# Patient Record
Sex: MALE | Race: WHITE | NOT HISPANIC OR LATINO | Employment: OTHER | URBAN - METROPOLITAN AREA
[De-identification: names, ages, dates, MRNs, and addresses within clinical notes are randomized per-mention and may not be internally consistent; named-entity substitution may affect disease eponyms.]

---

## 2017-01-06 ENCOUNTER — APPOINTMENT (OUTPATIENT)
Dept: LAB | Facility: CLINIC | Age: 82
End: 2017-01-06
Payer: MEDICARE

## 2017-01-06 ENCOUNTER — TRANSCRIBE ORDERS (OUTPATIENT)
Dept: LAB | Facility: CLINIC | Age: 82
End: 2017-01-06

## 2017-01-06 DIAGNOSIS — E03.9 HYPOTHYROIDISM, UNSPECIFIED TYPE: ICD-10-CM

## 2017-01-06 DIAGNOSIS — E78.00 PURE HYPERCHOLESTEROLEMIA: Primary | ICD-10-CM

## 2017-01-06 DIAGNOSIS — E78.00 PURE HYPERCHOLESTEROLEMIA: ICD-10-CM

## 2017-01-06 LAB
ALBUMIN SERPL BCP-MCNC: 3.8 G/DL (ref 3.5–5)
ALP SERPL-CCNC: 89 U/L (ref 46–116)
ALT SERPL W P-5'-P-CCNC: 26 U/L (ref 12–78)
ANION GAP SERPL CALCULATED.3IONS-SCNC: 5 MMOL/L (ref 4–13)
AST SERPL W P-5'-P-CCNC: 15 U/L (ref 5–45)
BASOPHILS # BLD AUTO: 0.05 THOUSANDS/ΜL (ref 0–0.1)
BASOPHILS NFR BLD AUTO: 1 % (ref 0–1)
BILIRUB SERPL-MCNC: 0.56 MG/DL (ref 0.2–1)
BUN SERPL-MCNC: 21 MG/DL (ref 5–25)
CALCIUM SERPL-MCNC: 8.9 MG/DL (ref 8.3–10.1)
CHLORIDE SERPL-SCNC: 105 MMOL/L (ref 100–108)
CHOLEST SERPL-MCNC: 184 MG/DL (ref 50–200)
CO2 SERPL-SCNC: 30 MMOL/L (ref 21–32)
CREAT SERPL-MCNC: 1.34 MG/DL (ref 0.6–1.3)
EOSINOPHIL # BLD AUTO: 0.42 THOUSAND/ΜL (ref 0–0.61)
EOSINOPHIL NFR BLD AUTO: 6 % (ref 0–6)
ERYTHROCYTE [DISTWIDTH] IN BLOOD BY AUTOMATED COUNT: 12.4 % (ref 11.6–15.1)
GFR SERPL CREATININE-BSD FRML MDRD: 50.9 ML/MIN/1.73SQ M
GLUCOSE SERPL-MCNC: 117 MG/DL (ref 65–140)
HCT VFR BLD AUTO: 45.8 % (ref 36.5–49.3)
HDLC SERPL-MCNC: 46 MG/DL (ref 40–60)
HGB BLD-MCNC: 15.9 G/DL (ref 12–17)
LDLC SERPL CALC-MCNC: 107 MG/DL (ref 0–100)
LYMPHOCYTES # BLD AUTO: 1.04 THOUSANDS/ΜL (ref 0.6–4.47)
LYMPHOCYTES NFR BLD AUTO: 15 % (ref 14–44)
MCH RBC QN AUTO: 32.6 PG (ref 26.8–34.3)
MCHC RBC AUTO-ENTMCNC: 34.7 G/DL (ref 31.4–37.4)
MCV RBC AUTO: 94 FL (ref 82–98)
MONOCYTES # BLD AUTO: 0.78 THOUSAND/ΜL (ref 0.17–1.22)
MONOCYTES NFR BLD AUTO: 11 % (ref 4–12)
NEUTROPHILS # BLD AUTO: 4.53 THOUSANDS/ΜL (ref 1.85–7.62)
NEUTS SEG NFR BLD AUTO: 67 % (ref 43–75)
NRBC BLD AUTO-RTO: 0 /100 WBCS
NT-PROBNP SERPL-MCNC: 4073 PG/ML
PLATELET # BLD AUTO: 155 THOUSANDS/UL (ref 149–390)
PMV BLD AUTO: 10.4 FL (ref 8.9–12.7)
POTASSIUM SERPL-SCNC: 3.9 MMOL/L (ref 3.5–5.3)
PROT SERPL-MCNC: 7.8 G/DL (ref 6.4–8.2)
RBC # BLD AUTO: 4.88 MILLION/UL (ref 3.88–5.62)
SODIUM SERPL-SCNC: 140 MMOL/L (ref 136–145)
TRIGL SERPL-MCNC: 156 MG/DL
TSH SERPL DL<=0.05 MIU/L-ACNC: 4.17 UIU/ML (ref 0.36–3.74)
WBC # BLD AUTO: 6.84 THOUSAND/UL (ref 4.31–10.16)

## 2017-01-06 PROCEDURE — 85025 COMPLETE CBC W/AUTO DIFF WBC: CPT

## 2017-01-06 PROCEDURE — 80061 LIPID PANEL: CPT

## 2017-01-06 PROCEDURE — 80053 COMPREHEN METABOLIC PANEL: CPT

## 2017-01-06 PROCEDURE — 84443 ASSAY THYROID STIM HORMONE: CPT

## 2017-01-06 PROCEDURE — 36415 COLL VENOUS BLD VENIPUNCTURE: CPT

## 2017-01-06 PROCEDURE — 83880 ASSAY OF NATRIURETIC PEPTIDE: CPT

## 2017-01-20 ENCOUNTER — TRANSCRIBE ORDERS (OUTPATIENT)
Dept: LAB | Facility: CLINIC | Age: 82
End: 2017-01-20

## 2017-01-20 ENCOUNTER — APPOINTMENT (OUTPATIENT)
Dept: LAB | Facility: CLINIC | Age: 82
End: 2017-01-20
Payer: MEDICARE

## 2017-01-20 DIAGNOSIS — N18.30 CHRONIC KIDNEY DISEASE, STAGE III (MODERATE) (HCC): ICD-10-CM

## 2017-01-20 DIAGNOSIS — Z13.1 DM (DIABETES MELLITUS SCREEN): ICD-10-CM

## 2017-01-20 DIAGNOSIS — N18.30 CHRONIC KIDNEY DISEASE, STAGE III (MODERATE) (HCC): Primary | ICD-10-CM

## 2017-01-20 LAB
ANION GAP SERPL CALCULATED.3IONS-SCNC: 5 MMOL/L (ref 4–13)
BUN SERPL-MCNC: 20 MG/DL (ref 5–25)
CALCIUM SERPL-MCNC: 9 MG/DL (ref 8.3–10.1)
CHLORIDE SERPL-SCNC: 104 MMOL/L (ref 100–108)
CO2 SERPL-SCNC: 29 MMOL/L (ref 21–32)
CREAT SERPL-MCNC: 1.44 MG/DL (ref 0.6–1.3)
EST. AVERAGE GLUCOSE BLD GHB EST-MCNC: 146 MG/DL
GFR SERPL CREATININE-BSD FRML MDRD: 46.9 ML/MIN/1.73SQ M
GLUCOSE SERPL-MCNC: 139 MG/DL (ref 65–140)
HBA1C MFR BLD: 6.7 % (ref 4.2–6.3)
POTASSIUM SERPL-SCNC: 3.8 MMOL/L (ref 3.5–5.3)
SODIUM SERPL-SCNC: 138 MMOL/L (ref 136–145)

## 2017-01-20 PROCEDURE — 36415 COLL VENOUS BLD VENIPUNCTURE: CPT

## 2017-01-20 PROCEDURE — 83036 HEMOGLOBIN GLYCOSYLATED A1C: CPT

## 2017-01-20 PROCEDURE — 80048 BASIC METABOLIC PNL TOTAL CA: CPT

## 2017-04-03 ENCOUNTER — APPOINTMENT (OUTPATIENT)
Dept: LAB | Facility: CLINIC | Age: 82
End: 2017-04-03
Payer: MEDICARE

## 2017-04-03 ENCOUNTER — TRANSCRIBE ORDERS (OUTPATIENT)
Dept: LAB | Facility: CLINIC | Age: 82
End: 2017-04-03

## 2017-04-03 DIAGNOSIS — I10 ESSENTIAL HYPERTENSION, MALIGNANT: ICD-10-CM

## 2017-04-03 DIAGNOSIS — Z86.39 PERSONAL HISTORY OF NUTRITIONAL DEFICIENCY: ICD-10-CM

## 2017-04-03 DIAGNOSIS — Z79.899 ENCOUNTER FOR LONG-TERM (CURRENT) USE OF OTHER MEDICATIONS: ICD-10-CM

## 2017-04-03 DIAGNOSIS — I10 ESSENTIAL HYPERTENSION, MALIGNANT: Primary | ICD-10-CM

## 2017-04-03 DIAGNOSIS — Z87.19 PERSONAL HISTORY OF DIGESTIVE DISEASE: ICD-10-CM

## 2017-04-03 LAB
ANION GAP SERPL CALCULATED.3IONS-SCNC: 5 MMOL/L (ref 4–13)
BUN SERPL-MCNC: 23 MG/DL (ref 5–25)
CALCIUM SERPL-MCNC: 9 MG/DL (ref 8.3–10.1)
CHLORIDE SERPL-SCNC: 104 MMOL/L (ref 100–108)
CO2 SERPL-SCNC: 30 MMOL/L (ref 21–32)
CREAT SERPL-MCNC: 1.62 MG/DL (ref 0.6–1.3)
GFR SERPL CREATININE-BSD FRML MDRD: 40.9 ML/MIN/1.73SQ M
GLUCOSE P FAST SERPL-MCNC: 162 MG/DL (ref 65–99)
POTASSIUM SERPL-SCNC: 4.3 MMOL/L (ref 3.5–5.3)
SODIUM SERPL-SCNC: 139 MMOL/L (ref 136–145)
TSH SERPL DL<=0.05 MIU/L-ACNC: 1.54 UIU/ML (ref 0.36–3.74)

## 2017-04-03 PROCEDURE — 36415 COLL VENOUS BLD VENIPUNCTURE: CPT

## 2017-04-03 PROCEDURE — 80048 BASIC METABOLIC PNL TOTAL CA: CPT

## 2017-04-03 PROCEDURE — 84443 ASSAY THYROID STIM HORMONE: CPT

## 2017-06-06 ENCOUNTER — TRANSCRIBE ORDERS (OUTPATIENT)
Dept: URGENT CARE | Facility: CLINIC | Age: 82
End: 2017-06-06

## 2017-06-06 ENCOUNTER — APPOINTMENT (OUTPATIENT)
Dept: LAB | Facility: CLINIC | Age: 82
End: 2017-06-06
Payer: MEDICARE

## 2017-06-06 DIAGNOSIS — Z79.899 ENCOUNTER FOR LONG-TERM (CURRENT) USE OF OTHER MEDICATIONS: ICD-10-CM

## 2017-06-06 DIAGNOSIS — I10 ESSENTIAL HYPERTENSION, MALIGNANT: ICD-10-CM

## 2017-06-06 DIAGNOSIS — R60.9 EDEMA, UNSPECIFIED TYPE: ICD-10-CM

## 2017-06-06 DIAGNOSIS — I10 ESSENTIAL HYPERTENSION, MALIGNANT: Primary | ICD-10-CM

## 2017-06-06 DIAGNOSIS — R07.9 CHEST PAIN, UNSPECIFIED: ICD-10-CM

## 2017-06-06 LAB
ALBUMIN SERPL BCP-MCNC: 3.8 G/DL (ref 3.5–5)
ALP SERPL-CCNC: 90 U/L (ref 46–116)
ALT SERPL W P-5'-P-CCNC: 21 U/L (ref 12–78)
ANION GAP SERPL CALCULATED.3IONS-SCNC: 8 MMOL/L (ref 4–13)
AST SERPL W P-5'-P-CCNC: 15 U/L (ref 5–45)
BASOPHILS # BLD AUTO: 0.08 THOUSANDS/ΜL (ref 0–0.1)
BASOPHILS NFR BLD AUTO: 1 % (ref 0–1)
BILIRUB SERPL-MCNC: 0.65 MG/DL (ref 0.2–1)
BUN SERPL-MCNC: 27 MG/DL (ref 5–25)
CALCIUM SERPL-MCNC: 8.8 MG/DL (ref 8.3–10.1)
CHLORIDE SERPL-SCNC: 101 MMOL/L (ref 100–108)
CO2 SERPL-SCNC: 29 MMOL/L (ref 21–32)
CREAT SERPL-MCNC: 1.67 MG/DL (ref 0.6–1.3)
EOSINOPHIL # BLD AUTO: 0.55 THOUSAND/ΜL (ref 0–0.61)
EOSINOPHIL NFR BLD AUTO: 9 % (ref 0–6)
ERYTHROCYTE [DISTWIDTH] IN BLOOD BY AUTOMATED COUNT: 13 % (ref 11.6–15.1)
GFR SERPL CREATININE-BSD FRML MDRD: 39.4 ML/MIN/1.73SQ M
GLUCOSE SERPL-MCNC: 218 MG/DL (ref 65–140)
HCT VFR BLD AUTO: 43.1 % (ref 36.5–49.3)
HGB BLD-MCNC: 14.9 G/DL (ref 12–17)
LYMPHOCYTES # BLD AUTO: 1.08 THOUSANDS/ΜL (ref 0.6–4.47)
LYMPHOCYTES NFR BLD AUTO: 17 % (ref 14–44)
MCH RBC QN AUTO: 33 PG (ref 26.8–34.3)
MCHC RBC AUTO-ENTMCNC: 34.6 G/DL (ref 31.4–37.4)
MCV RBC AUTO: 95 FL (ref 82–98)
MONOCYTES # BLD AUTO: 0.62 THOUSAND/ΜL (ref 0.17–1.22)
MONOCYTES NFR BLD AUTO: 10 % (ref 4–12)
NEUTROPHILS # BLD AUTO: 3.93 THOUSANDS/ΜL (ref 1.85–7.62)
NEUTS SEG NFR BLD AUTO: 63 % (ref 43–75)
NRBC BLD AUTO-RTO: 0 /100 WBCS
NT-PROBNP SERPL-MCNC: 1747 PG/ML
PLATELET # BLD AUTO: 133 THOUSANDS/UL (ref 149–390)
PMV BLD AUTO: 10.2 FL (ref 8.9–12.7)
POTASSIUM SERPL-SCNC: 3.6 MMOL/L (ref 3.5–5.3)
PROT SERPL-MCNC: 7.8 G/DL (ref 6.4–8.2)
RBC # BLD AUTO: 4.52 MILLION/UL (ref 3.88–5.62)
SODIUM SERPL-SCNC: 138 MMOL/L (ref 136–145)
WBC # BLD AUTO: 6.28 THOUSAND/UL (ref 4.31–10.16)

## 2017-06-06 PROCEDURE — 80053 COMPREHEN METABOLIC PANEL: CPT

## 2017-06-06 PROCEDURE — 36415 COLL VENOUS BLD VENIPUNCTURE: CPT

## 2017-06-06 PROCEDURE — 85025 COMPLETE CBC W/AUTO DIFF WBC: CPT

## 2017-06-06 PROCEDURE — 83880 ASSAY OF NATRIURETIC PEPTIDE: CPT

## 2017-07-17 ENCOUNTER — TRANSCRIBE ORDERS (OUTPATIENT)
Dept: LAB | Facility: CLINIC | Age: 82
End: 2017-07-17

## 2017-07-17 ENCOUNTER — APPOINTMENT (OUTPATIENT)
Dept: LAB | Facility: CLINIC | Age: 82
End: 2017-07-17
Payer: MEDICARE

## 2017-07-17 DIAGNOSIS — E03.9 UNSPECIFIED HYPOTHYROIDISM: ICD-10-CM

## 2017-07-17 DIAGNOSIS — E11.9 TYPE 2 DIABETES MELLITUS WITHOUT COMPLICATION, WITHOUT LONG-TERM CURRENT USE OF INSULIN (HCC): Primary | ICD-10-CM

## 2017-07-17 LAB
EST. AVERAGE GLUCOSE BLD GHB EST-MCNC: 151 MG/DL
HBA1C MFR BLD: 6.9 % (ref 4.2–6.3)
T4 FREE SERPL-MCNC: 1.37 NG/DL (ref 0.76–1.46)
TSH SERPL DL<=0.05 MIU/L-ACNC: 4.04 UIU/ML (ref 0.36–3.74)

## 2017-07-17 PROCEDURE — 84439 ASSAY OF FREE THYROXINE: CPT

## 2017-07-17 PROCEDURE — 36415 COLL VENOUS BLD VENIPUNCTURE: CPT

## 2017-07-17 PROCEDURE — 84443 ASSAY THYROID STIM HORMONE: CPT

## 2017-07-17 PROCEDURE — 83036 HEMOGLOBIN GLYCOSYLATED A1C: CPT

## 2017-10-19 ENCOUNTER — TRANSCRIBE ORDERS (OUTPATIENT)
Dept: LAB | Facility: CLINIC | Age: 82
End: 2017-10-19

## 2017-10-19 ENCOUNTER — APPOINTMENT (OUTPATIENT)
Dept: LAB | Facility: CLINIC | Age: 82
End: 2017-10-19
Payer: MEDICARE

## 2017-10-19 DIAGNOSIS — E03.9 HYPOTHYROIDISM, UNSPECIFIED TYPE: ICD-10-CM

## 2017-10-19 DIAGNOSIS — E11.9 TYPE 2 DIABETES MELLITUS WITHOUT COMPLICATION, UNSPECIFIED LONG TERM INSULIN USE STATUS: Primary | ICD-10-CM

## 2017-10-19 LAB
CREAT SERPL-MCNC: 1.48 MG/DL (ref 0.6–1.3)
CREAT UR-MCNC: 30.7 MG/DL
EST. AVERAGE GLUCOSE BLD GHB EST-MCNC: 140 MG/DL
GFR SERPL CREATININE-BSD FRML MDRD: 43 ML/MIN/1.73SQ M
HBA1C MFR BLD: 6.5 % (ref 4.2–6.3)
MICROALBUMIN UR-MCNC: 80.3 MG/L (ref 0–20)
MICROALBUMIN/CREAT 24H UR: 262 MG/G CREATININE (ref 0–30)
TSH SERPL DL<=0.05 MIU/L-ACNC: 3.5 UIU/ML (ref 0.36–3.74)

## 2017-10-19 PROCEDURE — 82565 ASSAY OF CREATININE: CPT

## 2017-10-19 PROCEDURE — 83036 HEMOGLOBIN GLYCOSYLATED A1C: CPT

## 2017-10-19 PROCEDURE — 82570 ASSAY OF URINE CREATININE: CPT

## 2017-10-19 PROCEDURE — 84443 ASSAY THYROID STIM HORMONE: CPT

## 2017-10-19 PROCEDURE — 82043 UR ALBUMIN QUANTITATIVE: CPT

## 2017-10-19 PROCEDURE — 36415 COLL VENOUS BLD VENIPUNCTURE: CPT

## 2017-11-09 ENCOUNTER — TRANSCRIBE ORDERS (OUTPATIENT)
Dept: URGENT CARE | Facility: CLINIC | Age: 82
End: 2017-11-09

## 2017-11-09 ENCOUNTER — APPOINTMENT (OUTPATIENT)
Dept: LAB | Facility: CLINIC | Age: 82
End: 2017-11-09
Payer: MEDICARE

## 2017-11-09 DIAGNOSIS — N18.9 CHRONIC RENAL IMPAIRMENT, UNSPECIFIED CKD STAGE: ICD-10-CM

## 2017-11-09 DIAGNOSIS — I51.9 MODERATE LEFT VENTRICULAR SYSTOLIC DYSFUNCTION: Primary | ICD-10-CM

## 2017-11-09 DIAGNOSIS — I10 HYPERTENSION, UNSPECIFIED TYPE: ICD-10-CM

## 2017-11-09 LAB
ANION GAP SERPL CALCULATED.3IONS-SCNC: 7 MMOL/L (ref 4–13)
BUN SERPL-MCNC: 20 MG/DL (ref 5–25)
CALCIUM SERPL-MCNC: 8.9 MG/DL (ref 8.3–10.1)
CHLORIDE SERPL-SCNC: 103 MMOL/L (ref 100–108)
CO2 SERPL-SCNC: 28 MMOL/L (ref 21–32)
CREAT SERPL-MCNC: 1.42 MG/DL (ref 0.6–1.3)
GFR SERPL CREATININE-BSD FRML MDRD: 45 ML/MIN/1.73SQ M
GLUCOSE P FAST SERPL-MCNC: 82 MG/DL (ref 65–99)
NT-PROBNP SERPL-MCNC: 1493 PG/ML
POTASSIUM SERPL-SCNC: 4 MMOL/L (ref 3.5–5.3)
SODIUM SERPL-SCNC: 138 MMOL/L (ref 136–145)

## 2017-11-09 PROCEDURE — 36415 COLL VENOUS BLD VENIPUNCTURE: CPT

## 2017-11-09 PROCEDURE — 80048 BASIC METABOLIC PNL TOTAL CA: CPT

## 2017-11-09 PROCEDURE — 83880 ASSAY OF NATRIURETIC PEPTIDE: CPT

## 2017-12-19 ENCOUNTER — TRANSCRIBE ORDERS (OUTPATIENT)
Dept: URGENT CARE | Facility: CLINIC | Age: 82
End: 2017-12-19

## 2017-12-19 ENCOUNTER — APPOINTMENT (OUTPATIENT)
Dept: LAB | Facility: CLINIC | Age: 82
End: 2017-12-19
Payer: MEDICARE

## 2017-12-19 DIAGNOSIS — I42.9 CARDIOMYOPATHY, UNSPECIFIED TYPE (HCC): ICD-10-CM

## 2017-12-19 DIAGNOSIS — E11.22 CONTROLLED TYPE 2 DIABETES MELLITUS WITH CHRONIC KIDNEY DISEASE, UNSPECIFIED CKD STAGE, UNSPECIFIED LONG TERM INSULIN USE STATUS: ICD-10-CM

## 2017-12-19 DIAGNOSIS — I42.9 CARDIOMYOPATHY, UNSPECIFIED TYPE (HCC): Primary | ICD-10-CM

## 2017-12-19 LAB
ANION GAP SERPL CALCULATED.3IONS-SCNC: 5 MMOL/L (ref 4–13)
BUN SERPL-MCNC: 23 MG/DL (ref 5–25)
CALCIUM SERPL-MCNC: 8.6 MG/DL (ref 8.3–10.1)
CHLORIDE SERPL-SCNC: 104 MMOL/L (ref 100–108)
CO2 SERPL-SCNC: 29 MMOL/L (ref 21–32)
CREAT SERPL-MCNC: 1.77 MG/DL (ref 0.6–1.3)
GFR SERPL CREATININE-BSD FRML MDRD: 35 ML/MIN/1.73SQ M
GLUCOSE SERPL-MCNC: 196 MG/DL (ref 65–140)
NT-PROBNP SERPL-MCNC: 1542 PG/ML
POTASSIUM SERPL-SCNC: 4 MMOL/L (ref 3.5–5.3)
SODIUM SERPL-SCNC: 138 MMOL/L (ref 136–145)

## 2017-12-19 PROCEDURE — 80048 BASIC METABOLIC PNL TOTAL CA: CPT

## 2017-12-19 PROCEDURE — 36415 COLL VENOUS BLD VENIPUNCTURE: CPT

## 2017-12-19 PROCEDURE — 83880 ASSAY OF NATRIURETIC PEPTIDE: CPT

## 2018-01-05 ENCOUNTER — GENERIC CONVERSION - ENCOUNTER (OUTPATIENT)
Dept: OTHER | Facility: OTHER | Age: 83
End: 2018-01-05

## 2018-01-05 ENCOUNTER — OFFICE VISIT (OUTPATIENT)
Dept: URGENT CARE | Facility: CLINIC | Age: 83
End: 2018-01-05
Payer: MEDICARE

## 2018-01-05 ENCOUNTER — TRANSCRIBE ORDERS (OUTPATIENT)
Dept: URGENT CARE | Facility: CLINIC | Age: 83
End: 2018-01-05

## 2018-01-05 ENCOUNTER — APPOINTMENT (OUTPATIENT)
Dept: URGENT CARE | Facility: CLINIC | Age: 83
End: 2018-01-05
Payer: MEDICARE

## 2018-01-05 DIAGNOSIS — R42 DIZZY: Primary | ICD-10-CM

## 2018-01-05 DIAGNOSIS — R42 DIZZY: ICD-10-CM

## 2018-01-05 LAB — GLUCOSE SERPL-MCNC: 216 MG/DL (ref 65–140)

## 2018-01-05 PROCEDURE — 99213 OFFICE O/P EST LOW 20 MIN: CPT

## 2018-01-05 PROCEDURE — G0463 HOSPITAL OUTPT CLINIC VISIT: HCPCS

## 2018-01-05 PROCEDURE — 93005 ELECTROCARDIOGRAM TRACING: CPT

## 2018-01-05 PROCEDURE — 82948 REAGENT STRIP/BLOOD GLUCOSE: CPT

## 2018-01-08 LAB
ATRIAL RATE: 53 BPM
P AXIS: 46 DEGREES
PR INTERVAL: 264 MS
QRS AXIS: -46 DEGREES
QRSD INTERVAL: 100 MS
QT INTERVAL: 468 MS
QTC INTERVAL: 439 MS
T WAVE AXIS: 113 DEGREES
VENTRICULAR RATE: 53 BPM

## 2018-01-09 NOTE — PROGRESS NOTES
Assessment   1  Dizziness (780 4) (R42)    Plan   Diabetes    · Blood Glucose- POC; Status:Active - Perform Order,Retrospective By Protocol    Authorization; Requested [id number]; Fasting (Y/N) : No - N    Discussion/Summary   Discussion Summary:    Patient referred to the emergency room for further evaluation and treatment  Will be going to [facility] called and notified of arrival     Medication Side Effects Reviewed: Possible side effects of new medications were reviewed with the patient/guardian today  Understands and agrees with treatment plan: The treatment plan was reviewed with the patient/guardian  The patient/guardian understands and agrees with the treatment plan      Chief Complaint   1  Dizziness  Chief Complaint Free Text Note Form: C/O dizziness, lightheadedness and in balance in gait since early AM  Denies SOB, chest pain, headache  History of Present Illness   HPI: 80year old male here with dizziness, lightheadedness and unsteadiness that started this am  Patient has a history of CAD, diabetes  Denies any shortness of breath, chest pain, vision changes, headache, leg swelling  Hospital Based Practices Required Assessment:      Pain Assessment      the patient states they do not have pain  (on a scale of 0 to 10, the patient rates the pain at 0 )      Abuse And Domestic Violence Screen   Domestic violence screen not done today  Reason DV Screen not done: wife present     Dizziness: [facility] presents with complaints of dizziness  Associated symptoms include weakness, but-- no syncope,-- no difficulty ambulating,-- no nausea,-- no vomiting,-- no diarrhea,-- no headache,-- no symptoms of an upper respiratory infection,-- no ear pain,-- no ear fullness,-- no tinnitus,-- no hearing loss,-- no neck pain,-- no neck stiffness,-- no visual changes,-- no dysarthria,-- no dysphagia,-- no facial weakness-- and-- no falling episodes        Review of Systems Focused-Male:      Constitutional: feeling poorly, but-- as noted in HPI       ENT: no complaints of earache, no loss of hearing, no nosebleeds or nasal discharge, no sore throat or hoarseness  Cardiovascular: no complaints of slow or fast heart rate, no chest pain, no palpitations, no leg claudication or lower extremity edema  Respiratory: no complaints of shortness of breath, no wheezing or cough, no dyspnea on exertion, no orthopnea or PND  Gastrointestinal: no complaints of abdominal pain, no constipation, no nausea or vomiting, no diarrhea or bloody stools  Neurological: dizziness, but-- as noted in HPI,-- no headache,-- no numbness,-- no tingling,-- no confusion-- and-- no fainting  ROS Reviewed:    ROS reviewed  Active Problems   1  CAD (coronary artery disease) (414 00) (I25 10)   2  Diabetes (250 00) (E11 9)   3  H/O heart artery stent (V45 82) (Z95 5)   4  Hypothyroidism (244 9) (E03 9)    Past Medical History   1  History of Bug bite (919 4,E906 4) (W57 XXXA)   2  History of insect bite (V15 59) (Z87 828)   3  No pertinent past medical history  Active Problems And Past Medical History Reviewed: The active problems and past medical history were reviewed and updated today  Family History   Family History Reviewed: The family history was reviewed and updated today  Social History    · Never a smoker  Social History Reviewed: The social history was reviewed and updated today  The social history was reviewed and is unchanged  Surgical History   1  History of Back Surgery   2  History of Cath Placement Of Stent 2   3  History of Coronary Artery Triple Arterial Bypass Graft   4  History of Hip Surgery   5  History of Knee Replacement   6  History of Shoulder Surgery  Surgical History Reviewed: The surgical history was reviewed and updated today  Current Meds    1  Acetylcysteine 20 % Inhalation Solution;      Therapy: 80YEW9883 to (Last Rx:96Iec4033) Requested for: 90Dpo1854 Ordered   2  Amiodarone HCl - 200 MG Oral Tablet; TAKE 1 TABLET DAILY; Therapy: 15UIS4187 to (Last HH:60VFA7827)  Requested for: 71QDT9580 Ordered   3  AmLODIPine Besylate 5 MG Oral Tablet; TAKE 1 TABLET DAILY; Therapy: 59ABD7957 to (Last Rx:99Wxt7254)  Requested for: 89ULP8895 Ordered   4  Aspirin 81 MG Oral Tablet Chewable; Therapy: (Recorded:05Jan2018) to Recorded   5  Atorvastatin Calcium 10 MG Oral Tablet; Therapy: (353 081 865) to Recorded   6  BD Pen Needle Mini U/F 31G X 5 MM Miscellaneous; Therapy: 59NVW2003 to (Last Rx:24Tyv7788)  Requested for: 69Ecs6623 Ordered   7  BD Pen Needle Skylar U/F 32G X 4 MM Miscellaneous; Therapy: 17Ztq0956 to (Last Rx:99Sca6341)  Requested for: 12Epg6800 Ordered   8  Blephamide 10-0 2 % Ophthalmic Suspension; Therapy: 36Ghm5260 to (Last Rx:68Kei3444)  Requested for: 07Pnp7929 Ordered   9  Carvedilol 6 25 MG Oral Tablet; TAKE 1 TABLET TWICE DAILY,  WITH MORNING AND     EVENING MEAL; Therapy: 58SKZ4266 to (Last Rx:51Yqq9731)  Requested for: 25RUE0413 Ordered   10  Erythromycin 5 MG/GM Ophthalmic Ointment; Therapy: 92KON2776 to (Last Rx:08Jan2011)  Requested for: 53OHO7343 Ordered   11  Fish Oil CAPS; Therapy: (353 081 865) to Recorded   12  Furosemide 20 MG Oral Tablet; Therapy: (353 081 865) to Recorded   13  Isosorbide Mononitrate 30 MG TB24;      Therapy: (WPSGOFXW:03LAI4208) to Recorded   14  Nitroglycerin 0 4 MG Sublingual Tablet Sublingual;      Therapy: (Recorded:05Jan2018) to Recorded   15  NovoLOG FlexPen 100 UNIT/ML SOLN;      Therapy: (Recorded:05Jan2018) to Recorded   16  Plavix 75 MG Oral Tablet; TAKE 1 TABLET DAILY; Therapy: 02BSH5037 to (Last W[place]:18SPA6032)  Requested for: 45NDL4464 Ordered   17  Potassium Chloride 10 MEQ TBCR; Therapy: (485 987 290) to Recorded   18  Prandin 2 MG Oral Tablet; Therapy: (112 364 398) to Recorded   19   PrednisoLONE Acetate 1 % Ophthalmic Suspension; Therapy: 98WYJ7617 to (Last Rx:17Mar2011)  Requested for: 09OZC7394 Ordered   20  Tradjenta 5 MG Oral Tablet; Therapy: (50-92-49-29) to Recorded   21  Vigamox 0 5 % Ophthalmic Solution; Therapy: 24XON4143 to (Last Rx:14Mar2011)  Requested for: 72XBN9131 Ordered   22  Vitamin D (Ergocalciferol) 28097 UNIT Oral Capsule; Therapy: 27ETC9838 to (Last Rx:06Jun2011)  Requested for: 58AAO2624 Ordered  Medication List Reviewed: The medication list was reviewed and updated today  Allergies   1  No Known Drug Allergies    Vitals   Signs   Recorded: 12UCO9737 12:12PM   Heart Rate: 60  Respiration: 20  Systolic: 300  Diastolic: 68  O2 Saturation: 98    Physical Exam        Constitutional      General appearance: No acute distress, well appearing and well nourished  Eyes      Conjunctiva and lids: No swelling, erythema, or discharge  Ears, Nose, Mouth, and Throat      External inspection of ears and nose: Normal        Otoscopic examination: Tympanic membrance translucent with normal light reflex  Canals patent without erythema  Nasal mucosa, septum, and turbinates: Normal without edema or erythema  Oropharynx: Normal with no erythema, edema, exudate or lesions  Pulmonary      Respiratory effort: No increased work of breathing or signs of respiratory distress  Auscultation of lungs: Clear to auscultation  Cardiovascular      Auscultation of heart: Normal rate and rhythm, normal S1 and S2, without murmurs  Abdomen      Abdomen: Non-tender, no masses  Lymphatic      Palpation of lymph nodes in neck: No lymphadenopathy         Musculoskeletal      Gait and station: Normal        Psychiatric      Orientation to person, place and time: Normal        Mood and affect: Normal        Signatures    Electronically signed by : KARIME Rivas; Jan 5 2018  1:07PM EST                       (Author)     Electronically signed by : RACHAEL Vazquez ; Jan 8 2018  3:55PM EST                       (Co-author)

## 2018-01-23 VITALS
DIASTOLIC BLOOD PRESSURE: 68 MMHG | OXYGEN SATURATION: 98 % | RESPIRATION RATE: 20 BRPM | SYSTOLIC BLOOD PRESSURE: 148 MMHG | HEART RATE: 60 BPM

## 2018-07-23 ENCOUNTER — APPOINTMENT (OUTPATIENT)
Dept: LAB | Facility: CLINIC | Age: 83
End: 2018-07-23
Payer: MEDICARE

## 2018-07-23 ENCOUNTER — TRANSCRIBE ORDERS (OUTPATIENT)
Dept: LAB | Facility: CLINIC | Age: 83
End: 2018-07-23

## 2018-07-23 DIAGNOSIS — IMO0001 UNCONTROLLED DIABETES MELLITUS TYPE 2 WITHOUT COMPLICATIONS, UNSPECIFIED WHETHER LONG TERM INSULIN USE: Primary | ICD-10-CM

## 2018-07-23 DIAGNOSIS — E03.9 HYPOTHYROIDISM, UNSPECIFIED TYPE: Primary | ICD-10-CM

## 2018-07-23 DIAGNOSIS — E03.9 HYPOTHYROIDISM, UNSPECIFIED TYPE: ICD-10-CM

## 2018-07-23 LAB
EST. AVERAGE GLUCOSE BLD GHB EST-MCNC: 154 MG/DL
HBA1C MFR BLD: 7 % (ref 4.2–6.3)
TSH SERPL DL<=0.05 MIU/L-ACNC: 3.34 UIU/ML (ref 0.36–3.74)

## 2018-07-23 PROCEDURE — 36415 COLL VENOUS BLD VENIPUNCTURE: CPT

## 2018-07-23 PROCEDURE — 84443 ASSAY THYROID STIM HORMONE: CPT

## 2018-07-23 PROCEDURE — 83036 HEMOGLOBIN GLYCOSYLATED A1C: CPT

## 2018-11-29 ENCOUNTER — TRANSCRIBE ORDERS (OUTPATIENT)
Dept: LAB | Facility: CLINIC | Age: 83
End: 2018-11-29

## 2018-11-29 ENCOUNTER — APPOINTMENT (OUTPATIENT)
Dept: LAB | Facility: CLINIC | Age: 83
End: 2018-11-29
Payer: MEDICARE

## 2018-11-29 DIAGNOSIS — I48.0 PAROXYSMAL ATRIAL FIBRILLATION (HCC): ICD-10-CM

## 2018-11-29 DIAGNOSIS — I51.9 MYXEDEMA HEART DISEASE: ICD-10-CM

## 2018-11-29 DIAGNOSIS — E03.9 MYXEDEMA HEART DISEASE: ICD-10-CM

## 2018-11-29 DIAGNOSIS — Z79.4 ENCOUNTER FOR LONG-TERM (CURRENT) USE OF INSULIN (HCC): ICD-10-CM

## 2018-11-29 DIAGNOSIS — E11.9 TYPE 2 DIABETES MELLITUS WITHOUT COMPLICATION, WITH LONG-TERM CURRENT USE OF INSULIN (HCC): ICD-10-CM

## 2018-11-29 DIAGNOSIS — Z79.4 TYPE 2 DIABETES MELLITUS WITHOUT COMPLICATION, WITH LONG-TERM CURRENT USE OF INSULIN (HCC): ICD-10-CM

## 2018-11-29 DIAGNOSIS — E78.2 MIXED HYPERLIPIDEMIA: Primary | ICD-10-CM

## 2018-11-29 LAB
ALBUMIN SERPL BCP-MCNC: 3.8 G/DL (ref 3.5–5)
ALP SERPL-CCNC: 81 U/L (ref 46–116)
ALT SERPL W P-5'-P-CCNC: 22 U/L (ref 12–78)
ANION GAP SERPL CALCULATED.3IONS-SCNC: 4 MMOL/L (ref 4–13)
AST SERPL W P-5'-P-CCNC: 14 U/L (ref 5–45)
BASOPHILS # BLD AUTO: 0.07 THOUSANDS/ΜL (ref 0–0.1)
BASOPHILS NFR BLD AUTO: 1 % (ref 0–1)
BILIRUB SERPL-MCNC: 0.61 MG/DL (ref 0.2–1)
BUN SERPL-MCNC: 20 MG/DL (ref 5–25)
CALCIUM SERPL-MCNC: 9.4 MG/DL (ref 8.3–10.1)
CHLORIDE SERPL-SCNC: 104 MMOL/L (ref 100–108)
CHOLEST SERPL-MCNC: 172 MG/DL (ref 50–200)
CO2 SERPL-SCNC: 28 MMOL/L (ref 21–32)
CREAT SERPL-MCNC: 1.65 MG/DL (ref 0.6–1.3)
CREAT UR-MCNC: 82.8 MG/DL
EOSINOPHIL # BLD AUTO: 0.33 THOUSAND/ΜL (ref 0–0.61)
EOSINOPHIL NFR BLD AUTO: 6 % (ref 0–6)
ERYTHROCYTE [DISTWIDTH] IN BLOOD BY AUTOMATED COUNT: 12.2 % (ref 11.6–15.1)
GFR SERPL CREATININE-BSD FRML MDRD: 37 ML/MIN/1.73SQ M
GLUCOSE P FAST SERPL-MCNC: 122 MG/DL (ref 65–99)
HCT VFR BLD AUTO: 44.6 % (ref 36.5–49.3)
HDLC SERPL-MCNC: 40 MG/DL (ref 40–60)
HGB BLD-MCNC: 15.2 G/DL (ref 12–17)
IMM GRANULOCYTES # BLD AUTO: 0.03 THOUSAND/UL (ref 0–0.2)
IMM GRANULOCYTES NFR BLD AUTO: 1 % (ref 0–2)
LDLC SERPL CALC-MCNC: 73 MG/DL (ref 0–100)
LYMPHOCYTES # BLD AUTO: 1.07 THOUSANDS/ΜL (ref 0.6–4.47)
LYMPHOCYTES NFR BLD AUTO: 18 % (ref 14–44)
MCH RBC QN AUTO: 33.2 PG (ref 26.8–34.3)
MCHC RBC AUTO-ENTMCNC: 34.1 G/DL (ref 31.4–37.4)
MCV RBC AUTO: 97 FL (ref 82–98)
MICROALBUMIN UR-MCNC: 104 MG/L (ref 0–20)
MICROALBUMIN/CREAT 24H UR: 126 MG/G CREATININE (ref 0–30)
MONOCYTES # BLD AUTO: 0.6 THOUSAND/ΜL (ref 0.17–1.22)
MONOCYTES NFR BLD AUTO: 10 % (ref 4–12)
NEUTROPHILS # BLD AUTO: 3.89 THOUSANDS/ΜL (ref 1.85–7.62)
NEUTS SEG NFR BLD AUTO: 64 % (ref 43–75)
NONHDLC SERPL-MCNC: 132 MG/DL
NRBC BLD AUTO-RTO: 0 /100 WBCS
PLATELET # BLD AUTO: 151 THOUSANDS/UL (ref 149–390)
PMV BLD AUTO: 10.4 FL (ref 8.9–12.7)
POTASSIUM SERPL-SCNC: 4.1 MMOL/L (ref 3.5–5.3)
PROT SERPL-MCNC: 7.6 G/DL (ref 6.4–8.2)
RBC # BLD AUTO: 4.58 MILLION/UL (ref 3.88–5.62)
SODIUM SERPL-SCNC: 136 MMOL/L (ref 136–145)
TRIGL SERPL-MCNC: 295 MG/DL
TSH SERPL DL<=0.05 MIU/L-ACNC: 3.38 UIU/ML (ref 0.36–3.74)
WBC # BLD AUTO: 5.99 THOUSAND/UL (ref 4.31–10.16)

## 2018-11-29 PROCEDURE — 80061 LIPID PANEL: CPT

## 2018-11-29 PROCEDURE — 84443 ASSAY THYROID STIM HORMONE: CPT

## 2018-11-29 PROCEDURE — 36415 COLL VENOUS BLD VENIPUNCTURE: CPT

## 2018-11-29 PROCEDURE — 82570 ASSAY OF URINE CREATININE: CPT

## 2018-11-29 PROCEDURE — 80053 COMPREHEN METABOLIC PANEL: CPT

## 2018-11-29 PROCEDURE — 83036 HEMOGLOBIN GLYCOSYLATED A1C: CPT

## 2018-11-29 PROCEDURE — 85025 COMPLETE CBC W/AUTO DIFF WBC: CPT

## 2018-11-29 PROCEDURE — 82043 UR ALBUMIN QUANTITATIVE: CPT

## 2018-11-30 LAB
EST. AVERAGE GLUCOSE BLD GHB EST-MCNC: 177 MG/DL
HBA1C MFR BLD: 7.8 % (ref 4.2–6.3)

## 2018-12-05 ENCOUNTER — OFFICE VISIT (OUTPATIENT)
Dept: CARDIOLOGY CLINIC | Facility: CLINIC | Age: 83
End: 2018-12-05
Payer: MEDICARE

## 2018-12-05 VITALS
DIASTOLIC BLOOD PRESSURE: 80 MMHG | WEIGHT: 178 LBS | HEIGHT: 68 IN | BODY MASS INDEX: 26.98 KG/M2 | SYSTOLIC BLOOD PRESSURE: 148 MMHG

## 2018-12-05 DIAGNOSIS — R00.1 BRADYCARDIA: ICD-10-CM

## 2018-12-05 DIAGNOSIS — I10 ESSENTIAL HYPERTENSION: ICD-10-CM

## 2018-12-05 DIAGNOSIS — I48.0 PAROXYSMAL ATRIAL FIBRILLATION (HCC): ICD-10-CM

## 2018-12-05 DIAGNOSIS — I25.5 ISCHEMIC CARDIOMYOPATHY: Primary | ICD-10-CM

## 2018-12-05 DIAGNOSIS — Z95.1 HX OF CABG: ICD-10-CM

## 2018-12-05 DIAGNOSIS — E78.5 DYSLIPIDEMIA: ICD-10-CM

## 2018-12-05 PROBLEM — E11.8 TYPE 2 DIABETES MELLITUS WITH COMPLICATION, WITH LONG-TERM CURRENT USE OF INSULIN (HCC): Status: ACTIVE | Noted: 2018-12-05

## 2018-12-05 PROBLEM — Z79.4 TYPE 2 DIABETES MELLITUS WITH COMPLICATION, WITH LONG-TERM CURRENT USE OF INSULIN (HCC): Status: ACTIVE | Noted: 2018-12-05

## 2018-12-05 PROCEDURE — 93000 ELECTROCARDIOGRAM COMPLETE: CPT | Performed by: INTERNAL MEDICINE

## 2018-12-05 PROCEDURE — 99214 OFFICE O/P EST MOD 30 MIN: CPT | Performed by: INTERNAL MEDICINE

## 2018-12-05 RX ORDER — REPAGLINIDE 2 MG/1
2 TABLET ORAL
COMMUNITY
End: 2019-07-31

## 2018-12-05 RX ORDER — NITROGLYCERIN 0.4 MG/1
0.4 TABLET SUBLINGUAL
COMMUNITY
End: 2020-08-21 | Stop reason: SDUPTHER

## 2018-12-05 RX ORDER — CHLORAL HYDRATE 500 MG
1000 CAPSULE ORAL DAILY
COMMUNITY
End: 2020-11-18 | Stop reason: CLARIF

## 2018-12-05 RX ORDER — CARVEDILOL 3.12 MG/1
3.12 TABLET ORAL 2 TIMES DAILY WITH MEALS
COMMUNITY
End: 2019-01-22 | Stop reason: SDUPTHER

## 2018-12-05 RX ORDER — AMIODARONE HYDROCHLORIDE 200 MG/1
100 TABLET ORAL DAILY
COMMUNITY
End: 2019-01-22 | Stop reason: SDUPTHER

## 2018-12-05 RX ORDER — AMLODIPINE BESYLATE 2.5 MG/1
5 TABLET ORAL 2 TIMES DAILY
COMMUNITY
End: 2019-01-22 | Stop reason: SDUPTHER

## 2018-12-05 RX ORDER — ISOSORBIDE MONONITRATE 30 MG/1
30 TABLET, EXTENDED RELEASE ORAL 4 TIMES DAILY
COMMUNITY
End: 2019-01-22 | Stop reason: SDUPTHER

## 2018-12-05 RX ORDER — LEVOTHYROXINE SODIUM 0.07 MG/1
75 TABLET ORAL DAILY
COMMUNITY
End: 2019-01-22 | Stop reason: SDUPTHER

## 2018-12-05 RX ORDER — FUROSEMIDE 20 MG/1
20 TABLET ORAL
COMMUNITY
End: 2019-01-22 | Stop reason: SDUPTHER

## 2018-12-05 RX ORDER — POTASSIUM CHLORIDE 750 MG/1
10 CAPSULE, EXTENDED RELEASE ORAL 3 TIMES DAILY
COMMUNITY
End: 2019-01-22 | Stop reason: SDUPTHER

## 2018-12-05 RX ORDER — ATORVASTATIN CALCIUM 10 MG/1
10 TABLET, FILM COATED ORAL DAILY
COMMUNITY
End: 2019-01-22 | Stop reason: SDUPTHER

## 2018-12-05 NOTE — PATIENT INSTRUCTIONS
Coronary Artery Disease   AMBULATORY CARE:   Coronary artery disease (CAD)  is narrowing of the arteries to your heart caused by a buildup of plaque  Plaque is made up of cholesterol and other substances  The narrowing in your arteries decreases the amount of blood that can flow to your heart  This causes your heart to get less oxygen  You may not have any symptoms of CAD  It is important for you to manage CAD even if you feel well  CAD can lead to a heart attack if it is not managed  Common symptoms include the following:   · Chest pain (angina), causing burning, squeezing, or crushing tightness in your chest    · Pain that spreads to your neck, jaw, or shoulder blade    · Nausea, vomiting, sweating, fainting, and hands and feet that are cold to the touch  Call 911 for any of the following:   · You have any of the following signs of a heart attack:      ¨ Squeezing, pressure, or pain in your chest that lasts longer than 5 minutes or returns    ¨ Discomfort or pain in your back, neck, jaw, stomach, or arm     ¨ Trouble breathing    ¨ Nausea or vomiting    ¨ Lightheadedness or a sudden cold sweat, especially with chest pain or trouble breathing    Contact your healthcare provider if:   · You have chest pain that is more frequent, or you have chest pain at rest     · You have questions or concerns about your condition or care  Medicines used to treat CAD:   · Blood pressure medicines  are given to lower your blood pressure  ACE inhibitors help keep your blood vessels relaxed and open to help keep blood flowing into your heart  Beta-blockers keep your heart pumping strongly and regularly so it does not work too hard to get oxygen  · Cholesterol medicines  help lower blood cholesterol levels  · Nitrates , such as nitroglycerin, relax the arteries of your heart so it gets more oxygen  They help to relieve your chest pain  · Antiplatelets , such as aspirin, help prevent blood clots   Take your antiplatelet medicine exactly as directed  These medicines make it more likely for you to bleed or bruise  If you are told to take aspirin, do not take acetaminophen or ibuprofen instead  · Blood thinners  keep clots from forming in your blood  Clots may cause heart attacks, strokes, or death  This medicine makes it more likely for you to bleed or bruise  · Do not take certain medicines without asking your healthcare provider first   These include NSAIDs, herbal or vitamin supplements, or hormones (estrogen or progestin)  Procedures used to treat CAD:   · Angioplasty  may be done to open an artery blocked by plaque  A tube with a balloon on the end is threaded into the blocked artery  Once the tube is in the artery, the balloon is inflated  As the balloon inflates, it presses the plaque against the artery wall to open the artery  A stent may be placed in your artery to keep it open  · Coronary artery bypass graft surgery (CABG)  is open heart surgery  Healthcare providers take arteries or veins from other areas in your body and use them to bypass or go around the blocked arteries of your heart  Cardiac rehabilitation:  Your healthcare provider may recommend that you attend cardiac rehabilitation (rehab)  This is a program run by specialists who will help you safely strengthen your heart and reduce the risk for more heart disease  The plan includes exercise, relaxation, stress management, and heart-healthy nutrition  Healthcare providers will also check to make sure any medicines you are taking are working  Manage CAD to prevent a heart attack:   · Do not smoke  Nicotine and other chemicals in cigarettes and cigars can cause heart and lung damage  Ask your healthcare provider for information if you currently smoke and need help to quit  E-cigarettes or smokeless tobacco still contain nicotine  Talk to your healthcare provider before you use these products  · Exercise regularly    Exercise at least 30 minutes each day, on most days of the week  Exercise helps to lower high cholesterol and high blood pressure  It can also help you maintain a healthy weight  Ask your healthcare provider about the kind of exercise you should do and how to get started  · Maintain a healthy weight  If you are overweight, talk to your healthcare provider about how to lose weight  A weight loss of 10% can improve your heart health  · Eat heart-healthy foods  Include fresh fruits and vegetables in your meal plan  Choose low-fat foods, such as skim or 1% fat milk, low-fat cheese and yogurt, fish, chicken (without skin), and lean meats  Eat two 4-ounce servings of fish high in omega-3 fats each week, such as salmon, fresh tuna, and herring  Do not eat foods that are high in sodium, such as canned foods, potato chips, salty snacks, and cold cuts  Put less table salt on your food  · Limit or do not drink alcohol  A drink of alcohol is 12 ounces of beer, 5 ounces of wine, or 1½ ounces of liquor  · Manage other health conditions  Follow your healthcare provider's advice on how to manage other conditions that can affect your heart health  These include diabetes, high blood pressure, and high cholesterol  You may need to take medicines for these conditions and make other lifestyle changes  · Ask if you should have a flu vaccine  The flu can be dangerous for a person who has CAD  The flu vaccine is available every year in the fall  Follow up with your healthcare provider as directed: You may need to return for other tests  You may also be referred to a cardiac surgeon  Write down your questions so you remember to ask them during your visits  © 2017 [employer]  Information is for End User's use only and may not be sold, redistributed or otherwise used for commercial purposes  All illustrations and images included in CareNotes® are the copyrighted property of [employer]  or [employer]    The above information is an  only  It is not intended as medical advice for individual conditions or treatments  Talk to your doctor, nurse or pharmacist before following any medical regimen to see if it is safe and effective for you

## 2018-12-05 NOTE — PROGRESS NOTES
Subjective:        Patient ID: Hollie Sewell is a 80 y o  male  Chief Complaint:  Alyse Montes is here to establish cardiac care, his cardiologist having retired  I reviewed records, I some in the hospital a couple years ago for bradycardia, reduced his amiodarone and carvedilol he says he has felt well since  Denies any hospitalizations since  Prior to that he had a catheterization at [facility] which revealed 2 of his vein grafts occluded, patent LIMA to LAD, ejection fraction 40%  Echocardiogram I did subsequent to that revealing ejection fraction of 50%  Has had a chest pain, has nitroglycerin but has not needed to use any, denies any concerning shortness of breath edema orthopnea PND palpitations presyncope or syncope  He does not recall ever having a breathing test   Recent transaminases and TSH normal   EKG shows normal sinus rhythm, left anterior fascicular block  The following portions of the patient's history were reviewed and updated as appropriate: allergies, current medications, past family history, past medical history, past social history, past surgical history and problem list   Review of Systems   Constitution: Negative for chills, diaphoresis, malaise/fatigue and weight gain  HENT: Positive for hearing loss  Negative for nosebleeds and stridor  Eyes: Negative for double vision, vision loss in left eye, vision loss in right eye and visual disturbance  Cardiovascular: Negative for chest pain, claudication, cyanosis, dyspnea on exertion, irregular heartbeat, leg swelling, near-syncope, orthopnea, palpitations, paroxysmal nocturnal dyspnea and syncope  Respiratory: Negative for cough, shortness of breath, snoring and wheezing  Endocrine: Negative for polydipsia, polyphagia and polyuria  Hematologic/Lymphatic: Negative for bleeding problem  Does not bruise/bleed easily  Skin: Negative for flushing and rash  Musculoskeletal: Positive for arthritis and stiffness  Negative for falls and myalgias  Gastrointestinal: Negative for abdominal pain, heartburn, hematemesis, hematochezia, melena and nausea  Genitourinary: Negative for hematuria  Neurological: Negative for brief paralysis, dizziness, focal weakness, headaches, light-headedness, loss of balance and vertigo  Psychiatric/Behavioral: Negative for altered mental status and substance abuse  Allergic/Immunologic: Negative for hives  Objective:      /80   Ht 5' 8" (1 727 m)   Wt 80 7 kg (178 lb)   BMI 27 06 kg/m²   Physical Exam   Constitutional: He is oriented to person, place, and time  He appears well-developed and well-nourished  No distress  HENT:   Head: Normocephalic and atraumatic  Eyes: Pupils are equal, round, and reactive to light  EOM are normal  No scleral icterus  Neck: Normal range of motion  Neck supple  No JVD present  No thyromegaly present  Cardiovascular: Normal rate, regular rhythm and normal heart sounds  Exam reveals no gallop and no friction rub  No murmur heard  Pulmonary/Chest: Effort normal and breath sounds normal  No stridor  No respiratory distress  He has no wheezes  He has no rales  Abdominal: Soft  Bowel sounds are normal  He exhibits no distension and no mass  There is no tenderness  Musculoskeletal: Normal range of motion  He exhibits no edema or deformity  Neurological: He is alert and oriented to person, place, and time  Coordination normal    Skin: Skin is warm and dry  No erythema  No pallor  Psychiatric: He has a normal mood and affect   His behavior is normal        Lab Review:   Transcribe Orders on 11/29/2018   Component Date Value    WBC 11/29/2018 5 99     RBC 11/29/2018 4 58     Hemoglobin 11/29/2018 15 2     Hematocrit 11/29/2018 44 6     MCV 11/29/2018 97     MCH 11/29/2018 33 2     MCHC 11/29/2018 34 1     RDW 11/29/2018 12 2     MPV 11/29/2018 10 4     Platelets 97/07/1203 151     nRBC 11/29/2018 0     Neutrophils Relative 11/29/2018 64     Immat GRANS % 11/29/2018 1     Lymphocytes Relative 11/29/2018 18     Monocytes Relative 11/29/2018 10     Eosinophils Relative 11/29/2018 6     Basophils Relative 11/29/2018 1     Neutrophils Absolute 11/29/2018 3 89     Immature Grans Absolute 11/29/2018 0 03     Lymphocytes Absolute 11/29/2018 1 07     Monocytes Absolute 11/29/2018 0 60     Eosinophils Absolute 11/29/2018 0 33     Basophils Absolute 11/29/2018 0 07     Sodium 11/29/2018 136     Potassium 11/29/2018 4 1     Chloride 11/29/2018 104     CO2 11/29/2018 28     ANION GAP 11/29/2018 4     BUN 11/29/2018 20     Creatinine 11/29/2018 1 65*    Glucose, Fasting 11/29/2018 122*    Calcium 11/29/2018 9 4     AST 11/29/2018 14     ALT 11/29/2018 22     Alkaline Phosphatase 11/29/2018 81     Total Protein 11/29/2018 7 6     Albumin 11/29/2018 3 8     Total Bilirubin 11/29/2018 0 61     eGFR 11/29/2018 37     Cholesterol 11/29/2018 172     Triglycerides 11/29/2018 295*    HDL, Direct 11/29/2018 40     LDL Calculated 11/29/2018 73     Non-HDL-Chol (CHOL-HDL) 11/29/2018 132     Hemoglobin A1C 11/29/2018 7 8*    EAG 11/29/2018 177     Creatinine, Ur 11/29/2018 82 8     Microalbum  ,U,Random 11/29/2018 104 0*    Microalb Creat Ratio 11/29/2018 126*    TSH 3RD GENERATON 11/29/2018 3 380    Appointment on 07/23/2018   Component Date Value    TSH 3RD GENERATON 07/23/2018 3 340    Transcribe Orders on 07/23/2018   Component Date Value    Hemoglobin A1C 07/23/2018 7 0*    EAG 07/23/2018 154      No results found  Assessment:       1  Ischemic cardiomyopathy  POCT ECG   2  Hx of CABG     3  Paroxysmal atrial fibrillation (HCC)     4  Bradycardia     5  Dyslipidemia     6  Essential hypertension          Plan:       Marjorie Cohn is without any signs or symptoms reminiscent of unstable angina, decompensated heart failure nor electrical instability  Medications ideal, reviewed in detail, advised no changes  Doubt much benefit in surveillance PFT testing at this point, he has been on amiodarone for a very longtime, minimal dose  I discussed my reasoning here with him today  Annual TSH and transaminases advised  Lipids reasonably well controlled, improving his blood sugar would certainly improve his triglycerides  I do not think any surveillance cardiac testing is warranted  I will see him back every 6 months with you, sooner as needed  Glad to see he is stable

## 2019-01-22 DIAGNOSIS — E03.9 HYPOTHYROIDISM, UNSPECIFIED TYPE: Primary | ICD-10-CM

## 2019-01-22 DIAGNOSIS — I48.91 ATRIAL FIBRILLATION, UNSPECIFIED TYPE (HCC): ICD-10-CM

## 2019-01-22 DIAGNOSIS — I10 ESSENTIAL HYPERTENSION: ICD-10-CM

## 2019-01-22 DIAGNOSIS — E78.5 HYPERLIPIDEMIA, UNSPECIFIED HYPERLIPIDEMIA TYPE: ICD-10-CM

## 2019-01-22 DIAGNOSIS — I25.10 CORONARY ARTERY DISEASE WITHOUT ANGINA PECTORIS, UNSPECIFIED VESSEL OR LESION TYPE, UNSPECIFIED WHETHER NATIVE OR TRANSPLANTED HEART: ICD-10-CM

## 2019-01-22 DIAGNOSIS — E87.6 HYPOKALEMIA: ICD-10-CM

## 2019-01-23 RX ORDER — CARVEDILOL 3.12 MG/1
3.12 TABLET ORAL 2 TIMES DAILY WITH MEALS
Qty: 180 TABLET | Refills: 3 | Status: SHIPPED | OUTPATIENT
Start: 2019-01-23 | End: 2020-02-04 | Stop reason: SDUPTHER

## 2019-01-23 RX ORDER — POTASSIUM CHLORIDE 750 MG/1
10 CAPSULE, EXTENDED RELEASE ORAL 3 TIMES DAILY
Qty: 270 CAPSULE | Refills: 3 | Status: SHIPPED | OUTPATIENT
Start: 2019-01-23 | End: 2019-08-20

## 2019-01-23 RX ORDER — ATORVASTATIN CALCIUM 10 MG/1
10 TABLET, FILM COATED ORAL DAILY
Qty: 90 TABLET | Refills: 3 | Status: SHIPPED | OUTPATIENT
Start: 2019-01-23 | End: 2020-02-04 | Stop reason: SDUPTHER

## 2019-01-23 RX ORDER — FUROSEMIDE 20 MG/1
20 TABLET ORAL
Qty: 90 TABLET | Refills: 3 | Status: SHIPPED | OUTPATIENT
Start: 2019-01-23 | End: 2019-08-20

## 2019-01-23 RX ORDER — AMLODIPINE BESYLATE 2.5 MG/1
2.5 TABLET ORAL 2 TIMES DAILY
Qty: 180 TABLET | Refills: 3 | Status: SHIPPED | OUTPATIENT
Start: 2019-01-23 | End: 2019-08-20

## 2019-01-23 RX ORDER — LEVOTHYROXINE SODIUM 0.07 MG/1
75 TABLET ORAL DAILY
Qty: 90 TABLET | Refills: 3 | Status: SHIPPED | OUTPATIENT
Start: 2019-01-23 | End: 2021-12-14 | Stop reason: SDUPTHER

## 2019-01-23 RX ORDER — ISOSORBIDE MONONITRATE 30 MG/1
30 TABLET, EXTENDED RELEASE ORAL 4 TIMES DAILY
Qty: 360 TABLET | Refills: 3 | Status: SHIPPED | OUTPATIENT
Start: 2019-01-23 | End: 2019-08-20

## 2019-01-23 RX ORDER — AMIODARONE HYDROCHLORIDE 200 MG/1
100 TABLET ORAL DAILY
Qty: 45 TABLET | Refills: 3 | Status: SHIPPED | OUTPATIENT
Start: 2019-01-23 | End: 2020-02-04 | Stop reason: SDUPTHER

## 2019-03-18 ENCOUNTER — APPOINTMENT (OUTPATIENT)
Dept: LAB | Facility: CLINIC | Age: 84
End: 2019-03-18
Payer: MEDICARE

## 2019-03-18 ENCOUNTER — TRANSCRIBE ORDERS (OUTPATIENT)
Dept: LAB | Facility: CLINIC | Age: 84
End: 2019-03-18

## 2019-03-18 DIAGNOSIS — E11.649 UNCONTROLLED TYPE 2 DIABETES MELLITUS WITH HYPOGLYCEMIA, UNSPECIFIED HYPOGLYCEMIA COMA STATUS (HCC): ICD-10-CM

## 2019-03-18 DIAGNOSIS — E03.9 HYPOTHYROIDISM, UNSPECIFIED TYPE: Primary | ICD-10-CM

## 2019-03-18 DIAGNOSIS — E11.22 TYPE 2 DIABETES MELLITUS WITH DIABETIC CHRONIC KIDNEY DISEASE, UNSPECIFIED CKD STAGE, UNSPECIFIED WHETHER LONG TERM INSULIN USE (HCC): ICD-10-CM

## 2019-03-18 LAB
EST. AVERAGE GLUCOSE BLD GHB EST-MCNC: 157 MG/DL
HBA1C MFR BLD: 7.1 % (ref 4.2–6.3)
TSH SERPL DL<=0.05 MIU/L-ACNC: 2.65 UIU/ML (ref 0.36–3.74)

## 2019-03-18 PROCEDURE — 36415 COLL VENOUS BLD VENIPUNCTURE: CPT

## 2019-03-18 PROCEDURE — 83036 HEMOGLOBIN GLYCOSYLATED A1C: CPT

## 2019-03-18 PROCEDURE — 84443 ASSAY THYROID STIM HORMONE: CPT

## 2019-04-16 ENCOUNTER — APPOINTMENT (OUTPATIENT)
Dept: LAB | Facility: CLINIC | Age: 84
End: 2019-04-16
Payer: MEDICARE

## 2019-04-16 ENCOUNTER — TRANSCRIBE ORDERS (OUTPATIENT)
Dept: LAB | Facility: CLINIC | Age: 84
End: 2019-04-16

## 2019-04-16 DIAGNOSIS — E11.9 TYPE 2 DIABETES MELLITUS WITHOUT COMPLICATION, WITH LONG-TERM CURRENT USE OF INSULIN (HCC): Primary | ICD-10-CM

## 2019-04-16 DIAGNOSIS — I10 HYPERTENSION, UNSPECIFIED TYPE: ICD-10-CM

## 2019-04-16 DIAGNOSIS — E03.9 ACQUIRED UNDERACTIVE THYROID: ICD-10-CM

## 2019-04-16 DIAGNOSIS — Z95.5 PRESENCE OF STENT IN CORONARY ARTERY: ICD-10-CM

## 2019-04-16 DIAGNOSIS — Z95.1 STATUS POST AORTO-CORONARY ARTERY BYPASS GRAFT: ICD-10-CM

## 2019-04-16 DIAGNOSIS — I25.110 CORONARY ARTERY DISEASE INVOLVING NATIVE CORONARY ARTERY WITH UNSTABLE ANGINA PECTORIS, UNSPECIFIED WHETHER NATIVE OR TRANSPLANTED HEART (HCC): ICD-10-CM

## 2019-04-16 DIAGNOSIS — Z79.4 TYPE 2 DIABETES MELLITUS WITHOUT COMPLICATION, WITH LONG-TERM CURRENT USE OF INSULIN (HCC): Primary | ICD-10-CM

## 2019-04-16 LAB
ALBUMIN SERPL BCP-MCNC: 4.2 G/DL (ref 3.5–5)
ALP SERPL-CCNC: 90 U/L (ref 46–116)
ALT SERPL W P-5'-P-CCNC: 22 U/L (ref 12–78)
ANION GAP SERPL CALCULATED.3IONS-SCNC: 2 MMOL/L (ref 4–13)
AST SERPL W P-5'-P-CCNC: 17 U/L (ref 5–45)
BASOPHILS # BLD AUTO: 0.09 THOUSANDS/ΜL (ref 0–0.1)
BASOPHILS NFR BLD AUTO: 2 % (ref 0–1)
BILIRUB SERPL-MCNC: 0.87 MG/DL (ref 0.2–1)
BUN SERPL-MCNC: 21 MG/DL (ref 5–25)
CALCIUM SERPL-MCNC: 8.5 MG/DL (ref 8.3–10.1)
CHLORIDE SERPL-SCNC: 104 MMOL/L (ref 100–108)
CHOLEST SERPL-MCNC: 176 MG/DL (ref 50–200)
CO2 SERPL-SCNC: 31 MMOL/L (ref 21–32)
CREAT SERPL-MCNC: 1.62 MG/DL (ref 0.6–1.3)
CREAT UR-MCNC: 28.4 MG/DL
EOSINOPHIL # BLD AUTO: 0.42 THOUSAND/ΜL (ref 0–0.61)
EOSINOPHIL NFR BLD AUTO: 7 % (ref 0–6)
ERYTHROCYTE [DISTWIDTH] IN BLOOD BY AUTOMATED COUNT: 12.5 % (ref 11.6–15.1)
EST. AVERAGE GLUCOSE BLD GHB EST-MCNC: 148 MG/DL
GFR SERPL CREATININE-BSD FRML MDRD: 38 ML/MIN/1.73SQ M
GLUCOSE P FAST SERPL-MCNC: 129 MG/DL (ref 65–99)
HBA1C MFR BLD: 6.8 % (ref 4.2–6.3)
HCT VFR BLD AUTO: 47.8 % (ref 36.5–49.3)
HDLC SERPL-MCNC: 43 MG/DL (ref 40–60)
HGB BLD-MCNC: 15.8 G/DL (ref 12–17)
IMM GRANULOCYTES # BLD AUTO: 0.04 THOUSAND/UL (ref 0–0.2)
IMM GRANULOCYTES NFR BLD AUTO: 1 % (ref 0–2)
LDLC SERPL CALC-MCNC: 94 MG/DL (ref 0–100)
LYMPHOCYTES # BLD AUTO: 1.14 THOUSANDS/ΜL (ref 0.6–4.47)
LYMPHOCYTES NFR BLD AUTO: 19 % (ref 14–44)
MCH RBC QN AUTO: 32 PG (ref 26.8–34.3)
MCHC RBC AUTO-ENTMCNC: 33.1 G/DL (ref 31.4–37.4)
MCV RBC AUTO: 97 FL (ref 82–98)
MICROALBUMIN UR-MCNC: 99.4 MG/L (ref 0–20)
MICROALBUMIN/CREAT 24H UR: 350 MG/G CREATININE (ref 0–30)
MONOCYTES # BLD AUTO: 0.62 THOUSAND/ΜL (ref 0.17–1.22)
MONOCYTES NFR BLD AUTO: 11 % (ref 4–12)
NEUTROPHILS # BLD AUTO: 3.62 THOUSANDS/ΜL (ref 1.85–7.62)
NEUTS SEG NFR BLD AUTO: 60 % (ref 43–75)
NONHDLC SERPL-MCNC: 133 MG/DL
NRBC BLD AUTO-RTO: 0 /100 WBCS
PLATELET # BLD AUTO: 165 THOUSANDS/UL (ref 149–390)
PMV BLD AUTO: 10.8 FL (ref 8.9–12.7)
POTASSIUM SERPL-SCNC: 4.1 MMOL/L (ref 3.5–5.3)
PROT SERPL-MCNC: 8.2 G/DL (ref 6.4–8.2)
RBC # BLD AUTO: 4.94 MILLION/UL (ref 3.88–5.62)
SODIUM SERPL-SCNC: 137 MMOL/L (ref 136–145)
TRIGL SERPL-MCNC: 195 MG/DL
WBC # BLD AUTO: 5.93 THOUSAND/UL (ref 4.31–10.16)

## 2019-04-16 PROCEDURE — 80061 LIPID PANEL: CPT

## 2019-04-16 PROCEDURE — 82043 UR ALBUMIN QUANTITATIVE: CPT

## 2019-04-16 PROCEDURE — 36415 COLL VENOUS BLD VENIPUNCTURE: CPT

## 2019-04-16 PROCEDURE — 82570 ASSAY OF URINE CREATININE: CPT

## 2019-04-16 PROCEDURE — 80053 COMPREHEN METABOLIC PANEL: CPT

## 2019-04-16 PROCEDURE — 83036 HEMOGLOBIN GLYCOSYLATED A1C: CPT

## 2019-04-16 PROCEDURE — 85025 COMPLETE CBC W/AUTO DIFF WBC: CPT

## 2019-05-31 ENCOUNTER — OFFICE VISIT (OUTPATIENT)
Dept: CARDIOLOGY CLINIC | Facility: CLINIC | Age: 84
End: 2019-05-31
Payer: MEDICARE

## 2019-05-31 VITALS
SYSTOLIC BLOOD PRESSURE: 128 MMHG | WEIGHT: 182 LBS | HEIGHT: 68 IN | BODY MASS INDEX: 27.58 KG/M2 | HEART RATE: 60 BPM | DIASTOLIC BLOOD PRESSURE: 70 MMHG

## 2019-05-31 DIAGNOSIS — I25.118 CORONARY ARTERY DISEASE OF NATIVE ARTERY OF NATIVE HEART WITH STABLE ANGINA PECTORIS (HCC): ICD-10-CM

## 2019-05-31 DIAGNOSIS — I20.9 ANGINA PECTORIS (HCC): Primary | ICD-10-CM

## 2019-05-31 DIAGNOSIS — I10 ESSENTIAL HYPERTENSION: ICD-10-CM

## 2019-05-31 DIAGNOSIS — E78.5 DYSLIPIDEMIA: ICD-10-CM

## 2019-05-31 DIAGNOSIS — I48.0 PAROXYSMAL ATRIAL FIBRILLATION (HCC): ICD-10-CM

## 2019-05-31 PROCEDURE — 93000 ELECTROCARDIOGRAM COMPLETE: CPT | Performed by: INTERNAL MEDICINE

## 2019-05-31 PROCEDURE — 99214 OFFICE O/P EST MOD 30 MIN: CPT | Performed by: INTERNAL MEDICINE

## 2019-06-20 ENCOUNTER — APPOINTMENT (OUTPATIENT)
Dept: LAB | Facility: CLINIC | Age: 84
End: 2019-06-20
Payer: MEDICARE

## 2019-06-20 ENCOUNTER — TRANSCRIBE ORDERS (OUTPATIENT)
Dept: LAB | Facility: CLINIC | Age: 84
End: 2019-06-20

## 2019-06-20 DIAGNOSIS — E11.29 TYPE 2 DIABETES MELLITUS WITH MICROALBUMINURIA, WITH LONG-TERM CURRENT USE OF INSULIN (HCC): Primary | ICD-10-CM

## 2019-06-20 DIAGNOSIS — R80.9 TYPE 2 DIABETES MELLITUS WITH MICROALBUMINURIA, WITH LONG-TERM CURRENT USE OF INSULIN (HCC): Primary | ICD-10-CM

## 2019-06-20 DIAGNOSIS — Z79.4 TYPE 2 DIABETES MELLITUS WITH MICROALBUMINURIA, WITH LONG-TERM CURRENT USE OF INSULIN (HCC): Primary | ICD-10-CM

## 2019-06-20 DIAGNOSIS — E11.65 UNCONTROLLED TYPE 2 DIABETES MELLITUS WITH HYPERGLYCEMIA (HCC): ICD-10-CM

## 2019-06-20 LAB
EST. AVERAGE GLUCOSE BLD GHB EST-MCNC: 148 MG/DL
HBA1C MFR BLD: 6.8 % (ref 4.2–6.3)

## 2019-06-20 PROCEDURE — 83036 HEMOGLOBIN GLYCOSYLATED A1C: CPT

## 2019-06-20 PROCEDURE — 36415 COLL VENOUS BLD VENIPUNCTURE: CPT

## 2019-07-31 ENCOUNTER — OFFICE VISIT (OUTPATIENT)
Dept: INTERNAL MEDICINE CLINIC | Facility: CLINIC | Age: 84
End: 2019-07-31
Payer: MEDICARE

## 2019-07-31 VITALS
OXYGEN SATURATION: 97 % | HEART RATE: 64 BPM | SYSTOLIC BLOOD PRESSURE: 120 MMHG | HEIGHT: 68 IN | DIASTOLIC BLOOD PRESSURE: 60 MMHG | WEIGHT: 184 LBS | RESPIRATION RATE: 16 BRPM | BODY MASS INDEX: 27.89 KG/M2 | TEMPERATURE: 97.4 F

## 2019-07-31 DIAGNOSIS — Z79.4 TYPE 2 DIABETES MELLITUS WITH COMPLICATION, WITH LONG-TERM CURRENT USE OF INSULIN (HCC): Primary | ICD-10-CM

## 2019-07-31 DIAGNOSIS — E78.2 MIXED HYPERLIPIDEMIA: ICD-10-CM

## 2019-07-31 DIAGNOSIS — E11.8 TYPE 2 DIABETES MELLITUS WITH COMPLICATION, WITH LONG-TERM CURRENT USE OF INSULIN (HCC): Primary | ICD-10-CM

## 2019-07-31 DIAGNOSIS — I48.0 PAROXYSMAL ATRIAL FIBRILLATION (HCC): ICD-10-CM

## 2019-07-31 DIAGNOSIS — I10 ESSENTIAL HYPERTENSION: ICD-10-CM

## 2019-07-31 DIAGNOSIS — N18.30 CHRONIC KIDNEY DISEASE, STAGE III (MODERATE) (HCC): ICD-10-CM

## 2019-07-31 PROBLEM — M48.062 SPINAL STENOSIS OF LUMBAR REGION WITH NEUROGENIC CLAUDICATION: Status: ACTIVE | Noted: 2017-05-04

## 2019-07-31 PROBLEM — S46.119A RUPTURE OF TENDON OF BICEPS, LONG HEAD: Status: ACTIVE | Noted: 2017-09-22

## 2019-07-31 PROCEDURE — 99204 OFFICE O/P NEW MOD 45 MIN: CPT | Performed by: INTERNAL MEDICINE

## 2019-07-31 RX ORDER — LANCETS
EACH MISCELLANEOUS
Refills: 3 | COMMUNITY
Start: 2019-06-30 | End: 2021-11-23 | Stop reason: SDUPTHER

## 2019-07-31 RX ORDER — BLOOD SUGAR DIAGNOSTIC, DRUM
STRIP MISCELLANEOUS
Refills: 3 | COMMUNITY
Start: 2019-06-20 | End: 2021-11-23 | Stop reason: SDUPTHER

## 2019-07-31 RX ORDER — PEN NEEDLE, DIABETIC 32GX 5/32"
NEEDLE, DISPOSABLE MISCELLANEOUS
Refills: 3 | COMMUNITY
Start: 2019-07-03

## 2019-07-31 NOTE — PROGRESS NOTES
Assessment/Plan:       Diagnoses and all orders for this visit:    Type 2 diabetes mellitus with complication, with long-term current use of insulin ([facility] [address] )  -     Ambulatory referral to Ophthalmology; Future    Mixed hyperlipidemia  -     Lipid Panel with Direct LDL reflex; Future    Essential hypertension  -     Comprehensive metabolic panel; Future  -     CBC and differential; Future    Paroxysmal atrial fibrillation (HCC)    Chronic kidney disease, stage III (moderate) (HCC)    Other orders  -     aspirin 81 MG tablet; Take 81 mg by mouth daily  -     BD PEN NEEDLE CARMINA U/F 32G X 4 MM MISC; USE 4 PEN NEEDLE DAILY AS DIRECTED  -     ACCU-CHEK SOFTCLIX LANCETS lancets; USE AND DISCARD 1 LANCET 3 TIMES DAILY  DX: E11 29  -     ACCU-CHEK COMPACT PLUS test strip; USE 3 TIMES DAILY  DX: E11 29        No problem-specific Assessment & Plan notes found for this encounter  The patient was seen and examined and noted to have no issues at this time    Subjective:      Patient ID: Ermias Butler is a 80 y o  male  The patient has a long history of CAD  He has had triple bypass and stent placement x 3  He is taking Coreg and Atorvastatin  He notes that he is taking the Imdur for episodic exertional CP/stable angina  He has a history of A-fib and he is on Amiodarone  However, he is not on Coumadin or NOAC at this time  He is on ASA 81 mg QDay  We discussed this and I am uncertain if falls are an issues and this was stopped because of safety issues  However, the patient is not interested in changing the medications at this time  The patient's DM is very well controlled an Lantus/Novolog and the patient's last hgbA1c was 6 8%  The patient's levothyroxine is also controlled by Endo  We will follow up on labs and see how he is doing with his TSH    Diabetes   He presents for his follow-up diabetic visit  He has type 2 diabetes mellitus  His disease course has been stable   Pertinent negatives for hypoglycemia include no confusion, dizziness, headaches, hunger, mood changes, nervousness/anxiousness, pallor, seizures, sleepiness, speech difficulty, sweats or tremors  Pertinent negatives for diabetes include no blurred vision, no chest pain, no fatigue, no foot paresthesias, no foot ulcerations, no polydipsia, no polyphagia, no polyuria, no visual change, no weakness and no weight loss  There are no hypoglycemic complications  Symptoms are stable  There are no diabetic complications  There are no known risk factors for coronary artery disease  When asked about current treatments, none were reported  He is compliant with treatment all of the time  His weight is stable  He is following a diabetic diet  He has not had a previous visit with a dietitian  He participates in exercise daily  There is no change in his home blood glucose trend  An ACE inhibitor/angiotensin II receptor blocker is not being taken  He does not see a podiatrist Eye exam is not current  The following portions of the patient's history were reviewed and updated as appropriate:   He has no past medical history on file  ,  does not have any pertinent problems on file  ,   has a past surgical history that includes Cardiac catheterization and Coronary artery bypass graft  ,  family history includes No Known Problems in his brother, father, maternal aunt, maternal grandfather, maternal grandmother, maternal uncle, mother, paternal aunt, paternal grandfather, paternal grandmother, paternal uncle, and sister  ,   reports that he has never smoked  He has quit using smokeless tobacco  His alcohol and drug histories are not on file  ,  has No Known Allergies     Current Outpatient Medications   Medication Sig Dispense Refill    ACCU-CHEK COMPACT PLUS test strip USE 3 TIMES DAILY  DX: E11 29  3    ACCU-CHEK SOFTCLIX LANCETS lancets USE AND DISCARD 1 LANCET 3 TIMES DAILY   DX: E11 29  3    amiodarone 200 mg tablet Take 0 5 tablets (100 mg total) by mouth daily 45 tablet 3    amLODIPine (NORVASC) 2 5 mg tablet Take 1 tablet (2 5 mg total) by mouth 2 (two) times a day 180 tablet 3    aspirin 81 MG tablet Take 81 mg by mouth daily      atorvastatin (LIPITOR) 10 mg tablet Take 1 tablet (10 mg total) by mouth daily 90 tablet 3    BD PEN NEEDLE CARMINA U/F 32G X 4 MM MISC USE 4 PEN NEEDLE DAILY AS DIRECTED  3    carvedilol (COREG) 3 125 mg tablet Take 1 tablet (3 125 mg total) by mouth 2 (two) times a day with meals 180 tablet 3    furosemide (LASIX) 20 mg tablet Take 1 tablet (20 mg total) by mouth daily after breakfast 90 tablet 3    insulin aspart (NovoLOG) 100 units/mL injection Inject under the skin 3 (three) times a day before meals      Insulin Glargine (LANTUS SOLOSTAR SC) Inject 5 Units under the skin daily       isosorbide mononitrate (IMDUR) 30 mg 24 hr tablet Take 1 tablet (30 mg total) by mouth 4 (four) times a day (Patient taking differently: 30 mg Take 2 tablets by oral route 2 times a day ) 360 tablet 3    levothyroxine 75 mcg tablet Take 1 tablet (75 mcg total) by mouth daily 90 tablet 3    nitroglycerin (NITROSTAT) 0 4 mg SL tablet Place 0 4 mg under the tongue every 5 (five) minutes as needed for chest pain      Omega-3 Fatty Acids (FISH OIL) 1,000 mg Take 1,000 mg by mouth daily      potassium chloride (MICRO-K) 10 MEQ CR capsule Take 1 capsule (10 mEq total) by mouth 3 (three) times a day (Patient taking differently: 10 mEq Take 2 tablets in the morning and 1 tablet in the evening ) 270 capsule 3     No current facility-administered medications for this visit  Review of Systems   Constitutional: Negative for chills, fatigue, fever and weight loss  HENT: Negative for mouth sores, postnasal drip, rhinorrhea, sinus pressure and sinus pain  Eyes: Negative for blurred vision  Respiratory: Negative  Cardiovascular: Negative for chest pain, palpitations and leg swelling     Gastrointestinal: Negative for constipation, diarrhea, nausea and vomiting  Endocrine: Negative for polydipsia, polyphagia and polyuria  Genitourinary: Negative  Musculoskeletal: Negative for back pain and myalgias  Skin: Negative for pallor  Neurological: Negative for dizziness, tremors, seizures, speech difficulty, weakness and headaches  Psychiatric/Behavioral: Negative for confusion  The patient is not nervous/anxious  Objective:  Vitals:    07/31/19 1404   BP: 120/60   BP Location: Left arm   Patient Position: Sitting   Cuff Size: Large   Pulse: 64   Resp: 16   Temp: (!) 97 4 °F (36 3 °C)   SpO2: 97%   Weight: 83 5 kg (184 lb)   Height: 5' 8" (1 727 m)     Body mass index is 27 98 kg/m²  Physical Exam   Constitutional: He is oriented to person, place, and time  He appears well-developed and well-nourished  HENT:   Head: Normocephalic and atraumatic  Eyes: Pupils are equal, round, and reactive to light  EOM are normal    Neck: Normal range of motion  Neck supple  Cardiovascular: Normal rate, regular rhythm, normal heart sounds and intact distal pulses  Exam reveals no friction rub  Pulses are no weak pulses  No murmur heard  Pulses:       Dorsalis pedis pulses are 2+ on the right side, and 2+ on the left side  Posterior tibial pulses are 2+ on the right side, and 2+ on the left side  Pulmonary/Chest: Effort normal and breath sounds normal  No stridor  No respiratory distress  He has no wheezes  Abdominal: Soft  Bowel sounds are normal  He exhibits no mass  There is no tenderness  There is no rebound  Musculoskeletal: Normal range of motion  He exhibits no edema or tenderness  Feet:   Right Foot:   Skin Integrity: Negative for ulcer, skin breakdown, erythema, warmth, callus or dry skin  Left Foot:   Skin Integrity: Negative for ulcer, skin breakdown, erythema, warmth, callus or dry skin  Neurological: He is alert and oriented to person, place, and time  Skin: Skin is warm and dry     Psychiatric: He has a normal mood and affect  Nursing note and vitals reviewed  Patient's shoes and socks removed  Right Foot/Ankle   Right Foot Inspection  Skin Exam: skin normal and skin intact no dry skin, no warmth, no callus, no erythema, no maceration, no abnormal color, no pre-ulcer, no ulcer and no callus                          Toe Exam: ROM and strength within normal limits  Sensory       Monofilament testing: intact  Vascular  Capillary refills: < 3 seconds  The right DP pulse is 2+  The right PT pulse is 2+  Left Foot/Ankle  Left Foot Inspection  Skin Exam: skin normal and skin intactno dry skin, no warmth, no erythema, no maceration, normal color, no pre-ulcer, no ulcer and no callus                         Toe Exam: ROM and strength within normal limits                   Sensory       Monofilament: intact  Vascular  Capillary refills: < 3 seconds  The left DP pulse is 2+  The left PT pulse is 2+  Assign Risk Category:  No deformity present; No loss of protective sensation;  No weak pulses       Risk: 0

## 2019-08-08 ENCOUNTER — TELEPHONE (OUTPATIENT)
Dept: CARDIOLOGY CLINIC | Facility: CLINIC | Age: 84
End: 2019-08-08

## 2019-08-20 ENCOUNTER — OFFICE VISIT (OUTPATIENT)
Dept: CARDIOLOGY CLINIC | Facility: CLINIC | Age: 84
End: 2019-08-20
Payer: MEDICARE

## 2019-08-20 VITALS
HEART RATE: 60 BPM | WEIGHT: 186 LBS | HEIGHT: 68 IN | BODY MASS INDEX: 28.19 KG/M2 | DIASTOLIC BLOOD PRESSURE: 60 MMHG | SYSTOLIC BLOOD PRESSURE: 112 MMHG

## 2019-08-20 DIAGNOSIS — R53.83 OTHER FATIGUE: Primary | ICD-10-CM

## 2019-08-20 DIAGNOSIS — I25.118 CORONARY ARTERY DISEASE OF NATIVE ARTERY OF NATIVE HEART WITH STABLE ANGINA PECTORIS (HCC): ICD-10-CM

## 2019-08-20 DIAGNOSIS — E78.2 MIXED HYPERLIPIDEMIA: ICD-10-CM

## 2019-08-20 DIAGNOSIS — E87.6 HYPOKALEMIA: ICD-10-CM

## 2019-08-20 DIAGNOSIS — I48.0 PAROXYSMAL ATRIAL FIBRILLATION (HCC): ICD-10-CM

## 2019-08-20 DIAGNOSIS — R42 LIGHTHEADEDNESS: ICD-10-CM

## 2019-08-20 DIAGNOSIS — I10 ESSENTIAL HYPERTENSION: ICD-10-CM

## 2019-08-20 DIAGNOSIS — I25.10 CORONARY ARTERY DISEASE WITHOUT ANGINA PECTORIS, UNSPECIFIED VESSEL OR LESION TYPE, UNSPECIFIED WHETHER NATIVE OR TRANSPLANTED HEART: ICD-10-CM

## 2019-08-20 PROCEDURE — 99214 OFFICE O/P EST MOD 30 MIN: CPT | Performed by: INTERNAL MEDICINE

## 2019-08-20 PROCEDURE — 93000 ELECTROCARDIOGRAM COMPLETE: CPT | Performed by: INTERNAL MEDICINE

## 2019-08-20 RX ORDER — ISOSORBIDE MONONITRATE 60 MG/1
60 TABLET, EXTENDED RELEASE ORAL DAILY
Start: 2019-08-20 | End: 2020-02-04 | Stop reason: SDUPTHER

## 2019-08-20 RX ORDER — POTASSIUM CHLORIDE 750 MG/1
10 CAPSULE, EXTENDED RELEASE ORAL 3 TIMES DAILY PRN
Qty: 270 CAPSULE | Refills: 3 | Status: SHIPPED | OUTPATIENT
Start: 2019-08-20 | End: 2020-02-04 | Stop reason: SDUPTHER

## 2019-08-20 RX ORDER — AMLODIPINE BESYLATE 2.5 MG/1
2.5 TABLET ORAL DAILY
Qty: 180 TABLET | Refills: 3
Start: 2019-08-20 | End: 2020-02-04 | Stop reason: SDUPTHER

## 2019-08-20 RX ORDER — FUROSEMIDE 20 MG/1
20 TABLET ORAL DAILY PRN
Qty: 90 TABLET | Refills: 3
Start: 2019-08-20 | End: 2020-02-04 | Stop reason: SDUPTHER

## 2019-08-20 NOTE — ASSESSMENT & PLAN NOTE
No real change in pattern over the last few years  Particularly as he has lightheadedness I am going to back off on some of his doses that may be contributing to relative low blood pressure

## 2019-08-20 NOTE — PROGRESS NOTES
Patient ID: Mely Álvarez is a 80 y o  male  Plan:      Paroxysmal atrial fibrillation (HCC)  None of late  Continues  On lower dose amiodarone  Coronary artery disease of native artery of native heart with stable angina pectoris (HCC)    No real change in pattern over the last few years  Particularly as he has lightheadedness I am going to back off on some of his doses that may be contributing to relative low blood pressure  Mixed hyperlipidemia    Tolerating statin therapy  Lightheadedness    Sounds as though his blood pressure is periodic Allyson low  He actually feels better since he cut back on amiodarone   In the last few daysbut I would rather change some of his other meds  Essential hypertension   Too well controlled  Follow up Plan: Will change furosemide and potassium to only take for weight over 185  Will cut down amlodipine to once daily and also cut down on Imdur or to once daily  Return visit in 1 month as previously scheduled with my partner  HPI:     Patient comes in today because for the past week or so he has felt lightheadedness with arising  No change in frequency of angina which truly only occurs with effort  He cut back on his own on the amiodarone dose and seems to feel a little bit better since then  Nonetheless, blood pressure  today in the office is on the lower side particularly for his age  Results for orders placed or performed in visit on 08/20/19   POCT ECG    Impression      Sinus rhythm  PVCs  First-degree AV block  Nonspecific ST segment changes  Most recent or relevant cardiac/vascular testing:      Coronary angiography January 2 017: Vein graft to posterior descending artery and obtuse marginal occluded  The mammary graft LAD okay  Unable to visualize left main or right coronary artery because of severe tortuosity        Past Surgical History:   Procedure Laterality Date    CARDIAC CATHETERIZATION      CORONARY ARTERY BYPASS GRAFT       CMP:   Lab Results   Component Value Date     02/23/2015    K 4 1 04/16/2019    K 4 3 02/23/2015     04/16/2019     02/23/2015    CO2 31 04/16/2019    CO2 26 7 02/23/2015    BUN 21 04/16/2019    BUN 28 (H) 02/23/2015    CREATININE 1 62 (H) 04/16/2019    CREATININE 1 42 (H) 11/18/2015    GLUCOSE 155 (H) 02/23/2015    EGFR 38 04/16/2019       Lipid Profile:   Lab Results   Component Value Date    CHOL 159 06/08/2015    TRIG 195 (H) 04/16/2019    TRIG 114 06/08/2015    HDL 43 04/16/2019    HDL 54 06/08/2015         Review of Systems   10  point ROS  was otherwise non pertinent or negative except as per HPI or as below  Gait:   Slow but normal         Objective:     /60   Pulse 60   Ht 5' 8" (1 727 m)   Wt 84 4 kg (186 lb)   BMI 28 28 kg/m²     PHYSICAL EXAM:    General:  Normal appearance in no distress  Eyes:  Anicteric  Oral mucosa:  Moist   Neck:  No JVD  Carotid upstrokes are brisk without bruits  No masses  Chest:  Clear to auscultation and percussion  Well-healed midline sternotomy scar  Cardiac:  Normal PMI  Normal S1 and S2  No murmur gallop or rub  Abdomen:  Soft and nontender  No palpable organomegaly or aortic enlargement  Extremities:  No peripheral edema  Musculoskeletal:  Symmetric  Vascular:  Femoral pulses are brisk without bruits  Popliteal pulses are intact bilaterally  Pedal pulses are intact  Neuro:  Grossly symmetric  Psych:  Alert and oriented x3  Current Outpatient Medications:     ACCU-CHEK COMPACT PLUS test strip, USE 3 TIMES DAILY  DX: E11 29, Disp: , Rfl: 3    ACCU-CHEK SOFTCLIX LANCETS lancets, USE AND DISCARD 1 LANCET 3 TIMES DAILY   DX: E11 29, Disp: , Rfl: 3    amiodarone 200 mg tablet, Take 0 5 tablets (100 mg total) by mouth daily, Disp: 45 tablet, Rfl: 3    amLODIPine (NORVASC) 2 5 mg tablet, Take 1 tablet (2 5 mg total) by mouth daily, Disp: 180 tablet, Rfl: 3    aspirin 81 MG tablet, Take 81 mg by mouth daily, Disp: , Rfl:     atorvastatin (LIPITOR) 10 mg tablet, Take 1 tablet (10 mg total) by mouth daily, Disp: 90 tablet, Rfl: 3    BD PEN NEEDLE CARMINA U/F 32G X 4 MM MISC, USE 4 PEN NEEDLE DAILY AS DIRECTED, Disp: , Rfl: 3    carvedilol (COREG) 3 125 mg tablet, Take 1 tablet (3 125 mg total) by mouth 2 (two) times a day with meals, Disp: 180 tablet, Rfl: 3    furosemide (LASIX) 20 mg tablet, Take 1 tablet (20 mg total) by mouth daily as needed (am weight more than 185), Disp: 90 tablet, Rfl: 3    insulin aspart (NovoLOG) 100 units/mL injection, Inject under the skin 3 (three) times a day before meals, Disp: , Rfl:     Insulin Glargine (LANTUS SOLOSTAR SC), Inject 5 Units under the skin daily , Disp: , Rfl:     isosorbide mononitrate (IMDUR) 60 mg 24 hr tablet, Take 1 tablet (60 mg total) by mouth daily, Disp: , Rfl:     levothyroxine 75 mcg tablet, Take 1 tablet (75 mcg total) by mouth daily, Disp: 90 tablet, Rfl: 3    nitroglycerin (NITROSTAT) 0 4 mg SL tablet, Place 0 4 mg under the tongue every 5 (five) minutes as needed for chest pain, Disp: , Rfl:     Omega-3 Fatty Acids (FISH OIL) 1,000 mg, Take 1,000 mg by mouth daily, Disp: , Rfl:     potassium chloride (MICRO-K) 10 MEQ CR capsule, Take 1 capsule (10 mEq total) by mouth 3 (three) times a day as needed (am weight  more than 185 ), Disp: 270 capsule, Rfl: 3  No Known Allergies  Past Medical History:   Diagnosis Date    Chronic kidney disease (CKD), stage III (moderate)     Coronary artery disease     CABG x3 in 1995    Hyperlipidemia     Hypertension     Hypothyroidism     Ischemic cardiomyopathy     Paroxysmal atrial fibrillation     Type II diabetes mellitus            Social History     Tobacco Use   Smoking Status Never Smoker   Smokeless Tobacco Former User

## 2019-08-20 NOTE — PATIENT INSTRUCTIONS
Only take furosemide and potassium if the weight in the morning is more than 185  Cut down on the amlodipine to once daily instead of twice daily  Changed isosorbide to 60 mg in the morning only

## 2019-09-20 ENCOUNTER — OFFICE VISIT (OUTPATIENT)
Dept: CARDIOLOGY CLINIC | Facility: CLINIC | Age: 84
End: 2019-09-20
Payer: MEDICARE

## 2019-09-20 VITALS
SYSTOLIC BLOOD PRESSURE: 118 MMHG | WEIGHT: 186 LBS | BODY MASS INDEX: 28.19 KG/M2 | HEIGHT: 68 IN | HEART RATE: 64 BPM | DIASTOLIC BLOOD PRESSURE: 74 MMHG

## 2019-09-20 DIAGNOSIS — I25.5 ISCHEMIC CARDIOMYOPATHY: ICD-10-CM

## 2019-09-20 DIAGNOSIS — I48.0 PAROXYSMAL ATRIAL FIBRILLATION (HCC): ICD-10-CM

## 2019-09-20 DIAGNOSIS — I25.118 CORONARY ARTERY DISEASE OF NATIVE ARTERY OF NATIVE HEART WITH STABLE ANGINA PECTORIS (HCC): ICD-10-CM

## 2019-09-20 DIAGNOSIS — R42 LIGHTHEADEDNESS: Primary | ICD-10-CM

## 2019-09-20 PROCEDURE — 99214 OFFICE O/P EST MOD 30 MIN: CPT | Performed by: INTERNAL MEDICINE

## 2019-09-20 NOTE — PATIENT INSTRUCTIONS
Coronary Artery Disease   AMBULATORY CARE:   Coronary artery disease (CAD)  is narrowing of the arteries to your heart caused by a buildup of plaque  Plaque is made up of cholesterol and other substances  The narrowing in your arteries decreases the amount of blood that can flow to your heart  This causes your heart to get less oxygen  You may not have any symptoms of CAD  It is important for you to manage CAD even if you feel well  CAD can lead to a heart attack if it is not managed  Common symptoms include the following:   · Chest pain (angina), causing burning, squeezing, or crushing tightness in your chest    · Pain that spreads to your neck, jaw, or shoulder blade    · Nausea, vomiting, sweating, fainting, and hands and feet that are cold to the touch  Call 911 for any of the following:   · You have any of the following signs of a heart attack:      ¨ Squeezing, pressure, or pain in your chest that lasts longer than 5 minutes or returns    ¨ Discomfort or pain in your back, neck, jaw, stomach, or arm     ¨ Trouble breathing    ¨ Nausea or vomiting    ¨ Lightheadedness or a sudden cold sweat, especially with chest pain or trouble breathing    Contact your healthcare provider if:   · You have chest pain that is more frequent, or you have chest pain at rest     · You have questions or concerns about your condition or care  Medicines used to treat CAD:   · Blood pressure medicines  are given to lower your blood pressure  ACE inhibitors help keep your blood vessels relaxed and open to help keep blood flowing into your heart  Beta-blockers keep your heart pumping strongly and regularly so it does not work too hard to get oxygen  · Cholesterol medicines  help lower blood cholesterol levels  · Nitrates , such as nitroglycerin, relax the arteries of your heart so it gets more oxygen  They help to relieve your chest pain  · Antiplatelets , such as aspirin, help prevent blood clots   Take your antiplatelet medicine exactly as directed  These medicines make it more likely for you to bleed or bruise  If you are told to take aspirin, do not take acetaminophen or ibuprofen instead  · Blood thinners  keep clots from forming in your blood  Clots may cause heart attacks, strokes, or death  This medicine makes it more likely for you to bleed or bruise  · Do not take certain medicines without asking your healthcare provider first   These include NSAIDs, herbal or vitamin supplements, or hormones (estrogen or progestin)  Procedures used to treat CAD:   · Angioplasty  may be done to open an artery blocked by plaque  A tube with a balloon on the end is threaded into the blocked artery  Once the tube is in the artery, the balloon is inflated  As the balloon inflates, it presses the plaque against the artery wall to open the artery  A stent may be placed in your artery to keep it open  · Coronary artery bypass graft surgery (CABG)  is open heart surgery  Healthcare providers take arteries or veins from other areas in your body and use them to bypass or go around the blocked arteries of your heart  Cardiac rehabilitation:  Your healthcare provider may recommend that you attend cardiac rehabilitation (rehab)  This is a program run by specialists who will help you safely strengthen your heart and reduce the risk for more heart disease  The plan includes exercise, relaxation, stress management, and heart-healthy nutrition  Healthcare providers will also check to make sure any medicines you are taking are working  Manage CAD to prevent a heart attack:   · Do not smoke  Nicotine and other chemicals in cigarettes and cigars can cause heart and lung damage  Ask your healthcare provider for information if you currently smoke and need help to quit  E-cigarettes or smokeless tobacco still contain nicotine  Talk to your healthcare provider before you use these products  · Exercise regularly    Exercise at least 30 minutes each day, on most days of the week  Exercise helps to lower high cholesterol and high blood pressure  It can also help you maintain a healthy weight  Ask your healthcare provider about the kind of exercise you should do and how to get started  · Maintain a healthy weight  If you are overweight, talk to your healthcare provider about how to lose weight  A weight loss of 10% can improve your heart health  · Eat heart-healthy foods  Include fresh fruits and vegetables in your meal plan  Choose low-fat foods, such as skim or 1% fat milk, low-fat cheese and yogurt, fish, chicken (without skin), and lean meats  Eat two 4-ounce servings of fish high in omega-3 fats each week, such as salmon, fresh tuna, and herring  Do not eat foods that are high in sodium, such as canned foods, potato chips, salty snacks, and cold cuts  Put less table salt on your food  · Limit or do not drink alcohol  A drink of alcohol is 12 ounces of beer, 5 ounces of wine, or 1½ ounces of liquor  · Manage other health conditions  Follow your healthcare provider's advice on how to manage other conditions that can affect your heart health  These include diabetes, high blood pressure, and high cholesterol  You may need to take medicines for these conditions and make other lifestyle changes  · Ask if you should have a flu vaccine  The flu can be dangerous for a person who has CAD  The flu vaccine is available every year in the fall  Follow up with your healthcare provider as directed: You may need to return for other tests  You may also be referred to a cardiac surgeon  Write down your questions so you remember to ask them during your visits  © 2017 [employer]  Information is for End User's use only and may not be sold, redistributed or otherwise used for commercial purposes  All illustrations and images included in CareNotes® are the copyrighted property of [employer]  or [employer]    The above information is an  only  It is not intended as medical advice for individual conditions or treatments  Talk to your doctor, nurse or pharmacist before following any medical regimen to see if it is safe and effective for you

## 2019-09-20 NOTE — PROGRESS NOTES
Subjective:        Patient ID: Marya Coels is a 80 y o  male  Chief Complaint:  Vick Edgar had some orthostasis  Saw my partner last month  Lasix and potassium a stopped  Amlodipine and Imdur or were both cut to only once a day  Wife says his blood pressure started arise any developed some fluid on his legs so they went back to taking everything as he was previously and miraculously his lightheadedness completely went away he said  No further orthostatic symptoms  No crescendo anginal symptoms  The following portions of the patient's history were reviewed and updated as appropriate: allergies, current medications, past family history, past medical history, past social history, past surgical history and problem list   Review of Systems   Constitution: Negative for chills, diaphoresis, malaise/fatigue and weight gain  HENT: Negative for nosebleeds and stridor  Eyes: Negative for double vision, vision loss in left eye, vision loss in right eye and visual disturbance  Cardiovascular: Negative for chest pain, claudication, cyanosis, dyspnea on exertion, irregular heartbeat, leg swelling, near-syncope, orthopnea, palpitations, paroxysmal nocturnal dyspnea and syncope  Respiratory: Negative for cough, shortness of breath, snoring and wheezing  Endocrine: Negative for polydipsia, polyphagia and polyuria  Hematologic/Lymphatic: Negative for bleeding problem  Does not bruise/bleed easily  Skin: Negative for flushing and rash  Musculoskeletal: Negative for falls and myalgias  Gastrointestinal: Negative for abdominal pain, heartburn, hematemesis, hematochezia, melena and nausea  Genitourinary: Negative for hematuria  Neurological: Negative for brief paralysis, dizziness, focal weakness, headaches, light-headedness, loss of balance and vertigo  Psychiatric/Behavioral: Negative for altered mental status and substance abuse  Allergic/Immunologic: Negative for hives  Objective:      /74   Pulse 64   Ht 5' 8" (1 727 m)   Wt 84 4 kg (186 lb)   BMI 28 28 kg/m²   Physical Exam   Constitutional: He is oriented to person, place, and time  He appears well-developed and well-nourished  No distress  HENT:   Head: Normocephalic and atraumatic  Eyes: Pupils are equal, round, and reactive to light  EOM are normal  No scleral icterus  Neck: Normal range of motion  Neck supple  No JVD present  No thyromegaly present  Cardiovascular: Normal rate, regular rhythm and normal heart sounds  Exam reveals no gallop and no friction rub  No murmur heard  Pulmonary/Chest: Effort normal and breath sounds normal  No stridor  No respiratory distress  He has no wheezes  He has no rales  Abdominal: Soft  Bowel sounds are normal  He exhibits no distension and no mass  There is no tenderness  Musculoskeletal: Normal range of motion  He exhibits no edema or deformity  Neurological: He is alert and oriented to person, place, and time  Coordination normal    Skin: Skin is warm and dry  No erythema  No pallor  Psychiatric: He has a normal mood and affect  His behavior is normal        Lab Review:   No visits with results within 2 Month(s) from this visit  Latest known visit with results is:   Transcribe Orders on 06/20/2019   Component Date Value    Hemoglobin A1C 06/20/2019 6 8*    EAG 06/20/2019 148      No results found  Assessment:       1  Lightheadedness     2  Paroxysmal atrial fibrillation (HCC)     3  Ischemic cardiomyopathy     4  Coronary artery disease of native artery of native heart with stable angina pectoris ([address] )          Plan:         Samina lightheadedness has resolved  No acute changes recommended today  He examines in sinus rhythm  His anginal pattern is stable  Volume status euvolemic /no evidence for decompensated CHF        I asked him to give me a call should lightheadedness recur, perhaps I would simply try to decrease 1 medication at a time rather than four, this would likely put his mind and his wife's mind at ease  They recollect that at 1 point he asked his prior heart dr  if he stopped his medicines what would happen and he was told "he wouldnt be around" which frightened them      RTO 4 months, sooner prn with any problems

## 2019-09-24 ENCOUNTER — TRANSCRIBE ORDERS (OUTPATIENT)
Dept: LAB | Facility: CLINIC | Age: 84
End: 2019-09-24

## 2019-09-24 ENCOUNTER — APPOINTMENT (OUTPATIENT)
Dept: LAB | Facility: CLINIC | Age: 84
End: 2019-09-24
Payer: MEDICARE

## 2019-09-24 DIAGNOSIS — E03.9 HYPOTHYROIDISM, UNSPECIFIED TYPE: Primary | ICD-10-CM

## 2019-09-24 DIAGNOSIS — IMO0002 TYPE 2 DIABETES, UNCONTROLLED, WITH RENAL MANIFESTATION: ICD-10-CM

## 2019-09-24 LAB
EST. AVERAGE GLUCOSE BLD GHB EST-MCNC: 146 MG/DL
HBA1C MFR BLD: 6.7 % (ref 4.2–6.3)
TSH SERPL DL<=0.05 MIU/L-ACNC: 2.5 UIU/ML (ref 0.36–3.74)

## 2019-09-24 PROCEDURE — 36415 COLL VENOUS BLD VENIPUNCTURE: CPT

## 2019-09-24 PROCEDURE — 83036 HEMOGLOBIN GLYCOSYLATED A1C: CPT

## 2019-09-24 PROCEDURE — 84443 ASSAY THYROID STIM HORMONE: CPT

## 2019-11-04 ENCOUNTER — TRANSCRIBE ORDERS (OUTPATIENT)
Dept: ADMINISTRATIVE | Facility: HOSPITAL | Age: 84
End: 2019-11-04

## 2019-11-04 DIAGNOSIS — L89.891 PRESSURE INJURY OF TOE, STAGE 1, UNSPECIFIED LATERALITY: ICD-10-CM

## 2019-11-04 DIAGNOSIS — I73.9 CLAUDICATION OF BOTH LOWER EXTREMITIES (HCC): Primary | ICD-10-CM

## 2019-11-07 ENCOUNTER — HOSPITAL ENCOUNTER (OUTPATIENT)
Dept: NON INVASIVE DIAGNOSTICS | Facility: HOSPITAL | Age: 84
Discharge: HOME | End: 2019-11-07
Attending: PODIATRIST
Payer: MEDICARE

## 2019-11-07 DIAGNOSIS — L89.891 PRESSURE INJURY OF TOE, STAGE 1, UNSPECIFIED LATERALITY: ICD-10-CM

## 2019-11-07 DIAGNOSIS — I73.9 CLAUDICATION OF BOTH LOWER EXTREMITIES (HCC): ICD-10-CM

## 2019-11-07 PROCEDURE — 93925 LOWER EXTREMITY STUDY: CPT

## 2019-11-07 PROCEDURE — 93923 UPR/LXTR ART STDY 3+ LVLS: CPT

## 2019-11-07 PROCEDURE — 93925 LOWER EXTREMITY STUDY: CPT | Performed by: SURGERY

## 2019-11-07 PROCEDURE — 93922 UPR/L XTREMITY ART 2 LEVELS: CPT | Performed by: SURGERY

## 2019-11-22 ENCOUNTER — CONSULT (OUTPATIENT)
Dept: VASCULAR SURGERY | Facility: CLINIC | Age: 84
End: 2019-11-22
Payer: MEDICARE

## 2019-11-22 VITALS
WEIGHT: 183 LBS | HEART RATE: 62 BPM | HEIGHT: 68 IN | BODY MASS INDEX: 27.74 KG/M2 | SYSTOLIC BLOOD PRESSURE: 104 MMHG | DIASTOLIC BLOOD PRESSURE: 68 MMHG

## 2019-11-22 DIAGNOSIS — E78.2 MIXED HYPERLIPIDEMIA: ICD-10-CM

## 2019-11-22 DIAGNOSIS — L89.891 PRESSURE INJURY OF TOE, STAGE 1, UNSPECIFIED LATERALITY: ICD-10-CM

## 2019-11-22 DIAGNOSIS — N18.30 CHRONIC KIDNEY DISEASE, STAGE III (MODERATE) (HCC): ICD-10-CM

## 2019-11-22 DIAGNOSIS — I73.9 PAD (PERIPHERAL ARTERY DISEASE) (HCC): Primary | ICD-10-CM

## 2019-11-22 DIAGNOSIS — I73.9 CLAUDICATION OF BOTH LOWER EXTREMITIES (HCC): ICD-10-CM

## 2019-11-22 DIAGNOSIS — E11.8 TYPE 2 DIABETES MELLITUS WITH COMPLICATION, WITH LONG-TERM CURRENT USE OF INSULIN (HCC): ICD-10-CM

## 2019-11-22 DIAGNOSIS — Z79.4 TYPE 2 DIABETES MELLITUS WITH COMPLICATION, WITH LONG-TERM CURRENT USE OF INSULIN (HCC): ICD-10-CM

## 2019-11-22 PROCEDURE — 99204 OFFICE O/P NEW MOD 45 MIN: CPT | Performed by: PHYSICIAN ASSISTANT

## 2019-11-22 NOTE — ASSESSMENT & PLAN NOTE
-stable  -continue to optimize BS control for optimization of wound healing and prevention of vascular disease  -management per PCP  Lab Results   Component Value Date    HGBA1C 6 7 (H) 09/24/2019

## 2019-11-22 NOTE — PROGRESS NOTES
Assessment/Plan:    PAD (peripheral artery disease) [facility])  80-year-old male nonsmoker with a history of HTN, HLD, CAD, s/p CABG, ICM, PAF on no anticoagulation, lumbar spinal stenosis, s/p extensive fusion x 2 ('99, '16), type 2 DM, CKD 3 (baseline creat 1 6/GFR 38) and PAD w/small R lateral foot wound x 3 weeks  Noninvasive imaging with diffuse femoropopliteal disease without focal stenosis and R VALDO 1 42 w/GTP just below healing  1+ R DP pulse w/biphasic doppler signal   -given underlying CKD and likelihood of small vessel disease, recommend conservative management with local wound care and short-interval f/u with wound recheck  If wound persists or worsens, may need to consider angiogram after formal nephrology for risk stratification    Discussed at length with patient and wife and both are in agreement with plan and would like to avoid any intervention   -Follow-up in the office with Dr Burlene Gilford in 2-3 weeks for wound recheck  -instructed patient and wife to contact the office immediately with worsening of the wound or new symptoms, to include rest pain  -continue ASA and statin therapy  -continue local wound care per Podiatry    Chronic kidney disease, stage III (moderate) (HCC)  -baseline creatinine 1 6-1 7  -most recent creatinine 1 62/GFR 38 on 4/16/2019  -continue to avoid nephrotoxic agents  -will require formal evaluation by Nephrology for risk stratification if angiogram considered    Mixed hyperlipidemia  -stable  -continue statin therapy  -management per PCP    Type 2 diabetes mellitus with complication, with long-term current use of insulin (HCC)  -stable  -continue to optimize BS control for optimization of wound healing and prevention of vascular disease  -management per PCP  Lab Results   Component Value Date    HGBA1C 6 7 (H) 09/24/2019        Diagnoses and all orders for this visit:    PAD (peripheral artery disease) ([address] )    Claudication of both lower extremities ([address] )  -     Ambulatory referral to Vascular Surgery    Pressure injury of toe, stage 1, unspecified laterality  -     Ambulatory referral to Vascular Surgery    Chronic kidney disease, stage III (moderate) ([facility])    Mixed hyperlipidemia    Type 2 diabetes mellitus with complication, with long-term current use of insulin (HCC)          Subjective:      Patient ID: Ramin Shrestha is a 80 y o  male  Pt C/o of tiredness in BL legs, pt states that when he walks it causes his back to hurt which causes his legs to get tired  Pt is able to walk 100 ft before having to rest  Pt currently has a wound on the R little toe  Pt currently puts an antibiotic on x2 day for less than a week  59-year-old male nonsmoker with a history of HTN, HLD, CAD, s/p CABG, ICM, PAF on no anticoagulation, lumbar spinal stenosis, s/p fusion and plating x 2 ('99, '16), type 2 DM, CKD 3 (baseline creat 1 6/GFR 38) and PAD w/small R lateral foot wound x 3 weeks who is referred by his podiatrist for evaluation for optimization of wound healing  Patient denies true claudication, rest pain or prior history of tissue loss but complains chronic bilateral lower leg fatigue with ambulating >150 feet since his last spine surgery 3 years ago  He reports he developed a callus on the right lateral aspect of the metatarsal head which was debrided by his podiatrist, now w/small nonhealing superficial wound for the past 2-3 weeks  He denies fever, chills, erythema, drainage or pain  Wound has been remaining the same  NIMCO 11/7/2019 has been reviewed and demonstrates diffuse femoral popliteal disease without focal stenosis and evidence of left tibioperoneal disease with R VALDO 1 42/105/45, L VALDO 1 01/136/61  Patient reports no prior history of peripheral vascular intervention  Knees on aspirin statin therapy  Most recent creatinine 1 62/GFR 38 on 4/16/2019    Patient is not followed regularly by a nephrologist       The following portions of the patient's history were reviewed and updated as appropriate: allergies, current medications, past family history, past medical history, past social history, past surgical history and problem list     Review of Systems   Constitutional: Positive for activity change  HENT: Negative  Eyes: Negative  Respiratory: Negative  Cardiovascular: Negative  Gastrointestinal: Negative  Endocrine: Negative  Genitourinary: Negative  Musculoskeletal: Positive for back pain  Tiredness in BL legs  Skin: Positive for wound (R little toe )  Allergic/Immunologic: Negative  Neurological: Negative  Hematological: Negative  Psychiatric/Behavioral: Negative  I have reviewed and made appropriate changes to the review of systems input by the medical assistant      Vitals:    11/22/19 1008   BP: 104/68   BP Location: Right arm   Patient Position: Sitting   Pulse: 62   Weight: 83 kg (183 lb)   Height: 5' 8" (1 727 m)       Patient Active Problem List   Diagnosis    Paroxysmal atrial fibrillation ([facility])    Type 2 diabetes mellitus with complication, with long-term current use of insulin (HCC)    Bradycardia    Acquired hypothyroidism    Chronic kidney disease, stage III (moderate) ([facility])    Coronary artery disease of native artery of native heart with stable angina pectoris ([facility])    Essential hypertension    Low back pain    Mixed hyperlipidemia    Rupture of tendon of biceps, long head    Spinal stenosis of lumbar region with neurogenic claudication    Stented coronary artery    Lightheadedness    PAD [facility]peripheral artery disease) [facility]       Past Surgical History:   Procedure Laterality Date    CARDIAC CATHETERIZATION      CORONARY ARTERY BYPASS GRAFT         Family History   Problem Relation Age of Onset    No Known Problems Mother     No Known Problems Father     No Known Problems Sister     No Known Problems Brother     No Known Problems Maternal Aunt     No Known Problems Maternal Uncle     No Known Problems Paternal Aunt     No Known Problems Paternal Uncle     No Known Problems Maternal Grandmother     No Known Problems Maternal Grandfather     No Known Problems Paternal Grandmother     No Known Problems Paternal Grandfather     Anemia Neg Hx     Arrhythmia Neg Hx     Asthma Neg Hx     Clotting disorder Neg Hx     Fainting Neg Hx     Heart attack Neg Hx     Heart disease Neg Hx     Heart failure Neg Hx     Hyperlipidemia Neg Hx     Hypertension Neg Hx        Social History     Socioeconomic History    Marital status: /Civil Union     Spouse name: Not on file    Number of children: Not on file    Years of education: Not on file    Highest education level: Not on file   Occupational History    Occupation: RETIRED   Social Needs    Financial resource strain: Not on file    Food insecurity:     Worry: Not on file     Inability: Not on file    Transportation needs:     Medical: Not on file     Non-medical: Not on file   Tobacco Use    Smoking status: Never Smoker    Smokeless tobacco: Former User   Substance and Sexual Activity    Alcohol use: Not on file    Drug use: Not on file    Sexual activity: Not on file   Lifestyle    Physical activity:     Days per week: Not on file     Minutes per session: Not on file    Stress: Not on file   Relationships    Social connections:     Talks on phone: Not on file     Gets together: Not on file     Attends Synagogue service: Not on file     Active member of club or organization: Not on file     Attends meetings of clubs or organizations: Not on file     Relationship status: Not on file    Intimate partner violence:     Fear of current or ex partner: Not on file     Emotionally abused: Not on file     Physically abused: Not on file     Forced sexual activity: Not on file   Other Topics Concern    Not on file   Social History Narrative        RETIRED       No Known Allergies      Current Outpatient Medications:    ACCU-CHEK COMPACT PLUS test strip, USE 3 TIMES DAILY  DX: E11 29, Disp: , Rfl: 3    ACCU-CHEK SOFTCLIX LANCETS lancets, USE AND DISCARD 1 LANCET 3 TIMES DAILY   DX: E11 29, Disp: , Rfl: 3    amiodarone 200 mg tablet, Take 0 5 tablets (100 mg total) by mouth daily, Disp: 45 tablet, Rfl: 3    amLODIPine (NORVASC) 2 5 mg tablet, Take 1 tablet (2 5 mg total) by mouth daily (Patient taking differently: Take 2 5 mg by mouth 2 (two) times a day ), Disp: 180 tablet, Rfl: 3    aspirin 81 MG tablet, Take 81 mg by mouth daily, Disp: , Rfl:     atorvastatin (LIPITOR) 10 mg tablet, Take 1 tablet (10 mg total) by mouth daily, Disp: 90 tablet, Rfl: 3    BD PEN NEEDLE CARMINA U/F 32G X 4 MM MISC, USE 4 PEN NEEDLE DAILY AS DIRECTED, Disp: , Rfl: 3    carvedilol (COREG) 3 125 mg tablet, Take 1 tablet (3 125 mg total) by mouth 2 (two) times a day with meals, Disp: 180 tablet, Rfl: 3    furosemide (LASIX) 20 mg tablet, Take 1 tablet (20 mg total) by mouth daily as needed (am weight more than 185) (Patient taking differently: Take 20 mg by mouth daily ), Disp: 90 tablet, Rfl: 3    insulin aspart (NovoLOG) 100 units/mL injection, Inject under the skin 3 (three) times a day before meals, Disp: , Rfl:     Insulin Glargine (LANTUS SOLOSTAR SC), Inject 5 Units under the skin daily , Disp: , Rfl:     isosorbide mononitrate (IMDUR) 60 mg 24 hr tablet, Take 1 tablet (60 mg total) by mouth daily (Patient taking differently: Take 30 mg by mouth 2 (two) times a day ), Disp: , Rfl:     levothyroxine 75 mcg tablet, Take 1 tablet (75 mcg total) by mouth daily, Disp: 90 tablet, Rfl: 3    nitroglycerin (NITROSTAT) 0 4 mg SL tablet, Place 0 4 mg under the tongue every 5 (five) minutes as needed for chest pain, Disp: , Rfl:     Omega-3 Fatty Acids (FISH OIL) 1,000 mg, Take 1,000 mg by mouth daily, Disp: , Rfl:     potassium chloride (MICRO-K) 10 MEQ CR capsule, Take 1 capsule (10 mEq total) by mouth 3 (three) times a day as needed (am weight more than 185 ) (Patient taking differently: Take 2 tablets in the morning and 1 tablet in the evening ), Disp: 270 capsule, Rfl: 3     Objective:  Imaging study:  NIMCO 11/7/2019:  Imaging study reviewed and as described above  ABIs unchanged since 2008  See full report below  Segment                Right            Left                                            Waveform    PSV  Waveform    PSV    Post  Tibial           Biphasic         Monophasic         Ant  Tibial            Monophasic       Monophasic         Common Femoral Artery               63               50    Prox Profunda                       39               33    Prox SFA                            46               51    Mid SFA                             56               48    Dist SFA                            60               52    Proximal Pop                        26               41    Distal Pop                          18               34    Tibioperoneal                       26               34    Dist Post Tibial                    24               44    Dist  Ant  Tibial                  132               44    Prox Peroneal                                        66    Dist Peroneal                                        44             CONCLUSION:     Impression:  RIGHT LOWER LIMB:  This resting evaluation shows diffuse calcific atherosclerotic disease with no  evidence of significant focal stenosis  Decreased velocities are noted in the  popliteal artery and tibio-peroneal arteries  Ankle/Brachial index: 1 42 which is considered unreliable due to  non-compressible vessels  Prior: 1 32  PVR/ PPG tracings are dampened  Metatarsal pressure of 105 mmHg  Great toe pressure of 45 mmHg,  which is not within the healing range     LEFT LOWER LIMB:  This resting evaluation shows diffuse calcific atherosclerotic arterial disease  with no evidence of significant focal stenosis   The findings suggest  tibio-peroneal arterial disease  Ankle/Brachial index: 1 01  , Prior: 1 14  PVR/ PPG tracings are  dampened at the metatarsal   Metatarsal pressure of  136 mmHg  Great toe pressure of 61 mmHg, within the healing range     Compared to previous study on 08/07/2008, there is no change ankle/brachial  indices  /68 (BP Location: Right arm, Patient Position: Sitting)   Pulse 62   Ht 5' 8" (1 727 m)   Wt 83 kg (183 lb)   BMI 27 83 kg/m²          Physical Exam   Constitutional: He is oriented to person, place, and time  He appears well-developed and well-nourished  No distress  HENT:   Head: Normocephalic and atraumatic  Eyes: Pupils are equal, round, and reactive to light  Conjunctivae and EOM are normal  No scleral icterus  Neck: Normal range of motion  Neck supple  No JVD present  Carotid bruit is not present  No tracheal deviation present  No thyromegaly present  Cardiovascular: Normal rate, regular rhythm, S1 normal and S2 normal  Exam reveals no gallop, no S3 and no friction rub  No murmur heard  Pulses:       Radial pulses are 2+ on the right side, and 2+ on the left side  Femoral pulses are 2+ on the right side, and 2+ on the left side  Popliteal pulses are 2+ on the right side, and 2+ on the left side  Dorsalis pedis pulses are 1+ on the right side, and 2+ on the left side  Posterior tibial pulses are 0 on the right side, and 0 on the left side  Doppler signals:  Right:  Monophasic PT and AT, biphasic DP and peroneal   Left:  Monophasic PT, AT and peroneal   Biphasic DP   Pulmonary/Chest: Breath sounds normal  No stridor  No respiratory distress  He has no wheezes  He has no rhonchi  He has no rales  Abdominal: Soft  Bowel sounds are normal  He exhibits no distension, no abdominal bruit, no pulsatile midline mass and no mass  There is no hepatosplenomegaly  There is no tenderness  There is no rebound  Musculoskeletal: Normal range of motion  He exhibits no edema or deformity  Bilateral lower extremities warm, pink, motor and sensory intact  No tissue loss  No cyanosis or pallor or rubor  Neurological: He is alert and oriented to person, place, and time  He has normal strength  Skin: Skin is warm, dry and intact  No lesion noted  No cyanosis or erythema  No pallor  Psychiatric: He has a normal mood and affect  Nursing note and vitals reviewed

## 2019-11-22 NOTE — ASSESSMENT & PLAN NOTE
-baseline creatinine 1 6-1 7  -most recent creatinine 1 62/GFR 38 on 4/16/2019  -continue to avoid nephrotoxic agents  -will require formal evaluation by Nephrology for risk stratification if angiogram considered

## 2019-11-22 NOTE — PATIENT INSTRUCTIONS
Peripheral Artery Disease   WHAT YOU NEED TO KNOW:   What is peripheral artery disease? Peripheral artery disease (PAD) is narrow, weak, or blocked arteries  It may affect any arteries outside of your heart and brain  PAD is usually the result of a buildup of fat and cholesterol, also called plaque, along your artery walls  Inflammation, a blood clot, or abnormal cell growth could also block your arteries  PAD prevents normal blood flow to your legs and arms  You are at risk of an amputation if poor blood flow keeps wounds from healing or causes gangrene (tissue death)  Without treatment, PAD can also cause a heart attack or stroke  What increases my risk for PAD? · Smoking cigarettes     · Diabetes     · High blood pressure     · High cholesterol     · Age older than 40 years    · Heart disease or a family history of heart disease  What are the signs and symptoms of PAD? Mild PAD usually does not cause symptoms  As the disease worsens over time, you may have the following:  · Pain or cramps in your leg or hip while you walk     · A numb, weak, or heavy feeling in your legs     · Dry, scaly, red, or pale skin on your legs     · Thick or brittle nails, or hair loss on your arms and legs     · Foot sores that will not heal     · Burning or aching in your feet and toes while resting (this may be worse when you lie down)  How is PAD diagnosed? · Angiography  is a test that shows pictures of the arteries in your arms and legs  You will be given contrast liquid to help the arteries show up better on the pictures  The pictures will be taken with an MRI or CT scan  Tell the healthcare provider if you have ever had an allergic reaction to contrast liquid  Do not enter the MRI room with anything metal  Metal can cause serious injury  Tell a healthcare provider if you have any metal in or on your body      · Doppler ankle brachial index (VALDO)  is a test that compares blood pressure in your ankles to blood pressure in your arms  This tells your healthcare provider how well blood is flowing through the arteries in your legs  How is PAD treated? Treatment can help reduce your risk of a heart attack, stroke, or amputation  You may need more than one of the following:  · Medicines  may be given  Your healthcare provider may give you any of the following:     ¨ Antiplatelet medicine,  such as aspirin, helps prevent blood clots and reduces the risk of a heart attack or stroke  ¨ Statin medicine  helps lower your cholesterol and prevents your PAD from getting worse  · A supervised exercise program  helps you stay active in normal daily activities and may prevent disability  Healthcare providers will help you safely walk or do strength training exercises 3 times a week for 30 to 60 minutes  You will do this for several months, then transition to walking on your own  · Angioplasty  is a procedure to open your artery so blood can flow through normally  A thin tube called a catheter is used to insert a small balloon into your artery  The balloon is inflated to open your blocked artery, and then removed  A tube called a stent may be placed in your artery to hold it open  · Bypass surgery  is used to make a new connection to your artery with a vein from another part of your body, or an artificial graft  The vein or graft is attached to your artery above and below your blockage  This allows blood to flow around the blocked portion of your artery  How can I manage PAD? · Do not smoke  Nicotine and other chemicals in cigarettes and cigars can worsen PAD  They can also increase your risk for a heart attack or stroke  Ask your healthcare provider for information if you currently smoke and need help to quit  E-cigarettes or smokeless tobacco still contain nicotine  Talk to your healthcare provider before you use these products  · Manage other health conditions  Take your medicines as directed   Follow your healthcare provider's instructions if you have high blood pressure or high cholesterol  Perform foot care and check your blood sugar levels as directed if you have diabetes  · Eat heart healthy foods  Eat whole grains, fruits, and vegetables every day  Limit salt and high-fat foods  Ask your healthcare provider for more information on a heart healthy diet  Ask if you need to lose weight  Your healthcare provider can help you create a healthy weight-loss plan  Call 911 for the following:   · You have any of the following signs of a heart attack:      ¨ Squeezing, pressure, or pain in your chest that lasts longer than 5 minutes or returns    ¨ Discomfort or pain in your back, neck, jaw, stomach, or arm     ¨ Trouble breathing    ¨ Nausea or vomiting    ¨ Lightheadedness or a sudden cold sweat, especially with chest pain or trouble breathing    · You have any of the following signs of a stroke:      ¨ Numbness or drooping on one side of your face     ¨ Weakness in an arm or leg    ¨ Confusion or difficulty speaking    ¨ Dizziness, a severe headache, or vision loss  When should I seek immediate care? · You have sores or wounds that will not heal      · You notice black or discolored skin on your arm or leg  · Your skin is cool to the touch  When should I contact my healthcare provider? · You have leg pain when you walk 1/8 mile (200 meters) or less, even with treatment  · Your legs are red, dry, or pale, even with treatment  · You have questions or concerns about your condition or care  CARE AGREEMENT:   You have the right to help plan your care  Learn about your health condition and how it may be treated  Discuss treatment options with your caregivers to decide what care you want to receive  You always have the right to refuse treatment  The above information is an  only  It is not intended as medical advice for individual conditions or treatments   Talk to your doctor, nurse or pharmacist before following any medical regimen to see if it is safe and effective for you  © 2017 [address] Information is for End User's use only and may not be sold, redistributed or otherwise used for commercial purposes  All illustrations and images included in CareNotes® are the copyrighted property of [employer] M [employer]  or Dima Thompson  -continue local wound care per your podiatrist  -return to office in 2-3 weeks with Dr Robert Max for re-evaluation of wound    Please call the office immediately if the wound gets worse and we will see you sooner to discuss possible angiogram  -please contact the office in the interim with any questions, concerns, change in her symptoms, rest pain or worsening or new wounds

## 2019-12-02 ENCOUNTER — APPOINTMENT (OUTPATIENT)
Dept: LAB | Facility: CLINIC | Age: 84
End: 2019-12-02
Payer: MEDICARE

## 2019-12-02 DIAGNOSIS — I10 ESSENTIAL HYPERTENSION: ICD-10-CM

## 2019-12-02 DIAGNOSIS — E78.2 MIXED HYPERLIPIDEMIA: ICD-10-CM

## 2019-12-02 LAB
ALBUMIN SERPL BCP-MCNC: 4.2 G/DL (ref 3.5–5)
ALP SERPL-CCNC: 87 U/L (ref 46–116)
ALT SERPL W P-5'-P-CCNC: 20 U/L (ref 12–78)
ANION GAP SERPL CALCULATED.3IONS-SCNC: 5 MMOL/L (ref 4–13)
AST SERPL W P-5'-P-CCNC: 16 U/L (ref 5–45)
BASOPHILS # BLD AUTO: 0.08 THOUSANDS/ΜL (ref 0–0.1)
BASOPHILS NFR BLD AUTO: 1 % (ref 0–1)
BILIRUB SERPL-MCNC: 0.7 MG/DL (ref 0.2–1)
BUN SERPL-MCNC: 29 MG/DL (ref 5–25)
CALCIUM SERPL-MCNC: 8.6 MG/DL (ref 8.3–10.1)
CHLORIDE SERPL-SCNC: 107 MMOL/L (ref 100–108)
CHOLEST SERPL-MCNC: 190 MG/DL (ref 50–200)
CO2 SERPL-SCNC: 28 MMOL/L (ref 21–32)
CREAT SERPL-MCNC: 1.72 MG/DL (ref 0.6–1.3)
EOSINOPHIL # BLD AUTO: 0.38 THOUSAND/ΜL (ref 0–0.61)
EOSINOPHIL NFR BLD AUTO: 7 % (ref 0–6)
ERYTHROCYTE [DISTWIDTH] IN BLOOD BY AUTOMATED COUNT: 12.1 % (ref 11.6–15.1)
GFR SERPL CREATININE-BSD FRML MDRD: 35 ML/MIN/1.73SQ M
GLUCOSE P FAST SERPL-MCNC: 167 MG/DL (ref 65–99)
HCT VFR BLD AUTO: 43.6 % (ref 36.5–49.3)
HDLC SERPL-MCNC: 42 MG/DL
HGB BLD-MCNC: 14.8 G/DL (ref 12–17)
IMM GRANULOCYTES # BLD AUTO: 0.02 THOUSAND/UL (ref 0–0.2)
IMM GRANULOCYTES NFR BLD AUTO: 0 % (ref 0–2)
LDLC SERPL CALC-MCNC: 96 MG/DL (ref 0–100)
LYMPHOCYTES # BLD AUTO: 1.02 THOUSANDS/ΜL (ref 0.6–4.47)
LYMPHOCYTES NFR BLD AUTO: 18 % (ref 14–44)
MCH RBC QN AUTO: 32.7 PG (ref 26.8–34.3)
MCHC RBC AUTO-ENTMCNC: 33.9 G/DL (ref 31.4–37.4)
MCV RBC AUTO: 96 FL (ref 82–98)
MONOCYTES # BLD AUTO: 0.55 THOUSAND/ΜL (ref 0.17–1.22)
MONOCYTES NFR BLD AUTO: 10 % (ref 4–12)
NEUTROPHILS # BLD AUTO: 3.71 THOUSANDS/ΜL (ref 1.85–7.62)
NEUTS SEG NFR BLD AUTO: 64 % (ref 43–75)
NRBC BLD AUTO-RTO: 0 /100 WBCS
PLATELET # BLD AUTO: 148 THOUSANDS/UL (ref 149–390)
PMV BLD AUTO: 10.2 FL (ref 8.9–12.7)
POTASSIUM SERPL-SCNC: 4.2 MMOL/L (ref 3.5–5.3)
PROT SERPL-MCNC: 7.5 G/DL (ref 6.4–8.2)
RBC # BLD AUTO: 4.53 MILLION/UL (ref 3.88–5.62)
SODIUM SERPL-SCNC: 140 MMOL/L (ref 136–145)
TRIGL SERPL-MCNC: 259 MG/DL
WBC # BLD AUTO: 5.76 THOUSAND/UL (ref 4.31–10.16)

## 2019-12-02 PROCEDURE — 80053 COMPREHEN METABOLIC PANEL: CPT

## 2019-12-02 PROCEDURE — 80061 LIPID PANEL: CPT

## 2019-12-02 PROCEDURE — 85025 COMPLETE CBC W/AUTO DIFF WBC: CPT

## 2019-12-02 PROCEDURE — 36415 COLL VENOUS BLD VENIPUNCTURE: CPT

## 2019-12-06 ENCOUNTER — OFFICE VISIT (OUTPATIENT)
Dept: INTERNAL MEDICINE CLINIC | Facility: CLINIC | Age: 84
End: 2019-12-06
Payer: MEDICARE

## 2019-12-06 VITALS
TEMPERATURE: 97.6 F | HEART RATE: 67 BPM | OXYGEN SATURATION: 96 % | HEIGHT: 68 IN | BODY MASS INDEX: 28.34 KG/M2 | WEIGHT: 187 LBS

## 2019-12-06 DIAGNOSIS — Z01.00 DIABETIC EYE EXAM (HCC): ICD-10-CM

## 2019-12-06 DIAGNOSIS — E11.8 TYPE 2 DIABETES MELLITUS WITH COMPLICATION, WITH LONG-TERM CURRENT USE OF INSULIN (HCC): ICD-10-CM

## 2019-12-06 DIAGNOSIS — I10 ESSENTIAL HYPERTENSION: ICD-10-CM

## 2019-12-06 DIAGNOSIS — I48.0 PAROXYSMAL ATRIAL FIBRILLATION (HCC): ICD-10-CM

## 2019-12-06 DIAGNOSIS — E11.9 DIABETIC EYE EXAM (HCC): ICD-10-CM

## 2019-12-06 DIAGNOSIS — H61.22 IMPACTED CERUMEN OF LEFT EAR: ICD-10-CM

## 2019-12-06 DIAGNOSIS — Z79.4 TYPE 2 DIABETES MELLITUS WITH COMPLICATION, WITH LONG-TERM CURRENT USE OF INSULIN (HCC): ICD-10-CM

## 2019-12-06 DIAGNOSIS — N18.30 CHRONIC KIDNEY DISEASE, STAGE III (MODERATE) (HCC): ICD-10-CM

## 2019-12-06 DIAGNOSIS — I25.118 CORONARY ARTERY DISEASE OF NATIVE ARTERY OF NATIVE HEART WITH STABLE ANGINA PECTORIS (HCC): ICD-10-CM

## 2019-12-06 DIAGNOSIS — Z00.00 MEDICARE ANNUAL WELLNESS VISIT, INITIAL: Primary | ICD-10-CM

## 2019-12-06 DIAGNOSIS — E78.2 MIXED HYPERLIPIDEMIA: ICD-10-CM

## 2019-12-06 PROCEDURE — G0439 PPPS, SUBSEQ VISIT: HCPCS | Performed by: INTERNAL MEDICINE

## 2019-12-06 PROCEDURE — 99214 OFFICE O/P EST MOD 30 MIN: CPT | Performed by: INTERNAL MEDICINE

## 2019-12-06 PROCEDURE — 69210 REMOVE IMPACTED EAR WAX UNI: CPT | Performed by: INTERNAL MEDICINE

## 2019-12-06 NOTE — PATIENT INSTRUCTIONS
Medicare Preventive Visit Patient Instructions  Thank you for completing your Welcome to Medicare Visit or Medicare Annual Wellness Visit today  Your next wellness visit will be due in one year (12/6/2020)  The screening/preventive services that you may require over the next 5-10 years are detailed below  Some tests may not apply to you based off risk factors and/or age  Screening tests ordered at today's visit but not completed yet may show as past due  Also, please note that scanned in results may not display below  Preventive Screenings:  Service Recommendations Previous Testing/Comments   Colorectal Cancer Screening  · Colonoscopy    · Fecal Occult Blood Test (FOBT)/Fecal Immunochemical Test (FIT)  · Fecal DNA/Cologuard Test  · Flexible Sigmoidoscopy Age: 54-65 years old   Colonoscopy: every 10 years (May be performed more frequently if at higher risk)  OR  FOBT/FIT: every 1 year  OR  Cologuard: every 3 years  OR  Sigmoidoscopy: every 5 years  Screening may be recommended earlier than age 48 if at higher risk for colorectal cancer  Also, an individualized decision between you and your healthcare provider will decide whether screening between the ages of 74-80 would be appropriate   Colonoscopy: Not on file  FOBT/FIT: Not on file  Cologuard: Not on file  Sigmoidoscopy: Not on file    Screening Not Indicated     Prostate Cancer Screening Individualized decision between patient and health care provider in men between ages of 53-78   Medicare will cover every 12 months beginning on the day after your 50th birthday PSA: No results in last 5 years     Screening Not Indicated     Hepatitis C Screening Once for adults born between [facility]  More frequently in patients at high risk for Hepatitis C Hep C Antibody: Not on file       Diabetes Screening 1-2 times per year if you're at risk for diabetes or have pre-diabetes Fasting glucose: 167 mg/dL   A1C: 6 7 %    Screening Not Indicated  History Diabetes Cholesterol Screening Once every 5 years if you don't have a lipid disorder  May order more often based on risk factors  Lipid panel: 12/02/2019    Screening Not Indicated  History Lipid Disorder      Other Preventive Screenings Covered by Medicare:  1  Abdominal Aortic Aneurysm (AAA) Screening: covered once if your at risk  You're considered to be at risk if you have a family history of AAA or a male between the age of 73-68 who smoking at least 100 cigarettes in your lifetime  2  Lung Cancer Screening: covers low dose CT scan once per year if you meet all of the following conditions: (1) Age 50-69; (2) No signs or symptoms of lung cancer; (3) Current smoker or have quit smoking within the last 15 years; (4) You have a tobacco smoking history of at least 30 pack years (packs per day x number of years you smoked); (5) You get a written order from a healthcare provider  3  Glaucoma Screening: covered annually if you're considered high risk: (1) You have diabetes OR (2) Family history of glaucoma OR (3)  aged 48 and older OR (3)  American aged 72 and older  3  Osteoporosis Screening: covered every 2 years if you meet one of the following conditions: (1) Have a vertebral abnormality; (2) On glucocorticoid therapy for more than 3 months; (3) Have primary hyperparathyroidism; (4) On osteoporosis medications and need to assess response to drug therapy  5  HIV Screening: covered annually if you're between the age of 12-76  Also covered annually if you are younger than 13 and older than 72 with risk factors for HIV infection  For pregnant patients, it is covered up to 3 times per pregnancy      Immunizations:  Immunization Recommendations   Influenza Vaccine Annual influenza vaccination during flu season is recommended for all persons aged >= 6 months who do not have contraindications   Pneumococcal Vaccine (Prevnar and Pneumovax)  * Prevnar = PCV13  * Pneumovax = PPSV23 Adults 25-60 years old: 1-3 doses may be recommended based on certain risk factors  Adults 72 years old: Prevnar (PCV13) vaccine recommended followed by Pneumovax (PPSV23) vaccine  If already received PPSV23 since turning 65, then PCV13 recommended at least one year after PPSV23 dose  Hepatitis B Vaccine 3 dose series if at intermediate or high risk (ex: diabetes, end stage renal disease, liver disease)   Tetanus (Td) Vaccine - COST NOT COVERED BY MEDICARE PART B Following completion of primary series, a booster dose should be given every 10 years to maintain immunity against tetanus  Td may also be given as tetanus wound prophylaxis  Tdap Vaccine - COST NOT COVERED BY MEDICARE PART B Recommended at least once for all adults  For pregnant patients, recommended with each pregnancy  Shingles Vaccine (Shingrix) - COST NOT COVERED BY MEDICARE PART B  2 shot series recommended in those aged 48 and above     Health Maintenance Due:  There are no preventive care reminders to display for this patient  Immunizations Due:      Topic Date Due    DTaP,Tdap,and Td Vaccines (1 - Tdap) 04/25/1944    Hepatitis B Vaccine (1 of 3 - Risk 3-dose series) 04/25/1952     Advance Directives   What are advance directives? Advance directives are legal documents that state your wishes and plans for medical care  These plans are made ahead of time in case you lose your ability to make decisions for yourself  Advance directives can apply to any medical decision, such as the treatments you want, and if you want to donate organs  What are the types of advance directives? There are many types of advance directives, and each state has rules about how to use them  You may choose a combination of any of the following:  · Living will: This is a written record of the treatment you want  You can also choose which treatments you do not want, which to limit, and which to stop at a certain time  This includes surgery, medicine, IV fluid, and tube feedings  · Durable power of  for healthcare [facility]): This is a written record that states who you want to make healthcare choices for you when you are unable to make them for yourself  This person, called a proxy, is usually a family member or a friend  You may choose more than 1 proxy  · Do not resuscitate (DNR) order:  A DNR order is used in case your heart stops beating or you stop breathing  It is a request not to have certain forms of treatment, such as CPR  A DNR order may be included in other types of advance directives  · Medical directive: This covers the care that you want if you are in a coma, near death, or unable to make decisions for yourself  You can list the treatments you want for each condition  Treatment may include pain medicine, surgery, blood transfusions, dialysis, IV or tube feedings, and a ventilator (breathing machine)  · Values history: This document has questions about your views, beliefs, and how you feel and think about life  This information can help others choose the care that you would choose  Why are advance directives important? An advance directive helps you control your care  Although spoken wishes may be used, it is better to have your wishes written down  Spoken wishes can be misunderstood, or not followed  Treatments may be given even if you do not want them  An advance directive may make it easier for your family to make difficult choices about your care  Weight Management   Why it is important to manage your weight:  Being overweight increases your risk of health conditions such as heart disease, high blood pressure, type 2 diabetes, and certain types of cancer  It can also increase your risk for osteoarthritis, sleep apnea, and other respiratory problems  Aim for a slow, steady weight loss  Even a small amount of weight loss can lower your risk of health problems    How to lose weight safely:  A safe and healthy way to lose weight is to eat fewer calories and get regular exercise  You can lose up about 1 pound a week by decreasing the number of calories you eat by 500 calories each day  Healthy meal plan for weight management:  A healthy meal plan includes a variety of foods, contains fewer calories, and helps you stay healthy  A healthy meal plan includes the following:  · Eat whole-grain foods more often  A healthy meal plan should contain fiber  Fiber is the part of grains, fruits, and vegetables that is not broken down by your body  Whole-grain foods are healthy and provide extra fiber in your diet  Some examples of whole-grain foods are whole-wheat breads and pastas, oatmeal, brown rice, and bulgur  · Eat a variety of vegetables every day  Include dark, leafy greens such as spinach, kale, karishma greens, and mustard greens  Eat yellow and orange vegetables such as carrots, sweet potatoes, and winter squash  · Eat a variety of fruits every day  Choose fresh or canned fruit (canned in its own juice or light syrup) instead of juice  Fruit juice has very little or no fiber  · Eat low-fat dairy foods  Drink fat-free (skim) milk or 1% milk  Eat fat-free yogurt and low-fat cottage cheese  Try low-fat cheeses such as mozzarella and other reduced-fat cheeses  · Choose meat and other protein foods that are low in fat  Choose beans or other legumes such as split peas or lentils  Choose fish, skinless poultry (chicken or turkey), or lean cuts of red meat (beef or pork)  Before you cook meat or poultry, cut off any visible fat  · Use less fat and oil  Try baking foods instead of frying them  Add less fat, such as margarine, sour cream, regular salad dressing and mayonnaise to foods  Eat fewer high-fat foods  Some examples of high-fat foods include french fries, doughnuts, ice cream, and cakes  · Eat fewer sweets  Limit foods and drinks that are high in sugar  This includes candy, cookies, regular soda, and sweetened drinks    Exercise:  Exercise at least 30 minutes per day on most days of the week  Some examples of exercise include walking, biking, dancing, and swimming  You can also fit in more physical activity by taking the stairs instead of the elevator or parking farther away from stores  Ask your healthcare provider about the best exercise plan for you  © Copyright [employer] 2018 Information is for End User's use only and may not be sold, redistributed or otherwise used for commercial purposes  All illustrations and images included in CareNotes® are the copyrighted property of [employer] Inc  or [facility] Gerardo Paul   Weight Management   AMBULATORY CARE:   Why it is important to manage your weight:  Being overweight increases your risk of health conditions such as heart disease, high blood pressure, type 2 diabetes, and certain types of cancer  It can also increase your risk for osteoarthritis, sleep apnea, and other respiratory problems  Aim for a slow, steady weight loss  Even a small amount of weight loss can lower your risk of health problems  How to lose weight safely:  A safe and healthy way to lose weight is to eat fewer calories and get regular exercise  You can lose up about 1 pound a week by decreasing the number of calories you eat by 500 calories each day  You can decrease calories by eating smaller portion sizes or by cutting out high-calorie foods  Read labels to find out how many calories are in the foods you eat  You can also burn calories with exercise such as walking, swimming, or biking  You will be more likely to keep weight off if you make these changes part of your lifestyle  Healthy meal plan for weight management:  A healthy meal plan includes a variety of foods, contains fewer calories, and helps you stay healthy  A healthy meal plan includes the following:  · Eat whole-grain foods more often  A healthy meal plan should contain fiber  Fiber is the part of grains, fruits, and vegetables that is not broken down by your body   Whole-grain foods are healthy and provide extra fiber in your diet  Some examples of whole-grain foods are whole-wheat breads and pastas, oatmeal, brown rice, and bulgur  · Eat a variety of vegetables every day  Include dark, leafy greens such as spinach, kale, karishma greens, and mustard greens  Eat yellow and orange vegetables such as carrots, sweet potatoes, and winter squash  · Eat a variety of fruits every day  Choose fresh or canned fruit (canned in its own juice or light syrup) instead of juice  Fruit juice has very little or no fiber  · Eat low-fat dairy foods  Drink fat-free (skim) milk or 1% milk  Eat fat-free yogurt and low-fat cottage cheese  Try low-fat cheeses such as mozzarella and other reduced-fat cheeses  · Choose meat and other protein foods that are low in fat  Choose beans or other legumes such as split peas or lentils  Choose fish, skinless poultry (chicken or turkey), or lean cuts of red meat (beef or pork)  Before you cook meat or poultry, cut off any visible fat  · Use less fat and oil  Try baking foods instead of frying them  Add less fat, such as margarine, sour cream, regular salad dressing and mayonnaise to foods  Eat fewer high-fat foods  Some examples of high-fat foods include french fries, doughnuts, ice cream, and cakes  · Eat fewer sweets  Limit foods and drinks that are high in sugar  This includes candy, cookies, regular soda, and sweetened drinks  Ways to decrease calories:   · Eat smaller portions  ¨ Use a small plate with smaller servings  ¨ Do not eat second helpings  ¨ When you eat at a restaurant, ask for a box and place half of your meal in the box before you eat  ¨ Share an entrée with someone else  · Replace high-calorie snacks with healthy, low-calorie snacks  ¨ Choose fresh fruit, vegetables, fat-free rice cakes, or air-popped popcorn instead of potato chips, nuts, or chocolate      ¨ Choose water or calorie-free drinks instead of soda or sweetened drinks  · Eat regular meals  Skipping meals can lead to overeating later in the day  Eat a healthy snack in place of a meal if you do not have time to eat a regular meal      · Do not shop for groceries when you are hungry  You may be more likely to make unhealthy food choices  Take a grocery list of healthy foods and shop after you have eaten  Exercise:  Exercise at least 30 minutes per day on most days of the week  Some examples of exercise include walking, biking, dancing, and swimming  You can also fit in more physical activity by taking the stairs instead of the elevator or parking farther away from stores  Ask your healthcare provider about the best exercise plan for you  Other things to consider as you try to lose weight:   · Be aware of situations that may give you the urge to overeat, such as eating while watching television  Find ways to avoid these situations  For example, read a book, go for a walk, or do crafts  · Meet with a weight loss support group or friends who are also trying to lose weight  This may help you stay motivated to continue working on your weight loss goals  © 2017 [facility] [facility] Information is for End User's use only and may not be sold, redistributed or otherwise used for commercial purposes  All illustrations and images included in CareNotes® are the copyrighted property of [employer]  or Dima Thompson  The above information is an  only  It is not intended as medical advice for individual conditions or treatments  Talk to your doctor, nurse or pharmacist before following any medical regimen to see if it is safe and effective for you

## 2019-12-06 NOTE — PROGRESS NOTES
Assessment and Plan:     Problem List Items Addressed This Visit     None           Preventive health issues were discussed with patient, and age appropriate screening tests were ordered as noted in patient's After Visit Summary  Personalized health advice and appropriate referrals for health education or preventive services given if needed, as noted in patient's After Visit Summary       History of Present Illness:     Patient presents for Medicare Annual Wellness visit    Patient Care Team:  Rita Sanders DO as PCP - General (Internal Medicine)     Problem List:     Patient Active Problem List   Diagnosis    [facility]oxysmal atrial fibrillation [facility] [facility]    Type 2 diabetes mellitus with complication, with long-term current use of insulin ([facility] [address] )    Bradycardia    Acquired hypothyroidism    Chronic kidney disease, stage III (moderate) ([facility] [address] )    Coronary artery disease of native artery of native heart with stable angina pectoris ([facility] [address] )    Essential hypertension    Low back pain    Mixed hyperlipidemia    Rupture of tendon of biceps, long head    Spinal stenosis of lumbar region with neurogenic claudication    Stented coronary artery    Lightheadedness    PAD (peripheral artery disease) ([facility] [address] )      Past Medical and Surgical History:     Past Medical History:   Diagnosis Date    Chronic kidney disease (CKD), stage III (moderate)     Coronary artery disease     CABG x3 in 1995    Hyperlipidemia     Hypertension     Hypothyroidism     Ischemic cardiomyopathy     Paroxysmal atrial fibrillation     Type II diabetes mellitus      Past Surgical History:   Procedure Laterality Date    CARDIAC CATHETERIZATION      CORONARY ARTERY BYPASS GRAFT        Family History:     Family History   Problem Relation Age of Onset    No Known Problems Mother     No Known Problems Father     No Known Problems Sister     No Known Problems Brother     No Known Problems Maternal Aunt     No Known Problems Maternal Uncle     No Known Problems Paternal Aunt     No Known Problems Paternal Uncle     No Known Problems Maternal Grandmother     No Known Problems Maternal Grandfather     No Known Problems Paternal Grandmother     No Known Problems Paternal Grandfather     Anemia Neg Hx     Arrhythmia Neg Hx     Asthma Neg Hx     Clotting disorder Neg Hx     Fainting Neg Hx     Heart attack Neg Hx     Heart disease Neg Hx     Heart failure Neg Hx     Hyperlipidemia Neg Hx     Hypertension Neg Hx       Social History:     Social History     Socioeconomic History    Marital status: /Civil Union     Spouse name: Not on file    Number of children: Not on file    Years of education: Not on file    Highest education level: Not on file   Occupational History    Occupation: RETIRED   Social Needs    Financial resource strain: Not on file    Food insecurity:     Worry: Not on file     Inability: Not on file    Transportation needs:     Medical: Not on file     Non-medical: Not on file   Tobacco Use    Smoking status: Never Smoker    Smokeless tobacco: Former User   Substance and Sexual Activity    Alcohol use: Not on file    Drug use: Not on file    Sexual activity: Not on file   Lifestyle    Physical activity:     Days per week: Not on file     Minutes per session: Not on file    Stress: Not on file   Relationships    Social connections:     Talks on phone: Not on file     Gets together: Not on file     Attends Muslim service: Not on file     Active member of club or organization: Not on file     Attends meetings of clubs or organizations: Not on file     Relationship status: Not on file    Intimate partner violence:     Fear of current or ex partner: Not on file     Emotionally abused: Not on file     Physically abused: Not on file     Forced sexual activity: Not on file   Other Topics Concern    Not on file   Social History Narrative        RETIRED       Medications and Allergies:     Current Outpatient Medications   Medication Sig Dispense Refill    ACCU-CHEK COMPACT PLUS test strip USE 3 TIMES DAILY  DX: E11 29  3    ACCU-CHEK SOFTCLIX LANCETS lancets USE AND DISCARD 1 LANCET 3 TIMES DAILY   DX: E11 29  3    amiodarone 200 mg tablet Take 0 5 tablets (100 mg total) by mouth daily 45 tablet 3    amLODIPine (NORVASC) 2 5 mg tablet Take 1 tablet (2 5 mg total) by mouth daily (Patient taking differently: Take 2 5 mg by mouth 2 (two) times a day ) 180 tablet 3    aspirin 81 MG tablet Take 81 mg by mouth daily      atorvastatin (LIPITOR) 10 mg tablet Take 1 tablet (10 mg total) by mouth daily 90 tablet 3    BD PEN NEEDLE CARMINA U/F 32G X 4 MM MISC USE 4 PEN NEEDLE DAILY AS DIRECTED  3    carvedilol (COREG) 3 125 mg tablet Take 1 tablet (3 125 mg total) by mouth 2 (two) times a day with meals 180 tablet 3    furosemide (LASIX) 20 mg tablet Take 1 tablet (20 mg total) by mouth daily as needed (am weight more than 185) (Patient taking differently: Take 20 mg by mouth daily ) 90 tablet 3    insulin aspart (NovoLOG) 100 units/mL injection Inject under the skin 3 (three) times a day before meals      Insulin Glargine (LANTUS SOLOSTAR SC) Inject 5 Units under the skin daily       isosorbide mononitrate (IMDUR) 60 mg 24 hr tablet Take 1 tablet (60 mg total) by mouth daily (Patient taking differently: Take 30 mg by mouth 2 (two) times a day )      levothyroxine 75 mcg tablet Take 1 tablet (75 mcg total) by mouth daily 90 tablet 3    nitroglycerin (NITROSTAT) 0 4 mg SL tablet Place 0 4 mg under the tongue every 5 (five) minutes as needed for chest pain      Omega-3 Fatty Acids (FISH OIL) 1,000 mg Take 1,000 mg by mouth daily      potassium chloride (MICRO-K) 10 MEQ CR capsule Take 1 capsule (10 mEq total) by mouth 3 (three) times a day as needed (am weight  more than 185 ) (Patient taking differently: Take 2 tablets in the morning and 1 tablet in the evening ) 270 capsule 3     No current facility-administered medications for this visit  No Known Allergies   Immunizations: There is no immunization history on file for this patient  Health Maintenance: There are no preventive care reminders to display for this patient  Topic Date Due    DTaP,Tdap,and Td Vaccines (1 - Tdap) 04/25/1944    Hepatitis B Vaccine (1 of 3 - Risk 3-dose series) [birth year]/1952    Pneumococcal Vaccine: 65+ Years (1 of 2 - PCV13) 04/25/1998    Influenza Vaccine  07/01/2019      Medicare Health Risk Assessment:     There were no vitals taken for this visit  Merly Richard is here for his Initial Wellness visit  Health Risk Assessment:   Patient rates overall health as good  Patient feels that their physical health rating is slightly worse  Eyesight was rated as same  Hearing was rated as slightly worse  Patient feels that their emotional and mental health rating is same  Pain experienced in the last 7 days has been some  Patient's pain rating has been 4/10  Patient states that he has experienced no weight loss or gain in last 6 months  Depression Screening:   PHQ-2 Score: 0      Home Safety:  Patient has trouble with stairs inside or outside of their home  Patient has working smoke alarms and has working carbon monoxide detector  Home safety hazards include: none  Nutrition:   Current diet is Regular  Medications:   Patient is currently taking over-the-counter supplements  OTC medications include: see medication list  Patient is able to manage medications  Activities of Daily Living (ADLs)/Instrumental Activities of Daily Living (IADLs):   Walk and transfer into and out of bed and chair?: Yes  Dress and groom yourself?: Yes    Bathe or shower yourself?: Yes    Feed yourself?  Yes  Do your laundry/housekeeping?: Yes  Manage your money, pay your bills and track your expenses?: Yes  Make your own meals?: Yes    Do your own shopping?: Yes    Previous Hospitalizations:   Any hospitalizations or ED visits within the last 12 months?: No      Advance Care Planning:   Living will: Yes    Durable POA for healthcare:  Yes    Advanced directive: Yes    Advanced directive counseling given: Yes    Five wishes given: Yes    Patient declined ACP directive: No    End of Life Decisions reviewed with patient: Yes    Provider agrees with end of life decisions: Yes      Cognitive Screening:   Provider or family/friend/caregiver concerned regarding cognition?: Yes    PREVENTIVE SCREENINGS      Cardiovascular Screening:    General: Screening Not Indicated and History Lipid Disorder      Diabetes Screening:     General: Screening Not Indicated and History Diabetes      Colorectal Cancer Screening:     General: Screening Not Indicated      Prostate Cancer Screening:    General: Screening Not Indicated      Osteoporosis Screening:    General: Screening Not Indicated      Abdominal Aortic Aneurysm (AAA) Screening:        General: Screening Not Indicated      Lung Cancer Screening:     General: Screening Not Indicated      Hepatitis C Screening:    General: Screening Not Indicated      Leonie Stover DO

## 2019-12-06 NOTE — PROGRESS NOTES
Assessment/Plan:    Problem List Items Addressed This Visit        Endocrine    Type 2 diabetes mellitus with complication, with long-term current use of insulin ([facility] [address] )    Relevant Orders    Ambulatory Referral to Ophthalmology       Cardiovascular and Mediastinum    Paroxysmal atrial fibrillation ([facility] [address] )    Coronary artery disease of native artery of native heart with stable angina pectoris ([facility] [address] )    Essential hypertension       Genitourinary    Chronic kidney disease, stage III (moderate) ([address] )       Other    Mixed hyperlipidemia      Other Visit Diagnoses     Medicare annual wellness visit, initial    -  Primary    Diabetic eye exam ([facility] )        Relevant Orders    Ambulatory Referral to Ophthalmology    BMI 28 0-28 9,adult               Diagnoses and all orders for this visit:    Medicare annual wellness visit, initial    Diabetic eye exam [facility])  -     Ambulatory Referral to Ophthalmology; Future    Type 2 diabetes mellitus with complication, with long-term current use of insulin ([facility] [address] )  -     Ambulatory Referral to Ophthalmology; Future    BMI 28 0-28 9,adult    Coronary artery disease of native artery of native heart with stable angina pectoris (HCC)    Paroxysmal atrial fibrillation (H[facility])    Essential hypertension    Chronic kidney disease, stage III (moderate) (H[facility])    Mixed hyperlipidemia        No problem-specific Assessment & Plan notes found for this encounter  Subjective:      Patient ID: Tha Kraft is a 80 y o  male  The patient DM is doing well and his last HgbA1c was noted to be reasonable at 6 7%  The patient states that he has no chest pain and notes that his BP is stable with the current medication  Diabetes   He presents for his follow-up diabetic visit  He has type 2 diabetes mellitus  His disease course has been stable  There are no hypoglycemic associated symptoms  Pertinent negatives for hypoglycemia include no nervousness/anxiousness  There are no diabetic associated symptoms  Pertinent negatives for diabetes include no chest pain and no fatigue  Symptoms are stable  There are no diabetic complications  Risk factors for coronary artery disease include male sex, hypertension, diabetes mellitus and dyslipidemia  Current diabetic treatment includes insulin injections  He is compliant with treatment all of the time  His weight is stable  An ACE inhibitor/angiotensin II receptor blocker is not being taken  Hypertension   This is a chronic problem  The current episode started more than 1 year ago  The problem is controlled  Pertinent negatives include no chest pain, palpitations or shortness of breath  There are no associated agents to hypertension  Past treatments include calcium channel blockers and beta blockers  The current treatment provides significant improvement  There are no compliance problems  The following portions of the patient's history were reviewed and updated as appropriate:   He has a past medical history of Hypothyroidism, Ischemic cardiomyopathy, and Paroxysmal atrial fibrillation  ,  does not have any pertinent problems on file  ,   has a past surgical history that includes Cardiac catheterization and Coronary artery bypass graft  ,  family history includes No Known Problems in his brother, father, maternal aunt, maternal grandfather, maternal grandmother, maternal uncle, mother, paternal aunt, paternal grandfather, paternal grandmother, paternal uncle, and sister  ,   reports that he has never smoked  He has quit using smokeless tobacco  His alcohol and drug histories are not on file  ,  has No Known Allergies     Current Outpatient Medications   Medication Sig Dispense Refill    ACCU-CHEK COMPACT PLUS test strip USE 3 TIMES DAILY  DX: E11 29  3    ACCU-CHEK SOFTCLIX LANCETS lancets USE AND DISCARD 1 LANCET 3 TIMES DAILY   DX: E11 29  3    amiodarone 200 mg tablet Take 0 5 tablets (100 mg total) by mouth daily 45 tablet 3    amLODIPine (NORVASC) 2 5 mg tablet Take 1 tablet (2 5 mg total) by mouth daily (Patient taking differently: Take 2 5 mg by mouth 2 (two) times a day ) 180 tablet 3    aspirin 81 MG tablet Take 81 mg by mouth daily      atorvastatin (LIPITOR) 10 mg tablet Take 1 tablet (10 mg total) by mouth daily 90 tablet 3    BD PEN NEEDLE CARMINA U/F 32G X 4 MM MISC USE 4 PEN NEEDLE DAILY AS DIRECTED  3    carvedilol (COREG) 3 125 mg tablet Take 1 tablet (3 125 mg total) by mouth 2 (two) times a day with meals 180 tablet 3    furosemide (LASIX) 20 mg tablet Take 1 tablet (20 mg total) by mouth daily as needed (am weight more than 185) (Patient taking differently: Take 20 mg by mouth daily ) 90 tablet 3    insulin aspart (NovoLOG) 100 units/mL injection Inject under the skin 3 (three) times a day before meals      Insulin Glargine (LANTUS SOLOSTAR SC) Inject 5 Units under the skin daily       isosorbide mononitrate (IMDUR) 60 mg 24 hr tablet Take 1 tablet (60 mg total) by mouth daily (Patient taking differently: Take 30 mg by mouth 2 (two) times a day )      levothyroxine 75 mcg tablet Take 1 tablet (75 mcg total) by mouth daily 90 tablet 3    nitroglycerin (NITROSTAT) 0 4 mg SL tablet Place 0 4 mg under the tongue every 5 (five) minutes as needed for chest pain      Omega-3 Fatty Acids (FISH OIL) 1,000 mg Take 1,000 mg by mouth daily      potassium chloride (MICRO-K) 10 MEQ CR capsule Take 1 capsule (10 mEq total) by mouth 3 (three) times a day as needed (am weight  more than 185 ) (Patient taking differently: Take 2 tablets in the morning and 1 tablet in the evening ) 270 capsule 3     No current facility-administered medications for this visit  Review of Systems   Constitutional: Negative for chills, fatigue and fever  HENT: Negative  Respiratory: Negative for cough, chest tightness and shortness of breath  Cardiovascular: Negative for chest pain and palpitations     Gastrointestinal: Negative for abdominal pain, constipation, diarrhea, nausea and vomiting  Genitourinary: Negative  Musculoskeletal: Negative for back pain and myalgias  Skin: Negative  Neurological: Negative  Psychiatric/Behavioral: Negative for dysphoric mood  The patient is not nervous/anxious  Objective:  Vitals:    12/06/19 1541   Pulse: 67   Temp: 97 6 °F (36 4 °C)   SpO2: 96%   Weight: 84 8 kg (187 lb)   Height: 5' 8" (1 727 m)     Body mass index is 28 43 kg/m²  Physical Exam   Constitutional: He is oriented to person, place, and time  He appears well-developed and well-nourished  HENT:   Head: Normocephalic and atraumatic  Eyes: Pupils are equal, round, and reactive to light  EOM are normal    Neck: Normal range of motion  Neck supple  Cardiovascular: Normal rate, regular rhythm, normal heart sounds and intact distal pulses  No murmur heard  Pulmonary/Chest: Effort normal and breath sounds normal  No stridor  He has no rales  Abdominal: Soft  Bowel sounds are normal  He exhibits no distension  There is no tenderness  Musculoskeletal: Normal range of motion  He exhibits no edema or deformity  Neurological: He is alert and oriented to person, place, and time  Skin: Skin is warm and dry  Psychiatric: He has a normal mood and affect  Nursing note and vitals reviewed  PHQ-9 Depression Screening    PHQ-9:    Frequency of the following problems over the past two weeks:       Little interest or pleasure in doing things:  0 - not at all  Feeling down, depressed, or hopeless:  0 - not at all  PHQ-2 Score:  0         BMI Counseling: Body mass index is 28 43 kg/m²  The BMI is above normal  Nutrition recommendations include reducing portion sizes     Ear cerumen removal  Date/Time: 12/6/2019 4:19 PM  Performed by: Darling Kim DO  Authorized by: Darling Kim DO     Patient location:  Clinic  Other Assisting Provider: No    Consent:     Consent obtained:  Verbal    Consent given by:  Patient    Risks discussed:  Infection, TM perforation and incomplete removal    Alternatives discussed:  No treatment  Universal protocol:     Procedure explained and questions answered to patient or proxy's satisfaction: yes      Patient identity confirmed:  Verbally with patient  Procedure details:     Local anesthetic:  None    Location:  L ear    Procedure type: irrigation with insturmentation      Insturmentation: loop      Visualization (free text):  TM visualized  Post-procedure details:     Complication:  None    Patient tolerance of procedure:   Tolerated well, no immediate complications

## 2019-12-17 ENCOUNTER — OFFICE VISIT (OUTPATIENT)
Dept: VASCULAR SURGERY | Facility: CLINIC | Age: 84
End: 2019-12-17
Payer: MEDICARE

## 2019-12-17 VITALS
HEIGHT: 68 IN | WEIGHT: 187 LBS | DIASTOLIC BLOOD PRESSURE: 78 MMHG | SYSTOLIC BLOOD PRESSURE: 134 MMHG | HEART RATE: 75 BPM | TEMPERATURE: 96.1 F | BODY MASS INDEX: 28.34 KG/M2

## 2019-12-17 DIAGNOSIS — L97.909 ATHEROSCLEROSIS OF ARTERY OF EXTREMITY WITH ULCERATION (HCC): Primary | ICD-10-CM

## 2019-12-17 DIAGNOSIS — I70.299 ATHEROSCLEROSIS OF ARTERY OF EXTREMITY WITH ULCERATION (HCC): Primary | ICD-10-CM

## 2019-12-17 PROCEDURE — 99213 OFFICE O/P EST LOW 20 MIN: CPT | Performed by: SURGERY

## 2019-12-17 NOTE — LETTER
December 17, 2019     [facility], [address] [facility] [address]    Patient: May Paradsavage   YOB: 1933   Date of Visit: 12/17/2019       Dear Dr Barbie Hager: Thank you for referring Armand Smith to me for evaluation  Below are my notes for this consultation  If you have questions, please do not hesitate to call me  I look forward to following your patient along with you           Sincerely,        Inocencio Baumgarten, MD        CC: No Recipients

## 2019-12-17 NOTE — PATIENT INSTRUCTIONS
Presents for further evaluation of nonhealing ulcer right lateral foot 5th metatarsal head area  The wound has essentially healed and will continue using moisturizer and protective strategies  No intervention from a vascular standpoint is either indicated or necessary at this time      Plan:  Lower extremity arterial study in 6 months routine podiatric care and daily observation of his foot by him or family

## 2019-12-17 NOTE — PROGRESS NOTES
Assessment/Plan:    Atherosclerosis of artery of extremity with ulceration ([address] )  Presents for further evaluation of nonhealing ulcer right lateral foot 5th metatarsal head area  The wound has essentially healed and will continue using moisturizer and protective strategies  No intervention from a vascular standpoint is either indicated or necessary at this time  Plan:  Lower extremity arterial study in 6 months routine podiatric care and daily observation of his foot by him or family         Diagnoses and all orders for this visit:    Atherosclerosis of artery of extremity with ulceration (HCC)        Subjective:      Patient ID: Lyndy Goldmann is a 80 y o  male  HPI recent healing of ulcer right lateral foot no erythema, does have dependent rubor right foot small vessel disease    The following portions of the patient's history were reviewed and updated as appropriate: allergies, current medications, past family history, past medical history, past social history, past surgical history and problem list     Review of Systems  right lateral foot ulcer, diabetes, small vessel disease, no GI or  symptoms no constitutional symptoms or skin rash all other systems negative    Objective:      /78 (BP Location: Right arm, Patient Position: Sitting, Cuff Size: Standard)   Pulse 75   Temp (!) 96 1 °F (35 6 °C) (Tympanic)   Ht 5' 8" (1 727 m)   Wt 84 8 kg (187 lb)   BMI 28 43 kg/m²          Physical Exam      No palpable pulses right foot, area of well-healed right lateral foot ulceration, dependent rubor throughout his foot  I have reviewed and made appropriate changes to the review of systems input by the medical assistant      Vitals:    12/17/19 1522   BP: 134/78   BP Location: Right arm   Patient Position: Sitting   Cuff Size: Standard   Pulse: 75   Temp: (!) 96 1 °F (35 6 °C)   TempSrc: Tympanic   Weight: 84 8 kg (187 lb)   Height: 5' 8" (1 727 m)       Patient Active Problem List   Diagnosis    Paroxysmal atrial fibrillation (HCC)    Type 2 diabetes mellitus with complication, with long-term current use of insulin (HCC)    Bradycardia    Acquired hypothyroidism    Chronic kidney disease, stage III (moderate) ([facility])    Coronary artery disease of native artery of native heart with stable angina pectoris ([facility])    Essential hypertension    Low back pain    Mixed hyperlipidemia    Rupture of tendon of biceps, long head    Spinal stenosis of lumbar region with neurogenic claudication    Stented coronary artery    Lightheadedness    [facility]peripheral artery disease) [facility]    Atherosclerosis of artery of extremity with ulceration ([facility])       Past Surgical History:   Procedure Laterality Date    CARDIAC CATHETERIZATION      CORONARY ARTERY BYPASS GRAFT         Family History   Problem Relation Age of Onset    No Known Problems Mother     No Known Problems Father     No Known Problems Sister     No Known Problems Brother     No Known Problems Maternal Aunt     No Known Problems Maternal Uncle     No Known Problems Paternal Aunt     No Known Problems Paternal Uncle     No Known Problems Maternal Grandmother     No Known Problems Maternal Grandfather     No Known Problems Paternal Grandmother     No Known Problems Paternal Grandfather     Anemia Neg Hx     Arrhythmia Neg Hx     Asthma Neg Hx     Clotting disorder Neg Hx     Fainting Neg Hx     Heart attack Neg Hx     Heart disease Neg Hx     Heart failure Neg Hx     Hyperlipidemia Neg Hx     Hypertension Neg Hx        Social History     Socioeconomic History    Marital status: /Civil Union     Spouse name: Not on file    Number of children: Not on file    Years of education: Not on file    Highest education level: Not on file   Occupational History    Occupation: RETIRED   Social Needs    Financial resource strain: Not on file    Food insecurity:     Worry: Not on file     Inability: Not on file   [employer] needs: Medical: Not on file     Non-medical: Not on file   Tobacco Use    Smoking status: Never Smoker    Smokeless tobacco: Former User   Substance and Sexual Activity    Alcohol use: Not on file    Drug use: Not on file    Sexual activity: Not on file   Lifestyle    Physical activity:     Days per week: Not on file     Minutes per session: Not on file    Stress: Not on file   Relationships    Social connections:     Talks on phone: Not on file     Gets together: Not on file     Attends Latter day service: Not on file     Active member of club or organization: Not on file     Attends meetings of clubs or organizations: Not on file     Relationship status: Not on file    Intimate partner violence:     Fear of current or ex partner: Not on file     Emotionally abused: Not on file     Physically abused: Not on file     Forced sexual activity: Not on file   Other Topics Concern    Not on file   Social History Narrative        RETIRED       No Known Allergies      Current Outpatient Medications:     ACCU-CHEK COMPACT PLUS test strip, USE 3 TIMES DAILY  DX: E11 29, Disp: , Rfl: 3    ACCU-CHEK SOFTCLIX LANCETS lancets, USE AND DISCARD 1 LANCET 3 TIMES DAILY   DX: E11 29, Disp: , Rfl: 3    amiodarone 200 mg tablet, Take 0 5 tablets (100 mg total) by mouth daily, Disp: 45 tablet, Rfl: 3    amLODIPine (NORVASC) 2 5 mg tablet, Take 1 tablet (2 5 mg total) by mouth daily (Patient taking differently: Take 2 5 mg by mouth 2 (two) times a day ), Disp: 180 tablet, Rfl: 3    aspirin 81 MG tablet, Take 81 mg by mouth daily, Disp: , Rfl:     atorvastatin (LIPITOR) 10 mg tablet, Take 1 tablet (10 mg total) by mouth daily, Disp: 90 tablet, Rfl: 3    BD PEN NEEDLE CARMINA U/F 32G X 4 MM MISC, USE 4 PEN NEEDLE DAILY AS DIRECTED, Disp: , Rfl: 3    carvedilol (COREG) 3 125 mg tablet, Take 1 tablet (3 125 mg total) by mouth 2 (two) times a day with meals, Disp: 180 tablet, Rfl: 3    furosemide (LASIX) 20 mg tablet, Take 1 tablet (20 mg total) by mouth daily as needed (am weight more than 185) (Patient taking differently: Take 20 mg by mouth daily ), Disp: 90 tablet, Rfl: 3    insulin aspart (NovoLOG) 100 units/mL injection, Inject under the skin 3 (three) times a day before meals, Disp: , Rfl:     Insulin Glargine (LANTUS SOLOSTAR SC), Inject 5 Units under the skin daily , Disp: , Rfl:     isosorbide mononitrate (IMDUR) 60 mg 24 hr tablet, Take 1 tablet (60 mg total) by mouth daily (Patient taking differently: Take 30 mg by mouth 2 (two) times a day ), Disp: , Rfl:     levothyroxine 75 mcg tablet, Take 1 tablet (75 mcg total) by mouth daily, Disp: 90 tablet, Rfl: 3    nitroglycerin (NITROSTAT) 0 4 mg SL tablet, Place 0 4 mg under the tongue every 5 (five) minutes as needed for chest pain, Disp: , Rfl:     Omega-3 Fatty Acids (FISH OIL) 1,000 mg, Take 1,000 mg by mouth daily, Disp: , Rfl:     potassium chloride (MICRO-K) 10 MEQ CR capsule, Take 1 capsule (10 mEq total) by mouth 3 (three) times a day as needed (am weight  more than 185 ) (Patient taking differently: Take 2 tablets in the morning and 1 tablet in the evening ), Disp: 270 capsule, Rfl: 3

## 2020-01-27 ENCOUNTER — APPOINTMENT (OUTPATIENT)
Dept: LAB | Facility: CLINIC | Age: 85
End: 2020-01-27
Payer: MEDICARE

## 2020-01-27 ENCOUNTER — TRANSCRIBE ORDERS (OUTPATIENT)
Dept: LAB | Facility: CLINIC | Age: 85
End: 2020-01-27

## 2020-01-27 DIAGNOSIS — E11.649 UNCONTROLLED TYPE 2 DIABETES MELLITUS WITH HYPOGLYCEMIA, UNSPECIFIED HYPOGLYCEMIA COMA STATUS (HCC): ICD-10-CM

## 2020-01-27 DIAGNOSIS — E11.22 TYPE 2 DIABETES MELLITUS WITH DIABETIC CHRONIC KIDNEY DISEASE, UNSPECIFIED CKD STAGE, UNSPECIFIED WHETHER LONG TERM INSULIN USE (HCC): ICD-10-CM

## 2020-01-27 DIAGNOSIS — E11.649 UNCONTROLLED TYPE 2 DIABETES MELLITUS WITH HYPOGLYCEMIA, UNSPECIFIED HYPOGLYCEMIA COMA STATUS (HCC): Primary | ICD-10-CM

## 2020-01-27 LAB
EST. AVERAGE GLUCOSE BLD GHB EST-MCNC: 177 MG/DL
HBA1C MFR BLD: 7.8 % (ref 4.2–6.3)
T4 FREE SERPL-MCNC: 1.25 NG/DL (ref 0.76–1.46)
TSH SERPL DL<=0.05 MIU/L-ACNC: 4.32 UIU/ML (ref 0.36–3.74)

## 2020-01-27 PROCEDURE — 36415 COLL VENOUS BLD VENIPUNCTURE: CPT

## 2020-01-27 PROCEDURE — 84443 ASSAY THYROID STIM HORMONE: CPT

## 2020-01-27 PROCEDURE — 84439 ASSAY OF FREE THYROXINE: CPT

## 2020-01-27 PROCEDURE — 83036 HEMOGLOBIN GLYCOSYLATED A1C: CPT

## 2020-02-04 DIAGNOSIS — I25.10 CORONARY ARTERY DISEASE WITHOUT ANGINA PECTORIS, UNSPECIFIED VESSEL OR LESION TYPE, UNSPECIFIED WHETHER NATIVE OR TRANSPLANTED HEART: ICD-10-CM

## 2020-02-04 DIAGNOSIS — E87.6 HYPOKALEMIA: ICD-10-CM

## 2020-02-04 DIAGNOSIS — I10 ESSENTIAL HYPERTENSION: ICD-10-CM

## 2020-02-04 DIAGNOSIS — I48.91 ATRIAL FIBRILLATION, UNSPECIFIED TYPE (HCC): ICD-10-CM

## 2020-02-05 RX ORDER — AMIODARONE HYDROCHLORIDE 200 MG/1
100 TABLET ORAL DAILY
Qty: 45 TABLET | Refills: 3 | Status: SHIPPED | OUTPATIENT
Start: 2020-02-05 | End: 2020-12-23

## 2020-02-05 RX ORDER — POTASSIUM CHLORIDE 750 MG/1
CAPSULE, EXTENDED RELEASE ORAL
Qty: 270 CAPSULE | Refills: 3 | Status: SHIPPED | OUTPATIENT
Start: 2020-02-05 | End: 2021-03-31 | Stop reason: SDUPTHER

## 2020-02-05 RX ORDER — ISOSORBIDE MONONITRATE 30 MG/1
30 TABLET, EXTENDED RELEASE ORAL 2 TIMES DAILY
Qty: 180 TABLET | Refills: 3 | Status: SHIPPED | OUTPATIENT
Start: 2020-02-05 | End: 2021-03-31 | Stop reason: SDUPTHER

## 2020-02-05 RX ORDER — ATORVASTATIN CALCIUM 10 MG/1
10 TABLET, FILM COATED ORAL DAILY
Qty: 90 TABLET | Refills: 3 | Status: SHIPPED | OUTPATIENT
Start: 2020-02-05 | End: 2020-03-27 | Stop reason: SDUPTHER

## 2020-02-05 RX ORDER — CARVEDILOL 3.12 MG/1
3.12 TABLET ORAL 2 TIMES DAILY WITH MEALS
Qty: 180 TABLET | Refills: 3 | Status: SHIPPED | OUTPATIENT
Start: 2020-02-05 | End: 2020-12-23 | Stop reason: SDUPTHER

## 2020-02-05 RX ORDER — FUROSEMIDE 20 MG/1
20 TABLET ORAL DAILY
Qty: 90 TABLET | Refills: 3 | Status: SHIPPED | OUTPATIENT
Start: 2020-02-05 | End: 2020-03-27 | Stop reason: SDUPTHER

## 2020-02-05 RX ORDER — AMLODIPINE BESYLATE 2.5 MG/1
2.5 TABLET ORAL 2 TIMES DAILY
Qty: 180 TABLET | Refills: 3 | Status: SHIPPED | OUTPATIENT
Start: 2020-02-05 | End: 2020-03-27 | Stop reason: SDUPTHER

## 2020-02-10 DIAGNOSIS — I10 ESSENTIAL HYPERTENSION: Primary | ICD-10-CM

## 2020-02-10 RX ORDER — AMLODIPINE BESYLATE 5 MG/1
5 TABLET ORAL DAILY
Qty: 90 TABLET | Refills: 3 | Status: SHIPPED | OUTPATIENT
Start: 2020-02-10 | End: 2020-03-04

## 2020-02-10 NOTE — TELEPHONE ENCOUNTER
His insurance does not want to approve norvasc 2 5 BID because of the quantity  Is it okay for me to resend it in for 5mg once daily? Or do you want it to say 5mg 1/2 tablet BID?

## 2020-03-04 ENCOUNTER — OFFICE VISIT (OUTPATIENT)
Dept: CARDIOLOGY CLINIC | Facility: CLINIC | Age: 85
End: 2020-03-04
Payer: MEDICARE

## 2020-03-04 VITALS
WEIGHT: 190 LBS | DIASTOLIC BLOOD PRESSURE: 70 MMHG | HEIGHT: 68 IN | HEART RATE: 76 BPM | SYSTOLIC BLOOD PRESSURE: 106 MMHG | BODY MASS INDEX: 28.79 KG/M2

## 2020-03-04 DIAGNOSIS — Z95.5 H/O HEART ARTERY STENT: ICD-10-CM

## 2020-03-04 DIAGNOSIS — E78.2 MIXED HYPERLIPIDEMIA: ICD-10-CM

## 2020-03-04 DIAGNOSIS — I25.118 CORONARY ARTERY DISEASE OF NATIVE ARTERY OF NATIVE HEART WITH STABLE ANGINA PECTORIS (HCC): Primary | ICD-10-CM

## 2020-03-04 DIAGNOSIS — I10 ESSENTIAL HYPERTENSION: ICD-10-CM

## 2020-03-04 DIAGNOSIS — I48.0 PAROXYSMAL ATRIAL FIBRILLATION (HCC): ICD-10-CM

## 2020-03-04 DIAGNOSIS — Z95.1 HX OF CABG: ICD-10-CM

## 2020-03-04 PROCEDURE — 3008F BODY MASS INDEX DOCD: CPT | Performed by: INTERNAL MEDICINE

## 2020-03-04 PROCEDURE — 4040F PNEUMOC VAC/ADMIN/RCVD: CPT | Performed by: INTERNAL MEDICINE

## 2020-03-04 PROCEDURE — 1160F RVW MEDS BY RX/DR IN RCRD: CPT | Performed by: INTERNAL MEDICINE

## 2020-03-04 PROCEDURE — 3066F NEPHROPATHY DOC TX: CPT | Performed by: INTERNAL MEDICINE

## 2020-03-04 PROCEDURE — 1036F TOBACCO NON-USER: CPT | Performed by: INTERNAL MEDICINE

## 2020-03-04 PROCEDURE — 99214 OFFICE O/P EST MOD 30 MIN: CPT | Performed by: INTERNAL MEDICINE

## 2020-03-04 PROCEDURE — 3078F DIAST BP <80 MM HG: CPT | Performed by: INTERNAL MEDICINE

## 2020-03-04 PROCEDURE — 3051F HG A1C>EQUAL 7.0%<8.0%: CPT | Performed by: INTERNAL MEDICINE

## 2020-03-04 PROCEDURE — 3074F SYST BP LT 130 MM HG: CPT | Performed by: INTERNAL MEDICINE

## 2020-03-04 NOTE — PROGRESS NOTES
Subjective:        Patient ID: Erasmo Enamorado is a 80 y o  male  Chief Complaint:  Merly Ricahrd is here for routine follow-up  He has CAD, status post CABG, status post PTCI, paroxysmal atrial fibrillation, hypertension and dyslipidemia  No concerning shortness of breath edema orthopnea PND  No palpitations presyncope or syncope  No TIA or claudication like symptoms  No falls  LDL and HDL at goal recently  Blood pressure well controlled  Clinically examines in sinus rhythm  He is not having any unusual angina, since 2017 he has had very stable anginal pattern but nothing significant off for him to take a nitroglycerin he says  He just rests whenever he gets his chest pain after exertion and goes away quickly  Recent T4 within normal limits, TSH only trivially elevated, transaminases/LFTs within normal limits  No signs or symptoms of amiodarone toxicity, he is on low-dose 100 mg  The following portions of the patient's history were reviewed and updated as appropriate: allergies, current medications, past family history, past medical history, past social history, past surgical history and problem list   Review of Systems   Constitution: Negative for chills, diaphoresis, malaise/fatigue and weight gain  HENT: Negative for nosebleeds and stridor  Eyes: Negative for double vision, vision loss in left eye, vision loss in right eye and visual disturbance  Cardiovascular: Positive for chest pain  Negative for claudication, cyanosis, dyspnea on exertion, irregular heartbeat, leg swelling, near-syncope, orthopnea, palpitations, paroxysmal nocturnal dyspnea and syncope  Respiratory: Negative for cough, shortness of breath, snoring and wheezing  Endocrine: Negative for polydipsia, polyphagia and polyuria  Hematologic/Lymphatic: Negative for bleeding problem  Does not bruise/bleed easily  Skin: Negative for flushing and rash  Musculoskeletal: Negative for falls and myalgias  Gastrointestinal: Negative for abdominal pain, heartburn, hematemesis, hematochezia, melena and nausea  Genitourinary: Negative for hematuria  Neurological: Negative for brief paralysis, dizziness, focal weakness, headaches, light-headedness, loss of balance and vertigo  Psychiatric/Behavioral: Negative for altered mental status and substance abuse  Allergic/Immunologic: Negative for hives  Objective:      /70   Pulse 76   Ht 5' 8" (1 727 m)   Wt 86 2 kg (190 lb)   BMI 28 89 kg/m²   Physical Exam   Constitutional: He is oriented to person, place, and time  He appears well-developed and well-nourished  No distress  HENT:   Head: Normocephalic and atraumatic  Eyes: Pupils are equal, round, and reactive to light  EOM are normal  No scleral icterus  Neck: Normal range of motion  Neck supple  No JVD present  No thyromegaly present  Cardiovascular: Normal rate, regular rhythm and normal heart sounds  Exam reveals no gallop and no friction rub  No murmur heard  Pulmonary/Chest: Effort normal and breath sounds normal  No stridor  No respiratory distress  He has no wheezes  He has no rales  Abdominal: Soft  Bowel sounds are normal  He exhibits no distension and no mass  There is no tenderness  Musculoskeletal: Normal range of motion  He exhibits no edema or deformity  Neurological: He is alert and oriented to person, place, and time  Coordination normal    Skin: Skin is warm and dry  No erythema  No pallor  Psychiatric: He has a normal mood and affect  His behavior is normal        Lab Review:   Appointment on 01/27/2020   Component Date Value    Hemoglobin A1C 01/27/2020 7 8*    EAG 01/27/2020 177     TSH 3RD GENERATON 01/27/2020 4 320*    Free T4 01/27/2020 1 25      No results found  Assessment:       1  Coronary artery disease of native artery of native heart with stable angina pectoris ([address] )     2  Hx of CABG     3  H/O heart artery stent     4   Paroxysmal atrial fibrillation ([address] )  CANCELED: POCT ECG   5  Mixed hyperlipidemia     6  Essential hypertension          Plan:       Alex Ackerman has no signs or symptoms reminiscent of unstable angina, heart failure nor electrical instability  I believe he is stable angina pectoris for many years now  I told him he should likely be more liberal in using sublingual nitroglycerin when this occurs as it is quite typical, going on for years though  I was clear that he should call me if this pattern increases in frequency intensity or duration, he says he will  Certainly should any elective surgery be scheduled we should further investigate with preoperative risk stratifying stress testing  Meds I believe otherwise to be optimal, he examines in sinus rhythm, no signs or symptoms of amiodarone toxicity and recent LDL at goal, blood pressure controlled  I made no changes today, plan to see him back in 4 months time, we will be glad to see him sooner should the need arise

## 2020-03-04 NOTE — PATIENT INSTRUCTIONS
Angina   WHAT YOU NEED TO KNOW:   Angina is pain, pressure, or tightness that is usually felt in your chest  Chest pain may come on when you are stressed or do physical activities, such as walking or exercising  Angina is caused by decreased blood flow and oxygen to your heart  These are often caused by atherosclerosis (hardening of the arteries)  If left untreated, angina may get worse, increase your risk for a heart attack, or become life-threatening  DISCHARGE INSTRUCTIONS:   Call 911 if:   · You have any of the following signs of a heart attack:      ¨ Squeezing, pressure, or pain in your chest that lasts longer than 5 minutes or returns    ¨ Discomfort or pain in your back, neck, jaw, stomach, or arm     ¨ Trouble breathing    ¨ Nausea or vomiting    ¨ Lightheadedness or a sudden cold sweat, especially with chest pain or trouble breathing    · You have chest pain that does not go away after you take medicine as directed  · You lose feeling in your face, arms, or legs, or you suddenly feel weak  Return to the emergency department if:   · Your angina is happening more frequently, lasting longer, or causing worse pain  Contact your healthcare provider if:   · You are dizzy or nauseated after you take your medicine  · You have shortness of breath at rest     · You have new or worse swelling in your feet or ankles  · You have questions or concerns about your condition or care  Medicines: You may need any of the following:  · Antiplatelets , such as aspirin, help prevent blood clots  Take your antiplatelet medicine exactly as directed  These medicines make it more likely for you to bleed or bruise  If you are told to take aspirin, do not take acetaminophen or ibuprofen instead  · Blood thinners    help prevent blood clots  Examples of blood thinners include heparin and warfarin  Clots can cause strokes, heart attacks, and death   The following are general safety guidelines to follow while you are taking a blood thinner:    ¨ Watch for bleeding and bruising while you take blood thinners  Watch for bleeding from your gums or nose  Watch for blood in your urine and bowel movements  Use a soft washcloth on your skin, and a soft toothbrush to brush your teeth  This can keep your skin and gums from bleeding  If you shave, use an electric shaver  Do not play contact sports  ¨ Tell your dentist and other healthcare providers that you take anticoagulants  Wear a bracelet or necklace that says you take this medicine  ¨ Do not start or stop any medicines unless your healthcare provider tells you to  Many medicines cannot be used with blood thinners  ¨ Tell your healthcare provider right away if you forget to take the medicine, or if you take too much  ¨ Warfarin  is a blood thinner that you may need to take  The following are things you should be aware of if you take warfarin  § Foods and medicines can affect the amount of warfarin in your blood  Do not make major changes to your diet while you take warfarin  Warfarin works best when you eat about the same amount of vitamin K every day  Vitamin K is found in green leafy vegetables and certain other foods  Ask for more information about what to eat when you are taking warfarin  § You will need to see your healthcare provider for follow-up visits when you are on warfarin  You will need regular blood tests  These tests are used to decide how much medicine you need  · Other medicines  may be given to open the arteries to your heart, slow your heartbeat, or decrease your blood pressure or cholesterol  · Do not take certain medicines without asking your healthcare provider first   These include NSAIDs, herbal or vitamin supplements, or hormones (estrogen or progestin)  · Take your medicine as directed  Contact your healthcare provider if you think your medicine is not helping or if you have side effects   Tell him or her if you are allergic to any medicine  Keep a list of the medicines, vitamins, and herbs you take  Include the amounts, and when and why you take them  Bring the list or the pill bottles to follow-up visits  Carry your medicine list with you in case of an emergency  Follow up with your healthcare provider as directed:  Keep a record or a calendar with details about your chest pain  Every time you have pain or symptoms, record what the pain is like, how long it lasts, and how severe it is  Also record what you are doing when the pain starts, and what makes it go away  Bring this with you every time you see your healthcare provider  Write down your questions so you remember to ask them during your visits  Cardiac rehabilitation:  Your healthcare provider may recommend that you attend cardiac rehabilitation (rehab)  This is a program run by specialists who will help you safely strengthen your heart and prevent more heart disease  The plan includes exercise, relaxation, stress management, and heart-healthy nutrition  Healthcare providers will also check to make sure any medicines you are taking are working  The plan may also include instructions for when you can drive, return to work, and do other normal daily activities  Manage angina:   · Do not smoke  Nicotine and other chemicals in cigarettes and cigars can cause heart and lung damage  Ask your healthcare provider for information if you currently smoke and need help to quit  E-cigarettes or smokeless tobacco still contain nicotine  Talk to your healthcare provider before you use these products  · Maintain a healthy weight  When you weigh more than is healthy for you, your heart must work harder  You are at higher risk for serious health problems if you are overweight  Ask your healthcare provider how much you should weigh  Ask him to help you create a weight loss plan if you are overweight  · Ask about activity    Your healthcare provider will tell you which activities to limit or avoid  Ask about the best exercise plan for you  · Eat heart-healthy foods  Include fresh fruits and vegetables in your meal plan  Choose low-fat foods, such as skim or 1% fat milk, low-fat cheese and yogurt, fish, chicken (without skin), and lean meats  Eat two 4-ounce servings of fish high in omega-3 fats each week, such as salmon, fresh tuna, and herring  Do not eat foods that are high in sodium, such as canned foods, potato chips, salty snacks, and cold cuts  Put less table salt on your food  · Avoid activities that cause angina  Pay attention to your symptoms and find out what seems to make your angina worse  · Ask if you should have a flu vaccine  The flu vaccine will decrease your risk for an infection  An infection can put more stress on your heart and worsen your angina  © 2017 [address] [employer] [employer] Information is for End User's use only and may not be sold, redistributed or otherwise used for commercial purposes  All illustrations and images included in CareNotes® are the copyrighted property of [employer]  or [employer]  The above information is an  only  It is not intended as medical advice for individual conditions or treatments  Talk to your doctor, nurse or pharmacist before following any medical regimen to see if it is safe and effective for you

## 2020-03-05 ENCOUNTER — TELEPHONE (OUTPATIENT)
Dept: INTERNAL MEDICINE CLINIC | Facility: CLINIC | Age: 85
End: 2020-03-05

## 2020-03-05 NOTE — TELEPHONE ENCOUNTER
Pt has an appt with you in April, asking if you will order BW if he is due?  Wants you to have the results for his visit

## 2020-03-20 ENCOUNTER — TELEPHONE (OUTPATIENT)
Dept: CARDIOLOGY CLINIC | Facility: CLINIC | Age: 85
End: 2020-03-20

## 2020-03-20 NOTE — TELEPHONE ENCOUNTER
Patients wife to let you  Know Keesha Hernadez is feeling "woozie" again  Not dizzy but woozie  Same feeling he had when he was admitted 2 yrs ago  She stated you cut his Amiodarone in half  Rec?

## 2020-03-20 NOTE — TELEPHONE ENCOUNTER
Called patient and spoke to wife  Gave her dr Wagner Ibarra  They are apprehencsive to go to ER with this virus going around but they will if symptoms get worse

## 2020-03-27 DIAGNOSIS — I10 ESSENTIAL HYPERTENSION: ICD-10-CM

## 2020-03-27 DIAGNOSIS — I25.10 CORONARY ARTERY DISEASE WITHOUT ANGINA PECTORIS, UNSPECIFIED VESSEL OR LESION TYPE, UNSPECIFIED WHETHER NATIVE OR TRANSPLANTED HEART: ICD-10-CM

## 2020-03-27 DIAGNOSIS — E03.9 HYPOTHYROIDISM, UNSPECIFIED TYPE: ICD-10-CM

## 2020-03-27 RX ORDER — FUROSEMIDE 20 MG/1
20 TABLET ORAL DAILY
Qty: 90 TABLET | Refills: 3 | OUTPATIENT
Start: 2020-03-27

## 2020-03-27 RX ORDER — LEVOTHYROXINE SODIUM 0.07 MG/1
75 TABLET ORAL DAILY
Qty: 90 TABLET | Refills: 3 | OUTPATIENT
Start: 2020-03-27

## 2020-03-27 RX ORDER — ATORVASTATIN CALCIUM 10 MG/1
10 TABLET, FILM COATED ORAL DAILY
Qty: 90 TABLET | Refills: 3 | OUTPATIENT
Start: 2020-03-27

## 2020-03-27 RX ORDER — ATORVASTATIN CALCIUM 10 MG/1
10 TABLET, FILM COATED ORAL DAILY
Qty: 90 TABLET | Refills: 3 | Status: SHIPPED | OUTPATIENT
Start: 2020-03-27 | End: 2021-04-23

## 2020-03-27 RX ORDER — AMLODIPINE BESYLATE 2.5 MG/1
2.5 TABLET ORAL 2 TIMES DAILY
Qty: 180 TABLET | Refills: 3 | Status: SHIPPED | OUTPATIENT
Start: 2020-03-27 | End: 2020-08-21 | Stop reason: SDUPTHER

## 2020-03-27 RX ORDER — AMLODIPINE BESYLATE 2.5 MG/1
2.5 TABLET ORAL 2 TIMES DAILY
Qty: 180 TABLET | Refills: 3 | OUTPATIENT
Start: 2020-03-27

## 2020-03-27 RX ORDER — FUROSEMIDE 20 MG/1
20 TABLET ORAL DAILY
Qty: 90 TABLET | Refills: 3 | Status: SHIPPED | OUTPATIENT
Start: 2020-03-27 | End: 2021-04-15

## 2020-05-29 ENCOUNTER — TRANSCRIBE ORDERS (OUTPATIENT)
Dept: LAB | Facility: CLINIC | Age: 85
End: 2020-05-29

## 2020-05-29 ENCOUNTER — APPOINTMENT (OUTPATIENT)
Dept: LAB | Facility: CLINIC | Age: 85
End: 2020-05-29
Payer: MEDICARE

## 2020-05-29 DIAGNOSIS — E11.9 TYPE 2 DIABETES MELLITUS WITHOUT COMPLICATION, WITH LONG-TERM CURRENT USE OF INSULIN (HCC): Primary | ICD-10-CM

## 2020-05-29 DIAGNOSIS — E03.9 HYPOTHYROIDISM, UNSPECIFIED TYPE: ICD-10-CM

## 2020-05-29 DIAGNOSIS — Z79.4 TYPE 2 DIABETES MELLITUS WITHOUT COMPLICATION, WITH LONG-TERM CURRENT USE OF INSULIN (HCC): Primary | ICD-10-CM

## 2020-05-29 LAB
CREAT SERPL-MCNC: 1.61 MG/DL (ref 0.6–1.3)
CREAT UR-MCNC: 50.5 MG/DL
EST. AVERAGE GLUCOSE BLD GHB EST-MCNC: 151 MG/DL
GFR SERPL CREATININE-BSD FRML MDRD: 38 ML/MIN/1.73SQ M
HBA1C MFR BLD: 6.9 %
MICROALBUMIN UR-MCNC: 70.9 MG/L (ref 0–20)
MICROALBUMIN/CREAT 24H UR: 140 MG/G CREATININE (ref 0–30)
TSH SERPL DL<=0.05 MIU/L-ACNC: 3.35 UIU/ML (ref 0.36–3.74)

## 2020-05-29 PROCEDURE — 82043 UR ALBUMIN QUANTITATIVE: CPT

## 2020-05-29 PROCEDURE — 84443 ASSAY THYROID STIM HORMONE: CPT

## 2020-05-29 PROCEDURE — 83036 HEMOGLOBIN GLYCOSYLATED A1C: CPT

## 2020-05-29 PROCEDURE — 36415 COLL VENOUS BLD VENIPUNCTURE: CPT

## 2020-05-29 PROCEDURE — 82570 ASSAY OF URINE CREATININE: CPT

## 2020-05-29 PROCEDURE — 82565 ASSAY OF CREATININE: CPT

## 2020-06-22 ENCOUNTER — HOSPITAL ENCOUNTER (OUTPATIENT)
Dept: NON INVASIVE DIAGNOSTICS | Facility: HOSPITAL | Age: 85
Discharge: HOME | End: 2020-06-22
Attending: SURGERY
Payer: MEDICARE

## 2020-06-22 DIAGNOSIS — I70.299 ATHEROSCLEROSIS OF ARTERY OF EXTREMITY WITH ULCERATION (HCC): ICD-10-CM

## 2020-06-22 DIAGNOSIS — L97.909 ATHEROSCLEROSIS OF ARTERY OF EXTREMITY WITH ULCERATION (HCC): ICD-10-CM

## 2020-06-22 PROCEDURE — 93925 LOWER EXTREMITY STUDY: CPT

## 2020-06-22 PROCEDURE — 93923 UPR/LXTR ART STDY 3+ LVLS: CPT

## 2020-06-22 PROCEDURE — 93922 UPR/L XTREMITY ART 2 LEVELS: CPT | Performed by: SURGERY

## 2020-06-22 PROCEDURE — 93925 LOWER EXTREMITY STUDY: CPT | Performed by: SURGERY

## 2020-06-23 ENCOUNTER — TELEPHONE (OUTPATIENT)
Dept: VASCULAR SURGERY | Facility: CLINIC | Age: 85
End: 2020-06-23

## 2020-07-14 ENCOUNTER — OFFICE VISIT (OUTPATIENT)
Dept: INTERNAL MEDICINE CLINIC | Facility: CLINIC | Age: 85
End: 2020-07-14
Payer: MEDICARE

## 2020-07-14 VITALS
SYSTOLIC BLOOD PRESSURE: 90 MMHG | DIASTOLIC BLOOD PRESSURE: 70 MMHG | WEIGHT: 188.8 LBS | RESPIRATION RATE: 16 BRPM | HEIGHT: 68 IN | HEART RATE: 62 BPM | BODY MASS INDEX: 28.61 KG/M2 | TEMPERATURE: 97.3 F

## 2020-07-14 DIAGNOSIS — I25.10 CORONARY ARTERY DISEASE WITHOUT ANGINA PECTORIS, UNSPECIFIED VESSEL OR LESION TYPE, UNSPECIFIED WHETHER NATIVE OR TRANSPLANTED HEART: ICD-10-CM

## 2020-07-14 DIAGNOSIS — I48.0 PAROXYSMAL ATRIAL FIBRILLATION (HCC): ICD-10-CM

## 2020-07-14 DIAGNOSIS — I25.118 CORONARY ARTERY DISEASE OF NATIVE ARTERY OF NATIVE HEART WITH STABLE ANGINA PECTORIS (HCC): ICD-10-CM

## 2020-07-14 DIAGNOSIS — E11.8 TYPE 2 DIABETES MELLITUS WITH COMPLICATION, WITH LONG-TERM CURRENT USE OF INSULIN (HCC): Primary | ICD-10-CM

## 2020-07-14 DIAGNOSIS — Z79.4 TYPE 2 DIABETES MELLITUS WITH COMPLICATION, WITH LONG-TERM CURRENT USE OF INSULIN (HCC): Primary | ICD-10-CM

## 2020-07-14 DIAGNOSIS — R42 LIGHTHEADEDNESS: ICD-10-CM

## 2020-07-14 PROCEDURE — 1036F TOBACCO NON-USER: CPT | Performed by: INTERNAL MEDICINE

## 2020-07-14 PROCEDURE — 3060F POS MICROALBUMINURIA REV: CPT | Performed by: INTERNAL MEDICINE

## 2020-07-14 PROCEDURE — 1160F RVW MEDS BY RX/DR IN RCRD: CPT | Performed by: INTERNAL MEDICINE

## 2020-07-14 PROCEDURE — 99214 OFFICE O/P EST MOD 30 MIN: CPT | Performed by: INTERNAL MEDICINE

## 2020-07-14 PROCEDURE — 3008F BODY MASS INDEX DOCD: CPT | Performed by: INTERNAL MEDICINE

## 2020-07-14 PROCEDURE — 4040F PNEUMOC VAC/ADMIN/RCVD: CPT | Performed by: INTERNAL MEDICINE

## 2020-07-14 PROCEDURE — 3074F SYST BP LT 130 MM HG: CPT | Performed by: INTERNAL MEDICINE

## 2020-07-14 PROCEDURE — 3078F DIAST BP <80 MM HG: CPT | Performed by: INTERNAL MEDICINE

## 2020-07-14 PROCEDURE — 3066F NEPHROPATHY DOC TX: CPT | Performed by: INTERNAL MEDICINE

## 2020-07-14 PROCEDURE — 3044F HG A1C LEVEL LT 7.0%: CPT | Performed by: INTERNAL MEDICINE

## 2020-07-14 NOTE — PROGRESS NOTES
Assessment/Plan:    Problem List Items Addressed This Visit        Endocrine    Type 2 diabetes mellitus with complication, with long-term current use of insulin ([address] ) - Primary       Cardiovascular and Mediastinum    Paroxysmal atrial fibrillation (HCC)    Coronary artery disease of native artery of native heart with stable angina pectoris ([address] )       Other    Lightheadedness      Other Visit Diagnoses     Coronary artery disease without angina pectoris, unspecified vessel or lesion type, unspecified whether native or transplanted heart               Diagnoses and all orders for this visit:    Type 2 diabetes mellitus with complication, with long-term current use of insulin ([address] )    Coronary artery disease without angina pectoris, unspecified vessel or lesion type, unspecified whether native or transplanted heart    Coronary artery disease of native artery of native heart with stable angina pectoris ([address] )    Paroxysmal atrial fibrillation ([address] )    Lightheadedness        No problem-specific Assessment & Plan notes found for this encounter  Subjective: The patient notes dizziness when he gets out of bed     Patient ID: Deidre Quick is a 80 y o  male  The patient states that he has mild orthostatic type symptoms with standing  He states that this has been going on for several weeks  We noted that his BP was mildly decreased today  We discussed discontinuation of the Amlodipine because it seemed unnecessary  The patient state that he is doing well with his insulin and is noted to have a hgbA1c of 6 7%  The patient states that he has no anginal pain and states no issues with DAS from the Imdur  He is on Coreg and Imdur  His A-fib is well controlled with Amiodarone and he notes no issues with the medication  Diabetes   He presents for his follow-up diabetic visit  He has type 2 diabetes mellitus  His disease course has been stable  There are no hypoglycemic associated symptoms   Pertinent negatives for hypoglycemia include no nervousness/anxiousness  Pertinent negatives for diabetes include no chest pain and no fatigue  Risk factors for coronary artery disease include male sex, diabetes mellitus and hypertension  Current diabetic treatment includes insulin injections  He is compliant with treatment all of the time  There is no change in his home blood glucose trend  An ACE inhibitor/angiotensin II receptor blocker is not being taken  Eye exam is not current  Hypertension   This is a chronic problem  The current episode started more than 1 year ago  The problem is unchanged  The problem is controlled  Pertinent negatives include no chest pain, palpitations or shortness of breath  Past treatments include calcium channel blockers, beta blockers, diuretics and direct vasodilators  The current treatment provides significant improvement  Hypertensive end-organ damage includes CAD/MI  The following portions of the patient's history were reviewed and updated as appropriate:   He has a past medical history of Benign hypertension, Chronic kidney disease (CKD), stage III (moderate), Coronary artery disease, Hypothyroidism, Ischemic cardiomyopathy, Mixed hyperlipidemia, Paroxysmal atrial fibrillation, PVD (peripheral vascular disease), and Type II diabetes mellitus  ,  does not have any pertinent problems on file  ,   has a past surgical history that includes Cardiac catheterization and Coronary artery bypass graft  ,  family history includes No Known Problems in his brother, father, maternal aunt, maternal grandfather, maternal grandmother, maternal uncle, mother, paternal aunt, paternal grandfather, paternal grandmother, paternal uncle, and sister  ,   reports that he has never smoked  He has never used smokeless tobacco  His alcohol and drug histories are not on file  ,  has No Known Allergies     Current Outpatient Medications   Medication Sig Dispense Refill    ACCU-CHEK COMPACT PLUS test strip USE 3 TIMES DAILY  DX: E11 29  3    ACCU-CHEK SOFTCLIX LANCETS lancets USE AND DISCARD 1 LANCET 3 TIMES DAILY  DX: E11 29  3    amiodarone 200 mg tablet Take 0 5 tablets (100 mg total) by mouth daily 45 tablet 3    amLODIPine (NORVASC) 2 5 mg tablet Take 1 tablet (2 5 mg total) by mouth 2 (two) times a day 180 tablet 3    aspirin 81 MG tablet Take 81 mg by mouth daily      atorvastatin (LIPITOR) 10 mg tablet Take 1 tablet (10 mg total) by mouth daily 90 tablet 3    BD PEN NEEDLE CARMINA U/F 32G X 4 MM MISC USE 4 PEN NEEDLE DAILY AS DIRECTED  3    carvedilol (COREG) 3 125 mg tablet Take 1 tablet (3 125 mg total) by mouth 2 (two) times a day with meals 180 tablet 3    furosemide (LASIX) 20 mg tablet Take 1 tablet (20 mg total) by mouth daily 90 tablet 3    insulin aspart (NovoLOG) 100 units/mL injection Inject under the skin 3 (three) times a day before meals      Insulin Glargine (LANTUS SOLOSTAR SC) Inject 5 Units under the skin daily       isosorbide mononitrate (IMDUR) 30 mg 24 hr tablet Take 1 tablet (30 mg total) by mouth 2 (two) times a day 180 tablet 3    levothyroxine 75 mcg tablet Take 1 tablet (75 mcg total) by mouth daily 90 tablet 3    nitroglycerin (NITROSTAT) 0 4 mg SL tablet Place 0 4 mg under the tongue every 5 (five) minutes as needed for chest pain      Omega-3 Fatty Acids (FISH OIL) 1,000 mg Take 1,000 mg by mouth daily      potassium chloride (MICRO-K) 10 MEQ CR capsule Take 2 tablets in the morning and 1 tablet in the evening  270 capsule 3     No current facility-administered medications for this visit  Review of Systems   Constitutional: Negative for chills, fatigue and fever  HENT: Negative  Respiratory: Negative for cough, chest tightness and shortness of breath  Cardiovascular: Negative for chest pain and palpitations  Gastrointestinal: Negative for abdominal pain, constipation, diarrhea, nausea and vomiting  Genitourinary: Negative      Musculoskeletal: Negative for back pain and myalgias  Skin: Negative  Neurological: Negative  Psychiatric/Behavioral: Negative for dysphoric mood  The patient is not nervous/anxious  Objective:  Vitals:    07/14/20 1431   Pulse: 62   Resp: 16   Temp: (!) 97 3 °F (36 3 °C)   Weight: 85 6 kg (188 lb 12 8 oz)   Height: 5' 8" (1 727 m)     Body mass index is 28 71 kg/m²  Physical Exam   Constitutional: He is oriented to person, place, and time  He appears well-developed and well-nourished  HENT:   Head: Normocephalic and atraumatic  Eyes: Pupils are equal, round, and reactive to light  EOM are normal    Neck: Normal range of motion  Neck supple  Cardiovascular: Normal rate, regular rhythm, normal heart sounds and intact distal pulses  No murmur heard  Pulmonary/Chest: Effort normal and breath sounds normal  No stridor  He has no rales  Abdominal: Soft  Bowel sounds are normal  He exhibits no distension  There is no tenderness  Musculoskeletal: Normal range of motion  He exhibits no edema or deformity  Neurological: He is alert and oriented to person, place, and time  Skin: Skin is warm and dry  Psychiatric: He has a normal mood and affect  Nursing note and vitals reviewed         PHQ-9 Depression Screening    PHQ-9:    Frequency of the following problems over the past two weeks:       Little interest or pleasure in doing things:  0 - not at all  Feeling down, depressed, or hopeless:  0 - not at all  PHQ-2 Score:  0

## 2020-07-14 NOTE — PROGRESS NOTES
Diabetic Foot Exam    Patient's shoes and socks removed  Right Foot/Ankle   Right Foot Inspection  Skin Exam: skin normal, skin intact, dry skin, callus and callus no warmth, no erythema, no maceration, no abnormal color, no pre-ulcer and no ulcer                          Toe Exam: ROM and strength within normal limits and swelling  Sensory       Monofilament testing: intact  Vascular  Capillary refills: < 3 seconds  The right DP pulse is 1+  The right PT pulse is 1+  Left Foot/Ankle  Left Foot Inspection  Skin Exam: skin normal, skin intact, dry skin and callusno warmth, no erythema, no maceration, normal color, no pre-ulcer and no ulcer                         Toe Exam: ROM and strength within normal limits and swelling                   Sensory       Monofilament: intact  Vascular  Capillary refills: < 3 seconds  The left DP pulse is 1+  The left PT pulse is 1+  Assign Risk Category:  No deformity present;  No loss of protective sensation; Weak pulses       Risk: 1

## 2020-07-21 ENCOUNTER — TELEMEDICINE (OUTPATIENT)
Dept: VASCULAR SURGERY | Facility: CLINIC | Age: 85
End: 2020-07-21
Payer: MEDICARE

## 2020-07-21 VITALS — HEIGHT: 68 IN | BODY MASS INDEX: 26.98 KG/M2 | WEIGHT: 178 LBS

## 2020-07-21 DIAGNOSIS — I73.9 PERIPHERAL ARTERIAL DISEASE (HCC): Primary | ICD-10-CM

## 2020-07-21 PROCEDURE — 99442 PR PHYS/QHP TELEPHONE EVALUATION 11-20 MIN: CPT | Performed by: SURGERY

## 2020-07-21 NOTE — PROGRESS NOTES
Virtual Brief Visit    Assessment/Plan:Doing well and remains completely healed from a right foot standpoint  No ischemic rest pain or tissue loss  Continues on a baby aspirin and a statin with regard to risk factor reduction  Plan:  Follow-up lower extremity arterial study in 1 year, should his ulcer recur or should he developed pain in his foot especially at nighttime they are to call the office and we would evaluate and change our plan if necessary at that time  Problem List Items Addressed This Visit     None                Reason for visit is   Chief Complaint   Patient presents with    Wound Check    Virtual Brief Visit        Encounter provider Cathy Mittal MD    Provider located at [facility] [address] [address]    Recent Visits  Date Type Provider Dept   07/14/20 Office Visit Deirdre Edwards, DO [facility]to recent visits within past 7 days and meeting all other requirements     Today's Visits  Date Type Provider Dept   07/21/20 Telemedicine Cathy Mittal MD [address] today's visits and meeting all other requirements     Future Appointments  No visits were found meeting these conditions  Showing future appointments within next 150 days and meeting all other requirements      It was my intent to perform this visit via video technology but the patient was not able to do a video connection so the visit was completed via audio telephone only  After connecting through telephone, the patient was identified by name and date of birth  Nishi Merino was informed that this is a telemedicine visit and that the visit is being conducted through telephone  My office door was closed  No one else was in the room  He acknowledged consent and understanding of privacy and security of the platform   The patient has agreed to participate and understands he can discontinue the visit at any time  Patient is aware this is a billable service  Subjective    Jj Atkins is a 80 y o  male doing well and remains completely healed from a right foot standpoint  No ischemic rest pain or tissue loss  Continues on a baby aspirin and a statin with regard to risk factor reduction  Plan:  Follow-up lower extremity arterial study in 1 year, should his ulcer recur or should he developed pain in his foot especially at nighttime they are to call the office and we would evaluate and change our plan if necessary at that time  HPI     Past Medical History:   Diagnosis Date    Benign hypertension     Chronic kidney disease (CKD), stage III (moderate)     Coronary artery disease     Hypothyroidism     Ischemic cardiomyopathy     Mixed hyperlipidemia     Paroxysmal atrial fibrillation     PVD (peripheral vascular disease)     Type II diabetes mellitus        Past Surgical History:   Procedure Laterality Date    CARDIAC CATHETERIZATION      CORONARY ARTERY BYPASS GRAFT         Current Outpatient Medications   Medication Sig Dispense Refill    ACCU-CHEK COMPACT PLUS test strip USE 3 TIMES DAILY  DX: E11 29  3    ACCU-CHEK SOFTCLIX LANCETS lancets USE AND DISCARD 1 LANCET 3 TIMES DAILY   DX: E11 29  3    amiodarone 200 mg tablet Take 0 5 tablets (100 mg total) by mouth daily 45 tablet 3    amLODIPine (NORVASC) 2 5 mg tablet Take 1 tablet (2 5 mg total) by mouth 2 (two) times a day 180 tablet 3    aspirin 81 MG tablet Take 81 mg by mouth daily      atorvastatin (LIPITOR) 10 mg tablet Take 1 tablet (10 mg total) by mouth daily 90 tablet 3    BD PEN NEEDLE CARMINA U/F 32G X 4 MM MISC USE 4 PEN NEEDLE DAILY AS DIRECTED  3    carvedilol (COREG) 3 125 mg tablet Take 1 tablet (3 125 mg total) by mouth 2 (two) times a day with meals 180 tablet 3    furosemide (LASIX) 20 mg tablet Take 1 tablet (20 mg total) by mouth daily 90 tablet 3    insulin aspart (NovoLOG) 100 units/mL injection Inject under the skin 3 (three) times a day before meals      Insulin Glargine (LANTUS SOLOSTAR SC) Inject 5 Units under the skin daily       isosorbide mononitrate (IMDUR) 30 mg 24 hr tablet Take 1 tablet (30 mg total) by mouth 2 (two) times a day 180 tablet 3    levothyroxine 75 mcg tablet Take 1 tablet (75 mcg total) by mouth daily 90 tablet 3    nitroglycerin (NITROSTAT) 0 4 mg SL tablet Place 0 4 mg under the tongue every 5 (five) minutes as needed for chest pain      Omega-3 Fatty Acids (FISH OIL) 1,000 mg Take 1,000 mg by mouth daily      potassium chloride (MICRO-K) 10 MEQ CR capsule Take 2 tablets in the morning and 1 tablet in the evening  270 capsule 3     No current facility-administered medications for this visit  No Known Allergies    Review of Systems no further complaints from his foot, no GI or  symptoms no constitutional symptoms weight loss fever malaise, all other systems negative    Vitals:    07/21/20 1025   Weight: 80 7 kg (178 lb)   Height: 5' 8" (1 727 m)         I spent Fifteen minutes directly with the patient during this visit    VIRTUAL VISIT Pedroganesh acknowledges that he has consented to an online visit or consultation  He understands that the online visit is based solely on information provided by him, and that, in the absence of a face-to-face physical evaluation by the physician, the diagnosis he receives is both limited and provisional in terms of accuracy and completeness  This is not intended to replace a full medical face-to-face evaluation by the physician  Jannette Daniel understands and accepts these terms

## 2020-07-21 NOTE — PATIENT INSTRUCTIONS
Doing well and remains completely healed from a right foot standpoint  No ischemic rest pain or tissue loss  Continues on a baby aspirin and a statin with regard to risk factor reduction  Plan:  Follow-up lower extremity arterial study in 1 year, should his ulcer recur or should he developed pain in his foot especially at nighttime they are to call the office and we would evaluate and change our plan if necessary at that time

## 2020-08-21 ENCOUNTER — OFFICE VISIT (OUTPATIENT)
Dept: CARDIOLOGY CLINIC | Facility: CLINIC | Age: 85
End: 2020-08-21
Payer: MEDICARE

## 2020-08-21 VITALS
BODY MASS INDEX: 28.79 KG/M2 | WEIGHT: 190 LBS | HEIGHT: 68 IN | SYSTOLIC BLOOD PRESSURE: 120 MMHG | HEART RATE: 61 BPM | DIASTOLIC BLOOD PRESSURE: 80 MMHG

## 2020-08-21 DIAGNOSIS — I10 ESSENTIAL HYPERTENSION: ICD-10-CM

## 2020-08-21 DIAGNOSIS — I25.10 CORONARY ARTERY DISEASE WITHOUT ANGINA PECTORIS, UNSPECIFIED VESSEL OR LESION TYPE, UNSPECIFIED WHETHER NATIVE OR TRANSPLANTED HEART: Primary | ICD-10-CM

## 2020-08-21 DIAGNOSIS — I20.9 ANGINA PECTORIS (HCC): ICD-10-CM

## 2020-08-21 PROCEDURE — 1160F RVW MEDS BY RX/DR IN RCRD: CPT | Performed by: INTERNAL MEDICINE

## 2020-08-21 PROCEDURE — 1036F TOBACCO NON-USER: CPT | Performed by: INTERNAL MEDICINE

## 2020-08-21 PROCEDURE — 3079F DIAST BP 80-89 MM HG: CPT | Performed by: INTERNAL MEDICINE

## 2020-08-21 PROCEDURE — 3044F HG A1C LEVEL LT 7.0%: CPT | Performed by: INTERNAL MEDICINE

## 2020-08-21 PROCEDURE — 3066F NEPHROPATHY DOC TX: CPT | Performed by: INTERNAL MEDICINE

## 2020-08-21 PROCEDURE — 99214 OFFICE O/P EST MOD 30 MIN: CPT | Performed by: INTERNAL MEDICINE

## 2020-08-21 PROCEDURE — 3074F SYST BP LT 130 MM HG: CPT | Performed by: INTERNAL MEDICINE

## 2020-08-21 PROCEDURE — 3060F POS MICROALBUMINURIA REV: CPT | Performed by: INTERNAL MEDICINE

## 2020-08-21 PROCEDURE — 4040F PNEUMOC VAC/ADMIN/RCVD: CPT | Performed by: INTERNAL MEDICINE

## 2020-08-21 PROCEDURE — 4010F ACE/ARB THERAPY RXD/TAKEN: CPT | Performed by: INTERNAL MEDICINE

## 2020-08-21 PROCEDURE — 3008F BODY MASS INDEX DOCD: CPT | Performed by: INTERNAL MEDICINE

## 2020-08-21 PROCEDURE — 93000 ELECTROCARDIOGRAM COMPLETE: CPT | Performed by: INTERNAL MEDICINE

## 2020-08-21 RX ORDER — NITROGLYCERIN 0.4 MG/1
0.4 TABLET SUBLINGUAL
Qty: 25 TABLET | Refills: 5 | Status: SHIPPED | OUTPATIENT
Start: 2020-08-21 | End: 2021-05-24 | Stop reason: SDUPTHER

## 2020-08-21 RX ORDER — AMLODIPINE BESYLATE 2.5 MG/1
2.5 TABLET ORAL DAILY
Qty: 90 TABLET | Refills: 3
Start: 2020-08-21 | End: 2021-04-15

## 2020-08-21 NOTE — PATIENT INSTRUCTIONS
Coronary Artery Disease   AMBULATORY CARE:   Coronary artery disease (CAD)  is narrowing of the arteries to your heart caused by a buildup of plaque  Plaque is made up of cholesterol and other substances  The narrowing in your arteries decreases the amount of blood that can flow to your heart  This causes your heart to get less oxygen  You may not have any symptoms of CAD  It is important for you to manage CAD even if you feel well  CAD can lead to a heart attack if it is not managed  Common symptoms include the following:   · Chest pain (angina), causing burning, squeezing, or crushing tightness in your chest    · Pain that spreads to your neck, jaw, or shoulder blade    · Nausea, vomiting, sweating, fainting, and hands and feet that are cold to the touch  Call 911 for any of the following:   · You have any of the following signs of a heart attack:      ¨ Squeezing, pressure, or pain in your chest that lasts longer than 5 minutes or returns    ¨ Discomfort or pain in your back, neck, jaw, stomach, or arm     ¨ Trouble breathing    ¨ Nausea or vomiting    ¨ Lightheadedness or a sudden cold sweat, especially with chest pain or trouble breathing    Contact your healthcare provider if:   · You have chest pain that is more frequent, or you have chest pain at rest     · You have questions or concerns about your condition or care  Medicines used to treat CAD:   · Blood pressure medicines  are given to lower your blood pressure  ACE inhibitors help keep your blood vessels relaxed and open to help keep blood flowing into your heart  Beta-blockers keep your heart pumping strongly and regularly so it does not work too hard to get oxygen  · Cholesterol medicines  help lower blood cholesterol levels  · Nitrates , such as nitroglycerin, relax the arteries of your heart so it gets more oxygen  They help to relieve your chest pain  · Antiplatelets , such as aspirin, help prevent blood clots   Take your antiplatelet medicine exactly as directed  These medicines make it more likely for you to bleed or bruise  If you are told to take aspirin, do not take acetaminophen or ibuprofen instead  · Blood thinners  keep clots from forming in your blood  Clots may cause heart attacks, strokes, or death  This medicine makes it more likely for you to bleed or bruise  · Do not take certain medicines without asking your healthcare provider first   These include NSAIDs, herbal or vitamin supplements, or hormones (estrogen or progestin)  Procedures used to treat CAD:   · Angioplasty  may be done to open an artery blocked by plaque  A tube with a balloon on the end is threaded into the blocked artery  Once the tube is in the artery, the balloon is inflated  As the balloon inflates, it presses the plaque against the artery wall to open the artery  A stent may be placed in your artery to keep it open  · Coronary artery bypass graft surgery (CABG)  is open heart surgery  Healthcare providers take arteries or veins from other areas in your body and use them to bypass or go around the blocked arteries of your heart  Cardiac rehabilitation:  Your healthcare provider may recommend that you attend cardiac rehabilitation (rehab)  This is a program run by specialists who will help you safely strengthen your heart and reduce the risk for more heart disease  The plan includes exercise, relaxation, stress management, and heart-healthy nutrition  Healthcare providers will also check to make sure any medicines you are taking are working  Manage CAD to prevent a heart attack:   · Do not smoke  Nicotine and other chemicals in cigarettes and cigars can cause heart and lung damage  Ask your healthcare provider for information if you currently smoke and need help to quit  E-cigarettes or smokeless tobacco still contain nicotine  Talk to your healthcare provider before you use these products  · Exercise regularly    Exercise at least 30 minutes each day, on most days of the week  Exercise helps to lower high cholesterol and high blood pressure  It can also help you maintain a healthy weight  Ask your healthcare provider about the kind of exercise you should do and how to get started  · Maintain a healthy weight  If you are overweight, talk to your healthcare provider about how to lose weight  A weight loss of 10% can improve your heart health  · Eat heart-healthy foods  Include fresh fruits and vegetables in your meal plan  Choose low-fat foods, such as skim or 1% fat milk, low-fat cheese and yogurt, fish, chicken (without skin), and lean meats  Eat two 4-ounce servings of fish high in omega-3 fats each week, such as salmon, fresh tuna, and herring  Do not eat foods that are high in sodium, such as canned foods, potato chips, salty snacks, and cold cuts  Put less table salt on your food  · Limit or do not drink alcohol  A drink of alcohol is 12 ounces of beer, 5 ounces of wine, or 1½ ounces of liquor  · Manage other health conditions  Follow your healthcare provider's advice on how to manage other conditions that can affect your heart health  These include diabetes, high blood pressure, and high cholesterol  You may need to take medicines for these conditions and make other lifestyle changes  · Ask if you should have a flu vaccine  The flu can be dangerous for a person who has CAD  The flu vaccine is available every year in the fall  Follow up with your healthcare provider as directed: You may need to return for other tests  You may also be referred to a cardiac surgeon  Write down your questions so you remember to ask them during your visits  © 2017 [employer]  Information is for End User's use only and may not be sold, redistributed or otherwise used for commercial purposes  All illustrations and images included in CareNotes® are the copyrighted property of [employer]  or D[employer] Thompson    The above information is an  only  It is not intended as medical advice for individual conditions or treatments  Talk to your doctor, nurse or pharmacist before following any medical regimen to see if it is safe and effective for you

## 2020-08-21 NOTE — PROGRESS NOTES
Subjective:        Patient ID: Kye Vaughn is a 80 y o  male  Chief Complaint:  Rudolph Díaz is here for routine cardiac follow-up  He has not used any nitrates since our last visit  His lower extremity edema is about the same he says  Gets stable chest pains quickly resolved with rest, no increase in frequency duration or severity he says  No palpitations presyncope or syncope  Says really what bothers him the most is chronic back pain, my back is shot, he said pain management did no good  He does not like taking any medications for pain but his back pain is clearly what stops his activity prior to any cardiac pain  The following portions of the patient's history were reviewed and updated as appropriate: allergies, current medications, past family history, past medical history, past social history, past surgical history and problem list   Review of Systems   Constitution: Positive for malaise/fatigue  Negative for chills, diaphoresis and weight gain  HENT: Negative for nosebleeds and stridor  Eyes: Negative for double vision, vision loss in left eye, vision loss in right eye and visual disturbance  Cardiovascular: Negative for chest pain, claudication, cyanosis, dyspnea on exertion, irregular heartbeat, leg swelling, near-syncope, orthopnea, palpitations, paroxysmal nocturnal dyspnea and syncope  Respiratory: Negative for cough, shortness of breath, snoring and wheezing  Endocrine: Negative for polydipsia, polyphagia and polyuria  Hematologic/Lymphatic: Negative for bleeding problem  Does not bruise/bleed easily  Skin: Negative for flushing and rash  Musculoskeletal: Positive for arthritis, back pain and stiffness  Negative for falls and myalgias  Gastrointestinal: Negative for abdominal pain, heartburn, hematemesis, hematochezia, melena and nausea  Genitourinary: Negative for hematuria     Neurological: Negative for brief paralysis, dizziness, focal weakness, headaches, light-headedness, loss of balance and vertigo  Psychiatric/Behavioral: Negative for altered mental status and substance abuse  Allergic/Immunologic: Negative for hives  Objective:      /80   Pulse 61   Ht 5' 8" (1 727 m)   Wt 86 2 kg (190 lb)   BMI 28 89 kg/m²   Physical Exam   Constitutional: He is oriented to person, place, and time  He appears well-developed and well-nourished  No distress  HENT:   Head: Normocephalic and atraumatic  Eyes: Pupils are equal, round, and reactive to light  EOM are normal  No scleral icterus  Neck: Normal range of motion  Neck supple  No JVD present  No thyromegaly present  Cardiovascular: Normal rate, regular rhythm and normal heart sounds  Exam reveals no gallop and no friction rub  No murmur heard  Pulmonary/Chest: Effort normal and breath sounds normal  No stridor  No respiratory distress  He has no wheezes  He has no rales  Abdominal: Soft  Bowel sounds are normal  He exhibits no distension and no mass  There is no abdominal tenderness  Musculoskeletal: Normal range of motion  General: Edema (Trace to +1 left slightly greater than right lower extremity edema which is chronic) present  No deformity  Neurological: He is alert and oriented to person, place, and time  Coordination normal    Skin: Skin is warm and dry  No erythema  No pallor  Psychiatric: He has a normal mood and affect  His behavior is normal        Lab Review:   Transcribe Orders on 05/29/2020   Component Date Value    TSH 3RD GENERATON 05/29/2020 3 350     Hemoglobin A1C 05/29/2020 6 9*    EAG 05/29/2020 151     Creatinine 05/29/2020 1 61*    eGFR 05/29/2020 38     Creatinine, Ur 05/29/2020 50 5     Microalbum  ,U,Random 05/29/2020 70 9*    Microalb Creat Ratio 05/29/2020 140*     No results found  Assessment:       1   Coronary artery disease without angina pectoris, unspecified vessel or lesion type, unspecified whether native or transplanted heart POCT ECG    amLODIPine (NORVASC) 2 5 mg tablet   2  Essential hypertension  amLODIPine (NORVASC) 2 5 mg tablet   3  Angina pectoris (HCC)  nitroglycerin (NITROSTAT) 0 4 mg SL tablet        Plan:       Aimee Kidd has stable angina pectoris, I refilled his sublingual nitroglycerin with explicit instructions provided  His blood pressure is well controlled  December LDL 96  Amiodarone at low dose maintaining sinus rhythm clinically, recent thyroid functions and liver functions stable  I am going to call back in get a PA and lateral chest x-ray done for completeness here  I recommended to lessen the chance of lower extremity edema from such that he decrease amlodipine to 2 5 mg once a day  He is taking his diuretic therapy daily  Recent creatinine stable at 1 6  I will see him back in 4 months time, asked him to call me with any increase in intensity duration or frequency of his angina  Should anything more than low risk upcoming dermatologic surgery be required we should consider risk stratifying stress testing

## 2020-08-24 ENCOUNTER — HOSPITAL ENCOUNTER (OUTPATIENT)
Dept: RADIOLOGY | Facility: HOSPITAL | Age: 85
Discharge: HOME | End: 2020-08-24
Attending: INTERNAL MEDICINE
Payer: MEDICARE

## 2020-08-24 DIAGNOSIS — I20.9 ANGINA PECTORIS (HCC): ICD-10-CM

## 2020-08-24 PROCEDURE — 71046 X-RAY EXAM CHEST 2 VIEWS: CPT

## 2020-08-27 DIAGNOSIS — J84.10 PULMONARY FIBROSIS (HCC): Primary | ICD-10-CM

## 2020-08-27 NOTE — RESULT ENCOUNTER NOTE
Please call the patient regarding his abnormal result  CXR shows fibrosis of lungs, increased since 2017 x ray  Recommend he see Pulmonary

## 2020-09-30 ENCOUNTER — CONSULT (OUTPATIENT)
Dept: PULMONOLOGY | Facility: CLINIC | Age: 85
End: 2020-09-30
Payer: MEDICARE

## 2020-09-30 VITALS
TEMPERATURE: 94.8 F | OXYGEN SATURATION: 97 % | HEART RATE: 64 BPM | DIASTOLIC BLOOD PRESSURE: 82 MMHG | WEIGHT: 186 LBS | SYSTOLIC BLOOD PRESSURE: 122 MMHG | HEIGHT: 68 IN | BODY MASS INDEX: 28.19 KG/M2

## 2020-09-30 DIAGNOSIS — J84.10 PULMONARY FIBROSIS (HCC): ICD-10-CM

## 2020-09-30 PROCEDURE — 99204 OFFICE O/P NEW MOD 45 MIN: CPT | Performed by: INTERNAL MEDICINE

## 2020-09-30 PROCEDURE — 94618 PULMONARY STRESS TESTING: CPT | Performed by: INTERNAL MEDICINE

## 2020-09-30 NOTE — PROGRESS NOTES
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  He is asymptomatic at this point        -  Check PFTs to obtain baseline pulmonary function       -  Check CT chest to understand the extent of disease       -  Walk test did not demonstrate any significant desaturation          -  We discussed treatment options, he is not particularly interested in any treatment as he is feeling well at this time  -  Will hold on additional inflammatory work up for now and discuss results of CT first with the patient  -  Follow in 3 months to discuss the results of the CT  History of Present Illness   HPI:  Jj Atkins is a 80 y o  male with PMHx as below who comes in for evaluation of abnormal CXR  He states that he is not sure why he is even here today  He was told by his PCP to get a pulmonary evaluation so he came over  He was not aware that his CXR showed reticular changes  Overall he is asymptomatic  He denies cough, wheezing, chest tightness, lightheadedness of dizziness  He does not feel limited by his breathing  He is only limited getting around with his knees  He did work repairing railroad for [employer]  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 [employer]  He is not sure of any definitive exposures  He is a lifelong nonsmoker  ROS:   Review of Systems   Constitutional: Negative  HENT: Positive for hearing loss  Negative for drooling, sneezing and tinnitus  Eyes: Negative  Respiratory: Negative  Cardiovascular: Negative  Gastrointestinal: Negative  Endocrine: Negative  Genitourinary: Negative  Musculoskeletal: Positive for back pain  Negative for arthralgias and myalgias  Skin: Negative  Allergic/Immunologic: Negative  Neurological: Negative  Hematological: Negative  Psychiatric/Behavioral: Negative          Historical Information   Past Medical History:   Diagnosis Date    Benign hypertension     Chronic kidney disease (CKD), stage III (moderate)     Coronary artery disease     Hypothyroidism     Ischemic cardiomyopathy     Mixed hyperlipidemia     Paroxysmal atrial fibrillation     PVD (peripheral vascular disease)     Type II diabetes mellitus      Past Surgical History:   Procedure Laterality Date    CARDIAC CATHETERIZATION      CORONARY ARTERY BYPASS GRAFT       Family History   Problem Relation Age of Onset    No Known Problems Mother     No Known Problems Father     No Known Problems Sister     No Known Problems Brother     No Known Problems Maternal Aunt     No Known Problems Maternal Uncle     No Known Problems Paternal Aunt     No Known Problems Paternal Uncle     No Known Problems Maternal Grandmother     No Known Problems Maternal Grandfather     No Known Problems Paternal Grandmother     No Known Problems Paternal Grandfather     Anemia Neg Hx     Arrhythmia Neg Hx     Asthma Neg Hx     Clotting disorder Neg Hx     Fainting Neg Hx     Heart attack Neg Hx     Heart disease Neg Hx     Heart failure Neg Hx     Hyperlipidemia Neg Hx     Hypertension Neg Hx      Social History     Socioeconomic History    Marital status: /Civil Union     Spouse name: Not on file    Number of children: Not on file    Years of education: Not on file    Highest education level: Not on file   Occupational History    Occupation: RETIRED   Social Needs    Financial resource strain: Not on file    Food insecurity     Worry: Not on file     Inability: Not on file    Transportation needs     Medical: Not on file     Non-medical: Not on file   Tobacco Use    Smoking status: Never Smoker    Smokeless tobacco: Never Used   Substance and Sexual Activity    Alcohol use: Not on file    Drug use: Not on file    Sexual activity: Not on file   Lifestyle    Physical activity     Days per week: Not on file     Minutes per session: Not on file    Stress: Not on file   Relationships    Social connections     Talks on phone: Not on file     Gets together: Not on file     Attends Latter-day service: Not on file     Active member of club or organization: Not on file     Attends meetings of clubs or organizations: Not on file     Relationship status: Not on file    Intimate partner violence     Fear of current or ex partner: Not on file     Emotionally abused: Not on file     Physically abused: Not on file     Forced sexual activity: Not on file   Other Topics Concern    Not on file   Social History Narrative        RETIRED       Occupational History: previously worked with [employer], 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 Authorizing Provider   BEATRSI-SHRUTHI COMPACT PLUS test strip USE 3 TIMES DAILY  DX: E11 29 6/20/19  Yes Historical Provider, MD VALERA SOFTCLIX LANCETS lancets USE AND DISCARD 1 LANCET 3 TIMES DAILY   DX: E11 29 6/30/19  Yes Historical Provider, MD   amiodarone 200 mg tablet Take 0 5 tablets (100 mg total) by mouth daily 2/5/20  Yes Marc Wise,    amLODIPine (NORVASC) 2 5 mg tablet Take 1 tablet (2 5 mg total) by mouth daily 8/21/20  Yes Marc Wise DO   aspirin 81 MG tablet Take 81 mg by mouth daily   Yes Historical Provider, MD   atorvastatin (LIPITOR) 10 mg tablet Take 1 tablet (10 mg total) by mouth daily 3/27/20  Yes Robin Mina, DO   BD PEN NEEDLE CARMINA U/F 32G X 4 MM MISC USE 4 PEN NEEDLE DAILY AS DIRECTED 7/3/19  Yes Historical Provider, MD   carvedilol (COREG) 3 125 mg tablet Take 1 tablet (3 125 mg total) by mouth 2 (two) times a day with meals 2/5/20  Yes Marc Wise DO   furosemide (LASIX) 20 mg tablet Take 1 tablet (20 mg total) by mouth daily 3/27/20  Yes Marc Wise DO   insulin aspart (NovoLOG) 100 units/mL injection Inject under the skin 3 (three) times a day before meals   Yes Historical Provider, MD   Insulin Glargine (LANTUS SOLOSTAR SC) Inject 5 Units under the skin daily    Yes Historical Provider, MD   isosorbide mononitrate (IMDUR) 30 mg 24 hr tablet Take 1 tablet (30 mg total) by mouth 2 (two) times a day 2/5/20  Yes Christo Vargas DO   levothyroxine 75 mcg tablet Take 1 tablet (75 mcg total) by mouth daily 1/23/19  Yes Marc Wise DO   nitroglycerin (NITROSTAT) 0 4 mg SL tablet Place 1 tablet (0 4 mg total) under the tongue every 5 (five) minutes as needed for chest pain 8/21/20  Yes Marc Wise DO   Omega-3 Fatty Acids (FISH OIL) 1,000 mg Take 1,000 mg by mouth daily   Yes Historical Provider, MD   potassium chloride (MICRO-K) 10 MEQ CR capsule Take 2 tablets in the morning and 1 tablet in the evening  2/5/20  Yes Christo Vargas, DO       Vitals:   Blood pressure 122/82, pulse 64, temperature (!) 94 8 °F (34 9 °C), height 5' 8" (1 727 m), weight 84 4 kg (186 lb), SpO2 97 % , RA, Body mass index is 28 28 kg/m²         Physical Exam  General: Pleasant, Awake alert and oriented x 3, conversant without conversational dyspnea, NAD, normal affect  HEENT:  PERRL, Sclera noninjected, nonicteric OU, Nares patent,  no craniofacial abnormalities, Mucous membranes, moist, no oral lesions, normal dentition  NECK: Trachea midline, no accessory muscle use, no stridor, no cervical or supraclavicular adenopathy, JVP not elevated  CARDIAC: Reg, single s1/S2, no m/r/g  PULM: fine crackles in lung bases bilaterally  ABD: Normoactive bowel sounds, soft nontender, nondistended, no rebound, no rigidity, no guarding  EXT: No cyanosis, no clubbing, no edema, normal capillary refill  NEURO: no focal neurologic deficits, AAOx3, moving all extremities appropriately    Labs: I have personally reviewed pertinent lab results  Lab Results   Component Value Date    WBC 5 76 12/02/2019    HGB 14 8 12/02/2019    HCT 43 6 12/02/2019    MCV 96 12/02/2019     (L) 12/02/2019      Lab Results   Component Value Date    GLUCOSE 155 (H) 02/23/2015    CALCIUM 8 6 12/02/2019     02/23/2015    K 4 2 12/02/2019    CO2 28 12/02/2019     12/02/2019    BUN 29 (H) 12/02/2019    CREATININE 1 61 (H) 05/29/2020       PFTs:  The most recent pulmonary function tests were reviewed  None    6 minute walk today  He could only walk for 3 minutes due to knee pain  Starting saturation - 96   Starting HR - 65  Lowest saturation - 96    Highest HR - 97  No need for oxygen on this walk        Imaging  I personally reviewed the images on the [facility] system pertinent to today's visit  CXR - IMPRESSION:  Chronic reticulation due to pulmonary fibrosis, question increased since 2017        Cardiac:  No echo in our system                                       DO Emre Armas 73 Sleep Physician

## 2020-10-12 ENCOUNTER — TRANSCRIBE ORDERS (OUTPATIENT)
Dept: LAB | Facility: CLINIC | Age: 85
End: 2020-10-12

## 2020-10-12 ENCOUNTER — LAB (OUTPATIENT)
Dept: LAB | Facility: CLINIC | Age: 85
End: 2020-10-12
Payer: MEDICARE

## 2020-10-12 DIAGNOSIS — I51.9 MYXEDEMA HEART DISEASE: ICD-10-CM

## 2020-10-12 DIAGNOSIS — Z79.4 TYPE 2 DIABETES MELLITUS WITHOUT COMPLICATION, WITH LONG-TERM CURRENT USE OF INSULIN (HCC): ICD-10-CM

## 2020-10-12 DIAGNOSIS — E03.9 MYXEDEMA HEART DISEASE: ICD-10-CM

## 2020-10-12 DIAGNOSIS — E13.10 TYPE II OR UNSPECIFIED TYPE DIABETES MELLITUS WITH KETOACIDOSIS, UNCONTROLLED(250.12) (HCC): ICD-10-CM

## 2020-10-12 DIAGNOSIS — E13.10 TYPE II OR UNSPECIFIED TYPE DIABETES MELLITUS WITH KETOACIDOSIS, UNCONTROLLED(250.12) (HCC): Primary | ICD-10-CM

## 2020-10-12 DIAGNOSIS — E11.9 TYPE 2 DIABETES MELLITUS WITHOUT COMPLICATION, WITH LONG-TERM CURRENT USE OF INSULIN (HCC): ICD-10-CM

## 2020-10-12 LAB
EST. AVERAGE GLUCOSE BLD GHB EST-MCNC: 154 MG/DL
HBA1C MFR BLD: 7 %
T4 FREE SERPL-MCNC: 1.54 NG/DL (ref 0.76–1.46)
TSH SERPL DL<=0.05 MIU/L-ACNC: 0.28 UIU/ML (ref 0.36–3.74)

## 2020-10-12 PROCEDURE — 84439 ASSAY OF FREE THYROXINE: CPT

## 2020-10-12 PROCEDURE — 36415 COLL VENOUS BLD VENIPUNCTURE: CPT

## 2020-10-12 PROCEDURE — 83036 HEMOGLOBIN GLYCOSYLATED A1C: CPT

## 2020-10-12 PROCEDURE — 84443 ASSAY THYROID STIM HORMONE: CPT

## 2020-10-21 ENCOUNTER — HOSPITAL ENCOUNTER (OUTPATIENT)
Dept: CT IMAGING | Facility: HOSPITAL | Age: 85
Discharge: HOME | End: 2020-10-21
Attending: INTERNAL MEDICINE
Payer: MEDICARE

## 2020-10-21 ENCOUNTER — HOSPITAL ENCOUNTER (OUTPATIENT)
Dept: PULMONOLOGY | Facility: HOSPITAL | Age: 85
Discharge: HOME | End: 2020-10-21
Attending: INTERNAL MEDICINE
Payer: MEDICARE

## 2020-10-21 DIAGNOSIS — J84.10 PULMONARY FIBROSIS (HCC): ICD-10-CM

## 2020-10-21 PROCEDURE — G1004 CDSM NDSC: HCPCS

## 2020-10-21 PROCEDURE — 94760 N-INVAS EAR/PLS OXIMETRY 1: CPT

## 2020-10-21 PROCEDURE — 94729 DIFFUSING CAPACITY: CPT | Performed by: INTERNAL MEDICINE

## 2020-10-21 PROCEDURE — 94010 BREATHING CAPACITY TEST: CPT | Performed by: INTERNAL MEDICINE

## 2020-10-21 PROCEDURE — 94727 GAS DIL/WSHOT DETER LNG VOL: CPT | Performed by: INTERNAL MEDICINE

## 2020-10-21 PROCEDURE — 94010 BREATHING CAPACITY TEST: CPT

## 2020-10-21 PROCEDURE — 94727 GAS DIL/WSHOT DETER LNG VOL: CPT

## 2020-10-21 PROCEDURE — 71250 CT THORAX DX C-: CPT

## 2020-10-21 PROCEDURE — 94729 DIFFUSING CAPACITY: CPT

## 2020-11-11 LAB
LEFT EYE DIABETIC RETINOPATHY: NORMAL
RIGHT EYE DIABETIC RETINOPATHY: NORMAL

## 2020-11-13 ENCOUNTER — LAB (OUTPATIENT)
Dept: LAB | Facility: CLINIC | Age: 85
End: 2020-11-13
Payer: MEDICARE

## 2020-11-13 ENCOUNTER — TRANSCRIBE ORDERS (OUTPATIENT)
Dept: LAB | Facility: CLINIC | Age: 85
End: 2020-11-13

## 2020-11-13 DIAGNOSIS — E03.2 HYPOTHYROIDISM DUE TO MEDICATION: Primary | ICD-10-CM

## 2020-11-13 LAB
T4 FREE SERPL-MCNC: 1.19 NG/DL (ref 0.76–1.46)
TSH SERPL DL<=0.05 MIU/L-ACNC: 5.53 UIU/ML (ref 0.36–3.74)

## 2020-11-13 PROCEDURE — 84439 ASSAY OF FREE THYROXINE: CPT

## 2020-11-13 PROCEDURE — 36415 COLL VENOUS BLD VENIPUNCTURE: CPT

## 2020-11-13 PROCEDURE — 84443 ASSAY THYROID STIM HORMONE: CPT

## 2020-11-18 ENCOUNTER — OFFICE VISIT (OUTPATIENT)
Dept: INTERNAL MEDICINE CLINIC | Facility: CLINIC | Age: 85
End: 2020-11-18
Payer: MEDICARE

## 2020-11-18 VITALS
SYSTOLIC BLOOD PRESSURE: 120 MMHG | OXYGEN SATURATION: 98 % | BODY MASS INDEX: 28.7 KG/M2 | HEART RATE: 67 BPM | DIASTOLIC BLOOD PRESSURE: 64 MMHG | HEIGHT: 68 IN | RESPIRATION RATE: 14 BRPM | WEIGHT: 189.4 LBS | TEMPERATURE: 98.2 F

## 2020-11-18 DIAGNOSIS — D69.6 PLATELETS DECREASED (HCC): ICD-10-CM

## 2020-11-18 DIAGNOSIS — I10 ESSENTIAL HYPERTENSION: ICD-10-CM

## 2020-11-18 DIAGNOSIS — Z79.4 TYPE 2 DIABETES MELLITUS WITH COMPLICATION, WITH LONG-TERM CURRENT USE OF INSULIN (HCC): Primary | ICD-10-CM

## 2020-11-18 DIAGNOSIS — N18.32 STAGE 3B CHRONIC KIDNEY DISEASE (HCC): ICD-10-CM

## 2020-11-18 DIAGNOSIS — E78.2 MIXED HYPERLIPIDEMIA: ICD-10-CM

## 2020-11-18 DIAGNOSIS — E11.8 TYPE 2 DIABETES MELLITUS WITH COMPLICATION, WITH LONG-TERM CURRENT USE OF INSULIN (HCC): Primary | ICD-10-CM

## 2020-11-18 PROBLEM — C44.311 BASAL CELL CARCINOMA OF SKIN OF NOSE: Status: ACTIVE | Noted: 2020-11-18

## 2020-11-18 PROBLEM — C44.42 SQUAMOUS CELL CARCINOMA OF SKIN OF SCALP AND NECK: Status: ACTIVE | Noted: 2020-11-18

## 2020-11-18 PROCEDURE — 99214 OFFICE O/P EST MOD 30 MIN: CPT | Performed by: INTERNAL MEDICINE

## 2020-12-14 ENCOUNTER — LAB (OUTPATIENT)
Dept: LAB | Facility: CLINIC | Age: 85
End: 2020-12-14
Payer: MEDICARE

## 2020-12-14 DIAGNOSIS — Z11.4 SCREENING FOR HUMAN IMMUNODEFICIENCY VIRUS: Primary | ICD-10-CM

## 2020-12-14 DIAGNOSIS — E78.2 MIXED HYPERLIPIDEMIA: ICD-10-CM

## 2020-12-14 DIAGNOSIS — Z79.4 TYPE 2 DIABETES MELLITUS WITH COMPLICATION, WITH LONG-TERM CURRENT USE OF INSULIN (HCC): ICD-10-CM

## 2020-12-14 DIAGNOSIS — E11.8 TYPE 2 DIABETES MELLITUS WITH COMPLICATION, WITH LONG-TERM CURRENT USE OF INSULIN (HCC): ICD-10-CM

## 2020-12-14 DIAGNOSIS — I10 ESSENTIAL HYPERTENSION: ICD-10-CM

## 2020-12-14 LAB
ALBUMIN SERPL BCP-MCNC: 3.9 G/DL (ref 3.5–5)
ALP SERPL-CCNC: 96 U/L (ref 46–116)
ALT SERPL W P-5'-P-CCNC: 28 U/L (ref 12–78)
ANION GAP SERPL CALCULATED.3IONS-SCNC: 6 MMOL/L (ref 4–13)
AST SERPL W P-5'-P-CCNC: 21 U/L (ref 5–45)
BASOPHILS # BLD AUTO: 0.07 THOUSANDS/ΜL (ref 0–0.1)
BASOPHILS NFR BLD AUTO: 1 % (ref 0–1)
BILIRUB SERPL-MCNC: 0.74 MG/DL (ref 0.2–1)
BUN SERPL-MCNC: 20 MG/DL (ref 5–25)
CALCIUM SERPL-MCNC: 9.6 MG/DL (ref 8.3–10.1)
CHLORIDE SERPL-SCNC: 102 MMOL/L (ref 100–108)
CHOLEST SERPL-MCNC: 196 MG/DL (ref 50–200)
CO2 SERPL-SCNC: 30 MMOL/L (ref 21–32)
CREAT SERPL-MCNC: 1.44 MG/DL (ref 0.6–1.3)
CREAT UR-MCNC: 58.7 MG/DL
EOSINOPHIL # BLD AUTO: 0.33 THOUSAND/ΜL (ref 0–0.61)
EOSINOPHIL NFR BLD AUTO: 6 % (ref 0–6)
ERYTHROCYTE [DISTWIDTH] IN BLOOD BY AUTOMATED COUNT: 12.4 % (ref 11.6–15.1)
GFR SERPL CREATININE-BSD FRML MDRD: 43 ML/MIN/1.73SQ M
GLUCOSE P FAST SERPL-MCNC: 164 MG/DL (ref 65–99)
HCT VFR BLD AUTO: 44.1 % (ref 36.5–49.3)
HDLC SERPL-MCNC: 41 MG/DL
HGB BLD-MCNC: 15.4 G/DL (ref 12–17)
IMM GRANULOCYTES # BLD AUTO: 0.03 THOUSAND/UL (ref 0–0.2)
IMM GRANULOCYTES NFR BLD AUTO: 1 % (ref 0–2)
LDLC SERPL DIRECT ASSAY-MCNC: 94 MG/DL (ref 0–100)
LYMPHOCYTES # BLD AUTO: 1.19 THOUSANDS/ΜL (ref 0.6–4.47)
LYMPHOCYTES NFR BLD AUTO: 21 % (ref 14–44)
MCH RBC QN AUTO: 33.7 PG (ref 26.8–34.3)
MCHC RBC AUTO-ENTMCNC: 34.9 G/DL (ref 31.4–37.4)
MCV RBC AUTO: 97 FL (ref 82–98)
MICROALBUMIN UR-MCNC: 104 MG/L (ref 0–20)
MICROALBUMIN/CREAT 24H UR: 177 MG/G CREATININE (ref 0–30)
MONOCYTES # BLD AUTO: 0.53 THOUSAND/ΜL (ref 0.17–1.22)
MONOCYTES NFR BLD AUTO: 9 % (ref 4–12)
NEUTROPHILS # BLD AUTO: 3.66 THOUSANDS/ΜL (ref 1.85–7.62)
NEUTS SEG NFR BLD AUTO: 62 % (ref 43–75)
NRBC BLD AUTO-RTO: 0 /100 WBCS
PLATELET # BLD AUTO: 158 THOUSANDS/UL (ref 149–390)
PMV BLD AUTO: 10 FL (ref 8.9–12.7)
POTASSIUM SERPL-SCNC: 3.9 MMOL/L (ref 3.5–5.3)
PROT SERPL-MCNC: 7.8 G/DL (ref 6.4–8.2)
RBC # BLD AUTO: 4.57 MILLION/UL (ref 3.88–5.62)
SODIUM SERPL-SCNC: 138 MMOL/L (ref 136–145)
TRIGL SERPL-MCNC: 403 MG/DL
WBC # BLD AUTO: 5.81 THOUSAND/UL (ref 4.31–10.16)

## 2020-12-14 PROCEDURE — 36415 COLL VENOUS BLD VENIPUNCTURE: CPT

## 2020-12-14 PROCEDURE — 82043 UR ALBUMIN QUANTITATIVE: CPT | Performed by: INTERNAL MEDICINE

## 2020-12-14 PROCEDURE — 80061 LIPID PANEL: CPT

## 2020-12-14 PROCEDURE — 85025 COMPLETE CBC W/AUTO DIFF WBC: CPT

## 2020-12-14 PROCEDURE — 87389 HIV-1 AG W/HIV-1&-2 AB AG IA: CPT

## 2020-12-14 PROCEDURE — 83721 ASSAY OF BLOOD LIPOPROTEIN: CPT

## 2020-12-14 PROCEDURE — 82570 ASSAY OF URINE CREATININE: CPT | Performed by: INTERNAL MEDICINE

## 2020-12-14 PROCEDURE — 80053 COMPREHEN METABOLIC PANEL: CPT

## 2020-12-15 LAB — HIV 1+2 AB+HIV1 P24 AG SERPL QL IA: NORMAL

## 2020-12-23 ENCOUNTER — OFFICE VISIT (OUTPATIENT)
Dept: CARDIOLOGY CLINIC | Facility: CLINIC | Age: 85
End: 2020-12-23
Payer: MEDICARE

## 2020-12-23 VITALS
HEIGHT: 68 IN | HEART RATE: 68 BPM | SYSTOLIC BLOOD PRESSURE: 140 MMHG | DIASTOLIC BLOOD PRESSURE: 80 MMHG | WEIGHT: 190 LBS | BODY MASS INDEX: 28.79 KG/M2

## 2020-12-23 DIAGNOSIS — I10 ESSENTIAL HYPERTENSION: ICD-10-CM

## 2020-12-23 DIAGNOSIS — I48.0 PAROXYSMAL ATRIAL FIBRILLATION (HCC): ICD-10-CM

## 2020-12-23 DIAGNOSIS — J84.10 PULMONARY FIBROSIS (HCC): Primary | ICD-10-CM

## 2020-12-23 DIAGNOSIS — I25.10 CORONARY ARTERY DISEASE WITHOUT ANGINA PECTORIS, UNSPECIFIED VESSEL OR LESION TYPE, UNSPECIFIED WHETHER NATIVE OR TRANSPLANTED HEART: ICD-10-CM

## 2020-12-23 PROCEDURE — 99214 OFFICE O/P EST MOD 30 MIN: CPT | Performed by: INTERNAL MEDICINE

## 2020-12-23 RX ORDER — CARVEDILOL 6.25 MG/1
6.25 TABLET ORAL 2 TIMES DAILY WITH MEALS
Qty: 60 TABLET | Refills: 5 | Status: SHIPPED | OUTPATIENT
Start: 2020-12-23 | End: 2021-03-31 | Stop reason: SDUPTHER

## 2020-12-24 DIAGNOSIS — E03.9 HYPOTHYROIDISM, UNSPECIFIED TYPE: Primary | ICD-10-CM

## 2020-12-24 DIAGNOSIS — E78.2 MIXED HYPERLIPIDEMIA: ICD-10-CM

## 2020-12-24 RX ORDER — ICOSAPENT ETHYL 1000 MG/1
2 CAPSULE ORAL 2 TIMES DAILY
Qty: 30 CAPSULE | Refills: 5 | Status: SHIPPED | OUTPATIENT
Start: 2020-12-24 | End: 2022-04-25

## 2021-01-06 ENCOUNTER — TELEPHONE (OUTPATIENT)
Dept: CARDIOLOGY CLINIC | Facility: CLINIC | Age: 86
End: 2021-01-06

## 2021-01-06 NOTE — TELEPHONE ENCOUNTER
Nickie went to pick ko vascepa and it is now 100 bucks for a week supply! So Nickie is saying if she can have a script to see if ko's triglycerides went down  And if they went down, "then will buy over the counter fish oil- omega 3 1200mg" since they cannot afford vascepa

## 2021-01-06 NOTE — TELEPHONE ENCOUNTER
I spoke to Nickie, she wants to know when he should check it then, but he won't be continuing it since its too expensive and if you agree with him taking omega fish OTC 1200mg and how he should take-- 2 in the AM, 2 PM? Whatever you think she said

## 2021-01-14 ENCOUNTER — LAB (OUTPATIENT)
Dept: LAB | Facility: CLINIC | Age: 86
End: 2021-01-14
Payer: MEDICARE

## 2021-01-14 ENCOUNTER — TRANSCRIBE ORDERS (OUTPATIENT)
Dept: LAB | Facility: CLINIC | Age: 86
End: 2021-01-14

## 2021-01-14 DIAGNOSIS — E03.9 MYXEDEMA HEART DISEASE: ICD-10-CM

## 2021-01-14 DIAGNOSIS — E11.9 TYPE 2 DIABETES MELLITUS WITHOUT COMPLICATION, WITH LONG-TERM CURRENT USE OF INSULIN (HCC): ICD-10-CM

## 2021-01-14 DIAGNOSIS — Z79.4 TYPE 2 DIABETES MELLITUS WITHOUT COMPLICATION, WITH LONG-TERM CURRENT USE OF INSULIN (HCC): ICD-10-CM

## 2021-01-14 DIAGNOSIS — E13.10 TYPE II OR UNSPECIFIED TYPE DIABETES MELLITUS WITH KETOACIDOSIS, UNCONTROLLED(250.12) (HCC): Primary | ICD-10-CM

## 2021-01-14 DIAGNOSIS — I51.9 MYXEDEMA HEART DISEASE: ICD-10-CM

## 2021-01-14 LAB
CREAT SERPL-MCNC: 1.6 MG/DL (ref 0.6–1.3)
EST. AVERAGE GLUCOSE BLD GHB EST-MCNC: 160 MG/DL
GFR SERPL CREATININE-BSD FRML MDRD: 38 ML/MIN/1.73SQ M
HBA1C MFR BLD: 7.2 %

## 2021-01-14 PROCEDURE — 82565 ASSAY OF CREATININE: CPT

## 2021-01-14 PROCEDURE — 83036 HEMOGLOBIN GLYCOSYLATED A1C: CPT

## 2021-01-14 PROCEDURE — 36415 COLL VENOUS BLD VENIPUNCTURE: CPT

## 2021-01-22 ENCOUNTER — IMMUNIZATIONS (OUTPATIENT)
Dept: FAMILY MEDICINE CLINIC | Facility: HOSPITAL | Age: 86
End: 2021-01-22

## 2021-01-22 DIAGNOSIS — Z23 ENCOUNTER FOR IMMUNIZATION: Primary | ICD-10-CM

## 2021-01-22 PROCEDURE — 0011A SARS-COV-2 / COVID-19 MRNA VACCINE (MODERNA) 100 MCG: CPT

## 2021-01-22 PROCEDURE — 91301 SARS-COV-2 / COVID-19 MRNA VACCINE (MODERNA) 100 MCG: CPT

## 2021-02-19 ENCOUNTER — IMMUNIZATIONS (OUTPATIENT)
Dept: FAMILY MEDICINE CLINIC | Facility: HOSPITAL | Age: 86
End: 2021-02-19

## 2021-02-19 DIAGNOSIS — Z23 ENCOUNTER FOR IMMUNIZATION: Primary | ICD-10-CM

## 2021-02-19 PROCEDURE — 0012A SARS-COV-2 / COVID-19 MRNA VACCINE (MODERNA) 100 MCG: CPT

## 2021-02-19 PROCEDURE — 91301 SARS-COV-2 / COVID-19 MRNA VACCINE (MODERNA) 100 MCG: CPT

## 2021-03-17 ENCOUNTER — APPOINTMENT (OUTPATIENT)
Dept: LAB | Facility: CLINIC | Age: 86
End: 2021-03-17
Payer: MEDICARE

## 2021-03-17 ENCOUNTER — TRANSCRIBE ORDERS (OUTPATIENT)
Dept: LAB | Facility: CLINIC | Age: 86
End: 2021-03-17

## 2021-03-17 DIAGNOSIS — E03.2 HYPOTHYROIDISM DUE TO MEDICATION: Primary | ICD-10-CM

## 2021-03-17 LAB — TSH SERPL DL<=0.05 MIU/L-ACNC: 3.51 UIU/ML (ref 0.36–3.74)

## 2021-03-17 PROCEDURE — 36415 COLL VENOUS BLD VENIPUNCTURE: CPT

## 2021-03-17 PROCEDURE — 84443 ASSAY THYROID STIM HORMONE: CPT

## 2021-03-31 ENCOUNTER — OFFICE VISIT (OUTPATIENT)
Dept: CARDIOLOGY CLINIC | Facility: CLINIC | Age: 86
End: 2021-03-31
Payer: MEDICARE

## 2021-03-31 VITALS
SYSTOLIC BLOOD PRESSURE: 112 MMHG | DIASTOLIC BLOOD PRESSURE: 80 MMHG | WEIGHT: 190 LBS | BODY MASS INDEX: 28.79 KG/M2 | HEIGHT: 68 IN

## 2021-03-31 DIAGNOSIS — Z95.1 HX OF CABG: ICD-10-CM

## 2021-03-31 DIAGNOSIS — I48.0 PAROXYSMAL ATRIAL FIBRILLATION (HCC): ICD-10-CM

## 2021-03-31 DIAGNOSIS — I25.10 CORONARY ARTERY DISEASE WITHOUT ANGINA PECTORIS, UNSPECIFIED VESSEL OR LESION TYPE, UNSPECIFIED WHETHER NATIVE OR TRANSPLANTED HEART: Primary | ICD-10-CM

## 2021-03-31 DIAGNOSIS — I10 ESSENTIAL HYPERTENSION: ICD-10-CM

## 2021-03-31 DIAGNOSIS — E78.5 DYSLIPIDEMIA: ICD-10-CM

## 2021-03-31 PROCEDURE — 99214 OFFICE O/P EST MOD 30 MIN: CPT | Performed by: INTERNAL MEDICINE

## 2021-03-31 RX ORDER — CARVEDILOL 6.25 MG/1
6.25 TABLET ORAL 2 TIMES DAILY WITH MEALS
Qty: 180 TABLET | Refills: 3 | Status: SHIPPED | OUTPATIENT
Start: 2021-03-31 | End: 2022-01-03

## 2021-03-31 RX ORDER — POTASSIUM CHLORIDE 750 MG/1
CAPSULE, EXTENDED RELEASE ORAL
Qty: 270 CAPSULE | Refills: 3 | Status: SHIPPED | OUTPATIENT
Start: 2021-03-31 | End: 2022-01-03

## 2021-03-31 RX ORDER — DULAGLUTIDE 1.5 MG/.5ML
1.5 INJECTION, SOLUTION SUBCUTANEOUS WEEKLY
COMMUNITY
Start: 2021-03-23 | End: 2021-11-23 | Stop reason: DRUGHIGH

## 2021-03-31 RX ORDER — ISOSORBIDE MONONITRATE 30 MG/1
30 TABLET, EXTENDED RELEASE ORAL 2 TIMES DAILY
Qty: 180 TABLET | Refills: 3 | Status: SHIPPED | OUTPATIENT
Start: 2021-03-31 | End: 2022-01-03

## 2021-03-31 NOTE — PROGRESS NOTES
Subjective:        Patient ID: Liane Martinez is a 80 y o  male  Chief Complaint:  Yenni Howe is here for routine follow-up, pulmonary note appreciated, he is now off amiodarone with some pulmonary fibrosis noted on scanning  He has no specific complaints period his arthritis limits his activity not any chest pain shortness of breath or concerning palpitations  No falls or bruising or excess bleeding  The following portions of the patient's history were reviewed and updated as appropriate: allergies, current medications, past family history, past medical history, past social history, past surgical history and problem list   Review of Systems   Constitution: Negative for chills, diaphoresis, malaise/fatigue and weight gain  HENT: Negative for nosebleeds and stridor  Eyes: Negative for double vision, vision loss in left eye, vision loss in right eye and visual disturbance  Cardiovascular: Negative for chest pain, claudication, cyanosis, dyspnea on exertion, irregular heartbeat, leg swelling, near-syncope, orthopnea, palpitations, paroxysmal nocturnal dyspnea and syncope  Respiratory: Negative for cough, shortness of breath, snoring and wheezing  Endocrine: Negative for polydipsia, polyphagia and polyuria  Hematologic/Lymphatic: Negative for bleeding problem  Does not bruise/bleed easily  Skin: Negative for flushing and rash  Musculoskeletal: Positive for arthritis and stiffness  Negative for falls and myalgias  Gastrointestinal: Negative for abdominal pain, heartburn, hematemesis, hematochezia, melena and nausea  Genitourinary: Negative for hematuria  Neurological: Negative for brief paralysis, dizziness, focal weakness, headaches, light-headedness, loss of balance and vertigo  Psychiatric/Behavioral: Negative for altered mental status and substance abuse  Allergic/Immunologic: Negative for hives            Objective:      /80   Ht 5' 8" (1 727 m)   Wt 86 2 kg (190 lb) BMI 28 89 kg/m²   Physical Exam   Constitutional: He is oriented to person, place, and time  He appears well-developed and well-nourished  No distress  HENT:   Head: Normocephalic and atraumatic  Eyes: Pupils are equal, round, and reactive to light  EOM are normal  No scleral icterus  Neck: Normal range of motion  Neck supple  No JVD present  No thyromegaly present  Cardiovascular: Normal rate, regular rhythm and normal heart sounds  Exam reveals no gallop and no friction rub  No murmur heard  Pulmonary/Chest: Effort normal and breath sounds normal  No stridor  No respiratory distress  He has no wheezes  He has no rales  Abdominal: Soft  Bowel sounds are normal  He exhibits no distension and no mass  There is no abdominal tenderness  Musculoskeletal: Normal range of motion  General: No deformity or edema  Neurological: He is alert and oriented to person, place, and time  Coordination normal    Skin: Skin is warm and dry  No erythema  No pallor  Psychiatric: He has a normal mood and affect  His behavior is normal        Lab Review:   Transcribe Orders on 03/17/2021   Component Date Value    TSH 3RD [facility] 03/17/2021 3 510      No results found  Assessment:       1  Coronary artery disease without angina pectoris, unspecified vessel or lesion type, unspecified whether native or transplanted heart  carvedilol (COREG) 6 25 mg tablet    isosorbide mononitrate (IMDUR) 30 mg 24 hr tablet   2  Hx of CABG     3  Essential hypertension  carvedilol (COREG) 6 25 mg tablet   4  Paroxysmal atrial fibrillation (HCC)  potassium chloride (MICRO-K) 10 MEQ CR capsule   5  Dyslipidemia          Plan:       Bev Holliday is without any signs or symptoms reminiscent of angina heart failure nor electrical instability  Now on lower dose amlodipine, no edema present  Blood pressure stable  Now off of amiodarone following up with Pulmonary for some fibrosis  No alarming dyspnea      Tolerating statin therapy, lipids adequate  Clinically remains in sinus rhythm by examination  Will reserve low threshold to discuss full anticoagulation should AFib ever recur  I will see him back in about 6 months, please let me know if you need me to see him sooner  Asked him to call me with any concerning potential cardiac symptoms in the meantime

## 2021-03-31 NOTE — PATIENT INSTRUCTIONS
A-fib (Atrial Fibrillation)   AMBULATORY CARE:   Atrial fibrillation (a-fib)  is an irregular heartbeat  It reduces your heart's ability to pump blood through your body  A-fib may come and go, or it may be a long-term condition  A-fib can cause life-threatening blood clots, stroke, or heart failure  It is important to treat and manage a-fib to help prevent these problems  Common signs and symptoms include the following:   · A heartbeat that races, pounds, or flutters    · Weakness, severe tiredness, or confusion    · Feeling lightheaded, sweaty, dizzy, or faint    · Shortness of breath or anxiety    · Chest pain or pressure    Call your local emergency number (911 in the [address] [facility] [facility] [address]) if:   · You have any of the following signs of a heart attack:      ? Squeezing, pressure, or pain in your chest    ? You may  also have any of the following:     ? Discomfort or pain in your back, neck, jaw, stomach, or arm    ? Shortness of breath    ? Nausea or vomiting    ? Lightheadedness or a sudden cold sweat    · You have any of the following signs of a stroke:      ? Numbness or drooping on one side of your face     ? Weakness in an arm or leg    ? Confusion or difficulty speaking    ? Dizziness, a severe headache, or vision loss    Call your cardiologist if:   · Your arm or leg feels warm, tender, and painful  It may look swollen and red  · Your heart rate is more than 110 beats per minute  · You have new or worsening swelling in your legs, feet, ankles, or abdomen  · You are short of breath, even at rest      · You have questions or concerns about your condition or care  Treatment for A-fib:  Conditions that cause a-fib, such as thyroid disease, will be treated  You may also need any of the following:  · Heart medicines  help control your heart rate or rhythm  You may need more than one medicine to treat your symptoms  · Antiplatelet or blood thinner medicines  help prevent blood clots and stroke  · Cardioversion  is a procedure to return your heart rate and rhythm to normal  It can be done using medicines or electric shock  · A-fib ablation  is a procedure that uses energy to burn a small area of heart tissue  This creates scar tissue and prevents electrical signals that cause a-fib  You may need this procedure more than once  Ask for more information on a-fib ablation  · A pacemaker  may be inserted into your heart  A pacemaker is a device that controls your heartbeat  A pacemaker may be inserted during an ablation procedure or surgery  Ask your healthcare provider for more information on pacemakers  · Surgery  may be needed if other procedures do not work  During surgery your healthcare provider will make cuts in the upper part of your heart  The provider will stitch the cuts together to create scar tissue  The scar tissue will prevent electrical signals that cause a-fib  Manage A-fib:   · Know your target heart rate  Learn how to check your pulse and monitor your heart rate  · Know the risks if you choose to drink alcohol  Alcohol can make a-fib hard to manage  Ask your healthcare provider if it is safe for you to drink alcohol  A drink of alcohol is 12 ounces of beer, 5 ounces of wine, or 1½ ounces of liquor  · Do not smoke  Nicotine can cause heart damage and make it more difficult to manage your a-fib  Do not use e-cigarettes or smokeless tobacco in place of cigarettes or to help you quit  They still contain nicotine  Ask your healthcare provider for information if you currently smoke and need help quitting  · Eat heart-healthy foods  Heart healthy foods will help keep your cholesterol low  These include fruits, vegetables, whole-grain breads, low-fat dairy products, beans, lean meats, and fish  Replace butter and margarine with heart-healthy oils such as olive oil and canola oil  · Maintain a healthy weight  Ask your healthcare provider how much you should weigh  Ask him or her to help you create a safe weight loss plan if you are overweight  Even a small goal of a 10% weight loss can improve your heart health  · Get regular physical activity  Physical activity helps improve your heart health  Get at least 150 minutes of moderate aerobic physical activity each week  Your healthcare provider can help you create an activity plan  · Manage other health conditions  This includes high blood pressure or cholesterol, sleep apnea, diabetes, and other heart conditions  Take medicine as directed and follow your treatment plan  Follow up with your cardiologist as directed: You will need regular blood tests and monitoring  Write down your questions so you remember to ask them during your visits  © Copyright [address] Information is for End User's use only and may not be sold, redistributed or otherwise used for commercial purposes  All illustrations and images included in CareNotes® are the copyrighted property of [employer]  or [facility]   The above information is an  only  It is not intended as medical advice for individual conditions or treatments  Talk to your doctor, nurse or pharmacist before following any medical regimen to see if it is safe and effective for you

## 2021-04-14 ENCOUNTER — OFFICE VISIT (OUTPATIENT)
Dept: INTERNAL MEDICINE CLINIC | Facility: CLINIC | Age: 86
End: 2021-04-14
Payer: MEDICARE

## 2021-04-14 VITALS
TEMPERATURE: 97.3 F | OXYGEN SATURATION: 98 % | HEIGHT: 68 IN | DIASTOLIC BLOOD PRESSURE: 62 MMHG | SYSTOLIC BLOOD PRESSURE: 130 MMHG | RESPIRATION RATE: 14 BRPM | BODY MASS INDEX: 28.98 KG/M2 | WEIGHT: 191.2 LBS | HEART RATE: 65 BPM

## 2021-04-14 DIAGNOSIS — Z00.00 MEDICARE ANNUAL WELLNESS VISIT, SUBSEQUENT: ICD-10-CM

## 2021-04-14 DIAGNOSIS — N18.32 STAGE 3B CHRONIC KIDNEY DISEASE (HCC): ICD-10-CM

## 2021-04-14 DIAGNOSIS — Z01.00 DIABETIC EYE EXAM (HCC): ICD-10-CM

## 2021-04-14 DIAGNOSIS — I25.10 CORONARY ARTERY DISEASE WITHOUT ANGINA PECTORIS, UNSPECIFIED VESSEL OR LESION TYPE, UNSPECIFIED WHETHER NATIVE OR TRANSPLANTED HEART: ICD-10-CM

## 2021-04-14 DIAGNOSIS — E11.8 TYPE 2 DIABETES MELLITUS WITH COMPLICATION, WITH LONG-TERM CURRENT USE OF INSULIN (HCC): Primary | ICD-10-CM

## 2021-04-14 DIAGNOSIS — E11.9 DIABETIC EYE EXAM (HCC): ICD-10-CM

## 2021-04-14 DIAGNOSIS — Z79.4 TYPE 2 DIABETES MELLITUS WITH COMPLICATION, WITH LONG-TERM CURRENT USE OF INSULIN (HCC): Primary | ICD-10-CM

## 2021-04-14 DIAGNOSIS — I10 ESSENTIAL HYPERTENSION: ICD-10-CM

## 2021-04-14 DIAGNOSIS — E03.9 ACQUIRED HYPOTHYROIDISM: ICD-10-CM

## 2021-04-14 DIAGNOSIS — I48.0 PAROXYSMAL ATRIAL FIBRILLATION (HCC): ICD-10-CM

## 2021-04-14 LAB — SL AMB POCT HEMOGLOBIN AIC: 6.8 (ref ?–6.5)

## 2021-04-14 PROCEDURE — 1123F ACP DISCUSS/DSCN MKR DOCD: CPT | Performed by: INTERNAL MEDICINE

## 2021-04-14 PROCEDURE — 83036 HEMOGLOBIN GLYCOSYLATED A1C: CPT | Performed by: INTERNAL MEDICINE

## 2021-04-14 PROCEDURE — G0439 PPPS, SUBSEQ VISIT: HCPCS | Performed by: INTERNAL MEDICINE

## 2021-04-14 PROCEDURE — 99214 OFFICE O/P EST MOD 30 MIN: CPT | Performed by: INTERNAL MEDICINE

## 2021-04-14 NOTE — PROGRESS NOTES
Assessment/Plan:  The patient states that there are no concerns at this time  The patient A1c is good with the addition of Trulicity  He notes no chest pain    Problem List Items Addressed This Visit        Endocrine    Type 2 diabetes mellitus with complication, with long-term current use of insulin (HCC) - Primary    Relevant Orders    POCT hemoglobin A1c (Completed)    Acquired hypothyroidism       Cardiovascular and Mediastinum    Paroxysmal atrial fibrillation (HCC)       Genitourinary    Stage 3b chronic kidney disease      Other Visit Diagnoses     Diabetic eye exam ([facility] )        Relevant Orders    Ambulatory referral to Ophthalmology           Diagnoses and all orders for this visit:    Type 2 diabetes mellitus with complication, with long-term current use of insulin ([facility] )  -     POCT hemoglobin A1c    Diabetic eye exam ([facility] )  -     Ambulatory referral to Ophthalmology    Acquired hypothyroidism    Paroxysmal atrial fibrillation ([facility] )    Stage 3b chronic kidney disease        No problem-specific Assessment & Plan notes found for this encounter  Subjective:      Patient ID: Vee Laboy is a 80 y o  male  The patient DM is well controlled and there are no issues with the addition of Trulicity, Novolog to Glargine  In addition, his TSH was good in March  He is doing well on the small dose of Atorvastatin, ASA with Coreg  He notes no anginal pain with the Ismo  He is taking Norvasc and furosemide for his BP  The following portions of the patient's history were reviewed and updated as appropriate:   He has a past medical history of Benign hypertension, Chronic kidney disease (CKD), stage III (moderate), Coronary artery disease, Hypothyroidism, Ischemic cardiomyopathy, Mixed hyperlipidemia, Paroxysmal atrial fibrillation, PVD (peripheral vascular disease), and Type II diabetes mellitus  ,  does not have any pertinent problems on file  ,   has a past surgical history that includes Cardiac catheterization and Coronary artery bypass graft  ,  family history includes No Known Problems in his brother, father, maternal aunt, maternal grandfather, maternal grandmother, maternal uncle, mother, paternal aunt, paternal grandfather, paternal grandmother, paternal uncle, and sister  ,   reports that he has never smoked  He has never used smokeless tobacco  No history on file for alcohol and drug ,  has No Known Allergies     Current Outpatient Medications   Medication Sig Dispense Refill    ACCU-CHEK COMPACT PLUS test strip USE 3 TIMES DAILY  DX: E11 29  3    ACCU-CHEK SOFTCLIX LANCETS lancets USE AND DISCARD 1 LANCET 3 TIMES DAILY  DX: E11 29  3    amLODIPine (NORVASC) 2 5 mg tablet Take 1 tablet (2 5 mg total) by mouth daily 90 tablet 3    aspirin 81 MG tablet Take 81 mg by mouth daily      atorvastatin (LIPITOR) 10 mg tablet Take 1 tablet (10 mg total) by mouth daily 90 tablet 3    BD PEN NEEDLE CARMINA U/F 32G X 4 MM MISC USE 4 PEN NEEDLE DAILY AS DIRECTED  3    carvedilol (COREG) 6 25 mg tablet Take 1 tablet (6 25 mg total) by mouth 2 (two) times a day with meals 180 tablet 3    furosemide (LASIX) 20 mg tablet Take 1 tablet (20 mg total) by mouth daily 90 tablet 3    Icosapent Ethyl (Vascepa) 1 g CAPS Take 2 capsules (2 g total) by mouth 2 (two) times a day 30 capsule 5    Insulin Glargine (LANTUS SOLOSTAR SC) Inject 5 Units under the skin daily       isosorbide mononitrate (IMDUR) 30 mg 24 hr tablet Take 1 tablet (30 mg total) by mouth 2 (two) times a day 180 tablet 3    levothyroxine 75 mcg tablet Take 1 tablet (75 mcg total) by mouth daily (Patient taking differently: Take 75 mcg by mouth Take 1 tablet daily except Sunday) 90 tablet 3    nitroglycerin (NITROSTAT) 0 4 mg SL tablet Place 1 tablet (0 4 mg total) under the tongue every 5 (five) minutes as needed for chest pain 25 tablet 5    potassium chloride (MICRO-K) 10 MEQ CR capsule Take 2 tablets in the morning and 1 tablet in the evening   [address] capsule 3    Trulicity 1 5 VX/5 8ZQ SOPN Inject 1 5 mg under the skin once a week       insulin aspart (NovoLOG) 100 units/mL injection Inject under the skin 3 (three) times a day before meals       No current facility-administered medications for this visit  Review of Systems   Constitutional: Negative for chills, fatigue and fever  HENT: Negative  Respiratory: Negative for cough, chest tightness and shortness of breath  Cardiovascular: Negative for chest pain and palpitations  Gastrointestinal: Negative for abdominal pain, constipation, diarrhea, nausea and vomiting  Genitourinary: Negative  Musculoskeletal: Negative for back pain and myalgias  Skin: Negative  Neurological: Negative  Psychiatric/Behavioral: Negative for dysphoric mood  The patient is not nervous/anxious  Objective:  Vitals:    04/14/21 0828 04/14/21 0845   BP: 142/76 130/62   BP Location: Left arm    Patient Position: Sitting    Cuff Size: Large    Pulse: 65    Resp: 14    Temp: (!) 97 3 °F (36 3 °C)    SpO2: 98%    Weight: 86 7 kg (191 lb 3 2 oz)    Height: 5' 8" (1 727 m)      Body mass index is 29 07 kg/m²  Physical Exam  Vitals signs and nursing note reviewed  Constitutional:       Appearance: He is well-developed  HENT:      Head: Normocephalic and atraumatic  Eyes:      Pupils: Pupils are equal, round, and reactive to light  Neck:      Musculoskeletal: Normal range of motion and neck supple  Cardiovascular:      Rate and Rhythm: Normal rate and regular rhythm  Heart sounds: Normal heart sounds  No murmur  Pulmonary:      Effort: Pulmonary effort is normal       Breath sounds: Normal breath sounds  No stridor  No rales  Abdominal:      General: Bowel sounds are normal  There is no distension  Palpations: Abdomen is soft  Tenderness: There is no abdominal tenderness  Musculoskeletal: Normal range of motion  General: No deformity     Skin:     General: Skin is warm and dry  Neurological:      Mental Status: He is alert and oriented to person, place, and time            PHQ-9 Depression Screening    PHQ-9:   Frequency of the following problems over the past two weeks:      Little interest or pleasure in doing things: 0 - not at all  Feeling down, depressed, or hopeless: 0 - not at all  PHQ-2 Score: 0

## 2021-04-14 NOTE — PROGRESS NOTES
Diabetic Foot Exam    Patient's shoes and socks removed  Right Foot/Ankle   Right Foot Inspection  Skin Exam: skin normal and skin intact no dry skin, no warmth, no callus, no erythema, no maceration, no abnormal color, no pre-ulcer, no ulcer and no callus                          Toe Exam: ROM and strength within normal limits no right toe deformity  Sensory       Monofilament testing: intact  Vascular  Capillary refills: < 3 seconds  The right DP pulse is 1+  The right PT pulse is 1+  Left Foot/Ankle  Left Foot Inspection  Skin Exam: skin normal and skin intactno dry skin, no warmth, no erythema, no maceration, normal color, no pre-ulcer, no ulcer and no callus                         Toe Exam: ROM and strength within normal limits and left toe deformity                   Sensory       Monofilament: intact  Vascular  Capillary refills: < 3 seconds  The left DP pulse is 1+  The left PT pulse is 1+  Assign Risk Category:  Deformity present;  No loss of protective sensation; Weak pulses       Risk: 2

## 2021-04-14 NOTE — PATIENT INSTRUCTIONS

## 2021-04-14 NOTE — PROGRESS NOTES
Assessment and Plan:     Problem List Items Addressed This Visit        Endocrine    Type 2 diabetes mellitus with complication, with long-term current use of insulin ([address] ) - Primary    Relevant Orders    POCT hemoglobin A1c      Other Visit Diagnoses     Diabetic eye exam [facility])        Relevant Orders    Ambulatory referral to Ophthalmology        BMI Counseling: Body mass index is 29 07 kg/m²  The BMI is above normal  Exercise recommendations include exercising 3-5 times per week  No pharmacotherapy was ordered  Preventive health issues were discussed with patient, and age appropriate screening tests were ordered as noted in patient's After Visit Summary  Personalized health advice and appropriate referrals for health education or preventive services given if needed, as noted in patient's After Visit Summary       History of Present Illness:     Patient presents for Medicare Annual Wellness visit    Patient Care Team:  Klever Balderas DO as PCP - General (Internal Medicine)     Problem List:     Patient Active Problem List   Diagnosis    Paroxysmal atrial fibrillation ([address] )    Type 2 diabetes mellitus with complication, with long-term current use of insulin ([address] )    Bradycardia    Acquired hypothyroidism    Stage 3b chronic kidney disease    Coronary artery disease of native artery of native heart with stable angina pectoris ([address] )    Essential hypertension    Low back pain    Mixed hyperlipidemia    Rupture of tendon of biceps, long head    Spinal stenosis of lumbar region with neurogenic claudication    Stented coronary artery    Lightheadedness    PAD (peripheral artery disease) ([address] )    Atherosclerosis of artery of extremity with ulceration ([address] )    Peripheral arterial disease ([address] )    Pulmonary fibrosis ([address] )    Basal cell carcinoma of skin of nose    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    Platelets decreased ([address] )      Past Medical and Surgical History:     Past Medical History:   Diagnosis Date    Benign hypertension     Chronic kidney disease (CKD), stage III (moderate)     Coronary artery disease     Hypothyroidism     Ischemic cardiomyopathy     Mixed hyperlipidemia     Paroxysmal atrial fibrillation     PVD (peripheral vascular disease)     Type II diabetes mellitus      Past Surgical History:   Procedure Laterality Date    CARDIAC CATHETERIZATION      CORONARY ARTERY BYPASS GRAFT        Family History:     Family History   Problem Relation Age of Onset    No Known Problems Mother     No Known Problems Father     No Known Problems Sister     No Known Problems Brother     No Known Problems Maternal Aunt     No Known Problems Maternal Uncle     No Known Problems Paternal Aunt     No Known Problems Paternal Uncle     No Known Problems Maternal Grandmother     No Known Problems Maternal Grandfather     No Known Problems Paternal Grandmother     No Known Problems Paternal Grandfather     Anemia Neg Hx     Arrhythmia Neg Hx     Asthma Neg Hx     Clotting disorder Neg Hx     Fainting Neg Hx     Heart attack Neg Hx     Heart disease Neg Hx     Heart failure Neg Hx     Hyperlipidemia Neg Hx     Hypertension Neg Hx       Social History:     E-Cigarette/Vaping    E-Cigarette Use Never User      E-Cigarette/Vaping Substances    Nicotine No     THC No     CBD No     Flavoring No     Other No     Unknown No      Social History     Socioeconomic History    Marital status: /Civil Union     Spouse name: None    Number of children: None    Years of education: None    Highest education level: None   Occupational History    Occupation: RETIRED   Social Needs    Financial resource strain: None    Food insecurity     Worry: None     Inability: None    Transportation needs     Medical: None     Non-medical: None   Tobacco Use    Smoking status: Never Smoker    Smokeless tobacco: Never Used   Substance and Sexual Activity    Alcohol use: None    Drug use: None    Sexual activity: None   Lifestyle    Physical activity     Days per week: None     Minutes per session: None    Stress: None   Relationships    Social connections     Talks on phone: None     Gets together: None     Attends [employer] service: None     Active member of club or organization: None     Attends meetings of clubs or organizations: None     Relationship status: None    Intimate partner violence     Fear of current or ex partner: None     Emotionally abused: None     Physically abused: None     Forced sexual activity: None   Other Topics Concern    None   Social History Narrative        RETIRED      Medications and Allergies:     Current Outpatient Medications   Medication Sig Dispense Refill    ACCU-CHEK COMPACT PLUS test strip USE 3 TIMES DAILY  DX: E11 29  3    ACCU-CHEK SOFTCLIX LANCETS lancets USE AND DISCARD 1 LANCET 3 TIMES DAILY   DX: E11 29  3    amLODIPine (NORVASC) 2 5 mg tablet Take 1 tablet (2 5 mg total) by mouth daily 90 tablet 3    aspirin 81 MG tablet Take 81 mg by mouth daily      atorvastatin (LIPITOR) 10 mg tablet Take 1 tablet (10 mg total) by mouth daily 90 tablet 3    BD PEN NEEDLE CARMINA U/F 32G X 4 MM MISC USE 4 PEN NEEDLE DAILY AS DIRECTED  3    carvedilol (COREG) 6 25 mg tablet Take 1 tablet (6 25 mg total) by mouth 2 (two) times a day with meals 180 tablet 3    furosemide (LASIX) 20 mg tablet Take 1 tablet (20 mg total) by mouth daily 90 tablet 3    Icosapent Ethyl (Vascepa) 1 g CAPS Take 2 capsules (2 g total) by mouth 2 (two) times a day 30 capsule 5    Insulin Glargine (LANTUS SOLOSTAR SC) Inject 5 Units under the skin daily       isosorbide mononitrate (IMDUR) 30 mg 24 hr tablet Take 1 tablet (30 mg total) by mouth 2 (two) times a day 180 tablet 3    levothyroxine 75 mcg tablet Take 1 tablet (75 mcg total) by mouth daily (Patient taking differently: Take 75 mcg by mouth Take 1 tablet daily except Sunday) 90 tablet 3    nitroglycerin (NITROSTAT) 0 4 mg SL tablet Place 1 tablet (0 4 mg total) under the tongue every 5 (five) minutes as needed for chest pain 25 tablet 5    potassium chloride (MICRO-K) 10 MEQ CR capsule Take 2 tablets in the morning and 1 tablet in the evening  270 capsule 3    Trulicity 1 5 UA/5 5IM SOPN Inject 1 5 mg under the skin once a week       insulin aspart (NovoLOG) 100 units/mL injection Inject under the skin 3 (three) times a day before meals       No current facility-administered medications for this visit  No Known Allergies   Immunizations:     Immunization History   Administered Date(s) Administered    INFLUENZA 10/01/2012, 10/02/2015, 10/06/2017, 10/20/2019    Influenza Split High Dose Preservative Free IM 10/18/2018, 10/20/2019    Influenza, recombinant, quadrivalent,injectable, preservative free 10/12/2020    Pneumococcal Conjugate 13-Valent 10/06/2015    Pneumococcal Polysaccharide PPV23 09/15/2010    SARS-CoV-2 / COVID-19 mRNA IM ([address] [place]) 01/22/2021, 02/19/2021      Health Maintenance: There are no preventive care reminders to display for this patient  Topic Date Due    DTaP,Tdap,and Td Vaccines (1 - Tdap) Never done      Medicare Health Risk Assessment:     /76 (BP Location: Left arm, Patient Position: Sitting, Cuff Size: Large)   Pulse 65   Temp (!) 97 3 °F (36 3 °C)   Resp 14   Ht 5' 8" (1 727 m)   Wt 86 7 kg (191 lb 3 2 oz)   SpO2 98%   BMI 29 07 kg/m²      Miriam Brady is here for his Subsequent Wellness visit  Last Medicare Wellness visit information reviewed, patient interviewed and updates made to the record today  Health Risk Assessment:   Patient rates overall health as fair  Patient feels that their physical health rating is same  Patient is satisfied with their life  Eyesight was rated as same  Hearing was rated as same  Patient feels that their emotional and mental health rating is same  Patients states they are never, rarely angry   Patient states they are sometimes unusually tired/fatigued  Pain experienced in the last 7 days has been none  Patient states that he has experienced no weight loss or gain in last 6 months  Depression Screening:   PHQ-2 Score: 0      Fall Risk Screening: In the past year, patient has experienced: no history of falling in past year      Home Safety:  Patient does not have trouble with stairs inside or outside of their home  Patient has working smoke alarms and has no working carbon monoxide detector  Home safety hazards include: none  Nutrition:   Current diet is Regular  Medications:   Patient is currently taking over-the-counter supplements  OTC medications include: see medication list  Patient is not able to manage medications  SPOUSE HANDLES    Activities of Daily Living (ADLs)/Instrumental Activities of Daily Living (IADLs):   Walk and transfer into and out of bed and chair?: Yes  Dress and groom yourself?: Yes    Bathe or shower yourself?: Yes    Feed yourself? Yes  Do your laundry/housekeeping?: No  Manage your money, pay your bills and track your expenses?: No  Make your own meals?: Yes    Do your own shopping?: No    ADL comments: Needs help with socks    Previous Hospitalizations:   Any hospitalizations or ED visits within the last 12 months?: No      Advance Care Planning:   Living will: Yes    Durable POA for healthcare:  Yes    Advanced directive: Yes    Advanced directive counseling given: Yes    Five wishes given: No    Patient declined ACP directive: No    End of Life Decisions reviewed with patient: Yes    Provider agrees with end of life decisions: Yes      Cognitive Screening:   Provider or family/friend/caregiver concerned regarding cognition?: No    PREVENTIVE SCREENINGS      Cardiovascular Screening:    General: Screening Not Indicated and History Lipid Disorder      Diabetes Screening:     General: Screening Not Indicated and History Diabetes      Colorectal Cancer Screening:     General: Screening Not Indicated      Prostate Cancer Screening:    General: Screening Not Indicated      Osteoporosis Screening:    General: Screening Not Indicated      Abdominal Aortic Aneurysm (AAA) Screening:        General: Screening Not Indicated      Lung Cancer Screening:     General: Screening Not Indicated      Hepatitis C Screening:    General: Screening Not Indicated    Screening, Brief Intervention, and Referral to Treatment (SBIRT)    Screening  Typical number of drinks in a day: 0  Typical number of drinks in a week: 2  Interpretation: Low risk drinking behavior      Single Item Drug Screening:  How often have you used an illegal drug (including marijuana) or a prescription medication for non-medical reasons in the past year? never    Single Item Drug Screen Score: 0  Interpretation: Negative screen for possible drug use disorder      Rodolfo Castillo, DO

## 2021-04-15 RX ORDER — FUROSEMIDE 20 MG/1
TABLET ORAL
Qty: 90 TABLET | Refills: 3 | Status: SHIPPED | OUTPATIENT
Start: 2021-04-15 | End: 2022-02-03

## 2021-04-15 RX ORDER — AMLODIPINE BESYLATE 2.5 MG/1
TABLET ORAL
Qty: 180 TABLET | Refills: 3 | Status: SHIPPED | OUTPATIENT
Start: 2021-04-15 | End: 2022-02-03

## 2021-04-23 DIAGNOSIS — I10 ESSENTIAL HYPERTENSION: ICD-10-CM

## 2021-04-23 RX ORDER — ATORVASTATIN CALCIUM 10 MG/1
TABLET, FILM COATED ORAL
Qty: 90 TABLET | Refills: 3 | Status: SHIPPED | OUTPATIENT
Start: 2021-04-23 | End: 2022-02-03

## 2021-05-06 ENCOUNTER — TRANSCRIBE ORDERS (OUTPATIENT)
Dept: LAB | Facility: CLINIC | Age: 86
End: 2021-05-06

## 2021-05-06 ENCOUNTER — APPOINTMENT (OUTPATIENT)
Dept: LAB | Facility: CLINIC | Age: 86
End: 2021-05-06
Payer: MEDICARE

## 2021-05-06 DIAGNOSIS — E11.65 TYPE 2 DIABETES MELLITUS WITH HYPERGLYCEMIA, WITH LONG-TERM CURRENT USE OF INSULIN (HCC): Primary | ICD-10-CM

## 2021-05-06 DIAGNOSIS — Z79.4 TYPE 2 DIABETES MELLITUS WITH HYPERGLYCEMIA, WITH LONG-TERM CURRENT USE OF INSULIN (HCC): Primary | ICD-10-CM

## 2021-05-06 LAB
EST. AVERAGE GLUCOSE BLD GHB EST-MCNC: 143 MG/DL
HBA1C MFR BLD: 6.6 %

## 2021-05-06 PROCEDURE — 36415 COLL VENOUS BLD VENIPUNCTURE: CPT

## 2021-05-06 PROCEDURE — 83036 HEMOGLOBIN GLYCOSYLATED A1C: CPT

## 2021-05-24 DIAGNOSIS — I20.9 ANGINA PECTORIS (HCC): ICD-10-CM

## 2021-05-24 RX ORDER — NITROGLYCERIN 0.4 MG/1
0.4 TABLET SUBLINGUAL
Qty: 25 TABLET | Refills: 5 | Status: SHIPPED | OUTPATIENT
Start: 2021-05-24 | End: 2022-03-02 | Stop reason: SDUPTHER

## 2021-06-24 ENCOUNTER — TELEPHONE (OUTPATIENT)
Dept: ADMINISTRATIVE | Facility: OTHER | Age: 86
End: 2021-06-24

## 2021-06-24 NOTE — TELEPHONE ENCOUNTER
Upon review of the In Basket request and the patient's chart, initial outreach has been made via fax, please see Contacts section for details       Thank you  Parul Rolon

## 2021-06-24 NOTE — LETTER
Diabetic Eye Exam Form    Date Requested: 21  Patient: Jannette Daniel  Patient : 1933   Referring Provider: Ofelia Ball DO    Dilated Retinal Exam, Optomap-Iris Exam, or Fundus Photography Done         Yes ([place] one above)         No     Date of Diabetic Eye Exam ______________________________  Left Eye      Exam did show retinopathy    Exam did not show retinopathy         Mild       Moderate       None       Proliferative       Severe     Right Eye     Exam did show retinopathy    Exam did not show retinopathy         Mild       Moderate       None       Proliferative       Severe     Comments __________________________________________________________    Practice Providing Exam ______________________________________________    Exam Performed By (print name) _______________________________________      Provider Signature ___________________________________________________      These reports are needed for  compliance    Please fax this completed form and a copy of the Diabetic Eye Exam report to our office located at [place] [address] as soon as possible to [phone] kait Barrios: Phone [phone]    We thank you for your assistance in treating our mutual patient

## 2021-06-24 NOTE — TELEPHONE ENCOUNTER
----- Message from Joseph Terrazas sent at 6/23/2021  2:41 PM EDT -----  06/23/21 2:41 PM    Hello, our patient Debby Meehan has had Diabetic Eye Exam completed/performed  Please assist in updating the patient chart by making an External outreach to [facility] [place] [address] facility located in [place] The date of service is UNSURE      Thank you,  Yuliet Stone [facility]

## 2021-06-29 NOTE — TELEPHONE ENCOUNTER
Upon review of the In Basket request we were able to locate, review, and update the patient chart as requested for Diabetic Eye Exam     Any additional questions or concerns should be emailed to the Practice Liaisons via [email or web address] email, please do not reply via In Basket      Thank you  Kate Hillman

## 2021-06-29 NOTE — TELEPHONE ENCOUNTER
Upon review of the In Basket request and the patient's chart, initial outreach has been made via fax, please see Contacts section for details       Thank you  Kate Hillman

## 2021-08-16 ENCOUNTER — APPOINTMENT (OUTPATIENT)
Dept: LAB | Facility: CLINIC | Age: 86
End: 2021-08-16
Payer: MEDICARE

## 2021-10-08 ENCOUNTER — OFFICE VISIT (OUTPATIENT)
Dept: CARDIOLOGY CLINIC | Facility: CLINIC | Age: 86
End: 2021-10-08
Payer: MEDICARE

## 2021-10-08 VITALS
WEIGHT: 183 LBS | HEIGHT: 68 IN | SYSTOLIC BLOOD PRESSURE: 130 MMHG | DIASTOLIC BLOOD PRESSURE: 80 MMHG | HEART RATE: 82 BPM | BODY MASS INDEX: 27.74 KG/M2

## 2021-10-08 DIAGNOSIS — I10 ESSENTIAL HYPERTENSION: ICD-10-CM

## 2021-10-08 DIAGNOSIS — Z95.1 HX OF CABG: ICD-10-CM

## 2021-10-08 DIAGNOSIS — I48.0 PAROXYSMAL ATRIAL FIBRILLATION (HCC): Primary | ICD-10-CM

## 2021-10-08 DIAGNOSIS — E78.2 MIXED HYPERLIPIDEMIA: ICD-10-CM

## 2021-10-08 DIAGNOSIS — I25.10 CORONARY ARTERY DISEASE WITHOUT ANGINA PECTORIS, UNSPECIFIED VESSEL OR LESION TYPE, UNSPECIFIED WHETHER NATIVE OR TRANSPLANTED HEART: ICD-10-CM

## 2021-10-08 PROCEDURE — 93000 ELECTROCARDIOGRAM COMPLETE: CPT | Performed by: INTERNAL MEDICINE

## 2021-10-08 PROCEDURE — 99214 OFFICE O/P EST MOD 30 MIN: CPT | Performed by: INTERNAL MEDICINE

## 2021-10-12 ENCOUNTER — APPOINTMENT (OUTPATIENT)
Dept: LAB | Facility: CLINIC | Age: 86
End: 2021-10-12
Payer: MEDICARE

## 2021-10-12 LAB
ALBUMIN SERPL BCP-MCNC: 3.7 G/DL (ref 3.5–5)
ALP SERPL-CCNC: 78 U/L (ref 46–116)
ALT SERPL W P-5'-P-CCNC: 26 U/L (ref 12–78)
ANION GAP SERPL CALCULATED.3IONS-SCNC: 4 MMOL/L (ref 4–13)
AST SERPL W P-5'-P-CCNC: 18 U/L (ref 5–45)
BASOPHILS # BLD AUTO: 0.06 THOUSANDS/ΜL (ref 0–0.1)
BASOPHILS NFR BLD AUTO: 1 % (ref 0–1)
BILIRUB SERPL-MCNC: 0.87 MG/DL (ref 0.2–1)
BUN SERPL-MCNC: 18 MG/DL (ref 5–25)
CALCIUM SERPL-MCNC: 9.1 MG/DL (ref 8.3–10.1)
CHLORIDE SERPL-SCNC: 103 MMOL/L (ref 100–108)
CO2 SERPL-SCNC: 29 MMOL/L (ref 21–32)
CREAT SERPL-MCNC: 1.32 MG/DL (ref 0.6–1.3)
EOSINOPHIL # BLD AUTO: 0.27 THOUSAND/ΜL (ref 0–0.61)
EOSINOPHIL NFR BLD AUTO: 4 % (ref 0–6)
ERYTHROCYTE [DISTWIDTH] IN BLOOD BY AUTOMATED COUNT: 12.2 % (ref 11.6–15.1)
GFR SERPL CREATININE-BSD FRML MDRD: 48 ML/MIN/1.73SQ M
GLUCOSE P FAST SERPL-MCNC: 144 MG/DL (ref 65–99)
HCT VFR BLD AUTO: 43.9 % (ref 36.5–49.3)
HGB BLD-MCNC: 14.9 G/DL (ref 12–17)
IMM GRANULOCYTES # BLD AUTO: 0.02 THOUSAND/UL (ref 0–0.2)
IMM GRANULOCYTES NFR BLD AUTO: 0 % (ref 0–2)
LYMPHOCYTES # BLD AUTO: 1.11 THOUSANDS/ΜL (ref 0.6–4.47)
LYMPHOCYTES NFR BLD AUTO: 18 % (ref 14–44)
MCH RBC QN AUTO: 32.8 PG (ref 26.8–34.3)
MCHC RBC AUTO-ENTMCNC: 33.9 G/DL (ref 31.4–37.4)
MCV RBC AUTO: 97 FL (ref 82–98)
MONOCYTES # BLD AUTO: 0.59 THOUSAND/ΜL (ref 0.17–1.22)
MONOCYTES NFR BLD AUTO: 10 % (ref 4–12)
NEUTROPHILS # BLD AUTO: 4.19 THOUSANDS/ΜL (ref 1.85–7.62)
NEUTS SEG NFR BLD AUTO: 67 % (ref 43–75)
NRBC BLD AUTO-RTO: 0 /100 WBCS
PLATELET # BLD AUTO: 171 THOUSANDS/UL (ref 149–390)
PMV BLD AUTO: 10.4 FL (ref 8.9–12.7)
POTASSIUM SERPL-SCNC: 4.1 MMOL/L (ref 3.5–5.3)
PROT SERPL-MCNC: 7.7 G/DL (ref 6.4–8.2)
RBC # BLD AUTO: 4.54 MILLION/UL (ref 3.88–5.62)
SODIUM SERPL-SCNC: 136 MMOL/L (ref 136–145)
WBC # BLD AUTO: 6.24 THOUSAND/UL (ref 4.31–10.16)

## 2021-10-12 PROCEDURE — 80053 COMPREHEN METABOLIC PANEL: CPT | Performed by: INTERNAL MEDICINE

## 2021-10-12 PROCEDURE — 36415 COLL VENOUS BLD VENIPUNCTURE: CPT | Performed by: INTERNAL MEDICINE

## 2021-10-12 PROCEDURE — 85025 COMPLETE CBC W/AUTO DIFF WBC: CPT | Performed by: INTERNAL MEDICINE

## 2021-10-19 ENCOUNTER — OFFICE VISIT (OUTPATIENT)
Dept: INTERNAL MEDICINE CLINIC | Facility: CLINIC | Age: 86
End: 2021-10-19
Payer: MEDICARE

## 2021-10-19 VITALS
HEART RATE: 70 BPM | OXYGEN SATURATION: 98 % | DIASTOLIC BLOOD PRESSURE: 76 MMHG | RESPIRATION RATE: 14 BRPM | TEMPERATURE: 96.9 F | WEIGHT: 182.6 LBS | SYSTOLIC BLOOD PRESSURE: 134 MMHG | HEIGHT: 68 IN | BODY MASS INDEX: 27.68 KG/M2

## 2021-10-19 DIAGNOSIS — E11.8 TYPE 2 DIABETES MELLITUS WITH COMPLICATION, WITH LONG-TERM CURRENT USE OF INSULIN (HCC): ICD-10-CM

## 2021-10-19 DIAGNOSIS — J84.10 PULMONARY FIBROSIS (HCC): ICD-10-CM

## 2021-10-19 DIAGNOSIS — I73.9 PAD (PERIPHERAL ARTERY DISEASE) (HCC): ICD-10-CM

## 2021-10-19 DIAGNOSIS — R09.89 BRUIT OF RIGHT CAROTID ARTERY: ICD-10-CM

## 2021-10-19 DIAGNOSIS — E03.9 ACQUIRED HYPOTHYROIDISM: ICD-10-CM

## 2021-10-19 DIAGNOSIS — I10 ESSENTIAL HYPERTENSION: ICD-10-CM

## 2021-10-19 DIAGNOSIS — I48.0 PAROXYSMAL ATRIAL FIBRILLATION (HCC): ICD-10-CM

## 2021-10-19 DIAGNOSIS — Z79.4 TYPE 2 DIABETES MELLITUS WITH COMPLICATION, WITH LONG-TERM CURRENT USE OF INSULIN (HCC): ICD-10-CM

## 2021-10-19 DIAGNOSIS — M48.062 SPINAL STENOSIS OF LUMBAR REGION WITH NEUROGENIC CLAUDICATION: Primary | ICD-10-CM

## 2021-10-19 DIAGNOSIS — D69.6 PLATELETS DECREASED (HCC): ICD-10-CM

## 2021-10-19 PROCEDURE — 99214 OFFICE O/P EST MOD 30 MIN: CPT | Performed by: INTERNAL MEDICINE

## 2021-10-19 RX ORDER — TRAMADOL HYDROCHLORIDE 50 MG/1
50 TABLET ORAL EVERY 6 HOURS PRN
Qty: 60 TABLET | Refills: 0 | Status: SHIPPED | OUTPATIENT
Start: 2021-10-19 | End: 2021-11-18

## 2021-10-19 RX ORDER — CALCIUM CARBONATE/VITAMIN D3 600 MG-10
TABLET ORAL
COMMUNITY

## 2021-11-04 ENCOUNTER — HOSPITAL ENCOUNTER (OUTPATIENT)
Dept: NON INVASIVE DIAGNOSTICS | Facility: HOSPITAL | Age: 86
Discharge: HOME | End: 2021-11-04
Payer: MEDICARE

## 2021-11-04 DIAGNOSIS — R09.89 BRUIT OF RIGHT CAROTID ARTERY: ICD-10-CM

## 2021-11-04 PROCEDURE — 93880 EXTRACRANIAL BILAT STUDY: CPT

## 2021-11-04 PROCEDURE — 93880 EXTRACRANIAL BILAT STUDY: CPT | Performed by: SURGERY

## 2021-11-18 ENCOUNTER — TELEPHONE (OUTPATIENT)
Dept: NON INVASIVE DIAGNOSTICS | Facility: HOSPITAL | Age: 86
End: 2021-11-18

## 2021-11-23 ENCOUNTER — OFFICE VISIT (OUTPATIENT)
Dept: ENDOCRINOLOGY | Facility: CLINIC | Age: 86
End: 2021-11-23
Payer: MEDICARE

## 2021-11-23 VITALS
HEIGHT: 68 IN | DIASTOLIC BLOOD PRESSURE: 80 MMHG | RESPIRATION RATE: 20 BRPM | SYSTOLIC BLOOD PRESSURE: 126 MMHG | TEMPERATURE: 98.5 F | BODY MASS INDEX: 28.64 KG/M2 | HEART RATE: 73 BPM | WEIGHT: 189 LBS

## 2021-11-23 DIAGNOSIS — I10 ESSENTIAL HYPERTENSION: ICD-10-CM

## 2021-11-23 DIAGNOSIS — E03.9 ACQUIRED HYPOTHYROIDISM: ICD-10-CM

## 2021-11-23 DIAGNOSIS — E78.2 MIXED HYPERLIPIDEMIA: ICD-10-CM

## 2021-11-23 DIAGNOSIS — E11.8 TYPE 2 DIABETES MELLITUS WITH COMPLICATION, WITH LONG-TERM CURRENT USE OF INSULIN (HCC): Primary | ICD-10-CM

## 2021-11-23 DIAGNOSIS — Z79.4 TYPE 2 DIABETES MELLITUS WITH COMPLICATION, WITH LONG-TERM CURRENT USE OF INSULIN (HCC): Primary | ICD-10-CM

## 2021-11-23 LAB — SL AMB POCT HEMOGLOBIN AIC: 7.2 (ref ?–6.5)

## 2021-11-23 PROCEDURE — 99204 OFFICE O/P NEW MOD 45 MIN: CPT | Performed by: NURSE PRACTITIONER

## 2021-11-23 PROCEDURE — 83036 HEMOGLOBIN GLYCOSYLATED A1C: CPT | Performed by: NURSE PRACTITIONER

## 2021-11-23 RX ORDER — INSULIN GLARGINE 100 [IU]/ML
INJECTION, SOLUTION SUBCUTANEOUS
Qty: 15 ML | Refills: 1 | Status: SHIPPED | OUTPATIENT
Start: 2021-11-23 | End: 2021-11-29 | Stop reason: SDUPTHER

## 2021-11-23 RX ORDER — BLOOD SUGAR DIAGNOSTIC, DRUM
STRIP MISCELLANEOUS
Qty: 300 EACH | Refills: 3 | Status: SHIPPED | OUTPATIENT
Start: 2021-11-23 | End: 2021-11-26

## 2021-11-23 RX ORDER — DULAGLUTIDE 3 MG/.5ML
3 INJECTION, SOLUTION SUBCUTANEOUS WEEKLY
Qty: 6 ML | Refills: 1 | Status: SHIPPED | OUTPATIENT
Start: 2021-11-23 | End: 2022-03-23

## 2021-11-23 RX ORDER — LANCETS
EACH MISCELLANEOUS
Qty: 300 EACH | Refills: 3 | Status: SHIPPED | OUTPATIENT
Start: 2021-11-23 | End: 2022-04-04 | Stop reason: SDUPTHER

## 2021-11-24 ENCOUNTER — HOSPITAL ENCOUNTER (OUTPATIENT)
Dept: NON INVASIVE DIAGNOSTICS | Facility: HOSPITAL | Age: 86
Discharge: HOME | End: 2021-11-24
Payer: MEDICARE

## 2021-11-24 VITALS
DIASTOLIC BLOOD PRESSURE: 76 MMHG | WEIGHT: 182 LBS | HEART RATE: 60 BPM | BODY MASS INDEX: 27.58 KG/M2 | SYSTOLIC BLOOD PRESSURE: 134 MMHG | HEIGHT: 68 IN

## 2021-11-24 DIAGNOSIS — I25.10 CORONARY ARTERY DISEASE WITHOUT ANGINA PECTORIS, UNSPECIFIED VESSEL OR LESION TYPE, UNSPECIFIED WHETHER NATIVE OR TRANSPLANTED HEART: ICD-10-CM

## 2021-11-24 DIAGNOSIS — I48.0 PAROXYSMAL ATRIAL FIBRILLATION (HCC): ICD-10-CM

## 2021-11-24 PROCEDURE — 93306 TTE W/DOPPLER COMPLETE: CPT

## 2021-11-26 LAB
AORTIC ROOT: 3.2 CM
AORTIC VALVE MEAN VELOCITY: 15.6 M/S
APICAL FOUR CHAMBER EJECTION FRACTION: 41 %
ASCENDING AORTA: 3.8 CM
AV AREA BY CONTINUOUS VTI: 1 CM2
AV AREA PEAK VELOCITY: 1.1 CM2
AV LVOT MEAN GRADIENT: 1 MMHG
AV LVOT PEAK GRADIENT: 2 MMHG
AV MEAN GRADIENT: 11 MMHG
AV PEAK GRADIENT: 18 MMHG
AV VALVE AREA: 1.04 CM2
AV VELOCITY RATIO: 0.32
DOP CALC AO PEAK VEL: 2.2 M/S
DOP CALC AO VTI: 49.11 CM
DOP CALC LVOT AREA: 3.14 CM2
DOP CALC LVOT DIAMETER: 2 CM
DOP CALC LVOT PEAK VEL VTI: 16.2 CM
DOP CALC LVOT PEAK VEL: 0.71 M/S
DOP CALC LVOT STROKE INDEX: 24 ML/M2
DOP CALC LVOT STROKE VOLUME: 50.87 CM3
E WAVE DECELERATION TIME: 252 MS
FRACTIONAL SHORTENING: 17 % (ref 28–44)
INTERVENTRICULAR SEPTUM IN DIASTOLE (PARASTERNAL SHORT AXIS VIEW): 1.2 CM
LEFT ATRIUM AREA SYSTOLE SINGLE PLANE A4C: 25.2 CM2
LEFT INTERNAL DIMENSION IN SYSTOLE: 3.5 CM (ref 2.1–4)
LEFT VENTRICULAR INTERNAL DIMENSION IN DIASTOLE: 4.2 CM (ref 4.83–7.19)
LEFT VENTRICULAR POSTERIOR WALL IN END DIASTOLE: 1.2 CM
LEFT VENTRICULAR STROKE VOLUME: 28 ML
MV E'TISSUE VEL-SEP: 5 CM/S
MV PEAK A VEL: 0.76 M/S
MV PEAK E VEL: 79 CM/S
RIGHT ATRIUM AREA SYSTOLE A4C: 13.9 CM2
RIGHT VENTRICLE ID DIMENSION: 3.2 CM
SL CV LV EF: 40
SL CV PED ECHO LEFT VENTRICLE DIASTOLIC VOLUME (MOD BIPLANE) 2D: 78 ML
SL CV PED ECHO LEFT VENTRICLE SYSTOLIC VOLUME (MOD BIPLANE) 2D: 50 ML
TRICUSPID VALVE PEAK REGURGITATION VELOCITY: 2.14 M/S
TRICUSPID VALVE S': 0.7 CM/S
TV PEAK GRADIENT: 18 MMHG
Z-SCORE OF LEFT VENTRICULAR DIMENSION IN END SYSTOLE: -3.38

## 2021-11-26 PROCEDURE — 93306 TTE W/DOPPLER COMPLETE: CPT | Performed by: INTERNAL MEDICINE

## 2021-11-29 DIAGNOSIS — E11.8 TYPE 2 DIABETES MELLITUS WITH COMPLICATION, WITH LONG-TERM CURRENT USE OF INSULIN (HCC): ICD-10-CM

## 2021-11-29 DIAGNOSIS — Z79.4 TYPE 2 DIABETES MELLITUS WITH COMPLICATION, WITH LONG-TERM CURRENT USE OF INSULIN (HCC): ICD-10-CM

## 2021-11-30 RX ORDER — INSULIN GLARGINE 100 [IU]/ML
INJECTION, SOLUTION SUBCUTANEOUS
Qty: 15 ML | Refills: 1 | Status: SHIPPED | OUTPATIENT
Start: 2021-11-30 | End: 2021-12-01 | Stop reason: SDUPTHER

## 2021-12-01 ENCOUNTER — OFFICE VISIT (OUTPATIENT)
Dept: CARDIOLOGY CLINIC | Facility: CLINIC | Age: 86
End: 2021-12-01
Payer: MEDICARE

## 2021-12-01 ENCOUNTER — TELEPHONE (OUTPATIENT)
Dept: DIABETES SERVICES | Facility: CLINIC | Age: 86
End: 2021-12-01

## 2021-12-01 VITALS
HEART RATE: 64 BPM | WEIGHT: 180 LBS | HEIGHT: 68 IN | SYSTOLIC BLOOD PRESSURE: 108 MMHG | DIASTOLIC BLOOD PRESSURE: 60 MMHG | BODY MASS INDEX: 27.28 KG/M2

## 2021-12-01 DIAGNOSIS — E11.8 TYPE 2 DIABETES MELLITUS WITH COMPLICATION, WITH LONG-TERM CURRENT USE OF INSULIN (HCC): ICD-10-CM

## 2021-12-01 DIAGNOSIS — I65.22 STENOSIS OF LEFT CAROTID ARTERY: Primary | ICD-10-CM

## 2021-12-01 DIAGNOSIS — E78.2 MIXED HYPERLIPIDEMIA: ICD-10-CM

## 2021-12-01 DIAGNOSIS — I48.0 PAROXYSMAL ATRIAL FIBRILLATION (HCC): ICD-10-CM

## 2021-12-01 DIAGNOSIS — Z79.4 TYPE 2 DIABETES MELLITUS WITH COMPLICATION, WITH LONG-TERM CURRENT USE OF INSULIN (HCC): ICD-10-CM

## 2021-12-01 DIAGNOSIS — I25.118 CORONARY ARTERY DISEASE OF NATIVE ARTERY OF NATIVE HEART WITH STABLE ANGINA PECTORIS (HCC): ICD-10-CM

## 2021-12-01 PROCEDURE — 99214 OFFICE O/P EST MOD 30 MIN: CPT | Performed by: PHYSICIAN ASSISTANT

## 2021-12-01 RX ORDER — INSULIN GLARGINE 100 [IU]/ML
INJECTION, SOLUTION SUBCUTANEOUS
Qty: 15 ML | Refills: 1 | Status: SHIPPED | OUTPATIENT
Start: 2021-12-01 | End: 2021-12-10

## 2021-12-09 ENCOUNTER — TELEPHONE (OUTPATIENT)
Dept: BARIATRICS | Facility: CLINIC | Age: 86
End: 2021-12-09

## 2021-12-10 ENCOUNTER — HOSPITAL ENCOUNTER (OUTPATIENT)
Dept: NON INVASIVE DIAGNOSTICS | Facility: HOSPITAL | Age: 86
Discharge: HOME | End: 2021-12-10
Attending: SURGERY
Payer: MEDICARE

## 2021-12-10 DIAGNOSIS — Z79.4 TYPE 2 DIABETES MELLITUS WITH COMPLICATION, WITH LONG-TERM CURRENT USE OF INSULIN (HCC): ICD-10-CM

## 2021-12-10 DIAGNOSIS — E11.8 TYPE 2 DIABETES MELLITUS WITH COMPLICATION, WITH LONG-TERM CURRENT USE OF INSULIN (HCC): ICD-10-CM

## 2021-12-10 DIAGNOSIS — I73.9 PERIPHERAL ARTERIAL DISEASE (HCC): ICD-10-CM

## 2021-12-10 PROCEDURE — 93923 UPR/LXTR ART STDY 3+ LVLS: CPT

## 2021-12-10 PROCEDURE — 93925 LOWER EXTREMITY STUDY: CPT

## 2021-12-10 RX ORDER — INSULIN GLARGINE 100 [IU]/ML
INJECTION, SOLUTION SUBCUTANEOUS
Qty: 15 ML | Refills: 1 | Status: SHIPPED | OUTPATIENT
Start: 2021-12-10 | End: 2022-03-23 | Stop reason: SDUPTHER

## 2021-12-14 DIAGNOSIS — E03.9 HYPOTHYROIDISM, UNSPECIFIED TYPE: ICD-10-CM

## 2021-12-14 PROCEDURE — 93925 LOWER EXTREMITY STUDY: CPT | Performed by: SURGERY

## 2021-12-14 PROCEDURE — 93922 UPR/L XTREMITY ART 2 LEVELS: CPT | Performed by: SURGERY

## 2021-12-14 RX ORDER — INSULIN GLARGINE 100 [IU]/ML
INJECTION, SOLUTION SUBCUTANEOUS
Qty: 15 ML | Refills: 1 | Status: CANCELLED | OUTPATIENT
Start: 2021-12-14

## 2021-12-15 RX ORDER — LEVOTHYROXINE SODIUM 0.07 MG/1
75 TABLET ORAL
Qty: 90 TABLET | Refills: 3 | Status: SHIPPED | OUTPATIENT
Start: 2021-12-15

## 2022-01-01 ENCOUNTER — APPOINTMENT (INPATIENT)
Dept: RADIOLOGY | Facility: HOSPITAL | Age: 87
End: 2022-01-01

## 2022-01-01 ENCOUNTER — APPOINTMENT (EMERGENCY)
Dept: RADIOLOGY | Facility: HOSPITAL | Age: 87
End: 2022-01-01

## 2022-01-01 ENCOUNTER — APPOINTMENT (INPATIENT)
Dept: NON INVASIVE DIAGNOSTICS | Facility: HOSPITAL | Age: 87
End: 2022-01-01

## 2022-01-01 ENCOUNTER — HOSPITAL ENCOUNTER (INPATIENT)
Facility: HOSPITAL | Age: 87
LOS: 10 days | End: 2022-11-21
Attending: SURGERY | Admitting: SURGERY

## 2022-01-01 ENCOUNTER — OFFICE VISIT (OUTPATIENT)
Dept: INTERNAL MEDICINE CLINIC | Facility: CLINIC | Age: 87
End: 2022-01-01

## 2022-01-01 VITALS
HEART RATE: 96 BPM | WEIGHT: 161.6 LBS | RESPIRATION RATE: 28 BRPM | BODY MASS INDEX: 24.49 KG/M2 | TEMPERATURE: 98 F | SYSTOLIC BLOOD PRESSURE: 134 MMHG | DIASTOLIC BLOOD PRESSURE: 67 MMHG | HEIGHT: 68 IN | OXYGEN SATURATION: 92 %

## 2022-01-01 VITALS
SYSTOLIC BLOOD PRESSURE: 134 MMHG | DIASTOLIC BLOOD PRESSURE: 80 MMHG | HEIGHT: 68 IN | BODY MASS INDEX: 26.61 KG/M2 | HEART RATE: 68 BPM | OXYGEN SATURATION: 98 % | WEIGHT: 175.6 LBS | TEMPERATURE: 97.5 F

## 2022-01-01 DIAGNOSIS — R42 VERTIGO: Primary | ICD-10-CM

## 2022-01-01 DIAGNOSIS — I10 ESSENTIAL HYPERTENSION: Chronic | ICD-10-CM

## 2022-01-01 DIAGNOSIS — I62.00 SUBDURAL HEMORRHAGE (HCC): ICD-10-CM

## 2022-01-01 DIAGNOSIS — R65.10 SIRS (SYSTEMIC INFLAMMATORY RESPONSE SYNDROME) (HCC): ICD-10-CM

## 2022-01-01 DIAGNOSIS — N17.9 ACUTE KIDNEY INJURY (HCC): ICD-10-CM

## 2022-01-01 DIAGNOSIS — I25.5 ISCHEMIC CARDIOMYOPATHY: ICD-10-CM

## 2022-01-01 DIAGNOSIS — E11.8 TYPE 2 DIABETES MELLITUS WITH COMPLICATION, WITH LONG-TERM CURRENT USE OF INSULIN (HCC): ICD-10-CM

## 2022-01-01 DIAGNOSIS — S02.2XXA CLOSED FRACTURE OF NASAL BONE, INITIAL ENCOUNTER: ICD-10-CM

## 2022-01-01 DIAGNOSIS — R13.10 DYSPHAGIA, UNSPECIFIED TYPE: ICD-10-CM

## 2022-01-01 DIAGNOSIS — S06.5XAA SUBDURAL HEMATOMA: Primary | ICD-10-CM

## 2022-01-01 DIAGNOSIS — J96.01 ACUTE RESPIRATORY FAILURE WITH HYPOXIA (HCC): ICD-10-CM

## 2022-01-01 DIAGNOSIS — Z51.5 COMFORT MEASURES ONLY STATUS: ICD-10-CM

## 2022-01-01 DIAGNOSIS — I48.0 PAROXYSMAL ATRIAL FIBRILLATION (HCC): ICD-10-CM

## 2022-01-01 DIAGNOSIS — J30.0 VASOMOTOR RHINITIS: Primary | ICD-10-CM
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
## 2022-01-01 DIAGNOSIS — I67.9 CEREBRAL VASCULAR DISEASE: ICD-10-CM

## 2022-01-01 DIAGNOSIS — I95.1 ORTHOSTATIC HYPOTENSION: ICD-10-CM

## 2022-01-01 DIAGNOSIS — Z79.4 TYPE 2 DIABETES MELLITUS WITH COMPLICATION, WITH LONG-TERM CURRENT USE OF INSULIN (HCC): ICD-10-CM

## 2022-01-01 LAB
2HR DELTA HS TROPONIN: 6475 NG/L
4HR DELTA HS TROPONIN: 8629 NG/L
ABO GROUP BLD: NORMAL
ALBUMIN SERPL BCP-MCNC: 2 G/DL (ref 3.5–5)
ALP SERPL-CCNC: 88 U/L (ref 46–116)
ALT SERPL W P-5'-P-CCNC: 57 U/L (ref 12–78)
ANION GAP SERPL CALCULATED.3IONS-SCNC: 10 MMOL/L (ref 4–13)
ANION GAP SERPL CALCULATED.3IONS-SCNC: 11 MMOL/L (ref 4–13)
ANION GAP SERPL CALCULATED.3IONS-SCNC: 3 MMOL/L (ref 4–13)
ANION GAP SERPL CALCULATED.3IONS-SCNC: 3 MMOL/L (ref 4–13)
ANION GAP SERPL CALCULATED.3IONS-SCNC: 4 MMOL/L (ref 4–13)
ANION GAP SERPL CALCULATED.3IONS-SCNC: 6 MMOL/L (ref 4–13)
ANION GAP SERPL CALCULATED.3IONS-SCNC: 7 MMOL/L (ref 4–13)
ANION GAP SERPL CALCULATED.3IONS-SCNC: 8 MMOL/L (ref 4–13)
AORTIC ROOT: 3.3 CM
AORTIC VALVE MEAN VELOCITY: 14.8 M/S
ARTERIAL PATENCY WRIST A: ABNORMAL
AST SERPL W P-5'-P-CCNC: 71 U/L (ref 5–45)
ATRIAL RATE: 117 BPM
ATRIAL RATE: 77 BPM
ATRIAL RATE: 78 BPM
AV AREA BY CONTINUOUS VTI: 0.9 CM2
AV AREA PEAK VELOCITY: 0.7 CM2
AV LVOT MEAN GRADIENT: 0 MMHG
AV LVOT PEAK GRADIENT: 1 MMHG
AV MEAN GRADIENT: 10 MMHG
AV PEAK GRADIENT: 16 MMHG
AV VALVE AREA: 0.91 CM2
AV VELOCITY RATIO: 0.24
BACTERIA UR QL AUTO: ABNORMAL /HPF
BACTERIA UR QL AUTO: ABNORMAL /HPF
BASE EX.OXY STD BLDV CALC-SCNC: 78.3 % (ref 60–80)
BASE EXCESS BLDA CALC-SCNC: 0 MMOL/L (ref -2–3)
BASE EXCESS BLDA CALC-SCNC: 7 MMOL/L (ref -2–3)
BASE EXCESS BLDV CALC-SCNC: -0.5 MMOL/L
BASOPHILS # BLD AUTO: 0.05 THOUSANDS/ÂΜL (ref 0–0.1)
BASOPHILS # BLD AUTO: 0.05 THOUSANDS/ÂΜL (ref 0–0.1)
BASOPHILS # BLD AUTO: 0.06 THOUSANDS/ÂΜL (ref 0–0.1)
BASOPHILS # BLD AUTO: 0.07 THOUSANDS/ÂΜL (ref 0–0.1)
BASOPHILS # BLD AUTO: 0.12 THOUSANDS/ÂΜL (ref 0–0.1)
BASOPHILS # BLD MANUAL: 0 THOUSAND/UL (ref 0–0.1)
BASOPHILS NFR BLD AUTO: 0 % (ref 0–1)
BASOPHILS NFR BLD AUTO: 0 % (ref 0–1)
BASOPHILS NFR BLD AUTO: 1 % (ref 0–1)
BASOPHILS NFR MAR MANUAL: 0 % (ref 0–1)
BILIRUB SERPL-MCNC: 0.96 MG/DL (ref 0.2–1)
BILIRUB UR QL STRIP: NEGATIVE
BILIRUB UR QL STRIP: NEGATIVE
BLD GP AB SCN SERPL QL: NEGATIVE
BUN SERPL-MCNC: 20 MG/DL (ref 5–25)
BUN SERPL-MCNC: 26 MG/DL (ref 5–25)
BUN SERPL-MCNC: 26 MG/DL (ref 5–25)
BUN SERPL-MCNC: 34 MG/DL (ref 5–25)
BUN SERPL-MCNC: 40 MG/DL (ref 5–25)
BUN SERPL-MCNC: 47 MG/DL (ref 5–25)
BUN SERPL-MCNC: 51 MG/DL (ref 5–25)
BUN SERPL-MCNC: 57 MG/DL (ref 5–25)
BUN SERPL-MCNC: 59 MG/DL (ref 5–25)
BUN SERPL-MCNC: 66 MG/DL (ref 5–25)
BUN SERPL-MCNC: 66 MG/DL (ref 5–25)
BUN SERPL-MCNC: 77 MG/DL (ref 5–25)
BUN SERPL-MCNC: 89 MG/DL (ref 5–25)
BUN SERPL-MCNC: 93 MG/DL (ref 5–25)
BUN SERPL-MCNC: 95 MG/DL (ref 5–25)
CA-I BLD-SCNC: 1.07 MMOL/L (ref 1.12–1.32)
CA-I BLD-SCNC: 1.09 MMOL/L (ref 1.12–1.32)
CA-I BLD-SCNC: 1.1 MMOL/L (ref 1.12–1.32)
CA-I BLD-SCNC: 1.15 MMOL/L (ref 1.12–1.32)
CA-I BLD-SCNC: 1.16 MMOL/L (ref 1.12–1.32)
CA-I BLD-SCNC: 1.17 MMOL/L (ref 1.12–1.32)
CA-I BLD-SCNC: 1.17 MMOL/L (ref 1.12–1.32)
CA-I BLD-SCNC: 1.22 MMOL/L (ref 1.12–1.32)
CALCIUM ALBUM COR SERPL-MCNC: 11.4 MG/DL (ref 8.3–10.1)
CALCIUM SERPL-MCNC: 8.5 MG/DL (ref 8.3–10.1)
CALCIUM SERPL-MCNC: 8.6 MG/DL (ref 8.3–10.1)
CALCIUM SERPL-MCNC: 8.6 MG/DL (ref 8.3–10.1)
CALCIUM SERPL-MCNC: 8.8 MG/DL (ref 8.3–10.1)
CALCIUM SERPL-MCNC: 8.8 MG/DL (ref 8.3–10.1)
CALCIUM SERPL-MCNC: 8.9 MG/DL (ref 8.3–10.1)
CALCIUM SERPL-MCNC: 8.9 MG/DL (ref 8.3–10.1)
CALCIUM SERPL-MCNC: 9 MG/DL (ref 8.3–10.1)
CALCIUM SERPL-MCNC: 9.1 MG/DL (ref 8.3–10.1)
CALCIUM SERPL-MCNC: 9.3 MG/DL (ref 8.3–10.1)
CALCIUM SERPL-MCNC: 9.8 MG/DL (ref 8.3–10.1)
CARDIAC TROPONIN I PNL SERPL HS: 3977 NG/L
CARDIAC TROPONIN I PNL SERPL HS: 6925 NG/L (ref 8–18)
CARDIAC TROPONIN I PNL SERPL HS: ABNORMAL NG/L
CARDIAC TROPONIN I PNL SERPL HS: ABNORMAL NG/L
CHLORIDE SERPL-SCNC: 102 MMOL/L (ref 96–108)
CHLORIDE SERPL-SCNC: 102 MMOL/L (ref 96–108)
CHLORIDE SERPL-SCNC: 105 MMOL/L (ref 96–108)
CHLORIDE SERPL-SCNC: 106 MMOL/L (ref 96–108)
CHLORIDE SERPL-SCNC: 107 MMOL/L (ref 96–108)
CHLORIDE SERPL-SCNC: 109 MMOL/L (ref 96–108)
CHLORIDE SERPL-SCNC: 110 MMOL/L (ref 96–108)
CHLORIDE SERPL-SCNC: 113 MMOL/L (ref 96–108)
CHLORIDE SERPL-SCNC: 117 MMOL/L (ref 96–108)
CHLORIDE SERPL-SCNC: 120 MMOL/L (ref 96–108)
CHLORIDE SERPL-SCNC: 123 MMOL/L (ref 96–108)
CHLORIDE SERPL-SCNC: 123 MMOL/L (ref 96–108)
CHLORIDE SERPL-SCNC: 124 MMOL/L (ref 96–108)
CLARITY UR: ABNORMAL
CLARITY UR: CLEAR
CO2 SERPL-SCNC: 20 MMOL/L (ref 21–32)
CO2 SERPL-SCNC: 23 MMOL/L (ref 21–32)
CO2 SERPL-SCNC: 24 MMOL/L (ref 21–32)
CO2 SERPL-SCNC: 25 MMOL/L (ref 21–32)
CO2 SERPL-SCNC: 26 MMOL/L (ref 21–32)
CO2 SERPL-SCNC: 26 MMOL/L (ref 21–32)
CO2 SERPL-SCNC: 27 MMOL/L (ref 21–32)
CO2 SERPL-SCNC: 29 MMOL/L (ref 21–32)
CO2 SERPL-SCNC: 29 MMOL/L (ref 21–32)
CO2 SERPL-SCNC: 32 MMOL/L (ref 21–32)
COLOR UR: ABNORMAL
COLOR UR: YELLOW
CREAT SERPL-MCNC: 1.26 MG/DL (ref 0.6–1.3)
CREAT SERPL-MCNC: 1.36 MG/DL (ref 0.6–1.3)
CREAT SERPL-MCNC: 1.37 MG/DL (ref 0.6–1.3)
CREAT SERPL-MCNC: 1.41 MG/DL (ref 0.6–1.3)
CREAT SERPL-MCNC: 1.48 MG/DL (ref 0.6–1.3)
CREAT SERPL-MCNC: 1.5 MG/DL (ref 0.6–1.3)
CREAT SERPL-MCNC: 1.53 MG/DL (ref 0.6–1.3)
CREAT SERPL-MCNC: 1.55 MG/DL (ref 0.6–1.3)
CREAT SERPL-MCNC: 1.81 MG/DL (ref 0.6–1.3)
CREAT SERPL-MCNC: 1.86 MG/DL (ref 0.6–1.3)
CREAT SERPL-MCNC: 2.07 MG/DL (ref 0.6–1.3)
CREAT SERPL-MCNC: 2.08 MG/DL (ref 0.6–1.3)
CREAT SERPL-MCNC: 2.13 MG/DL (ref 0.6–1.3)
CREAT SERPL-MCNC: 2.81 MG/DL (ref 0.6–1.3)
CREAT SERPL-MCNC: 2.82 MG/DL (ref 0.6–1.3)
DOP CALC AO PEAK VEL: 2.03 M/S
DOP CALC AO VTI: 36.5 CM
DOP CALC LVOT AREA: 3.14 CM2
DOP CALC LVOT DIAMETER: 2 CM
DOP CALC LVOT PEAK VEL VTI: 10.56 CM
DOP CALC LVOT PEAK VEL: 0.48 M/S
DOP CALC LVOT STROKE INDEX: 17.3 ML/M2
DOP CALC LVOT STROKE VOLUME: 33.16 CM3
E WAVE DECELERATION TIME: 260 MS
EOSINOPHIL # BLD AUTO: 0 THOUSAND/ÂΜL (ref 0–0.61)
EOSINOPHIL # BLD AUTO: 0.02 THOUSAND/ÂΜL (ref 0–0.61)
EOSINOPHIL # BLD AUTO: 0.09 THOUSAND/ÂΜL (ref 0–0.61)
EOSINOPHIL # BLD AUTO: 0.15 THOUSAND/ÂΜL (ref 0–0.61)
EOSINOPHIL # BLD AUTO: 0.16 THOUSAND/ÂΜL (ref 0–0.61)
EOSINOPHIL # BLD AUTO: 0.17 THOUSAND/ÂΜL (ref 0–0.61)
EOSINOPHIL # BLD AUTO: 0.18 THOUSAND/ÂΜL (ref 0–0.61)
EOSINOPHIL # BLD MANUAL: 0 THOUSAND/UL (ref 0–0.4)
EOSINOPHIL NFR BLD AUTO: 0 % (ref 0–6)
EOSINOPHIL NFR BLD AUTO: 0 % (ref 0–6)
EOSINOPHIL NFR BLD AUTO: 1 % (ref 0–6)
EOSINOPHIL NFR BLD AUTO: 2 % (ref 0–6)
EOSINOPHIL NFR BLD AUTO: 2 % (ref 0–6)
EOSINOPHIL NFR BLD MANUAL: 0 % (ref 0–6)
ERYTHROCYTE [DISTWIDTH] IN BLOOD BY AUTOMATED COUNT: 12.9 % (ref 11.6–15.1)
ERYTHROCYTE [DISTWIDTH] IN BLOOD BY AUTOMATED COUNT: 13.1 % (ref 11.6–15.1)
ERYTHROCYTE [DISTWIDTH] IN BLOOD BY AUTOMATED COUNT: 13.2 % (ref 11.6–15.1)
ERYTHROCYTE [DISTWIDTH] IN BLOOD BY AUTOMATED COUNT: 13.2 % (ref 11.6–15.1)
ERYTHROCYTE [DISTWIDTH] IN BLOOD BY AUTOMATED COUNT: 13.3 % (ref 11.6–15.1)
ERYTHROCYTE [DISTWIDTH] IN BLOOD BY AUTOMATED COUNT: 13.3 % (ref 11.6–15.1)
ERYTHROCYTE [DISTWIDTH] IN BLOOD BY AUTOMATED COUNT: 13.8 % (ref 11.6–15.1)
ERYTHROCYTE [DISTWIDTH] IN BLOOD BY AUTOMATED COUNT: 14 % (ref 11.6–15.1)
FRACTIONAL SHORTENING: 20 % (ref 28–44)
GFR SERPL CREATININE-BSD FRML MDRD: 18 ML/MIN/1.73SQ M
GFR SERPL CREATININE-BSD FRML MDRD: 19 ML/MIN/1.73SQ M
GFR SERPL CREATININE-BSD FRML MDRD: 26 ML/MIN/1.73SQ M
GFR SERPL CREATININE-BSD FRML MDRD: 27 ML/MIN/1.73SQ M
GFR SERPL CREATININE-BSD FRML MDRD: 27 ML/MIN/1.73SQ M
GFR SERPL CREATININE-BSD FRML MDRD: 31 ML/MIN/1.73SQ M
GFR SERPL CREATININE-BSD FRML MDRD: 32 ML/MIN/1.73SQ M
GFR SERPL CREATININE-BSD FRML MDRD: 39 ML/MIN/1.73SQ M
GFR SERPL CREATININE-BSD FRML MDRD: 39 ML/MIN/1.73SQ M
GFR SERPL CREATININE-BSD FRML MDRD: 40 ML/MIN/1.73SQ M
GFR SERPL CREATININE-BSD FRML MDRD: 41 ML/MIN/1.73SQ M
GFR SERPL CREATININE-BSD FRML MDRD: 43 ML/MIN/1.73SQ M
GFR SERPL CREATININE-BSD FRML MDRD: 45 ML/MIN/1.73SQ M
GFR SERPL CREATININE-BSD FRML MDRD: 45 ML/MIN/1.73SQ M
GFR SERPL CREATININE-BSD FRML MDRD: 50 ML/MIN/1.73SQ M
GLUCOSE SERPL-MCNC: 115 MG/DL (ref 65–140)
GLUCOSE SERPL-MCNC: 120 MG/DL (ref 65–140)
GLUCOSE SERPL-MCNC: 122 MG/DL (ref 65–140)
GLUCOSE SERPL-MCNC: 123 MG/DL (ref 65–140)
GLUCOSE SERPL-MCNC: 125 MG/DL (ref 65–140)
GLUCOSE SERPL-MCNC: 131 MG/DL (ref 65–140)
GLUCOSE SERPL-MCNC: 134 MG/DL (ref 65–140)
GLUCOSE SERPL-MCNC: 138 MG/DL (ref 65–140)
GLUCOSE SERPL-MCNC: 146 MG/DL (ref 65–140)
GLUCOSE SERPL-MCNC: 147 MG/DL (ref 65–140)
GLUCOSE SERPL-MCNC: 148 MG/DL (ref 65–140)
GLUCOSE SERPL-MCNC: 149 MG/DL (ref 65–140)
GLUCOSE SERPL-MCNC: 155 MG/DL (ref 65–140)
GLUCOSE SERPL-MCNC: 156 MG/DL (ref 65–140)
GLUCOSE SERPL-MCNC: 159 MG/DL (ref 65–140)
GLUCOSE SERPL-MCNC: 165 MG/DL (ref 65–140)
GLUCOSE SERPL-MCNC: 166 MG/DL (ref 65–140)
GLUCOSE SERPL-MCNC: 168 MG/DL (ref 65–140)
GLUCOSE SERPL-MCNC: 170 MG/DL (ref 65–140)
GLUCOSE SERPL-MCNC: 171 MG/DL (ref 65–140)
GLUCOSE SERPL-MCNC: 172 MG/DL (ref 65–140)
GLUCOSE SERPL-MCNC: 176 MG/DL (ref 65–140)
GLUCOSE SERPL-MCNC: 178 MG/DL (ref 65–140)
GLUCOSE SERPL-MCNC: 183 MG/DL (ref 65–140)
GLUCOSE SERPL-MCNC: 186 MG/DL (ref 65–140)
GLUCOSE SERPL-MCNC: 186 MG/DL (ref 65–140)
GLUCOSE SERPL-MCNC: 190 MG/DL (ref 65–140)
GLUCOSE SERPL-MCNC: 190 MG/DL (ref 65–140)
GLUCOSE SERPL-MCNC: 192 MG/DL (ref 65–140)
GLUCOSE SERPL-MCNC: 193 MG/DL (ref 65–140)
GLUCOSE SERPL-MCNC: 198 MG/DL (ref 65–140)
GLUCOSE SERPL-MCNC: 200 MG/DL (ref 65–140)
GLUCOSE SERPL-MCNC: 202 MG/DL (ref 65–140)
GLUCOSE SERPL-MCNC: 202 MG/DL (ref 65–140)
GLUCOSE SERPL-MCNC: 203 MG/DL (ref 65–140)
GLUCOSE SERPL-MCNC: 204 MG/DL (ref 65–140)
GLUCOSE SERPL-MCNC: 207 MG/DL (ref 65–140)
GLUCOSE SERPL-MCNC: 213 MG/DL (ref 65–140)
GLUCOSE SERPL-MCNC: 213 MG/DL (ref 65–140)
GLUCOSE SERPL-MCNC: 219 MG/DL (ref 65–140)
GLUCOSE SERPL-MCNC: 225 MG/DL (ref 65–140)
GLUCOSE SERPL-MCNC: 228 MG/DL (ref 65–140)
GLUCOSE SERPL-MCNC: 229 MG/DL (ref 65–140)
GLUCOSE SERPL-MCNC: 231 MG/DL (ref 65–140)
GLUCOSE SERPL-MCNC: 236 MG/DL (ref 65–140)
GLUCOSE SERPL-MCNC: 237 MG/DL (ref 65–140)
GLUCOSE SERPL-MCNC: 244 MG/DL (ref 65–140)
GLUCOSE SERPL-MCNC: 246 MG/DL (ref 65–140)
GLUCOSE SERPL-MCNC: 253 MG/DL (ref 65–140)
GLUCOSE SERPL-MCNC: 263 MG/DL (ref 65–140)
GLUCOSE SERPL-MCNC: 264 MG/DL (ref 65–140)
GLUCOSE SERPL-MCNC: 275 MG/DL (ref 65–140)
GLUCOSE SERPL-MCNC: 277 MG/DL (ref 65–140)
GLUCOSE SERPL-MCNC: 277 MG/DL (ref 65–140)
GLUCOSE SERPL-MCNC: 278 MG/DL (ref 65–140)
GLUCOSE SERPL-MCNC: 295 MG/DL (ref 65–140)
GLUCOSE SERPL-MCNC: 295 MG/DL (ref 65–140)
GLUCOSE SERPL-MCNC: 301 MG/DL (ref 65–140)
GLUCOSE SERPL-MCNC: 311 MG/DL (ref 65–140)
GLUCOSE SERPL-MCNC: 314 MG/DL (ref 65–140)
GLUCOSE SERPL-MCNC: 350 MG/DL (ref 65–140)
GLUCOSE SERPL-MCNC: 397 MG/DL (ref 65–140)
GLUCOSE SERPL-MCNC: 411 MG/DL (ref 65–140)
GLUCOSE SERPL-MCNC: 418 MG/DL (ref 65–140)
GLUCOSE UR STRIP-MCNC: ABNORMAL MG/DL
GLUCOSE UR STRIP-MCNC: NEGATIVE MG/DL
GRAN CASTS #/AREA URNS LPF: ABNORMAL /[LPF]
HCO3 BLDA-SCNC: 27 MMOL/L (ref 24–30)
HCO3 BLDA-SCNC: 30.9 MMOL/L (ref 24–30)
HCO3 BLDV-SCNC: 23.9 MMOL/L (ref 24–30)
HCT VFR BLD AUTO: 35.4 % (ref 36.5–49.3)
HCT VFR BLD AUTO: 37.1 % (ref 36.5–49.3)
HCT VFR BLD AUTO: 37.9 % (ref 36.5–49.3)
HCT VFR BLD AUTO: 39.1 % (ref 36.5–49.3)
HCT VFR BLD AUTO: 39.3 % (ref 36.5–49.3)
HCT VFR BLD AUTO: 39.5 % (ref 36.5–49.3)
HCT VFR BLD AUTO: 40.7 % (ref 36.5–49.3)
HCT VFR BLD AUTO: 41.2 % (ref 36.5–49.3)
HCT VFR BLD AUTO: 41.7 % (ref 36.5–49.3)
HCT VFR BLD AUTO: 41.9 % (ref 36.5–49.3)
HCT VFR BLD CALC: 31 % (ref 36.5–49.3)
HCT VFR BLD CALC: 36 % (ref 36.5–49.3)
HCT VFR BLD CALC: 41 % (ref 36.5–49.3)
HGB BLD-MCNC: 11.8 G/DL (ref 12–17)
HGB BLD-MCNC: 11.8 G/DL (ref 12–17)
HGB BLD-MCNC: 12 G/DL (ref 12–17)
HGB BLD-MCNC: 12.2 G/DL (ref 12–17)
HGB BLD-MCNC: 12.8 G/DL (ref 12–17)
HGB BLD-MCNC: 13 G/DL (ref 12–17)
HGB BLD-MCNC: 13.2 G/DL (ref 12–17)
HGB BLD-MCNC: 13.3 G/DL (ref 12–17)
HGB BLD-MCNC: 13.5 G/DL (ref 12–17)
HGB BLD-MCNC: 13.7 G/DL (ref 12–17)
HGB BLDA-MCNC: 10.5 G/DL (ref 12–17)
HGB BLDA-MCNC: 12.2 G/DL (ref 12–17)
HGB BLDA-MCNC: 13.9 G/DL (ref 12–17)
HGB UR QL STRIP.AUTO: ABNORMAL
HGB UR QL STRIP.AUTO: NEGATIVE
HYALINE CASTS #/AREA URNS LPF: ABNORMAL /LPF
HYALINE CASTS #/AREA URNS LPF: ABNORMAL /LPF
IMM GRANULOCYTES # BLD AUTO: 0.04 THOUSAND/UL (ref 0–0.2)
IMM GRANULOCYTES # BLD AUTO: 0.05 THOUSAND/UL (ref 0–0.2)
IMM GRANULOCYTES # BLD AUTO: 0.05 THOUSAND/UL (ref 0–0.2)
IMM GRANULOCYTES # BLD AUTO: 0.07 THOUSAND/UL (ref 0–0.2)
IMM GRANULOCYTES # BLD AUTO: 0.07 THOUSAND/UL (ref 0–0.2)
IMM GRANULOCYTES # BLD AUTO: 0.09 THOUSAND/UL (ref 0–0.2)
IMM GRANULOCYTES # BLD AUTO: 0.49 THOUSAND/UL (ref 0–0.2)
IMM GRANULOCYTES NFR BLD AUTO: 0 % (ref 0–2)
IMM GRANULOCYTES NFR BLD AUTO: 0 % (ref 0–2)
IMM GRANULOCYTES NFR BLD AUTO: 1 % (ref 0–2)
IMM GRANULOCYTES NFR BLD AUTO: 2 % (ref 0–2)
INTERVENTRICULAR SEPTUM IN DIASTOLE (PARASTERNAL SHORT AXIS VIEW): 1.1 CM
INTERVENTRICULAR SEPTUM: 1.1 CM (ref 0.6–1.1)
KETONES UR STRIP-MCNC: ABNORMAL MG/DL
KETONES UR STRIP-MCNC: NEGATIVE MG/DL
LAAS-AP2: 13.4 CM2
LAAS-AP4: 17.3 CM2
LACTATE SERPL-SCNC: 2 MMOL/L (ref 0.5–2)
LACTATE SERPL-SCNC: 2.3 MMOL/L (ref 0.5–2)
LACTATE SERPL-SCNC: 2.5 MMOL/L (ref 0.5–2)
LACTATE SERPL-SCNC: 2.8 MMOL/L (ref 0.5–2)
LACTATE SERPL-SCNC: 5 MMOL/L (ref 0.5–2)
LEFT ATRIUM AREA SYSTOLE SINGLE PLANE A4C: 17.6 CM2
LEFT ATRIUM SIZE: 3.2 CM
LEFT INTERNAL DIMENSION IN SYSTOLE: 3.9 CM (ref 2.1–4)
LEFT VENTRICULAR INTERNAL DIMENSION IN DIASTOLE: 4.9 CM (ref 3.5–6)
LEFT VENTRICULAR POSTERIOR WALL IN END DIASTOLE: 1.1 CM
LEFT VENTRICULAR STROKE VOLUME: 46 ML
LEUKOCYTE ESTERASE UR QL STRIP: ABNORMAL
LEUKOCYTE ESTERASE UR QL STRIP: NEGATIVE
LVSV (TEICH): 46 ML
LYMPHOCYTES # BLD AUTO: 0.67 THOUSANDS/ÂΜL (ref 0.6–4.47)
LYMPHOCYTES # BLD AUTO: 0.72 THOUSANDS/ÂΜL (ref 0.6–4.47)
LYMPHOCYTES # BLD AUTO: 0.8 THOUSANDS/ÂΜL (ref 0.6–4.47)
LYMPHOCYTES # BLD AUTO: 0.83 THOUSANDS/ÂΜL (ref 0.6–4.47)
LYMPHOCYTES # BLD AUTO: 0.92 THOUSANDS/ÂΜL (ref 0.6–4.47)
LYMPHOCYTES # BLD AUTO: 0.96 THOUSANDS/ÂΜL (ref 0.6–4.47)
LYMPHOCYTES # BLD AUTO: 0.97 THOUSAND/UL (ref 0.6–4.47)
LYMPHOCYTES # BLD AUTO: 1.16 THOUSANDS/ÂΜL (ref 0.6–4.47)
LYMPHOCYTES # BLD AUTO: 4 % (ref 14–44)
LYMPHOCYTES NFR BLD AUTO: 2 % (ref 14–44)
LYMPHOCYTES NFR BLD AUTO: 6 % (ref 14–44)
LYMPHOCYTES NFR BLD AUTO: 7 % (ref 14–44)
LYMPHOCYTES NFR BLD AUTO: 8 % (ref 14–44)
LYMPHOCYTES NFR BLD AUTO: 8 % (ref 14–44)
LYMPHOCYTES NFR BLD AUTO: 9 % (ref 14–44)
LYMPHOCYTES NFR BLD AUTO: 9 % (ref 14–44)
MAGNESIUM SERPL-MCNC: 1.8 MG/DL (ref 1.6–2.6)
MAGNESIUM SERPL-MCNC: 2.1 MG/DL (ref 1.6–2.6)
MAGNESIUM SERPL-MCNC: 2.3 MG/DL (ref 1.6–2.6)
MAGNESIUM SERPL-MCNC: 2.6 MG/DL (ref 1.6–2.6)
MAGNESIUM SERPL-MCNC: 2.7 MG/DL (ref 1.6–2.6)
MAGNESIUM SERPL-MCNC: 2.7 MG/DL (ref 1.6–2.6)
MAGNESIUM SERPL-MCNC: 3 MG/DL (ref 1.6–2.6)
MAGNESIUM SERPL-MCNC: 3.2 MG/DL (ref 1.6–2.6)
MAGNESIUM SERPL-MCNC: 3.7 MG/DL (ref 1.6–2.6)
MCH RBC QN AUTO: 31.4 PG (ref 26.8–34.3)
MCH RBC QN AUTO: 31.8 PG (ref 26.8–34.3)
MCH RBC QN AUTO: 31.9 PG (ref 26.8–34.3)
MCH RBC QN AUTO: 32.1 PG (ref 26.8–34.3)
MCH RBC QN AUTO: 32.2 PG (ref 26.8–34.3)
MCH RBC QN AUTO: 32.3 PG (ref 26.8–34.3)
MCH RBC QN AUTO: 32.4 PG (ref 26.8–34.3)
MCH RBC QN AUTO: 32.4 PG (ref 26.8–34.3)
MCH RBC QN AUTO: 32.5 PG (ref 26.8–34.3)
MCH RBC QN AUTO: 32.7 PG (ref 26.8–34.3)
MCHC RBC AUTO-ENTMCNC: 30 G/DL (ref 31.4–37.4)
MCHC RBC AUTO-ENTMCNC: 31.1 G/DL (ref 31.4–37.4)
MCHC RBC AUTO-ENTMCNC: 31.2 G/DL (ref 31.4–37.4)
MCHC RBC AUTO-ENTMCNC: 31.5 G/DL (ref 31.4–37.4)
MCHC RBC AUTO-ENTMCNC: 32.2 G/DL (ref 31.4–37.4)
MCHC RBC AUTO-ENTMCNC: 32.3 G/DL (ref 31.4–37.4)
MCHC RBC AUTO-ENTMCNC: 33.3 G/DL (ref 31.4–37.4)
MCHC RBC AUTO-ENTMCNC: 33.7 G/DL (ref 31.4–37.4)
MCHC RBC AUTO-ENTMCNC: 33.7 G/DL (ref 31.4–37.4)
MCHC RBC AUTO-ENTMCNC: 34.5 G/DL (ref 31.4–37.4)
MCV RBC AUTO: 100 FL (ref 82–98)
MCV RBC AUTO: 101 FL (ref 82–98)
MCV RBC AUTO: 101 FL (ref 82–98)
MCV RBC AUTO: 102 FL (ref 82–98)
MCV RBC AUTO: 102 FL (ref 82–98)
MCV RBC AUTO: 107 FL (ref 82–98)
MCV RBC AUTO: 94 FL (ref 82–98)
MCV RBC AUTO: 95 FL (ref 82–98)
MCV RBC AUTO: 97 FL (ref 82–98)
MCV RBC AUTO: 98 FL (ref 82–98)
MONOCYTES # BLD AUTO: 0.67 THOUSAND/ÂΜL (ref 0.17–1.22)
MONOCYTES # BLD AUTO: 0.73 THOUSAND/UL (ref 0–1.22)
MONOCYTES # BLD AUTO: 0.77 THOUSAND/ÂΜL (ref 0.17–1.22)
MONOCYTES # BLD AUTO: 0.87 THOUSAND/ÂΜL (ref 0.17–1.22)
MONOCYTES # BLD AUTO: 0.92 THOUSAND/ÂΜL (ref 0.17–1.22)
MONOCYTES # BLD AUTO: 1.04 THOUSAND/ÂΜL (ref 0.17–1.22)
MONOCYTES # BLD AUTO: 1.06 THOUSAND/ÂΜL (ref 0.17–1.22)
MONOCYTES # BLD AUTO: 1.06 THOUSAND/ÂΜL (ref 0.17–1.22)
MONOCYTES NFR BLD AUTO: 10 % (ref 4–12)
MONOCYTES NFR BLD AUTO: 3 % (ref 4–12)
MONOCYTES NFR BLD AUTO: 5 % (ref 4–12)
MONOCYTES NFR BLD AUTO: 8 % (ref 4–12)
MONOCYTES NFR BLD: 3 % (ref 4–12)
MUCOUS THREADS UR QL AUTO: ABNORMAL
MV E'TISSUE VEL-SEP: 4 CM/S
MV PEAK A VEL: 0.38 M/S
MV PEAK E VEL: 102 CM/S
MV STENOSIS PRESSURE HALF TIME: 75 MS
MV VALVE AREA P 1/2 METHOD: 2.93 CM2
NEUTROPHILS # BLD AUTO: 10.76 THOUSANDS/ÂΜL (ref 1.85–7.62)
NEUTROPHILS # BLD AUTO: 11.16 THOUSANDS/ÂΜL (ref 1.85–7.62)
NEUTROPHILS # BLD AUTO: 30.21 THOUSANDS/ÂΜL (ref 1.85–7.62)
NEUTROPHILS # BLD AUTO: 7.59 THOUSANDS/ÂΜL (ref 1.85–7.62)
NEUTROPHILS # BLD AUTO: 8.36 THOUSANDS/ÂΜL (ref 1.85–7.62)
NEUTROPHILS # BLD AUTO: 9.24 THOUSANDS/ÂΜL (ref 1.85–7.62)
NEUTROPHILS # BLD AUTO: 9.33 THOUSANDS/ÂΜL (ref 1.85–7.62)
NEUTROPHILS # BLD MANUAL: 22.59 THOUSAND/UL (ref 1.85–7.62)
NEUTS BAND NFR BLD MANUAL: 13 % (ref 0–8)
NEUTS SEG NFR BLD AUTO: 79 % (ref 43–75)
NEUTS SEG NFR BLD AUTO: 80 % (ref 43–75)
NEUTS SEG NFR BLD AUTO: 81 % (ref 43–75)
NEUTS SEG NFR BLD AUTO: 82 % (ref 43–75)
NEUTS SEG NFR BLD AUTO: 86 % (ref 43–75)
NEUTS SEG NFR BLD AUTO: 93 % (ref 43–75)
NITRITE UR QL STRIP: NEGATIVE
NITRITE UR QL STRIP: NEGATIVE
NON-SQ EPI CELLS URNS QL MICRO: ABNORMAL /HPF
NON-SQ EPI CELLS URNS QL MICRO: ABNORMAL /HPF
NRBC BLD AUTO-RTO: 0 /100 WBCS
O2 CT BLDV-SCNC: 15.6 ML/DL
P AXIS: 107 DEGREES
PCO2 BLD: 29 MMOL/L (ref 21–32)
PCO2 BLD: 32 MMOL/L (ref 21–32)
PCO2 BLD: 39.3 MM HG (ref 42–50)
PCO2 BLD: 53 MM HG (ref 42–50)
PCO2 BLD: >103 MM HG (ref 36–44)
PCO2 BLDV: 38.9 MM HG (ref 42–50)
PH BLD: 7.06 [PH] (ref 7.35–7.45)
PH BLD: 7.32 [PH] (ref 7.3–7.4)
PH BLD: 7.5 [PH] (ref 7.3–7.4)
PH BLDV: 7.41 [PH] (ref 7.3–7.4)
PH UR STRIP.AUTO: 5 [PH]
PH UR STRIP.AUTO: 5.5 [PH]
PHOSPHATE SERPL-MCNC: 3.2 MG/DL (ref 2.3–4.1)
PHOSPHATE SERPL-MCNC: 3.3 MG/DL (ref 2.3–4.1)
PHOSPHATE SERPL-MCNC: 3.5 MG/DL (ref 2.3–4.1)
PHOSPHATE SERPL-MCNC: 3.7 MG/DL (ref 2.3–4.1)
PHOSPHATE SERPL-MCNC: 4 MG/DL (ref 2.3–4.1)
PHOSPHATE SERPL-MCNC: 4 MG/DL (ref 2.3–4.1)
PHOSPHATE SERPL-MCNC: 8.4 MG/DL (ref 2.3–4.1)
PLATELET # BLD AUTO: 146 THOUSANDS/UL (ref 149–390)
PLATELET # BLD AUTO: 147 THOUSANDS/UL (ref 149–390)
PLATELET # BLD AUTO: 152 THOUSANDS/UL (ref 149–390)
PLATELET # BLD AUTO: 174 THOUSANDS/UL (ref 149–390)
PLATELET # BLD AUTO: 181 THOUSANDS/UL (ref 149–390)
PLATELET # BLD AUTO: 186 THOUSANDS/UL (ref 149–390)
PLATELET # BLD AUTO: 220 THOUSANDS/UL (ref 149–390)
PLATELET # BLD AUTO: 281 THOUSANDS/UL (ref 149–390)
PLATELET # BLD AUTO: 340 THOUSANDS/UL (ref 149–390)
PLATELET # BLD AUTO: 364 THOUSANDS/UL (ref 149–390)
PLATELET BLD QL SMEAR: ADEQUATE
PMV BLD AUTO: 10.1 FL (ref 8.9–12.7)
PMV BLD AUTO: 10.1 FL (ref 8.9–12.7)
PMV BLD AUTO: 10.2 FL (ref 8.9–12.7)
PMV BLD AUTO: 10.5 FL (ref 8.9–12.7)
PMV BLD AUTO: 10.7 FL (ref 8.9–12.7)
PMV BLD AUTO: 9.3 FL (ref 8.9–12.7)
PMV BLD AUTO: 9.5 FL (ref 8.9–12.7)
PMV BLD AUTO: 9.6 FL (ref 8.9–12.7)
PMV BLD AUTO: 9.8 FL (ref 8.9–12.7)
PMV BLD AUTO: 9.8 FL (ref 8.9–12.7)
PO2 BLD: 129 MM HG (ref 75–129)
PO2 BLD: 47 MM HG (ref 35–45)
PO2 BLD: 92 MM HG (ref 35–45)
PO2 BLDV: 47 MM HG (ref 35–45)
POTASSIUM BLD-SCNC: 3.9 MMOL/L (ref 3.5–5.3)
POTASSIUM BLD-SCNC: 4.1 MMOL/L (ref 3.5–5.3)
POTASSIUM BLD-SCNC: 4.5 MMOL/L (ref 3.5–5.3)
POTASSIUM SERPL-SCNC: 3.2 MMOL/L (ref 3.5–5.3)
POTASSIUM SERPL-SCNC: 3.3 MMOL/L (ref 3.5–5.3)
POTASSIUM SERPL-SCNC: 3.4 MMOL/L (ref 3.5–5.3)
POTASSIUM SERPL-SCNC: 3.7 MMOL/L (ref 3.5–5.3)
POTASSIUM SERPL-SCNC: 3.8 MMOL/L (ref 3.5–5.3)
POTASSIUM SERPL-SCNC: 3.9 MMOL/L (ref 3.5–5.3)
POTASSIUM SERPL-SCNC: 4 MMOL/L (ref 3.5–5.3)
POTASSIUM SERPL-SCNC: 4.1 MMOL/L (ref 3.5–5.3)
POTASSIUM SERPL-SCNC: 4.1 MMOL/L (ref 3.5–5.3)
POTASSIUM SERPL-SCNC: 4.2 MMOL/L (ref 3.5–5.3)
POTASSIUM SERPL-SCNC: 4.3 MMOL/L (ref 3.5–5.3)
POTASSIUM SERPL-SCNC: 4.5 MMOL/L (ref 3.5–5.3)
POTASSIUM SERPL-SCNC: 4.6 MMOL/L (ref 3.5–5.3)
PR INTERVAL: 304 MS
PROT SERPL-MCNC: 7.1 G/DL (ref 6.4–8.4)
PROT UR STRIP-MCNC: ABNORMAL MG/DL
PROT UR STRIP-MCNC: NEGATIVE MG/DL
QRS AXIS: -22 DEGREES
QRS AXIS: -24 DEGREES
QRS AXIS: 7 DEGREES
QRSD INTERVAL: 102 MS
QRSD INTERVAL: 120 MS
QRSD INTERVAL: 120 MS
QT INTERVAL: 402 MS
QT INTERVAL: 404 MS
QT INTERVAL: 416 MS
QTC INTERVAL: 470 MS
QTC INTERVAL: 491 MS
QTC INTERVAL: 491 MS
RBC # BLD AUTO: 3.61 MILLION/UL (ref 3.88–5.62)
RBC # BLD AUTO: 3.68 MILLION/UL (ref 3.88–5.62)
RBC # BLD AUTO: 3.71 MILLION/UL (ref 3.88–5.62)
RBC # BLD AUTO: 3.77 MILLION/UL (ref 3.88–5.62)
RBC # BLD AUTO: 4.07 MILLION/UL (ref 3.88–5.62)
RBC # BLD AUTO: 4.09 MILLION/UL (ref 3.88–5.62)
RBC # BLD AUTO: 4.09 MILLION/UL (ref 3.88–5.62)
RBC # BLD AUTO: 4.1 MILLION/UL (ref 3.88–5.62)
RBC # BLD AUTO: 4.17 MILLION/UL (ref 3.88–5.62)
RBC # BLD AUTO: 4.29 MILLION/UL (ref 3.88–5.62)
RBC #/AREA URNS AUTO: ABNORMAL /HPF
RBC #/AREA URNS AUTO: ABNORMAL /HPF
RH BLD: NEGATIVE
RIGHT ATRIUM AREA SYSTOLE A4C: 15.3 CM2
RIGHT VENTRICLE ID DIMENSION: 4.1 CM
SAMPLE SITE: ABNORMAL
SAO2 % BLD FROM PO2: 78 % (ref 60–85)
SAO2 % BLD FROM PO2: 98 % (ref 60–85)
SL CV LEFT ATRIUM LENGTH A2C: 4.3 CM
SL CV LV EF: 35
SL CV PED ECHO LEFT VENTRICLE DIASTOLIC VOLUME (MOD BIPLANE) 2D: 112 ML
SL CV PED ECHO LEFT VENTRICLE SYSTOLIC VOLUME (MOD BIPLANE) 2D: 66 ML
SODIUM BLD-SCNC: 137 MMOL/L (ref 136–145)
SODIUM BLD-SCNC: 158 MMOL/L (ref 136–145)
SODIUM BLD-SCNC: 159 MMOL/L (ref 136–145)
SODIUM SERPL-SCNC: 135 MMOL/L (ref 135–147)
SODIUM SERPL-SCNC: 135 MMOL/L (ref 135–147)
SODIUM SERPL-SCNC: 137 MMOL/L (ref 135–147)
SODIUM SERPL-SCNC: 139 MMOL/L (ref 135–147)
SODIUM SERPL-SCNC: 140 MMOL/L (ref 135–147)
SODIUM SERPL-SCNC: 143 MMOL/L (ref 135–147)
SODIUM SERPL-SCNC: 146 MMOL/L (ref 135–147)
SODIUM SERPL-SCNC: 147 MMOL/L (ref 135–147)
SODIUM SERPL-SCNC: 155 MMOL/L (ref 135–147)
SODIUM SERPL-SCNC: 155 MMOL/L (ref 135–147)
SODIUM SERPL-SCNC: 156 MMOL/L (ref 135–147)
SODIUM SERPL-SCNC: 158 MMOL/L (ref 135–147)
SP GR UR STRIP.AUTO: 1.02 (ref 1–1.03)
SP GR UR STRIP.AUTO: 1.03 (ref 1–1.03)
SPECIMEN EXPIRATION DATE: NORMAL
SPECIMEN SOURCE: ABNORMAL
T WAVE AXIS: 142 DEGREES
T WAVE AXIS: 145 DEGREES
T WAVE AXIS: 148 DEGREES
UROBILINOGEN UR STRIP-ACNC: <2 MG/DL
UROBILINOGEN UR STRIP-ACNC: <2 MG/DL
VENTRICULAR RATE: 77 BPM
VENTRICULAR RATE: 89 BPM
VENTRICULAR RATE: 90 BPM
WBC # BLD AUTO: 10.5 THOUSAND/UL (ref 4.31–10.16)
WBC # BLD AUTO: 11.23 THOUSAND/UL (ref 4.31–10.16)
WBC # BLD AUTO: 11.48 THOUSAND/UL (ref 4.31–10.16)
WBC # BLD AUTO: 12.18 THOUSAND/UL (ref 4.31–10.16)
WBC # BLD AUTO: 12.96 THOUSAND/UL (ref 4.31–10.16)
WBC # BLD AUTO: 13.13 THOUSAND/UL (ref 4.31–10.16)
WBC # BLD AUTO: 13.15 THOUSAND/UL (ref 4.31–10.16)
WBC # BLD AUTO: 24.29 THOUSAND/UL (ref 4.31–10.16)
WBC # BLD AUTO: 32.55 THOUSAND/UL (ref 4.31–10.16)
WBC # BLD AUTO: 9.33 THOUSAND/UL (ref 4.31–10.16)
WBC #/AREA URNS AUTO: ABNORMAL /HPF
WBC #/AREA URNS AUTO: ABNORMAL /HPF

## 2022-01-01 PROCEDURE — 0BH18EZ INSERTION OF ENDOTRACHEAL AIRWAY INTO TRACHEA, VIA NATURAL OR ARTIFICIAL OPENING ENDOSCOPIC: ICD-10-PCS | Performed by: SURGERY

## 2022-01-01 PROCEDURE — 5A1945Z RESPIRATORY VENTILATION, 24-96 CONSECUTIVE HOURS: ICD-10-PCS | Performed by: SURGERY

## 2022-01-01 RX ORDER — DEXMEDETOMIDINE HYDROCHLORIDE 4 UG/ML
.1-.7 INJECTION, SOLUTION INTRAVENOUS
Status: DISCONTINUED | OUTPATIENT
Start: 2022-01-01 | End: 2022-01-01

## 2022-01-01 RX ORDER — WATER 1000 ML/1000ML
INJECTION, SOLUTION INTRAVENOUS
Status: COMPLETED
Start: 2022-01-01 | End: 2022-01-01

## 2022-01-01 RX ORDER — POTASSIUM CHLORIDE 20MEQ/15ML
40 LIQUID (ML) ORAL ONCE
Status: COMPLETED | OUTPATIENT
Start: 2022-01-01 | End: 2022-01-01

## 2022-01-01 RX ORDER — METOPROLOL TARTRATE 5 MG/5ML
2.5 INJECTION INTRAVENOUS ONCE
Status: COMPLETED | OUTPATIENT
Start: 2022-01-01 | End: 2022-01-01

## 2022-01-01 RX ORDER — INSULIN LISPRO 100 [IU]/ML
4-20 INJECTION, SOLUTION INTRAVENOUS; SUBCUTANEOUS EVERY 6 HOURS
Status: DISCONTINUED | OUTPATIENT
Start: 2022-01-01 | End: 2022-01-01

## 2022-01-01 RX ORDER — METOPROLOL SUCCINATE 50 MG/1
50 TABLET, EXTENDED RELEASE ORAL 2 TIMES DAILY
Status: DISCONTINUED | OUTPATIENT
Start: 2022-01-01 | End: 2022-01-01

## 2022-01-01 RX ORDER — POTASSIUM CHLORIDE 20MEQ/15ML
20 LIQUID (ML) ORAL ONCE
Status: COMPLETED | OUTPATIENT
Start: 2022-01-01 | End: 2022-01-01

## 2022-01-01 RX ORDER — KETAMINE HCL IN NACL, ISO-OSM 100MG/10ML
SYRINGE (ML) INJECTION
Status: COMPLETED
Start: 2022-01-01 | End: 2022-01-01

## 2022-01-01 RX ORDER — CALCIUM GLUCONATE 20 MG/ML
2 INJECTION, SOLUTION INTRAVENOUS ONCE
Status: COMPLETED | OUTPATIENT
Start: 2022-01-01 | End: 2022-01-01

## 2022-01-01 RX ORDER — AZELASTINE 1 MG/ML
1 SPRAY, METERED NASAL 2 TIMES DAILY
Qty: 90 ML | Refills: 1 | Status: SHIPPED | OUTPATIENT
Start: 2022-01-01 | End: 2022-01-01

## 2022-01-01 RX ORDER — BISACODYL 10 MG
10 SUPPOSITORY, RECTAL RECTAL DAILY PRN
Status: DISCONTINUED | OUTPATIENT
Start: 2022-01-01 | End: 2022-01-01

## 2022-01-01 RX ORDER — SUCCINYLCHOLINE/SOD CL,ISO/PF 100 MG/5ML
SYRINGE (ML) INTRAVENOUS CODE/TRAUMA/SEDATION MEDICATION
Status: COMPLETED | OUTPATIENT
Start: 2022-01-01 | End: 2022-01-01

## 2022-01-01 RX ORDER — ACETAMINOPHEN 160 MG/5ML
650 SUSPENSION, ORAL (FINAL DOSE FORM) ORAL EVERY 6 HOURS PRN
Status: DISCONTINUED | OUTPATIENT
Start: 2022-01-01 | End: 2022-01-01

## 2022-01-01 RX ORDER — POLYETHYLENE GLYCOL 3350 17 G/17G
17 POWDER, FOR SOLUTION ORAL DAILY
Status: DISCONTINUED | OUTPATIENT
Start: 2022-01-01 | End: 2022-01-01

## 2022-01-01 RX ORDER — SODIUM CHLORIDE FOR INHALATION 3 %
4 VIAL, NEBULIZER (ML) INHALATION
Status: DISCONTINUED | OUTPATIENT
Start: 2022-01-01 | End: 2022-01-01

## 2022-01-01 RX ORDER — LORAZEPAM 2 MG/ML
1 INJECTION INTRAMUSCULAR
Status: DISCONTINUED | OUTPATIENT
Start: 2022-01-01 | End: 2022-01-01 | Stop reason: HOSPADM

## 2022-01-01 RX ORDER — CALCIUM CHLORIDE 100 MG/ML
SYRINGE (ML) INTRAVENOUS CODE/TRAUMA/SEDATION MEDICATION
Status: COMPLETED | OUTPATIENT
Start: 2022-01-01 | End: 2022-01-01

## 2022-01-01 RX ORDER — INSULIN LISPRO 100 [IU]/ML
4 INJECTION, SOLUTION INTRAVENOUS; SUBCUTANEOUS
Status: DISCONTINUED | OUTPATIENT
Start: 2022-01-01 | End: 2022-01-01

## 2022-01-01 RX ORDER — POTASSIUM CHLORIDE 14.9 MG/ML
20 INJECTION INTRAVENOUS ONCE
Status: COMPLETED | OUTPATIENT
Start: 2022-01-01 | End: 2022-01-01

## 2022-01-01 RX ORDER — ATORVASTATIN CALCIUM 80 MG/1
80 TABLET, FILM COATED ORAL DAILY
Qty: 90 TABLET | Refills: 1 | Status: SHIPPED | OUTPATIENT
Start: 2022-01-01 | End: 2022-01-01

## 2022-01-01 RX ORDER — ACETAMINOPHEN 325 MG/1
975 TABLET ORAL EVERY 8 HOURS SCHEDULED
Status: DISCONTINUED | OUTPATIENT
Start: 2022-01-01 | End: 2022-01-01

## 2022-01-01 RX ORDER — PANTOPRAZOLE SODIUM 40 MG/1
40 TABLET, DELAYED RELEASE ORAL
Status: DISCONTINUED | OUTPATIENT
Start: 2022-01-01 | End: 2022-01-01

## 2022-01-01 RX ORDER — INSULIN LISPRO 100 [IU]/ML
1-6 INJECTION, SOLUTION INTRAVENOUS; SUBCUTANEOUS EVERY 6 HOURS SCHEDULED
Status: DISCONTINUED | OUTPATIENT
Start: 2022-01-01 | End: 2022-01-01

## 2022-01-01 RX ORDER — INSULIN LISPRO 100 [IU]/ML
2-12 INJECTION, SOLUTION INTRAVENOUS; SUBCUTANEOUS
Status: DISCONTINUED | OUTPATIENT
Start: 2022-01-01 | End: 2022-01-01

## 2022-01-01 RX ORDER — ERYTHROMYCIN 5 MG/G
0.5 OINTMENT OPHTHALMIC EVERY OTHER DAY
Status: DISCONTINUED | OUTPATIENT
Start: 2022-01-01 | End: 2022-01-01

## 2022-01-01 RX ORDER — PROPOFOL 10 MG/ML
5-50 INJECTION, EMULSION INTRAVENOUS
Status: DISCONTINUED | OUTPATIENT
Start: 2022-01-01 | End: 2022-01-01

## 2022-01-01 RX ORDER — AMOXICILLIN 250 MG
1 CAPSULE ORAL 2 TIMES DAILY
Status: DISCONTINUED | OUTPATIENT
Start: 2022-01-01 | End: 2022-01-01

## 2022-01-01 RX ORDER — OXYMETAZOLINE HYDROCHLORIDE 0.05 G/100ML
2 SPRAY NASAL EVERY 12 HOURS SCHEDULED
Status: COMPLETED | OUTPATIENT
Start: 2022-01-01 | End: 2022-01-01

## 2022-01-01 RX ORDER — LEVOTHYROXINE SODIUM 0.07 MG/1
75 TABLET ORAL
Status: DISCONTINUED | OUTPATIENT
Start: 2022-01-01 | End: 2022-01-01

## 2022-01-01 RX ORDER — MIDODRINE HYDROCHLORIDE 2.5 MG/1
2.5 TABLET ORAL 3 TIMES DAILY
Qty: 90 TABLET | Refills: 5 | Status: SHIPPED | OUTPATIENT
Start: 2022-01-01 | End: 2022-01-01 | Stop reason: SDUPTHER

## 2022-01-01 RX ORDER — QUETIAPINE FUMARATE 25 MG/1
25 TABLET, FILM COATED ORAL
Status: DISCONTINUED | OUTPATIENT
Start: 2022-01-01 | End: 2022-01-01

## 2022-01-01 RX ORDER — FUROSEMIDE 10 MG/ML
40 INJECTION INTRAMUSCULAR; INTRAVENOUS ONCE
Status: COMPLETED | OUTPATIENT
Start: 2022-01-01 | End: 2022-01-01

## 2022-01-01 RX ORDER — PREDNISOLONE ACETATE 10 MG/ML
1 SUSPENSION/ DROPS OPHTHALMIC EVERY OTHER DAY
Status: DISCONTINUED | OUTPATIENT
Start: 2022-01-01 | End: 2022-01-01

## 2022-01-01 RX ORDER — METOPROLOL TARTRATE 5 MG/5ML
5 INJECTION INTRAVENOUS EVERY 6 HOURS
Status: DISCONTINUED | OUTPATIENT
Start: 2022-01-01 | End: 2022-01-01

## 2022-01-01 RX ORDER — FENTANYL CITRATE 50 UG/ML
50 INJECTION, SOLUTION INTRAMUSCULAR; INTRAVENOUS
Status: DISCONTINUED | OUTPATIENT
Start: 2022-01-01 | End: 2022-01-01

## 2022-01-01 RX ORDER — HEPARIN SODIUM 5000 [USP'U]/ML
5000 INJECTION, SOLUTION INTRAVENOUS; SUBCUTANEOUS EVERY 8 HOURS SCHEDULED
Status: DISCONTINUED | OUTPATIENT
Start: 2022-01-01 | End: 2022-01-01

## 2022-01-01 RX ORDER — METRONIDAZOLE 500 MG/100ML
500 INJECTION, SOLUTION INTRAVENOUS EVERY 8 HOURS
Status: DISCONTINUED | OUTPATIENT
Start: 2022-01-01 | End: 2022-01-01

## 2022-01-01 RX ORDER — LIDOCAINE HYDROCHLORIDE AND EPINEPHRINE 10; 10 MG/ML; UG/ML
5 INJECTION, SOLUTION INFILTRATION; PERINEURAL ONCE
Status: COMPLETED | OUTPATIENT
Start: 2022-01-01 | End: 2022-01-01

## 2022-01-01 RX ORDER — LEVALBUTEROL 1.25 MG/.5ML
1.25 SOLUTION, CONCENTRATE RESPIRATORY (INHALATION) EVERY 8 HOURS PRN
Status: DISCONTINUED | OUTPATIENT
Start: 2022-01-01 | End: 2022-01-01

## 2022-01-01 RX ORDER — OLANZAPINE 10 MG/1
5 INJECTION, POWDER, LYOPHILIZED, FOR SOLUTION INTRAMUSCULAR ONCE
Status: COMPLETED | OUTPATIENT
Start: 2022-01-01 | End: 2022-01-01

## 2022-01-01 RX ORDER — ACETYLCYSTEINE 200 MG/ML
3 SOLUTION ORAL; RESPIRATORY (INHALATION)
Status: DISCONTINUED | OUTPATIENT
Start: 2022-01-01 | End: 2022-01-01

## 2022-01-01 RX ORDER — GLYCOPYRROLATE 0.2 MG/ML
0.1 INJECTION INTRAMUSCULAR; INTRAVENOUS EVERY 4 HOURS PRN
Status: DISCONTINUED | OUTPATIENT
Start: 2022-01-01 | End: 2022-01-01 | Stop reason: HOSPADM

## 2022-01-01 RX ORDER — METOPROLOL SUCCINATE 25 MG/1
25 TABLET, EXTENDED RELEASE ORAL ONCE
Status: COMPLETED | OUTPATIENT
Start: 2022-01-01 | End: 2022-01-01

## 2022-01-01 RX ORDER — MAGNESIUM SULFATE HEPTAHYDRATE 40 MG/ML
2 INJECTION, SOLUTION INTRAVENOUS ONCE
Status: COMPLETED | OUTPATIENT
Start: 2022-01-01 | End: 2022-01-01

## 2022-01-01 RX ORDER — HYDROMORPHONE HCL/PF 1 MG/ML
0.3 SYRINGE (ML) INJECTION EVERY 2 HOUR PRN
Status: DISCONTINUED | OUTPATIENT
Start: 2022-01-01 | End: 2022-01-01 | Stop reason: HOSPADM

## 2022-01-01 RX ORDER — LEVETIRACETAM 500 MG/1
500 TABLET ORAL EVERY 12 HOURS SCHEDULED
Status: COMPLETED | OUTPATIENT
Start: 2022-01-01 | End: 2022-01-01

## 2022-01-01 RX ORDER — ALBUMIN, HUMAN INJ 5% 5 %
12.5 SOLUTION INTRAVENOUS ONCE
Status: COMPLETED | OUTPATIENT
Start: 2022-01-01 | End: 2022-01-01

## 2022-01-01 RX ORDER — SODIUM CHLORIDE, SODIUM GLUCONATE, SODIUM ACETATE, POTASSIUM CHLORIDE, MAGNESIUM CHLORIDE, SODIUM PHOSPHATE, DIBASIC, AND POTASSIUM PHOSPHATE .53; .5; .37; .037; .03; .012; .00082 G/100ML; G/100ML; G/100ML; G/100ML; G/100ML; G/100ML; G/100ML
50 INJECTION, SOLUTION INTRAVENOUS CONTINUOUS
Status: DISCONTINUED | OUTPATIENT
Start: 2022-01-01 | End: 2022-01-01

## 2022-01-01 RX ORDER — FUROSEMIDE 10 MG/ML
20 INJECTION INTRAMUSCULAR; INTRAVENOUS ONCE
Status: COMPLETED | OUTPATIENT
Start: 2022-01-01 | End: 2022-01-01

## 2022-01-01 RX ORDER — PANTOPRAZOLE SODIUM 40 MG/1
40 TABLET, DELAYED RELEASE ORAL
Status: DISCONTINUED | OUTPATIENT
Start: 2022-01-01 | End: 2022-01-01 | Stop reason: SDUPTHER

## 2022-01-01 RX ORDER — AZELASTINE 1 MG/ML
1 SPRAY, METERED NASAL 2 TIMES DAILY
Qty: 30 ML | Refills: 0 | Status: SHIPPED | OUTPATIENT
Start: 2022-01-01 | End: 2022-01-01

## 2022-01-01 RX ORDER — LEVETIRACETAM 500 MG/1
500 TABLET ORAL EVERY 12 HOURS SCHEDULED
Status: DISCONTINUED | OUTPATIENT
Start: 2022-01-01 | End: 2022-01-01

## 2022-01-01 RX ORDER — HYDRALAZINE HYDROCHLORIDE 10 MG/1
10 TABLET, FILM COATED ORAL EVERY 8 HOURS SCHEDULED
Status: DISCONTINUED | OUTPATIENT
Start: 2022-01-01 | End: 2022-01-01

## 2022-01-01 RX ORDER — DEXTROSE MONOHYDRATE 50 MG/ML
75 INJECTION, SOLUTION INTRAVENOUS CONTINUOUS
Status: DISCONTINUED | OUTPATIENT
Start: 2022-01-01 | End: 2022-01-01

## 2022-01-01 RX ORDER — POTASSIUM CHLORIDE 29.8 MG/ML
40 INJECTION INTRAVENOUS ONCE
Status: COMPLETED | OUTPATIENT
Start: 2022-01-01 | End: 2022-01-01

## 2022-01-01 RX ORDER — PANTOPRAZOLE SODIUM 40 MG/10ML
40 INJECTION, POWDER, LYOPHILIZED, FOR SOLUTION INTRAVENOUS ONCE
Status: COMPLETED | OUTPATIENT
Start: 2022-01-01 | End: 2022-01-01

## 2022-01-01 RX ORDER — HYDROMORPHONE HCL/PF 1 MG/ML
1 SYRINGE (ML) INJECTION ONCE
Status: COMPLETED | OUTPATIENT
Start: 2022-01-01 | End: 2022-01-01

## 2022-01-01 RX ORDER — PANTOPRAZOLE SODIUM 40 MG/10ML
40 INJECTION, POWDER, LYOPHILIZED, FOR SOLUTION INTRAVENOUS
Status: DISCONTINUED | OUTPATIENT
Start: 2022-01-01 | End: 2022-01-01

## 2022-01-01 RX ORDER — OXYCODONE HYDROCHLORIDE 5 MG/1
2.5 TABLET ORAL EVERY 4 HOURS PRN
Status: DISCONTINUED | OUTPATIENT
Start: 2022-01-01 | End: 2022-01-01

## 2022-01-01 RX ORDER — AMOXICILLIN 500 MG/1
CAPSULE ORAL
COMMUNITY
Start: 2022-01-01 | End: 2022-01-01

## 2022-01-01 RX ORDER — HALOPERIDOL 5 MG/ML
0.5 INJECTION INTRAMUSCULAR EVERY 2 HOUR PRN
Status: DISCONTINUED | OUTPATIENT
Start: 2022-01-01 | End: 2022-01-01 | Stop reason: HOSPADM

## 2022-01-01 RX ORDER — LEVALBUTEROL 1.25 MG/.5ML
0.31 SOLUTION, CONCENTRATE RESPIRATORY (INHALATION) EVERY 8 HOURS PRN
Status: DISCONTINUED | OUTPATIENT
Start: 2022-01-01 | End: 2022-01-01

## 2022-01-01 RX ORDER — ACETAMINOPHEN 160 MG/5ML
650 SUSPENSION, ORAL (FINAL DOSE FORM) ORAL EVERY 8 HOURS SCHEDULED
Status: DISCONTINUED | OUTPATIENT
Start: 2022-01-01 | End: 2022-01-01

## 2022-01-01 RX ORDER — INSULIN LISPRO 100 [IU]/ML
1-6 INJECTION, SOLUTION INTRAVENOUS; SUBCUTANEOUS
Status: DISCONTINUED | OUTPATIENT
Start: 2022-01-01 | End: 2022-01-01

## 2022-01-01 RX ORDER — LEVALBUTEROL INHALATION SOLUTION 0.63 MG/3ML
0.63 SOLUTION RESPIRATORY (INHALATION) EVERY 8 HOURS PRN
Status: DISCONTINUED | OUTPATIENT
Start: 2022-01-01 | End: 2022-01-01

## 2022-01-01 RX ORDER — ATORVASTATIN CALCIUM 80 MG/1
80 TABLET, FILM COATED ORAL
Status: DISCONTINUED | OUTPATIENT
Start: 2022-01-01 | End: 2022-01-01

## 2022-01-01 RX ORDER — ISOSORBIDE DINITRATE 10 MG/1
10 TABLET ORAL
Status: DISCONTINUED | OUTPATIENT
Start: 2022-01-01 | End: 2022-01-01

## 2022-01-01 RX ORDER — ACETAMINOPHEN 325 MG/1
650 TABLET ORAL EVERY 6 HOURS PRN
Status: DISCONTINUED | OUTPATIENT
Start: 2022-01-01 | End: 2022-01-01

## 2022-01-01 RX ORDER — HYDROMORPHONE HCL/PF 1 MG/ML
0.3 SYRINGE (ML) INJECTION ONCE
Status: COMPLETED | OUTPATIENT
Start: 2022-01-01 | End: 2022-01-01

## 2022-01-01 RX ORDER — CHLORHEXIDINE GLUCONATE 0.12 MG/ML
15 RINSE ORAL EVERY 12 HOURS SCHEDULED
Status: DISCONTINUED | OUTPATIENT
Start: 2022-01-01 | End: 2022-01-01

## 2022-01-01 RX ORDER — LEVETIRACETAM 100 MG/ML
500 SOLUTION ORAL EVERY 12 HOURS SCHEDULED
Status: DISCONTINUED | OUTPATIENT
Start: 2022-01-01 | End: 2022-01-01

## 2022-01-01 RX ORDER — OXYCODONE HYDROCHLORIDE 5 MG/1
5 TABLET ORAL EVERY 4 HOURS PRN
Status: DISCONTINUED | OUTPATIENT
Start: 2022-01-01 | End: 2022-01-01

## 2022-01-01 RX ORDER — METOPROLOL SUCCINATE 25 MG/1
25 TABLET, EXTENDED RELEASE ORAL 2 TIMES DAILY
Status: DISCONTINUED | OUTPATIENT
Start: 2022-01-01 | End: 2022-01-01

## 2022-01-01 RX ORDER — FENTANYL CITRATE 50 UG/ML
50 INJECTION, SOLUTION INTRAMUSCULAR; INTRAVENOUS ONCE
Status: COMPLETED | OUTPATIENT
Start: 2022-01-01 | End: 2022-01-01

## 2022-01-01 RX ORDER — ETOMIDATE 2 MG/ML
INJECTION INTRAVENOUS CODE/TRAUMA/SEDATION MEDICATION
Status: COMPLETED | OUTPATIENT
Start: 2022-01-01 | End: 2022-01-01

## 2022-01-01 RX ORDER — LANOLIN ALCOHOL/MO/W.PET/CERES
3 CREAM (GRAM) TOPICAL
Status: DISCONTINUED | OUTPATIENT
Start: 2022-01-01 | End: 2022-01-01

## 2022-01-01 RX ORDER — METOPROLOL TARTRATE 5 MG/5ML
5 INJECTION INTRAVENOUS EVERY 6 HOURS
Status: DISCONTINUED | OUTPATIENT
Start: 2022-01-01 | End: 2022-01-01 | Stop reason: SDUPTHER

## 2022-01-01 RX ORDER — INSULIN LISPRO 100 [IU]/ML
4-20 INJECTION, SOLUTION INTRAVENOUS; SUBCUTANEOUS
Status: DISCONTINUED | OUTPATIENT
Start: 2022-01-01 | End: 2022-01-01

## 2022-01-01 RX ORDER — SODIUM BICARBONATE 84 MG/ML
INJECTION, SOLUTION INTRAVENOUS CODE/TRAUMA/SEDATION MEDICATION
Status: COMPLETED | OUTPATIENT
Start: 2022-01-01 | End: 2022-01-01

## 2022-01-01 RX ORDER — FENTANYL CITRATE-0.9 % NACL/PF 10 MCG/ML
100 PLASTIC BAG, INJECTION (ML) INTRAVENOUS CONTINUOUS
Status: DISCONTINUED | OUTPATIENT
Start: 2022-01-01 | End: 2022-01-01

## 2022-01-01 RX ORDER — ISOSORBIDE MONONITRATE 30 MG/1
30 TABLET, EXTENDED RELEASE ORAL DAILY
Status: DISCONTINUED | OUTPATIENT
Start: 2022-01-01 | End: 2022-01-01

## 2022-01-01 RX ORDER — LEVALBUTEROL 1.25 MG/.5ML
1.25 SOLUTION, CONCENTRATE RESPIRATORY (INHALATION) EVERY 6 HOURS
Status: DISCONTINUED | OUTPATIENT
Start: 2022-01-01 | End: 2022-01-01

## 2022-01-01 RX ORDER — KETAMINE HCL IN NACL, ISO-OSM 100MG/10ML
100 SYRINGE (ML) INJECTION ONCE
Status: COMPLETED | OUTPATIENT
Start: 2022-01-01 | End: 2022-01-01

## 2022-01-01 RX ORDER — FUROSEMIDE 10 MG/ML
40 INJECTION INTRAMUSCULAR; INTRAVENOUS
Status: DISCONTINUED | OUTPATIENT
Start: 2022-01-01 | End: 2022-01-01

## 2022-01-01 RX ADMIN — INSULIN LISPRO 2 UNITS: 100 INJECTION, SOLUTION INTRAVENOUS; SUBCUTANEOUS at 19:00

## 2022-01-01 RX ADMIN — HEPARIN SODIUM 5000 UNITS: 5000 INJECTION INTRAVENOUS; SUBCUTANEOUS at 05:52

## 2022-01-01 RX ADMIN — LEVALBUTEROL HYDROCHLORIDE 1.25 MG: 1.25 SOLUTION, CONCENTRATE RESPIRATORY (INHALATION) at 04:35

## 2022-01-01 RX ADMIN — INSULIN LISPRO 4 UNITS: 100 INJECTION, SOLUTION INTRAVENOUS; SUBCUTANEOUS at 22:29

## 2022-01-01 RX ADMIN — Medication 20 MG: at 06:38

## 2022-01-01 RX ADMIN — HEPARIN SODIUM 5000 UNITS: 5000 INJECTION INTRAVENOUS; SUBCUTANEOUS at 05:08

## 2022-01-01 RX ADMIN — HYDRALAZINE HYDROCHLORIDE 10 MG: 10 TABLET, FILM COATED ORAL at 13:27

## 2022-01-01 RX ADMIN — FENTANYL CITRATE 50 MCG/HR: 0.05 INJECTION, SOLUTION INTRAMUSCULAR; INTRAVENOUS at 05:41

## 2022-01-01 RX ADMIN — Medication 100 MG: at 03:24

## 2022-01-01 RX ADMIN — INSULIN DETEMIR 11 UNITS: 100 INJECTION, SOLUTION SUBCUTANEOUS at 21:26

## 2022-01-01 RX ADMIN — METOROPROLOL TARTRATE 5 MG: 5 INJECTION, SOLUTION INTRAVENOUS at 23:49

## 2022-01-01 RX ADMIN — INSULIN LISPRO 2 UNITS: 100 INJECTION, SOLUTION INTRAVENOUS; SUBCUTANEOUS at 11:39

## 2022-01-01 RX ADMIN — HEPARIN SODIUM 5000 UNITS: 5000 INJECTION INTRAVENOUS; SUBCUTANEOUS at 06:23

## 2022-01-01 RX ADMIN — ATORVASTATIN CALCIUM 80 MG: 80 TABLET, FILM COATED ORAL at 16:00

## 2022-01-01 RX ADMIN — CALCIUM CHLORIDE 1 G: 100 INJECTION PARENTERAL at 03:27

## 2022-01-01 RX ADMIN — ISOSORBIDE MONONITRATE 30 MG: 30 TABLET, EXTENDED RELEASE ORAL at 08:28

## 2022-01-01 RX ADMIN — ETOMIDATE 20 MG: 20 INJECTION, SOLUTION INTRAVENOUS at 03:23

## 2022-01-01 RX ADMIN — FUROSEMIDE 40 MG: 10 INJECTION, SOLUTION INTRAMUSCULAR; INTRAVENOUS at 08:22

## 2022-01-01 RX ADMIN — ATORVASTATIN CALCIUM 80 MG: 80 TABLET, FILM COATED ORAL at 17:33

## 2022-01-01 RX ADMIN — HEPARIN SODIUM 5000 UNITS: 5000 INJECTION INTRAVENOUS; SUBCUTANEOUS at 04:26

## 2022-01-01 RX ADMIN — ACETAMINOPHEN 975 MG: 325 TABLET ORAL at 21:26

## 2022-01-01 RX ADMIN — SODIUM CHLORIDE 1000 MG: 900 INJECTION, SOLUTION INTRAVENOUS at 11:37

## 2022-01-01 RX ADMIN — INSULIN LISPRO 2 UNITS: 100 INJECTION, SOLUTION INTRAVENOUS; SUBCUTANEOUS at 06:06

## 2022-01-01 RX ADMIN — METOROPROLOL TARTRATE 5 MG: 5 INJECTION, SOLUTION INTRAVENOUS at 12:13

## 2022-01-01 RX ADMIN — METOROPROLOL TARTRATE 5 MG: 5 INJECTION, SOLUTION INTRAVENOUS at 04:23

## 2022-01-01 RX ADMIN — QUETIAPINE FUMARATE 25 MG: 25 TABLET ORAL at 21:26

## 2022-01-01 RX ADMIN — SENNOSIDES AND DOCUSATE SODIUM 1 TABLET: 8.6; 5 TABLET ORAL at 17:42

## 2022-01-01 RX ADMIN — INSULIN LISPRO 12 UNITS: 100 INJECTION, SOLUTION INTRAVENOUS; SUBCUTANEOUS at 17:24

## 2022-01-01 RX ADMIN — INSULIN LISPRO 4 UNITS: 100 INJECTION, SOLUTION INTRAVENOUS; SUBCUTANEOUS at 21:29

## 2022-01-01 RX ADMIN — HEPARIN SODIUM 5000 UNITS: 5000 INJECTION INTRAVENOUS; SUBCUTANEOUS at 05:50

## 2022-01-01 RX ADMIN — Medication 20 MG: at 05:08

## 2022-01-01 RX ADMIN — ACETYLCYSTEINE 600 MG: 200 SOLUTION ORAL; RESPIRATORY (INHALATION) at 21:38

## 2022-01-01 RX ADMIN — POLYETHYLENE GLYCOL 3350 17 G: 17 POWDER, FOR SOLUTION ORAL at 08:48

## 2022-01-01 RX ADMIN — LEVOTHYROXINE SODIUM 75 MCG: 75 TABLET ORAL at 06:22

## 2022-01-01 RX ADMIN — HYDRALAZINE HYDROCHLORIDE 10 MG: 10 TABLET, FILM COATED ORAL at 21:22

## 2022-01-01 RX ADMIN — SENNOSIDES AND DOCUSATE SODIUM 1 TABLET: 8.6; 5 TABLET ORAL at 16:00

## 2022-01-01 RX ADMIN — FENTANYL CITRATE 50 MCG: 50 INJECTION INTRAMUSCULAR; INTRAVENOUS at 00:57

## 2022-01-01 RX ADMIN — HEPARIN SODIUM 5000 UNITS: 5000 INJECTION INTRAVENOUS; SUBCUTANEOUS at 06:19

## 2022-01-01 RX ADMIN — HEPARIN SODIUM 5000 UNITS: 5000 INJECTION INTRAVENOUS; SUBCUTANEOUS at 22:05

## 2022-01-01 RX ADMIN — ISOSORBIDE DINITRATE 10 MG: 10 TABLET ORAL at 12:41

## 2022-01-01 RX ADMIN — Medication 50 MEQ: at 04:00

## 2022-01-01 RX ADMIN — INSULIN LISPRO 6 UNITS: 100 INJECTION, SOLUTION INTRAVENOUS; SUBCUTANEOUS at 08:30

## 2022-01-01 RX ADMIN — INSULIN LISPRO 4 UNITS: 100 INJECTION, SOLUTION INTRAVENOUS; SUBCUTANEOUS at 22:06

## 2022-01-01 RX ADMIN — METRONIDAZOLE 500 MG: 500 INJECTION, SOLUTION INTRAVENOUS at 02:10

## 2022-01-01 RX ADMIN — Medication 12.5 MG: at 22:03

## 2022-01-01 RX ADMIN — MAGNESIUM SULFATE HEPTAHYDRATE 2 G: 40 INJECTION, SOLUTION INTRAVENOUS at 08:14

## 2022-01-01 RX ADMIN — SODIUM CHLORIDE, SODIUM GLUCONATE, SODIUM ACETATE, POTASSIUM CHLORIDE, MAGNESIUM CHLORIDE, SODIUM PHOSPHATE, DIBASIC, AND POTASSIUM PHOSPHATE 50 ML/HR: .53; .5; .37; .037; .03; .012; .00082 INJECTION, SOLUTION INTRAVENOUS at 10:22

## 2022-01-01 RX ADMIN — ACETAMINOPHEN 975 MG: 325 TABLET ORAL at 06:39

## 2022-01-01 RX ADMIN — LEVOTHYROXINE SODIUM 75 MCG: 75 TABLET ORAL at 06:05

## 2022-01-01 RX ADMIN — HEPARIN SODIUM 5000 UNITS: 5000 INJECTION INTRAVENOUS; SUBCUTANEOUS at 21:26

## 2022-01-01 RX ADMIN — HYDRALAZINE HYDROCHLORIDE 10 MG: 10 TABLET, FILM COATED ORAL at 06:05

## 2022-01-01 RX ADMIN — POLYETHYLENE GLYCOL 3350 17 G: 17 POWDER, FOR SOLUTION ORAL at 14:06

## 2022-01-01 RX ADMIN — ERYTHROMYCIN 0.5 INCH: 5 OINTMENT OPHTHALMIC at 17:47

## 2022-01-01 RX ADMIN — HEPARIN SODIUM 5000 UNITS: 5000 INJECTION INTRAVENOUS; SUBCUTANEOUS at 21:22

## 2022-01-01 RX ADMIN — FENTANYL CITRATE 50 MCG: 50 INJECTION INTRAMUSCULAR; INTRAVENOUS at 02:55

## 2022-01-01 RX ADMIN — LEVETIRACETAM 500 MG: 500 TABLET, FILM COATED ORAL at 08:42

## 2022-01-01 RX ADMIN — DEXMEDETOMIDINE HYDROCHLORIDE 0.2 MCG/KG/HR: 400 INJECTION INTRAVENOUS at 11:39

## 2022-01-01 RX ADMIN — POLYETHYLENE GLYCOL 3350 17 G: 17 POWDER, FOR SOLUTION ORAL at 12:42

## 2022-01-01 RX ADMIN — HEPARIN SODIUM 5000 UNITS: 5000 INJECTION INTRAVENOUS; SUBCUTANEOUS at 22:33

## 2022-01-01 RX ADMIN — NOREPINEPHRINE BITARTRATE 10 MCG/MIN: 1 INJECTION INTRAVENOUS at 03:23

## 2022-01-01 RX ADMIN — INSULIN LISPRO 4 UNITS: 100 INJECTION, SOLUTION INTRAVENOUS; SUBCUTANEOUS at 08:40

## 2022-01-01 RX ADMIN — QUETIAPINE FUMARATE 25 MG: 25 TABLET ORAL at 21:23

## 2022-01-01 RX ADMIN — FUROSEMIDE 40 MG: 10 INJECTION, SOLUTION INTRAMUSCULAR; INTRAVENOUS at 12:55

## 2022-01-01 RX ADMIN — INSULIN LISPRO 12 UNITS: 100 INJECTION, SOLUTION INTRAVENOUS; SUBCUTANEOUS at 12:10

## 2022-01-01 RX ADMIN — MAGNESIUM SULFATE HEPTAHYDRATE 2 G: 40 INJECTION, SOLUTION INTRAVENOUS at 10:25

## 2022-01-01 RX ADMIN — Medication 3 MG: at 21:23

## 2022-01-01 RX ADMIN — LEVOTHYROXINE SODIUM 75 MCG: 75 TABLET ORAL at 05:08

## 2022-01-01 RX ADMIN — LORAZEPAM 1 MG: 2 INJECTION INTRAMUSCULAR; INTRAVENOUS at 07:11

## 2022-01-01 RX ADMIN — LEVETIRACETAM 500 MG: 100 SOLUTION ORAL at 08:28

## 2022-01-01 RX ADMIN — METOPROLOL TARTRATE 25 MG: 25 TABLET, FILM COATED ORAL at 08:25

## 2022-01-01 RX ADMIN — OXYMETAZOLINE HYDROCHLORIDE 2 SPRAY: 0.05 SPRAY NASAL at 21:47

## 2022-01-01 RX ADMIN — SODIUM CHLORIDE 500 MG: 900 INJECTION, SOLUTION INTRAVENOUS at 21:47

## 2022-01-01 RX ADMIN — LEVETIRACETAM 500 MG: 500 TABLET, FILM COATED ORAL at 21:23

## 2022-01-01 RX ADMIN — SENNOSIDES AND DOCUSATE SODIUM 1 TABLET: 8.6; 5 TABLET ORAL at 12:41

## 2022-01-01 RX ADMIN — HYDROMORPHONE HYDROCHLORIDE 1 MG: 1 INJECTION, SOLUTION INTRAMUSCULAR; INTRAVENOUS; SUBCUTANEOUS at 04:15

## 2022-01-01 RX ADMIN — POTASSIUM CHLORIDE 40 MEQ: 20 SOLUTION ORAL at 09:13

## 2022-01-01 RX ADMIN — CHLORHEXIDINE GLUCONATE 15 ML: 1.2 SOLUTION ORAL at 21:22

## 2022-01-01 RX ADMIN — LEVOTHYROXINE SODIUM 75 MCG: 75 TABLET ORAL at 08:22

## 2022-01-01 RX ADMIN — METOROPROLOL TARTRATE 5 MG: 5 INJECTION, SOLUTION INTRAVENOUS at 19:02

## 2022-01-01 RX ADMIN — INSULIN LISPRO 4 UNITS: 100 INJECTION, SOLUTION INTRAVENOUS; SUBCUTANEOUS at 12:10

## 2022-01-01 RX ADMIN — HEPARIN SODIUM 5000 UNITS: 5000 INJECTION INTRAVENOUS; SUBCUTANEOUS at 05:13

## 2022-01-01 RX ADMIN — LEVETIRACETAM 500 MG: 100 SOLUTION ORAL at 09:14

## 2022-01-01 RX ADMIN — FUROSEMIDE 40 MG: 10 INJECTION, SOLUTION INTRAMUSCULAR; INTRAVENOUS at 08:28

## 2022-01-01 RX ADMIN — HEPARIN SODIUM 5000 UNITS: 5000 INJECTION INTRAVENOUS; SUBCUTANEOUS at 15:56

## 2022-01-01 RX ADMIN — METOROPROLOL TARTRATE 5 MG: 5 INJECTION, SOLUTION INTRAVENOUS at 12:29

## 2022-01-01 RX ADMIN — ATORVASTATIN CALCIUM 80 MG: 80 TABLET, FILM COATED ORAL at 17:28

## 2022-01-01 RX ADMIN — LEVETIRACETAM 500 MG: 500 TABLET, FILM COATED ORAL at 22:03

## 2022-01-01 RX ADMIN — OXYMETAZOLINE HYDROCHLORIDE 2 SPRAY: 0.05 SPRAY NASAL at 10:35

## 2022-01-01 RX ADMIN — POTASSIUM CHLORIDE 20 MEQ: 20 SOLUTION ORAL at 14:46

## 2022-01-01 RX ADMIN — INSULIN LISPRO 4 UNITS: 100 INJECTION, SOLUTION INTRAVENOUS; SUBCUTANEOUS at 12:13

## 2022-01-01 RX ADMIN — POLYETHYLENE GLYCOL 3350 17 G: 17 POWDER, FOR SOLUTION ORAL at 08:28

## 2022-01-01 RX ADMIN — ACETAMINOPHEN 975 MG: 325 TABLET ORAL at 14:46

## 2022-01-01 RX ADMIN — SODIUM CHLORIDE 1 UNITS/HR: 900 INJECTION, SOLUTION INTRAVENOUS at 11:45

## 2022-01-01 RX ADMIN — HEPARIN SODIUM 5000 UNITS: 5000 INJECTION INTRAVENOUS; SUBCUTANEOUS at 21:36

## 2022-01-01 RX ADMIN — INSULIN LISPRO 2 UNITS: 100 INJECTION, SOLUTION INTRAVENOUS; SUBCUTANEOUS at 00:01

## 2022-01-01 RX ADMIN — METOROPROLOL TARTRATE 5 MG: 5 INJECTION, SOLUTION INTRAVENOUS at 22:24

## 2022-01-01 RX ADMIN — METOROPROLOL TARTRATE 5 MG: 5 INJECTION, SOLUTION INTRAVENOUS at 17:46

## 2022-01-01 RX ADMIN — METOROPROLOL TARTRATE 5 MG: 5 INJECTION, SOLUTION INTRAVENOUS at 13:43

## 2022-01-01 RX ADMIN — INSULIN LISPRO 2 UNITS: 100 INJECTION, SOLUTION INTRAVENOUS; SUBCUTANEOUS at 12:42

## 2022-01-01 RX ADMIN — DEXMEDETOMIDINE HYDROCHLORIDE 0.4 MCG/KG/HR: 400 INJECTION INTRAVENOUS at 04:19

## 2022-01-01 RX ADMIN — VASOPRESSIN 0.04 UNITS/MIN: 20 INJECTION INTRAVENOUS at 04:03

## 2022-01-01 RX ADMIN — HYDROMORPHONE HYDROCHLORIDE 0.3 MG: 1 INJECTION, SOLUTION INTRAMUSCULAR; INTRAVENOUS; SUBCUTANEOUS at 07:11

## 2022-01-01 RX ADMIN — METOROPROLOL TARTRATE 5 MG: 5 INJECTION, SOLUTION INTRAVENOUS at 17:47

## 2022-01-01 RX ADMIN — SODIUM BICARBONATE 50 MEQ: 84 INJECTION, SOLUTION INTRAVENOUS at 03:22

## 2022-01-01 RX ADMIN — SENNOSIDES AND DOCUSATE SODIUM 1 TABLET: 8.6; 5 TABLET ORAL at 08:38

## 2022-01-01 RX ADMIN — PANTOPRAZOLE SODIUM 40 MG: 40 INJECTION, POWDER, FOR SOLUTION INTRAVENOUS at 12:29

## 2022-01-01 RX ADMIN — ACETAMINOPHEN 975 MG: 325 TABLET ORAL at 13:05

## 2022-01-01 RX ADMIN — HYDROMORPHONE HYDROCHLORIDE 0.3 MG: 1 INJECTION, SOLUTION INTRAMUSCULAR; INTRAVENOUS; SUBCUTANEOUS at 03:07

## 2022-01-01 RX ADMIN — INSULIN LISPRO 4 UNITS: 100 INJECTION, SOLUTION INTRAVENOUS; SUBCUTANEOUS at 08:00

## 2022-01-01 RX ADMIN — TETANUS TOXOID, REDUCED DIPHTHERIA TOXOID AND ACELLULAR PERTUSSIS VACCINE, ADSORBED 0.5 ML: 5; 2.5; 8; 8; 2.5 SUSPENSION INTRAMUSCULAR at 12:55

## 2022-01-01 RX ADMIN — POTASSIUM CHLORIDE 40 MEQ: 29.8 INJECTION, SOLUTION INTRAVENOUS at 08:28

## 2022-01-01 RX ADMIN — FENTANYL CITRATE 50 MCG/HR: 0.05 INJECTION, SOLUTION INTRAMUSCULAR; INTRAVENOUS at 21:34

## 2022-01-01 RX ADMIN — METOPROLOL SUCCINATE 25 MG: 25 TABLET, EXTENDED RELEASE ORAL at 08:42

## 2022-01-01 RX ADMIN — HEPARIN SODIUM 5000 UNITS: 5000 INJECTION INTRAVENOUS; SUBCUTANEOUS at 14:46

## 2022-01-01 RX ADMIN — PROPOFOL 15 MCG/KG/MIN: 10 INJECTION, EMULSION INTRAVENOUS at 09:37

## 2022-01-01 RX ADMIN — LEVOTHYROXINE SODIUM 75 MCG: 75 TABLET ORAL at 06:39

## 2022-01-01 RX ADMIN — POTASSIUM CHLORIDE 40 MEQ: 29.8 INJECTION, SOLUTION INTRAVENOUS at 11:32

## 2022-01-01 RX ADMIN — FUROSEMIDE 40 MG: 10 INJECTION, SOLUTION INTRAMUSCULAR; INTRAVENOUS at 17:42

## 2022-01-01 RX ADMIN — INSULIN LISPRO 4 UNITS: 100 INJECTION, SOLUTION INTRAVENOUS; SUBCUTANEOUS at 17:34

## 2022-01-01 RX ADMIN — MAGNESIUM SULFATE HEPTAHYDRATE 2 G: 40 INJECTION, SOLUTION INTRAVENOUS at 09:29

## 2022-01-01 RX ADMIN — FENTANYL CITRATE 50 MCG: 50 INJECTION INTRAMUSCULAR; INTRAVENOUS at 05:32

## 2022-01-01 RX ADMIN — SENNOSIDES AND DOCUSATE SODIUM 1 TABLET: 8.6; 5 TABLET ORAL at 17:28

## 2022-01-01 RX ADMIN — DEXTROSE 50 ML/HR: 5 SOLUTION INTRAVENOUS at 21:15

## 2022-01-01 RX ADMIN — POTASSIUM CHLORIDE 40 MEQ: 20 SOLUTION ORAL at 08:27

## 2022-01-01 RX ADMIN — HEPARIN SODIUM 5000 UNITS: 5000 INJECTION INTRAVENOUS; SUBCUTANEOUS at 06:39

## 2022-01-01 RX ADMIN — HEPARIN SODIUM 5000 UNITS: 5000 INJECTION INTRAVENOUS; SUBCUTANEOUS at 14:06

## 2022-01-01 RX ADMIN — PANTOPRAZOLE SODIUM 40 MG: 40 INJECTION, POWDER, FOR SOLUTION INTRAVENOUS at 05:52

## 2022-01-01 RX ADMIN — Medication 20 MG: at 06:19

## 2022-01-01 RX ADMIN — ATORVASTATIN CALCIUM 80 MG: 80 TABLET, FILM COATED ORAL at 17:23

## 2022-01-01 RX ADMIN — INSULIN LISPRO 8 UNITS: 100 INJECTION, SOLUTION INTRAVENOUS; SUBCUTANEOUS at 00:24

## 2022-01-01 RX ADMIN — ALBUMIN HUMAN 12.5 G: 0.05 INJECTION, SOLUTION INTRAVENOUS at 05:00

## 2022-01-01 RX ADMIN — HYDROMORPHONE HYDROCHLORIDE 0.3 MG: 1 INJECTION, SOLUTION INTRAMUSCULAR; INTRAVENOUS; SUBCUTANEOUS at 14:20

## 2022-01-01 RX ADMIN — INSULIN LISPRO 4 UNITS: 100 INJECTION, SOLUTION INTRAVENOUS; SUBCUTANEOUS at 21:27

## 2022-01-01 RX ADMIN — PERFLUTREN 0.8 ML/MIN: 6.52 INJECTION, SUSPENSION INTRAVENOUS at 12:01

## 2022-01-01 RX ADMIN — HEPARIN SODIUM 5000 UNITS: 5000 INJECTION INTRAVENOUS; SUBCUTANEOUS at 06:05

## 2022-01-01 RX ADMIN — INSULIN DETEMIR 11 UNITS: 100 INJECTION, SOLUTION SUBCUTANEOUS at 22:23

## 2022-01-01 RX ADMIN — LEVETIRACETAM 500 MG: 500 TABLET, FILM COATED ORAL at 21:26

## 2022-01-01 RX ADMIN — OLANZAPINE 5 MG: 10 INJECTION, POWDER, FOR SOLUTION INTRAMUSCULAR at 01:21

## 2022-01-01 RX ADMIN — HEPARIN SODIUM 5000 UNITS: 5000 INJECTION INTRAVENOUS; SUBCUTANEOUS at 16:00

## 2022-01-01 RX ADMIN — INSULIN LISPRO 4 UNITS: 100 INJECTION, SOLUTION INTRAVENOUS; SUBCUTANEOUS at 08:49

## 2022-01-01 RX ADMIN — FUROSEMIDE 40 MG: 10 INJECTION, SOLUTION INTRAMUSCULAR; INTRAVENOUS at 17:33

## 2022-01-01 RX ADMIN — HEPARIN SODIUM 5000 UNITS: 5000 INJECTION INTRAVENOUS; SUBCUTANEOUS at 13:43

## 2022-01-01 RX ADMIN — CEFEPIME 1000 MG: 1 INJECTION, POWDER, FOR SOLUTION INTRAMUSCULAR; INTRAVENOUS at 23:07

## 2022-01-01 RX ADMIN — Medication 12.5 MG: at 11:18

## 2022-01-01 RX ADMIN — PROPOFOL 15 MCG/KG/MIN: 10 INJECTION, EMULSION INTRAVENOUS at 05:40

## 2022-01-01 RX ADMIN — Medication 12.5 MG: at 21:26

## 2022-01-01 RX ADMIN — FENTANYL CITRATE 50 MCG: 50 INJECTION INTRAMUSCULAR; INTRAVENOUS at 23:28

## 2022-01-01 RX ADMIN — NOREPINEPHRINE BITARTRATE 24 MCG/MIN: 1 SOLUTION INTRAVENOUS at 04:04

## 2022-01-01 RX ADMIN — Medication 3 MG: at 21:26

## 2022-01-01 RX ADMIN — INSULIN LISPRO 3 UNITS: 100 INJECTION, SOLUTION INTRAVENOUS; SUBCUTANEOUS at 12:10

## 2022-01-01 RX ADMIN — POTASSIUM CHLORIDE 20 MEQ: 14.9 INJECTION, SOLUTION INTRAVENOUS at 08:22

## 2022-01-01 RX ADMIN — INSULIN LISPRO 2 UNITS: 100 INJECTION, SOLUTION INTRAVENOUS; SUBCUTANEOUS at 23:52

## 2022-01-01 RX ADMIN — INSULIN DETEMIR 11 UNITS: 100 INJECTION, SOLUTION SUBCUTANEOUS at 21:24

## 2022-01-01 RX ADMIN — METOROPROLOL TARTRATE 5 MG: 5 INJECTION, SOLUTION INTRAVENOUS at 23:53

## 2022-01-01 RX ADMIN — SENNOSIDES AND DOCUSATE SODIUM 1 TABLET: 8.6; 5 TABLET ORAL at 17:33

## 2022-01-01 RX ADMIN — HEPARIN SODIUM 5000 UNITS: 5000 INJECTION INTRAVENOUS; SUBCUTANEOUS at 22:23

## 2022-01-01 RX ADMIN — FENTANYL CITRATE 50 MCG/HR: 0.05 INJECTION, SOLUTION INTRAMUSCULAR; INTRAVENOUS at 18:33

## 2022-01-01 RX ADMIN — LIDOCAINE HYDROCHLORIDE,EPINEPHRINE BITARTRATE 5 ML: 10; .01 INJECTION, SOLUTION INFILTRATION; PERINEURAL at 17:48

## 2022-01-01 RX ADMIN — CEFEPIME 1000 MG: 1 INJECTION, POWDER, FOR SOLUTION INTRAMUSCULAR; INTRAVENOUS at 12:10

## 2022-01-01 RX ADMIN — INSULIN LISPRO 2 UNITS: 100 INJECTION, SOLUTION INTRAVENOUS; SUBCUTANEOUS at 05:58

## 2022-01-01 RX ADMIN — INSULIN DETEMIR 11 UNITS: 100 INJECTION, SOLUTION SUBCUTANEOUS at 21:36

## 2022-01-01 RX ADMIN — LEVETIRACETAM 500 MG: 500 TABLET, FILM COATED ORAL at 08:38

## 2022-01-01 RX ADMIN — METOROPROLOL TARTRATE 5 MG: 5 INJECTION, SOLUTION INTRAVENOUS at 05:13

## 2022-01-01 RX ADMIN — LEVETIRACETAM 500 MG: 100 SOLUTION ORAL at 21:22

## 2022-01-01 RX ADMIN — Medication 100 MG: at 04:02

## 2022-01-01 RX ADMIN — POLYETHYLENE GLYCOL 3350 17 G: 17 POWDER, FOR SOLUTION ORAL at 08:38

## 2022-01-01 RX ADMIN — PROPOFOL 15 MCG/KG/MIN: 10 INJECTION, EMULSION INTRAVENOUS at 13:06

## 2022-01-01 RX ADMIN — METOROPROLOL TARTRATE 2.5 MG: 5 INJECTION, SOLUTION INTRAVENOUS at 10:25

## 2022-01-01 RX ADMIN — CHLORHEXIDINE GLUCONATE 15 ML: 1.2 SOLUTION ORAL at 20:28

## 2022-01-01 RX ADMIN — METOROPROLOL TARTRATE 5 MG: 5 INJECTION, SOLUTION INTRAVENOUS at 12:19

## 2022-01-01 RX ADMIN — METOPROLOL SUCCINATE 25 MG: 25 TABLET, EXTENDED RELEASE ORAL at 21:24

## 2022-01-01 RX ADMIN — METOROPROLOL TARTRATE 5 MG: 5 INJECTION, SOLUTION INTRAVENOUS at 17:50

## 2022-01-01 RX ADMIN — CALCIUM GLUCONATE 2 G: 20 INJECTION, SOLUTION INTRAVENOUS at 08:30

## 2022-01-01 RX ADMIN — Medication 20 MG: at 06:21

## 2022-01-01 RX ADMIN — INSULIN LISPRO 4 UNITS: 100 INJECTION, SOLUTION INTRAVENOUS; SUBCUTANEOUS at 11:56

## 2022-01-01 RX ADMIN — QUETIAPINE FUMARATE 25 MG: 25 TABLET ORAL at 22:03

## 2022-01-01 RX ADMIN — FENTANYL CITRATE 50 MCG: 50 INJECTION INTRAMUSCULAR; INTRAVENOUS at 02:13

## 2022-01-01 RX ADMIN — HEPARIN SODIUM 5000 UNITS: 5000 INJECTION INTRAVENOUS; SUBCUTANEOUS at 15:26

## 2022-01-01 RX ADMIN — SODIUM BICARBONATE 50 MEQ: 84 INJECTION, SOLUTION INTRAVENOUS at 03:26

## 2022-01-01 RX ADMIN — LEVETIRACETAM 500 MG: 100 SOLUTION ORAL at 20:28

## 2022-01-01 RX ADMIN — SODIUM BICARBONATE 50 MEQ: 84 INJECTION, SOLUTION INTRAVENOUS at 04:00

## 2022-01-01 RX ADMIN — INSULIN LISPRO 3 UNITS: 100 INJECTION, SOLUTION INTRAVENOUS; SUBCUTANEOUS at 08:35

## 2022-01-01 RX ADMIN — LORAZEPAM 1 MG: 2 INJECTION INTRAMUSCULAR; INTRAVENOUS at 14:27

## 2022-01-01 RX ADMIN — METRONIDAZOLE 500 MG: 500 INJECTION, SOLUTION INTRAVENOUS at 10:25

## 2022-01-01 RX ADMIN — SENNOSIDES AND DOCUSATE SODIUM 1 TABLET: 8.6; 5 TABLET ORAL at 08:42

## 2022-01-01 RX ADMIN — ACETAMINOPHEN 975 MG: 325 TABLET ORAL at 13:27

## 2022-01-01 RX ADMIN — METOROPROLOL TARTRATE 5 MG: 5 INJECTION, SOLUTION INTRAVENOUS at 01:06

## 2022-01-01 RX ADMIN — SODIUM CHLORIDE, SODIUM GLUCONATE, SODIUM ACETATE, POTASSIUM CHLORIDE, MAGNESIUM CHLORIDE, SODIUM PHOSPHATE, DIBASIC, AND POTASSIUM PHOSPHATE 50 ML/HR: .53; .5; .37; .037; .03; .012; .00082 INJECTION, SOLUTION INTRAVENOUS at 14:53

## 2022-01-01 RX ADMIN — CHLORHEXIDINE GLUCONATE 15 ML: 1.2 SOLUTION ORAL at 08:15

## 2022-01-01 RX ADMIN — Medication 12.5 MG: at 08:38

## 2022-01-01 RX ADMIN — INSULIN LISPRO 4 UNITS: 100 INJECTION, SOLUTION INTRAVENOUS; SUBCUTANEOUS at 17:23

## 2022-01-01 RX ADMIN — CHLORHEXIDINE GLUCONATE 15 ML: 1.2 SOLUTION ORAL at 08:28

## 2022-01-01 RX ADMIN — HEPARIN SODIUM 5000 UNITS: 5000 INJECTION INTRAVENOUS; SUBCUTANEOUS at 21:46

## 2022-01-01 RX ADMIN — ACETAMINOPHEN 975 MG: 325 TABLET ORAL at 22:03

## 2022-01-01 RX ADMIN — SENNOSIDES AND DOCUSATE SODIUM 1 TABLET: 8.6; 5 TABLET ORAL at 09:16

## 2022-01-01 RX ADMIN — INSULIN DETEMIR 11 UNITS: 100 INJECTION, SOLUTION SUBCUTANEOUS at 21:22

## 2022-01-01 RX ADMIN — INSULIN LISPRO 4 UNITS: 100 INJECTION, SOLUTION INTRAVENOUS; SUBCUTANEOUS at 12:14

## 2022-01-01 RX ADMIN — OXYMETAZOLINE HYDROCHLORIDE 2 SPRAY: 0.05 SPRAY NASAL at 22:13

## 2022-01-01 RX ADMIN — LEVETIRACETAM 500 MG: 500 TABLET, FILM COATED ORAL at 08:25

## 2022-01-01 RX ADMIN — CHLORHEXIDINE GLUCONATE 15 ML: 1.2 SOLUTION ORAL at 09:16

## 2022-01-01 RX ADMIN — PREDNISOLONE ACETATE 1 DROP: 10 SUSPENSION/ DROPS OPHTHALMIC at 17:47

## 2022-01-01 RX ADMIN — HEPARIN SODIUM 5000 UNITS: 5000 INJECTION INTRAVENOUS; SUBCUTANEOUS at 13:27

## 2022-01-01 RX ADMIN — SENNOSIDES AND DOCUSATE SODIUM 1 TABLET: 8.6; 5 TABLET ORAL at 08:28

## 2022-01-01 RX ADMIN — LEVALBUTEROL HYDROCHLORIDE 1.25 MG: 1.25 SOLUTION, CONCENTRATE RESPIRATORY (INHALATION) at 21:37

## 2022-01-01 RX ADMIN — INSULIN LISPRO 4 UNITS: 100 INJECTION, SOLUTION INTRAVENOUS; SUBCUTANEOUS at 17:28

## 2022-01-01 RX ADMIN — WATER 10 ML: 1 INJECTION INTRAMUSCULAR; INTRAVENOUS; SUBCUTANEOUS at 01:21

## 2022-01-01 RX ADMIN — METOROPROLOL TARTRATE 5 MG: 5 INJECTION, SOLUTION INTRAVENOUS at 05:50

## 2022-01-01 RX ADMIN — METRONIDAZOLE 500 MG: 500 INJECTION, SOLUTION INTRAVENOUS at 17:47

## 2022-01-01 RX ADMIN — FUROSEMIDE 20 MG: 10 INJECTION, SOLUTION INTRAMUSCULAR; INTRAVENOUS at 12:29

## 2022-01-01 RX ADMIN — FUROSEMIDE 40 MG: 10 INJECTION, SOLUTION INTRAMUSCULAR; INTRAVENOUS at 16:44

## 2022-01-01 RX ADMIN — HEPARIN SODIUM 5000 UNITS: 5000 INJECTION INTRAVENOUS; SUBCUTANEOUS at 15:00

## 2022-01-01 RX ADMIN — Medication 20 MG: at 08:22

## 2022-01-01 RX ADMIN — HEPARIN SODIUM 5000 UNITS: 5000 INJECTION INTRAVENOUS; SUBCUTANEOUS at 21:25

## 2022-01-01 RX ADMIN — METOROPROLOL TARTRATE 5 MG: 5 INJECTION, SOLUTION INTRAVENOUS at 05:52

## 2022-01-01 RX ADMIN — METOPROLOL SUCCINATE 25 MG: 25 TABLET, EXTENDED RELEASE ORAL at 11:56

## 2022-01-01 RX ADMIN — METOROPROLOL TARTRATE 5 MG: 5 INJECTION, SOLUTION INTRAVENOUS at 00:22

## 2022-01-01 RX ADMIN — SODIUM CHLORIDE 500 MG: 900 INJECTION, SOLUTION INTRAVENOUS at 08:24

## 2022-01-01 RX ADMIN — ATORVASTATIN CALCIUM 80 MG: 80 TABLET, FILM COATED ORAL at 17:42

## 2022-01-01 RX ADMIN — SENNOSIDES AND DOCUSATE SODIUM 1 TABLET: 8.6; 5 TABLET ORAL at 08:22

## 2022-01-01 RX ADMIN — HEPARIN SODIUM 5000 UNITS: 5000 INJECTION INTRAVENOUS; SUBCUTANEOUS at 13:05

## 2022-01-01 RX ADMIN — INSULIN LISPRO 4 UNITS: 100 INJECTION, SOLUTION INTRAVENOUS; SUBCUTANEOUS at 08:50

## 2022-01-01 RX ADMIN — OXYMETAZOLINE HYDROCHLORIDE 2 SPRAY: 0.05 SPRAY NASAL at 08:23

## 2022-01-02 DIAGNOSIS — I10 ESSENTIAL HYPERTENSION: ICD-10-CM

## 2022-01-02 DIAGNOSIS — I48.0 PAROXYSMAL ATRIAL FIBRILLATION (HCC): ICD-10-CM

## 2022-01-02 DIAGNOSIS — I25.10 CORONARY ARTERY DISEASE WITHOUT ANGINA PECTORIS, UNSPECIFIED VESSEL OR LESION TYPE, UNSPECIFIED WHETHER NATIVE OR TRANSPLANTED HEART: ICD-10-CM

## 2022-01-03 RX ORDER — ISOSORBIDE MONONITRATE 30 MG/1
TABLET, EXTENDED RELEASE ORAL
Qty: 180 TABLET | Refills: 3 | Status: SHIPPED | OUTPATIENT
Start: 2022-01-03

## 2022-01-03 RX ORDER — POTASSIUM CHLORIDE 750 MG/1
CAPSULE, EXTENDED RELEASE ORAL
Qty: 270 CAPSULE | Refills: 3 | Status: SHIPPED | OUTPATIENT
Start: 2022-01-03

## 2022-01-03 RX ORDER — CARVEDILOL 6.25 MG/1
6.25 TABLET ORAL 2 TIMES DAILY WITH MEALS
Qty: 180 TABLET | Refills: 3 | Status: SHIPPED | OUTPATIENT
Start: 2022-01-03

## 2022-01-11 ENCOUNTER — OFFICE VISIT (OUTPATIENT)
Dept: VASCULAR SURGERY | Facility: CLINIC | Age: 87
End: 2022-01-11
Payer: MEDICARE

## 2022-01-11 VITALS
SYSTOLIC BLOOD PRESSURE: 146 MMHG | HEART RATE: 73 BPM | WEIGHT: 180 LBS | TEMPERATURE: 95.9 F | DIASTOLIC BLOOD PRESSURE: 80 MMHG | BODY MASS INDEX: 27.28 KG/M2 | HEIGHT: 68 IN

## 2022-01-11 DIAGNOSIS — I70.213 ATHEROSCLEROSIS OF NATIVE ARTERIES OF EXTREMITIES WITH INTERMITTENT CLAUDICATION, BILATERAL LEGS (HCC): Primary | ICD-10-CM

## 2022-01-11 DIAGNOSIS — Z79.4 TYPE 2 DIABETES MELLITUS WITH COMPLICATION, WITH LONG-TERM CURRENT USE OF INSULIN (HCC): ICD-10-CM

## 2022-01-11 DIAGNOSIS — N18.32 STAGE 3B CHRONIC KIDNEY DISEASE (HCC): ICD-10-CM

## 2022-01-11 DIAGNOSIS — E11.8 TYPE 2 DIABETES MELLITUS WITH COMPLICATION, WITH LONG-TERM CURRENT USE OF INSULIN (HCC): ICD-10-CM

## 2022-01-11 DIAGNOSIS — I65.22 STENOSIS OF LEFT CAROTID ARTERY: ICD-10-CM

## 2022-01-11 DIAGNOSIS — I65.23 ASYMPTOMATIC BILATERAL CAROTID ARTERY STENOSIS: ICD-10-CM

## 2022-01-11 PROCEDURE — 99215 OFFICE O/P EST HI 40 MIN: CPT | Performed by: SURGERY

## 2022-01-11 NOTE — PROGRESS NOTES
Assessment/Plan:    Pt is an 79 yo M w/ DM, hypothyroidism, pulmonary fibrosis, afib (Eliquis), ICM, HTN, SCC, BCC, CKD, CAD s/p stent, HLD, PAD, asymptomatic carotid stenosis    Atherosclerosis of native arteries of extremities with intermittent claudication, bilateral legs (HCC)  -     VAS lower limb arterial duplex, complete bilateral; Future  -reviewed LEADs which show R: 0 68/81/70 and L: 0 94/122/57 w/ tibial disease B and pop ectasia 10/11mm  -patients symptoms, exam, and LEADs are not quite consistent; he has palpable pulse on the right but VALDO is only 0 68; his symptoms are "tiredness" which may represent claudication, but are equal bilaterally; regardless, his symptoms are not prohibitive and thus no intervention is warranted at this time; he does have a new R heel crack which was noticed yesterday and I have instructed him/his wife to keep this clean, apply betadine, and cover with a bandaid; they are to RTC if this looks worse  -repeat LEADs in 6 months  -f/u 1 year or sooner if any new symptoms occur    Asymptomatic bilateral carotid artery stenosis  -     VAS carotid complete study; Future        -     Ambulatory referral to Vascular Surgery  -reviewed carotid DU which showed R ICA <50% and L ICA 50-69%  -asymptomatic; no known hx of CVA  -discussed recommendations for carotid disease; we will continue surveillance on a 6 month basis at this time; instructed pt to seek care immediately if stroke symptoms were to occur  -repeat DU in 6 months    Type 2 diabetes mellitus with complication, with long-term current use of insulin ([facility])  -A1C: 7 2 (6 4)  -needs better control; per PCP    Stage 3b chronic kidney disease (HCC)  -Cr: 1 32  GFR: 48  -would be at increased risk for contrast nephropathy if angiography were needed    Medications  -continue ASA/statin daily  -also on Eliquis for afib    Subjective:      Patient ID: Jarad Delgado is a 80 y o  male      Pt is here today to review results of [facility] done 12/10/2021  He denies any pain in his legs when walking  He c/o LBP from a previous back surgery that radiates down his legs  He denies any leg swelling or any open wounds that are not healing  Pt is taking Eliquis and Atorvastatin  Pt is a non-smoker  HPI:    Patient presents to review testing  Patient has pain in the lumbar spine  He is s/p 2 operations for this  He has pain that radiates down the legs  When he is walking, he has back pain and his legs get tired  He can walk about 100ft before he has to take a break  The legs are equal   This is mostly in his calves  Denies rest pain or wounds  This has been present since his last spine operation a couple years ago, about '23  Denies hx of CVA/TIA  Denies amaurosis, facial droop, garbled speech, unilateral weakness/numbness  Takes ASA, statin, and Eliquis (afib)  The following portions of the patient's history were reviewed and updated as appropriate: allergies, current medications, past family history, past medical history, past social history, past surgical history and problem list     Review of Systems   Constitutional: Negative  HENT: Negative  Eyes: Negative  Respiratory: Negative  Negative for shortness of breath  Cardiovascular: Negative  Negative for chest pain and leg swelling  Gastrointestinal: Negative  Endocrine: Negative  Genitourinary: Negative  Musculoskeletal: Positive for arthralgias and back pain  Skin: Positive for wound (R heel crack)  Allergic/Immunologic: Negative  Neurological: Negative  Hematological: Bruises/bleeds easily  Psychiatric/Behavioral: Negative  Objective:      /80 (BP Location: Left arm, Patient Position: Sitting, Cuff Size: Standard)   Pulse 73   Temp (!) 95 9 °F (35 5 °C) (Tympanic)   Ht 5' 8" (1 727 m)   Wt 81 6 kg (180 lb)   BMI 27 37 kg/m²          Physical Exam  Vitals and nursing note reviewed     Constitutional:       Appearance: He is well-developed  HENT:      Head: Normocephalic and atraumatic  Eyes:      Conjunctiva/sclera: Conjunctivae normal    Cardiovascular:      Rate and Rhythm: Normal rate and regular rhythm  Pulses:           Radial pulses are 2+ on the right side and 2+ on the left side  Dorsalis pedis pulses are 2+ on the right side and 0 on the left side  Posterior tibial pulses are 0 on the right side and 0 on the left side  Heart sounds: Murmur heard  Comments: No carotid bruits B  Pulmonary:      Effort: Pulmonary effort is normal  No respiratory distress  Breath sounds: Normal breath sounds  No wheezing or rales  Abdominal:      General: There is no distension  Palpations: Abdomen is soft  Tenderness: There is no abdominal tenderness  There is no rebound  Musculoskeletal:         General: Normal range of motion  Cervical back: Normal range of motion and neck supple  Right lower leg: No edema  Left lower leg: No edema  Skin:     General: Skin is warm and dry  Comments: R heel w/ skin crack about 2cm in length; no drainage   Neurological:      Mental Status: He is alert and oriented to person, place, and time  Psychiatric:         Behavior: Behavior normal          I have reviewed and made appropriate changes to the review of systems input by the medical assistant      Vitals:    01/11/22 1540   BP: 146/80   BP Location: Left arm   Patient Position: Sitting   Cuff Size: Standard   Pulse: 73   Temp: (!) 95 9 °F (35 5 °C)   TempSrc: Tympanic   Weight: 81 6 kg (180 lb)   Height: 5' 8" (1 727 m)       Patient Active Problem List   Diagnosis    Paroxysmal atrial fibrillation (HCC)    Type 2 diabetes mellitus with complication, with long-term current use of insulin (HCC)    Bradycardia    Acquired hypothyroidism    Stage 3b chronic kidney disease    Essential hypertension    Low back pain    Mixed hyperlipidemia    Rupture of tendon of biceps, long head  Spinal stenosis of lumbar region with neurogenic claudication    Stented coronary artery    Lightheadedness    Atherosclerosis of native arteries of extremities with intermittent claudication, bilateral legs (HCC)    Atherosclerosis of artery of extremity with ulceration (HCC)    Peripheral arterial disease (HCC)    Pulmonary fibrosis (HCC)    Basal cell carcinoma of skin of nose    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    Platelets decreased (HCC)    Asymptomatic bilateral carotid artery stenosis    Ischemic cardiomyopathy       Past Surgical History:   Procedure Laterality Date    CARDIAC CATHETERIZATION      CORONARY ARTERY BYPASS GRAFT         Family History   Problem Relation Age of Onset    No Known Problems Mother     No Known Problems Father     No Known Problems Sister     No Known Problems Brother     No Known Problems Maternal Aunt     No Known Problems Maternal Uncle     No Known Problems Paternal Aunt     No Known Problems Paternal Uncle     No Known Problems Maternal Grandmother     No Known Problems Maternal Grandfather     No Known Problems Paternal Grandmother     No Known Problems Paternal Grandfather     Anemia Neg Hx     Arrhythmia Neg Hx     Asthma Neg Hx     Clotting disorder Neg Hx     Fainting Neg Hx     Heart attack Neg Hx     Heart disease Neg Hx     Heart failure Neg Hx     Hyperlipidemia Neg Hx     Hypertension Neg Hx        Social History     Socioeconomic History    Marital status: /Civil Union     Spouse name: Not on file    Number of children: Not on file    Years of education: Not on file    Highest education level: Not on file   Occupational History    Occupation: RETIRED   Tobacco Use    Smoking status: Never Smoker    Smokeless tobacco: Never Used   Vaping Use    Vaping Use: Never used   Substance and Sexual Activity    Alcohol use: Not on file    Drug use: Not on file    Sexual activity: Not on file   Other Topics Concern    Not on file   Social History Narrative        RETIRED     Social Determinants of Health     Financial Resource Strain: Not on file   Food Insecurity: Not on file   Transportation Needs: Not on file   Physical Activity: Not on file   Stress: Not on file   Social Connections: Not on file   Intimate Partner Violence: Not on file   Housing Stability: Not on file       No Known Allergies      Current Outpatient Medications:     Accu-Chek Softclix Lancets lancets, Use to test blood sugar 3x daily  , Disp: 300 each, Rfl: 3    amLODIPine (NORVASC) 2 5 mg tablet, TAKE 1 TABLET TWICE DAILY, Disp: 180 tablet, Rfl: 3    apixaban (Eliquis) 5 mg, Take 1 tablet (5 mg total) by mouth 2 (two) times a day, Disp: 180 tablet, Rfl: 3    atorvastatin (LIPITOR) 10 mg tablet, TAKE 1 TABLET EVERY DAY, Disp: 90 tablet, Rfl: 3    BD PEN NEEDLE CARMINA U/F 32G X 4 MM MISC, USE 4 PEN NEEDLE DAILY AS DIRECTED, Disp: , Rfl: 3    carvedilol (COREG) 6 25 mg tablet, TAKE 1 TABLET (6 25 MG TOTAL) BY MOUTH 2 (TWO) TIMES A DAY WITH MEALS, Disp: 180 tablet, Rfl: 3    Dulaglutide (Trulicity) 3 HR/7 9QC SOPN, Inject 0 5 mL (3 mg total) under the skin once a week, Disp: 6 mL, Rfl: 1    furosemide (LASIX) 20 mg tablet, TAKE 1 TABLET EVERY DAY, Disp: 90 tablet, Rfl: 3    glucose blood (Accu-Chek Guide) test strip, Use to test blood sugar 3 times a day, Disp: 102 strip, Rfl: 3    insulin glargine (Lantus SoloStar) 100 units/mL injection pen, Inject 20 units daily  , Disp: 15 mL, Rfl: 1    isosorbide mononitrate (IMDUR) 30 mg 24 hr tablet, TAKE 1 TABLET TWICE DAILY, Disp: 180 tablet, Rfl: 3    levothyroxine 75 mcg tablet, Take 1 tablet (75 mcg total) by mouth daily in the early morning Take 1 tablet daily except Sunday, Disp: 90 tablet, Rfl: 3    Omega 3 1200 MG CAPS, Take by mouth Take 4 tablets daily, Disp: , Rfl:     potassium chloride (MICRO-K) 10 MEQ CR capsule, TAKE 2 CAPSULES IN THE MORNING AND TAKE 1 CAPSULE IN THE EVENING, Disp: 270 capsule, Rfl: 3    Icosapent Ethyl (Vascepa) 1 g CAPS, Take 2 capsules (2 g total) by mouth 2 (two) times a day (Patient not taking: Reported on 1/11/2022 ), Disp: 30 capsule, Rfl: 5    nitroglycerin (NITROSTAT) 0 4 mg SL tablet, Place 1 tablet (0 4 mg total) under the tongue every 5 (five) minutes as needed for chest pain (Patient not taking: Reported on 1/11/2022 ), Disp: 25 tablet, Rfl: 5

## 2022-01-11 NOTE — PATIENT INSTRUCTIONS
1) PAD  -your test shows some blockages in the arteries that may be the cause of your leg tiredness when you walk  -we are going to keep an eye on this with ultrasound on a 6 month basis  -if your heel crack gets worse or you get a new wound, please come in for another appointment sooner    2) Carotid stenosis  -your ultrasound showed a mild blockage on the right and a moderate blockage on the left  -we are going to monitor this with ultrasound on a 6 month basis  -if you have any stroke symptoms, please go to the ER for evaluation    3) Medications  -please continue your aspirin and statin as well as the rest of your prescribed medications

## 2022-01-18 NOTE — ASSESSMENT & PLAN NOTE
68-year-old male nonsmoker with a history of HTN, HLD, CAD, s/p CABG, ICM, PAF on no anticoagulation, lumbar spinal stenosis, s/p extensive fusion x 2 ('99, '16), type 2 DM, CKD 3 (baseline creat 1 6/GFR 38) and PAD w/small R lateral foot wound x 3 weeks  Noninvasive imaging with diffuse femoropopliteal disease without focal stenosis and R VALDO 1 42 w/GTP just below healing  1+ R DP pulse w/biphasic doppler signal   -given underlying CKD and likelihood of small vessel disease, recommend conservative management with local wound care and short-interval f/u with wound recheck  If wound persists or worsens, may need to consider angiogram after formal nephrology for risk stratification    Discussed at length with patient and wife and both are in agreement with plan and would like to avoid any intervention   -Follow-up in the office with Dr Sheryle Overman in 2-3 weeks for wound recheck  -instructed patient and wife to contact the office immediately with worsening of the wound or new symptoms, to include rest pain  -continue ASA and statin therapy  -continue local wound care per Podiatry Patient takes Wellbutrin, lithium, Zyprexa and trazodone at facility  Will hold as patient is NPO

## 2022-02-02 DIAGNOSIS — I10 ESSENTIAL HYPERTENSION: ICD-10-CM

## 2022-02-02 DIAGNOSIS — I25.10 CORONARY ARTERY DISEASE WITHOUT ANGINA PECTORIS, UNSPECIFIED VESSEL OR LESION TYPE, UNSPECIFIED WHETHER NATIVE OR TRANSPLANTED HEART: ICD-10-CM

## 2022-02-03 DIAGNOSIS — I10 ESSENTIAL HYPERTENSION: ICD-10-CM

## 2022-02-03 RX ORDER — ATORVASTATIN CALCIUM 10 MG/1
TABLET, FILM COATED ORAL
Qty: 90 TABLET | Refills: 3 | Status: SHIPPED | OUTPATIENT
Start: 2022-02-03 | End: 2022-06-10

## 2022-02-03 RX ORDER — AMLODIPINE BESYLATE 2.5 MG/1
TABLET ORAL
Qty: 180 TABLET | Refills: 3 | Status: SHIPPED | OUTPATIENT
Start: 2022-02-03 | End: 2022-05-24

## 2022-02-03 RX ORDER — FUROSEMIDE 20 MG/1
TABLET ORAL
Qty: 90 TABLET | Refills: 3 | Status: SHIPPED | OUTPATIENT
Start: 2022-02-03

## 2022-03-02 ENCOUNTER — OFFICE VISIT (OUTPATIENT)
Dept: CARDIOLOGY CLINIC | Facility: CLINIC | Age: 87
End: 2022-03-02
Payer: MEDICARE

## 2022-03-02 VITALS
DIASTOLIC BLOOD PRESSURE: 70 MMHG | WEIGHT: 178 LBS | HEIGHT: 68 IN | HEART RATE: 70 BPM | SYSTOLIC BLOOD PRESSURE: 130 MMHG | BODY MASS INDEX: 26.98 KG/M2

## 2022-03-02 DIAGNOSIS — Z95.5 STENTED CORONARY ARTERY: ICD-10-CM

## 2022-03-02 DIAGNOSIS — I20.8 STABLE ANGINA (HCC): ICD-10-CM

## 2022-03-02 DIAGNOSIS — I48.91 ATRIAL FIBRILLATION, UNSPECIFIED TYPE (HCC): ICD-10-CM

## 2022-03-02 DIAGNOSIS — I25.5 ISCHEMIC CARDIOMYOPATHY: ICD-10-CM

## 2022-03-02 DIAGNOSIS — J84.10 PULMONARY FIBROSIS (HCC): ICD-10-CM

## 2022-03-02 DIAGNOSIS — E78.2 MIXED HYPERLIPIDEMIA: Primary | ICD-10-CM

## 2022-03-02 PROCEDURE — 99214 OFFICE O/P EST MOD 30 MIN: CPT | Performed by: INTERNAL MEDICINE

## 2022-03-02 RX ORDER — NITROGLYCERIN 0.4 MG/1
0.4 TABLET SUBLINGUAL
Qty: 100 TABLET | Refills: 3 | Status: SHIPPED | OUTPATIENT
Start: 2022-03-02

## 2022-03-02 NOTE — PROGRESS NOTES
Subjective:        Patient ID: Slime Baires is a 80 y o  male  Chief Complaint:  Alexus Pena is here for routine Follow-up, offers no specific cardiac complaints  On questioning took 1 nitroglycerin back in the fall when cleaning up leaves shortly after his last visit with me but this chest pain is never recurred  He asked for a nitro refill  No edema orthopnea or PND  No palpitations presyncope syncope  No edema bruising or falls  Creatinine over 35, 80years of age soon 80  The following portions of the patient's history were reviewed and updated as appropriate: allergies, current medications, past family history, past medical history, past social history, past surgical history and problem list   Review of Systems   Constitutional: Negative for chills, diaphoresis, malaise/fatigue and weight gain  HENT: Negative for nosebleeds and stridor  Eyes: Negative for double vision, vision loss in left eye, vision loss in right eye and visual disturbance  Cardiovascular: Negative for chest pain, claudication, cyanosis, dyspnea on exertion, irregular heartbeat, leg swelling, near-syncope, orthopnea, palpitations, paroxysmal nocturnal dyspnea and syncope  Respiratory: Negative for cough, shortness of breath, snoring and wheezing  Endocrine: Negative for polydipsia, polyphagia and polyuria  Hematologic/Lymphatic: Negative for bleeding problem  Does not bruise/bleed easily  Skin: Negative for flushing and rash  Musculoskeletal: Negative for falls and myalgias  Gastrointestinal: Negative for abdominal pain, heartburn, hematemesis, hematochezia, melena and nausea  Genitourinary: Negative for hematuria  Neurological: Negative for brief paralysis, dizziness, focal weakness, headaches, light-headedness, loss of balance and vertigo  Psychiatric/Behavioral: Negative for altered mental status and substance abuse  Allergic/Immunologic: Negative for hives            Objective:      BP 130/70   Pulse 70   Ht 5' 8" (1 727 m)   Wt 80 7 kg (178 lb)   BMI 27 06 kg/m²   Physical Exam  Constitutional:       General: He is not in acute distress  Appearance: He is well-developed  He is not diaphoretic  HENT:      Head: Normocephalic and atraumatic  Eyes:      General: No scleral icterus  Pupils: Pupils are equal, round, and reactive to light  Neck:      Thyroid: No thyromegaly  Vascular: No carotid bruit or JVD  Cardiovascular:      Rate and Rhythm: Normal rate  Rhythm irregular  Heart sounds: Normal heart sounds  No murmur heard  No friction rub  No gallop  Pulmonary:      Effort: Pulmonary effort is normal  No respiratory distress  Breath sounds: No stridor  Rales (Dry rales throughout all lung fields posteriorly, normal to percussion, no basilar rales) present  No wheezing  Abdominal:      General: Bowel sounds are normal  There is no distension  Palpations: Abdomen is soft  There is no mass  Tenderness: There is no abdominal tenderness  Musculoskeletal:         General: No deformity  Normal range of motion  Cervical back: Normal range of motion and neck supple  Right lower leg: No edema  Left lower leg: No edema  Skin:     General: Skin is warm and dry  Coloration: Skin is not pale  Findings: No erythema  Neurological:      Mental Status: He is alert and oriented to person, place, and time  Coordination: Coordination normal    Psychiatric:         Behavior: Behavior normal          Thought Content: Thought content normal          Judgment: Judgment normal          Lab Review:   No visits with results within 2 Month(s) from this visit     Latest known visit with results is:   Hospital Outpatient Visit on 11/24/2021   Component Date Value    TV S' 11/24/2021 0 7     AV area peak cl 11/24/2021 1 1     Aortic valve mean veloci* 11/24/2021 15 60     TV peak gradient 11/24/2021 18     Tricuspid valve peak reg* 11/24/2021 2 14     LVPWd 11/24/2021 1 20     MV E' Tissue Velocity Se* 11/24/2021 5     IVSd 11/24/2021 1 09     LV DIASTOLIC VOLUME (MOD* 75/94/6676 78     LEFT VENTRICLE SYSTOLIC * 64/23/8702 50     Left ventricular stroke * 11/24/2021 28 00     A4C EF 11/24/2021 41     LVIDd 11/24/2021 4 20     LVIDS 11/24/2021 3 50     FS 11/24/2021 17     Asc Ao 11/24/2021 3 8*    Ao root 11/24/2021 3 20     RVID d 11/24/2021 3 2     LVOT mn grad 11/24/2021 1 0     AV area by cont VTI 11/24/2021 1 0     AV mean gradient 11/24/2021 11*    AV LVOT peak gradient 11/24/2021 2     E wave deceleration time 11/24/2021 252     LVOT diameter 11/24/2021 2 0     LVOT peak yuriy 11/24/2021 0 71     LVOT peak VTI 11/24/2021 16 2     Ao VTI 11/24/2021 49 11     LVOT stroke volume 11/24/2021 50 87     AV peak gradient 11/24/2021 18     MV Peak E Yuriy 11/24/2021 79     MV Peak A Yuriy 11/24/2021 0 76     VALERY A4C 11/24/2021 25 2     RAA A4C 11/24/2021 13 9     LVOT SI 11/24/2021 24 00     LVOT area 11/24/2021 3 14     AV valve area 11/24/2021 1 04     ZLVIDS 11/24/2021 -3 38     LV EF 11/24/2021 40*    Ao peak yuriy retrograde 11/24/2021 2 2*    AV Velocity Ratio 11/24/2021 0 32      No results found  Assessment:       1  Mixed hyperlipidemia     2  Stable angina (HCC)  nitroglycerin (NITROSTAT) 0 4 mg SL tablet   3  Atrial fibrillation, unspecified type (HCC)  apixaban (Eliquis) 5 mg   4  Pulmonary fibrosis ([address] )     5  Ischemic cardiomyopathy     6  Stented coronary artery          Plan:       Isra Vu has stable angina, I asked him to let me know if he needs to use any more nitro in the future  Heart rate and blood pressure well controlled, AFib is rate controlled and anticoagulated however as he is over 85, creatinine over 1 5, will decrease to Eliquis 2 5 mg p o  B i d  to reduce the risk of bleeding  His physical exam is consistent with history of pulmonary fibrosis rather than heart failure    He is euvolemic  Meds otherwise optimized, recommend no additional changes today, ordered no acute cardiac testing, plan to see him back in about 4 months, he will call sooner with any other concerning potential cardiac symptoms or if he need take more nitro under his tongue

## 2022-03-02 NOTE — PATIENT INSTRUCTIONS
A-fib (Atrial Fibrillation)   AMBULATORY CARE:   Atrial fibrillation (A-fib)  is an irregular heartbeat  It reduces your heart's ability to pump blood through your body  A-fib may come and go, or it may be a long-term condition  A-fib can cause life-threatening blood clots, stroke, or heart failure  It is important to treat and manage A-fib to help prevent these problems  Common signs and symptoms include the following:   · A heartbeat that races, pounds, or flutters    · Weakness, severe tiredness, or confusion    · Feeling lightheaded, sweaty, dizzy, or faint    · Shortness of breath or anxiety    · Chest pain or pressure    Call your local emergency number (911 in the [address] [facility]) or have someone call if:   · You have any of the following signs of a heart attack:      ? Squeezing, pressure, or pain in your chest    ? You may  also have any of the following:     § Discomfort or pain in your back, neck, jaw, stomach, or arm    § Shortness of breath    § Nausea or vomiting    § Lightheadedness or a sudden cold sweat    · You have any of the following signs of a stroke:      ? Numbness or drooping on one side of your face     ? Weakness in an arm or leg    ? Confusion or difficulty speaking    ? Dizziness, a severe headache, or vision loss    Call your doctor or cardiologist if:   · Your arm or leg feels warm, tender, and painful  It may look swollen and red  · Your heart rate is more than 110 beats per minute  · You have new or worsening swelling in your legs, feet, ankles, or abdomen  · You are short of breath, even at rest     · You have questions or concerns about your condition or care  Treatment for A-fib:  Conditions that cause A-fib, such as thyroid disease, will be treated  You may also need any of the following:  · Heart medicines  help control your heart rate or rhythm  You may need more than one medicine to treat your symptoms      · Antiplatelet or blood thinner medicines  help prevent blood clots and stroke  · Cardioversion  is a procedure to return your heart rate and rhythm to normal  It can be done using medicines or electric shock  · A-fib ablation  is a procedure that uses energy to burn a small area of heart tissue  This creates scar tissue and prevents electrical signals that cause A-fib  You may need this procedure more than once  Ask for more information on A-fib ablation  · A pacemaker  may be inserted into your heart  A pacemaker is a device that controls your heartbeat  A pacemaker may be inserted during an ablation procedure or surgery  Ask your healthcare provider for more information on pacemakers  · Surgery  may be needed if other procedures do not work  During surgery your healthcare provider will make cuts in the upper part of your heart  The provider will stitch the cuts together to create scar tissue  The scar tissue will prevent electrical signals that cause A-fib  Manage A-fib:   · Know your target heart rate  Learn how to check your pulse and monitor your heart rate  · Know the risks if you choose to drink alcohol  Alcohol can increase your risk for A-fib or make A-fib harder to manage  Ask your healthcare provider if it is okay for you to drink any alcohol  He or she can help you set limits for the number of drinks you have in 24 hours and in a week  A drink of alcohol is 12 ounces of beer, 5 ounces of wine, or 1½ ounces of liquor  · Do not smoke  Nicotine can cause heart damage and make it more difficult to manage your A-fib  Do not use e-cigarettes or smokeless tobacco in place of cigarettes or to help you quit  They still contain nicotine  Ask your healthcare provider for information if you currently smoke and need help quitting  · Eat heart-healthy foods  Heart healthy foods will help keep your cholesterol low  These include fruits, vegetables, whole-grain breads, low-fat dairy products, beans, lean meats, and fish   Replace butter and margarine with heart-healthy oils such as olive oil and canola oil  · Maintain a healthy weight  Ask your healthcare provider what a healthy weight is for you  Ask him or her to help you create a safe weight loss plan if you are overweight  Even a small goal of a 10% weight loss can improve your heart health  · Get regular physical activity  Physical activity helps improve your heart health  Get at least 150 minutes of moderate aerobic physical activity each week  Your healthcare provider can help you create an activity plan  · Manage other health conditions  This includes high blood pressure or cholesterol, sleep apnea, diabetes, and other heart conditions  Take medicine as directed and follow your treatment plan  Your healthcare provider may need to change a medicine you are taking if it is causing your A-fib  Do not  stop taking any medicine unless directed by your provider  Follow up with your doctor or cardiologist as directed: You will need regular blood tests and monitoring  Write down your questions so you remember to ask them during your visits  © [employer] 2022 Information is for End User's use only and may not be sold, redistributed or otherwise used for commercial purposes  All illustrations and images included in CareNotes® are the copyrighted property of [employer]  or [address] [place], [place] [address]marcella   The above information is an  only  It is not intended as medical advice for individual conditions or treatments  Talk to your doctor, nurse or pharmacist before following any medical regimen to see if it is safe and effective for you

## 2022-03-16 ENCOUNTER — APPOINTMENT (OUTPATIENT)
Dept: LAB | Facility: CLINIC | Age: 87
End: 2022-03-16
Payer: MEDICARE

## 2022-03-16 DIAGNOSIS — E03.9 ACQUIRED HYPOTHYROIDISM: ICD-10-CM

## 2022-03-16 DIAGNOSIS — E11.8 TYPE 2 DIABETES MELLITUS WITH COMPLICATION, WITH LONG-TERM CURRENT USE OF INSULIN (HCC): ICD-10-CM

## 2022-03-16 DIAGNOSIS — Z79.4 TYPE 2 DIABETES MELLITUS WITH COMPLICATION, WITH LONG-TERM CURRENT USE OF INSULIN (HCC): ICD-10-CM

## 2022-03-16 LAB
ALBUMIN SERPL BCP-MCNC: 3.9 G/DL (ref 3.5–5)
ALP SERPL-CCNC: 72 U/L (ref 46–116)
ALT SERPL W P-5'-P-CCNC: 24 U/L (ref 12–78)
ANION GAP SERPL CALCULATED.3IONS-SCNC: 8 MMOL/L (ref 4–13)
AST SERPL W P-5'-P-CCNC: 20 U/L (ref 5–45)
BILIRUB SERPL-MCNC: 0.93 MG/DL (ref 0.2–1)
BUN SERPL-MCNC: 19 MG/DL (ref 5–25)
CALCIUM SERPL-MCNC: 9.6 MG/DL (ref 8.3–10.1)
CHLORIDE SERPL-SCNC: 103 MMOL/L (ref 100–108)
CHOLEST SERPL-MCNC: 140 MG/DL
CO2 SERPL-SCNC: 26 MMOL/L (ref 21–32)
CREAT SERPL-MCNC: 1.28 MG/DL (ref 0.6–1.3)
CREAT UR-MCNC: 106 MG/DL
EST. AVERAGE GLUCOSE BLD GHB EST-MCNC: 157 MG/DL
GFR SERPL CREATININE-BSD FRML MDRD: 49 ML/MIN/1.73SQ M
GLUCOSE P FAST SERPL-MCNC: 153 MG/DL (ref 65–99)
HBA1C MFR BLD: 7.1 %
HDLC SERPL-MCNC: 43 MG/DL
LDLC SERPL CALC-MCNC: 66 MG/DL (ref 0–100)
MICROALBUMIN UR-MCNC: 126 MG/L (ref 0–20)
MICROALBUMIN/CREAT 24H UR: 119 MG/G CREATININE (ref 0–30)
NONHDLC SERPL-MCNC: 97 MG/DL
POTASSIUM SERPL-SCNC: 4.1 MMOL/L (ref 3.5–5.3)
PROT SERPL-MCNC: 7.8 G/DL (ref 6.4–8.2)
SODIUM SERPL-SCNC: 137 MMOL/L (ref 136–145)
T4 FREE SERPL-MCNC: 1.2 NG/DL (ref 0.76–1.46)
TRIGL SERPL-MCNC: 154 MG/DL
TSH SERPL DL<=0.05 MIU/L-ACNC: 3.19 UIU/ML (ref 0.36–3.74)

## 2022-03-16 PROCEDURE — 36415 COLL VENOUS BLD VENIPUNCTURE: CPT

## 2022-03-16 PROCEDURE — 83036 HEMOGLOBIN GLYCOSYLATED A1C: CPT

## 2022-03-16 PROCEDURE — 80053 COMPREHEN METABOLIC PANEL: CPT

## 2022-03-16 PROCEDURE — 84443 ASSAY THYROID STIM HORMONE: CPT

## 2022-03-16 PROCEDURE — 80061 LIPID PANEL: CPT

## 2022-03-16 PROCEDURE — 84439 ASSAY OF FREE THYROXINE: CPT

## 2022-03-16 PROCEDURE — 82570 ASSAY OF URINE CREATININE: CPT

## 2022-03-16 PROCEDURE — 82043 UR ALBUMIN QUANTITATIVE: CPT

## 2022-03-23 ENCOUNTER — OFFICE VISIT (OUTPATIENT)
Dept: ENDOCRINOLOGY | Facility: CLINIC | Age: 87
End: 2022-03-23
Payer: MEDICARE

## 2022-03-23 VITALS
BODY MASS INDEX: 26.37 KG/M2 | WEIGHT: 174 LBS | HEIGHT: 68 IN | DIASTOLIC BLOOD PRESSURE: 82 MMHG | SYSTOLIC BLOOD PRESSURE: 128 MMHG

## 2022-03-23 DIAGNOSIS — E11.8 TYPE 2 DIABETES MELLITUS WITH COMPLICATION, WITH LONG-TERM CURRENT USE OF INSULIN (HCC): Primary | ICD-10-CM

## 2022-03-23 DIAGNOSIS — Z79.4 TYPE 2 DIABETES MELLITUS WITH COMPLICATION, WITH LONG-TERM CURRENT USE OF INSULIN (HCC): Primary | ICD-10-CM

## 2022-03-23 DIAGNOSIS — E03.9 ACQUIRED HYPOTHYROIDISM: ICD-10-CM

## 2022-03-23 DIAGNOSIS — E78.2 MIXED HYPERLIPIDEMIA: ICD-10-CM

## 2022-03-23 DIAGNOSIS — I10 ESSENTIAL HYPERTENSION: ICD-10-CM

## 2022-03-23 PROCEDURE — 99214 OFFICE O/P EST MOD 30 MIN: CPT | Performed by: NURSE PRACTITIONER

## 2022-03-23 RX ORDER — DULAGLUTIDE 4.5 MG/.5ML
4.5 INJECTION, SOLUTION SUBCUTANEOUS WEEKLY
Qty: 6 ML | Refills: 1 | Status: SHIPPED | OUTPATIENT
Start: 2022-03-23

## 2022-03-23 RX ORDER — INSULIN GLARGINE 100 [IU]/ML
INJECTION, SOLUTION SUBCUTANEOUS
Qty: 15 ML | Refills: 2 | Status: SHIPPED | OUTPATIENT
Start: 2022-03-23 | End: 2022-08-04

## 2022-03-23 NOTE — ASSESSMENT & PLAN NOTE
Patient is relatively well controlled without any episodes of hypoglycemia  I again counseled the patient on the proper way to use basal insulin  I do not anticipate that he will change his current regimen, however  I agreed to increase Trulicity to 4 5 mg once weekly in the hopes of making the use of insulin unnecessary and reducing the risk of hypoglycemia  Counseled on the importance of continuing to test blood sugars 3x daily and to notify me of any hypoglycemia  Continue to focus on a healthy diet  Engage in physical activity as tolerated  Patient knows to notify me with persistent hyperglycemia or hypoglycemia  Counseled on appropriate surveillance and treatment of hypoglycemia  Repeat labs prior to next appointment in 6 months     Lab Results   Component Value Date    HGBA1C 7 1 (H) 03/16/2022

## 2022-03-23 NOTE — PROGRESS NOTES
Established Patient Progress Note      Chief Complaint   Patient presents with    Diabetes Type 2        History of Present Illness:   Jarad Delgado is a 80 y o  male with T2DM and hypothyroidism presenting to the office today for follow up  PMH significant for HTN, HLD, CAD s/p stent, paroxysmal atrial fibrillation with anticoagulant therapy, peripheral vascular disease, and CKD stage IIIB  The patient's last appointment, he was instructed to use his basal insulin once daily rather than as correction insulin  Trulicity was increased to 3 mg once weekly  He was initially instructed to inject 8 units daily  This was increased to 12 units and then eventually to 20 units based on patient's wife's reports of hyperglycemia  Today, patient reports that he has not been taking his Lantus as directed but rather exactly as he had before "When I need it "    Example from patient's glucose logs:    Day A  181, 162 (8 units), 250 (10 units)  Day B 132, 200 (8 units), 200  Day C 140, 242 (14 units), 235 (14 units)    Thankfully, he has not had any episodes of low blood sugar  He also believes that the Trulicity is not doing anything at this dose and would like it increased  His wife reports that they have been being more mindful of carbohydrate intake  Patient reports feeling well  Component      Latest Ref Rng & Units 8/16/2021 11/23/2021 3/16/2022   Hemoglobin A1C      Normal 3 8-5 6%; PreDiabetic 5 7-6 4%; Diabetic >=6 5%; Glycemic control for adults with diabetes <7 0% % 6 4 (H) 7 2 (A) 7 1 (H)   eAG, EST AVG Glucose      mg/dl 137  157        Current regimen:   Trulicity 3 mg weekly  lantus 20 units daily    Last Eye Exam: Patient is UTD; form requested from office  Last Foot Exam: UTD    For hypothyroidism, he is taking 75 mcg of LT4 daily  He reports taking this consistently and correctly  TFT are stable    Component      Latest Ref Rng & Units 8/16/2021 3/16/2022   TSH 3RD GENERATON      0 358 - 3 740 uIU/mL 3 030 3 190     Component      Latest Ref Rng & Units 11/13/2020 3/16/2022   Free T4      0 76 - 1 46 ng/dL 1 19 1 20     For HLD, he is taking 40 mg of atorvastatin nightly  He denies myalgias  For hypertension, he is taking 2 5 mg of amlodipine 30 mg isosorbide daily  He denies headache and pedal edema      Patient Active Problem List   Diagnosis    Paroxysmal atrial fibrillation (HCC)    Type 2 diabetes mellitus with complication, with long-term current use of insulin (HCC)    Bradycardia    Acquired hypothyroidism    Stage 3b chronic kidney disease    Essential hypertension    Low back pain    Mixed hyperlipidemia    Rupture of tendon of biceps, long head    Spinal stenosis of lumbar region with neurogenic claudication    Stented coronary artery    Lightheadedness    Atherosclerosis of native arteries of extremities with intermittent claudication, bilateral legs ([address] )    Atherosclerosis of artery of extremity with ulceration ([address] )    Peripheral arterial disease (HCC)    Pulmonary fibrosis (HCC)    Basal cell carcinoma of skin of nose    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    Platelets decreased ([address] )    Asymptomatic bilateral carotid artery stenosis    Ischemic cardiomyopathy      Past Medical History:   Diagnosis Date    Benign hypertension     Chronic kidney disease (CKD), stage III (moderate)     Coronary artery disease     Hypothyroidism     Ischemic cardiomyopathy     Mixed hyperlipidemia     Paroxysmal atrial fibrillation     PVD (peripheral vascular disease)     Type II diabetes mellitus       Past Surgical History:   Procedure Laterality Date    CARDIAC CATHETERIZATION      CORONARY ARTERY BYPASS GRAFT        Family History   Problem Relation Age of Onset    No Known Problems Mother     No Known Problems Father     No Known Problems Sister     No Known Problems Brother     No Known Problems Maternal Aunt     No Known Problems Maternal Uncle     No Known Problems Paternal Aunt     No Known Problems Paternal Uncle     No Known Problems Maternal Grandmother     No Known Problems Maternal Grandfather     No Known Problems Paternal Grandmother     No Known Problems Paternal Grandfather     Anemia Neg Hx     Arrhythmia Neg Hx     Asthma Neg Hx     Clotting disorder Neg Hx     Fainting Neg Hx     Heart attack Neg Hx     Heart disease Neg Hx     Heart failure Neg Hx     Hyperlipidemia Neg Hx     Hypertension Neg Hx      Social History     Tobacco Use    Smoking status: Never Smoker    Smokeless tobacco: Never Used   Substance Use Topics    Alcohol use: Not on file     No Known Allergies      Current Outpatient Medications:     Accu-Chek Softclix Lancets lancets, Use to test blood sugar 3x daily  , Disp: 300 each, Rfl: 3    amLODIPine (NORVASC) 2 5 mg tablet, TAKE 1 TABLET TWICE DAILY, Disp: 180 tablet, Rfl: 3    apixaban (Eliquis) 5 mg, Half tablet twice a day, Disp: 180 tablet, Rfl: 3    atorvastatin (LIPITOR) 10 mg tablet, TAKE 1 TABLET EVERY DAY, Disp: 90 tablet, Rfl: 3    BD PEN NEEDLE CARMINA U/F 32G X 4 MM MISC, USE 4 PEN NEEDLE DAILY AS DIRECTED, Disp: , Rfl: 3    carvedilol (COREG) 6 25 mg tablet, TAKE 1 TABLET (6 25 MG TOTAL) BY MOUTH 2 (TWO) TIMES A DAY WITH MEALS, Disp: 180 tablet, Rfl: 3    furosemide (LASIX) 20 mg tablet, TAKE 1 TABLET EVERY DAY, Disp: 90 tablet, Rfl: 3    glucose blood (Accu-Chek Guide) test strip, Use to test blood sugar 3 times a day, Disp: 102 strip, Rfl: 3    insulin glargine (Lantus SoloStar) 100 units/mL injection pen, Inject 30 units daily  , Disp: 15 mL, Rfl: 2    isosorbide mononitrate (IMDUR) 30 mg 24 hr tablet, TAKE 1 TABLET TWICE DAILY, Disp: 180 tablet, Rfl: 3    levothyroxine 75 mcg tablet, Take 1 tablet (75 mcg total) by mouth daily in the early morning Take 1 tablet daily except Sunday, Disp: 90 tablet, Rfl: 3    nitroglycerin (NITROSTAT) 0 4 mg SL tablet, Place 1 tablet (0 4 mg total) under the tongue every 5 (five) minutes as needed for chest pain, Disp: 100 tablet, Rfl: 3    Omega 3 1200 MG CAPS, Take by mouth Take 4 tablets daily, Disp: , Rfl:     potassium chloride (MICRO-K) 10 MEQ CR capsule, TAKE 2 CAPSULES IN THE MORNING AND TAKE 1 CAPSULE IN THE EVENING, Disp: 270 capsule, Rfl: 3    Dulaglutide (Trulicity) 4 5 ON/5 9KX SOPN, Inject 0 5 mL (4 5 mg total) under the skin once a week, Disp: 6 mL, Rfl: 1    Icosapent Ethyl (Vascepa) 1 g CAPS, Take 2 capsules (2 g total) by mouth 2 (two) times a day (Patient not taking: Reported on 1/11/2022 ), Disp: 30 capsule, Rfl: 5    Review of Systems   Constitutional: Negative for activity change, appetite change, fatigue and unexpected weight change  HENT: Negative for dental problem, sore throat, trouble swallowing and voice change  Eyes: Negative for visual disturbance  Respiratory: Negative for cough, chest tightness and shortness of breath  Cardiovascular: Negative for chest pain, palpitations and leg swelling  Gastrointestinal: Negative for constipation, diarrhea, nausea and vomiting  Endocrine: Negative for polydipsia, polyphagia and polyuria  Genitourinary: Negative for frequency  Musculoskeletal: Negative for arthralgias, back pain, joint swelling and myalgias  Skin: Negative for wound  Allergic/Immunologic: Negative for environmental allergies and food allergies  Neurological: Negative for dizziness, weakness, light-headedness, numbness and headaches  Hematological: Does not bruise/bleed easily  Psychiatric/Behavioral: Negative for decreased concentration, dysphoric mood and sleep disturbance  The patient is not nervous/anxious  Physical Exam:  Body mass index is 26 46 kg/m²    /82   Ht 5' 8" (1 727 m)   Wt 78 9 kg (174 lb)   BMI 26 46 kg/m²    Wt Readings from Last 3 Encounters:   03/23/22 78 9 kg (174 lb)   03/02/22 80 7 kg (178 lb)   01/11/22 81 6 kg (180 lb)       Physical Exam  Vitals reviewed  Constitutional:       General: He is not in acute distress  Appearance: He is well-developed  He is not ill-appearing  HENT:      Head: Normocephalic and atraumatic  Comments: Mask in place  Eyes:      Pupils: Pupils are equal, round, and reactive to light  Neck:      Thyroid: No thyromegaly  Cardiovascular:      Rate and Rhythm: Normal rate and regular rhythm  Pulses: Normal pulses  Heart sounds: Normal heart sounds  Pulmonary:      Effort: Pulmonary effort is normal       Breath sounds: Normal breath sounds  Abdominal:      General: Bowel sounds are normal  There is no distension  Palpations: Abdomen is soft  Tenderness: There is no abdominal tenderness  Musculoskeletal:      Cervical back: Normal range of motion and neck supple  Right lower leg: No edema  Left lower leg: No edema  Lymphadenopathy:      Cervical: No cervical adenopathy  Skin:     General: Skin is warm and dry  Capillary Refill: Capillary refill takes less than 2 seconds  Neurological:      Mental Status: He is alert and oriented to person, place, and time        Gait: Gait normal    Psychiatric:         Mood and Affect: Mood normal          Behavior: Behavior normal            Labs:   Lab Results   Component Value Date    HGBA1C 7 1 (H) 03/16/2022    HGBA1C 7 2 (A) 11/23/2021    HGBA1C 6 4 (H) 08/16/2021     Lab Results   Component Value Date    CREATININE 1 28 03/16/2022    CREATININE 1 32 (H) 10/12/2021    CREATININE 1 60 (H) 01/14/2021    BUN 19 03/16/2022     02/23/2015    K 4 1 03/16/2022     03/16/2022    CO2 26 03/16/2022     eGFR   Date Value Ref Range Status   03/16/2022 49 ml/min/1 73sq m Final     Lab Results   Component Value Date    CHOL 159 06/08/2015    HDL 43 03/16/2022    TRIG 154 (H) 03/16/2022     Lab Results   Component Value Date    ALT 24 03/16/2022    AST 20 03/16/2022    ALKPHOS 72 03/16/2022    BILITOT 0 8 06/08/2015     Lab Results Component Value Date    SXI0NLVQLSNI 3 190 03/16/2022    EKW9DHSBBWUO 3 030 08/16/2021    NYI0HLVBSSOT 3 510 03/17/2021     Lab Results   Component Value Date    FREET4 1 20 03/16/2022       Impression & Plan:    Problem List Items Addressed This Visit        Endocrine    Type 2 diabetes mellitus with complication, with long-term current use of insulin ([facility] [address] ) - Primary     Patient is relatively well controlled without any episodes of hypoglycemia  I again counseled the patient on the proper way to use basal insulin  I do not anticipate that he will change his current regimen, however  I agreed to increase Trulicity to 4 5 mg once weekly in the hopes of making the use of insulin unnecessary and reducing the risk of hypoglycemia  Counseled on the importance of continuing to test blood sugars 3x daily and to notify me of any hypoglycemia  Continue to focus on a healthy diet  Engage in physical activity as tolerated  Patient knows to notify me with persistent hyperglycemia or hypoglycemia  Counseled on appropriate surveillance and treatment of hypoglycemia  Repeat labs prior to next appointment in 6 months  Lab Results   Component Value Date    HGBA1C 7 1 (H) 03/16/2022            Relevant Medications    Dulaglutide (Trulicity) 4 5 UK/4 1DN SOPN    insulin glargine (Lantus SoloStar) 100 units/mL injection pen    Other Relevant Orders    Diabetic foot exam    Comprehensive metabolic panel Lab Collect    HEMOGLOBIN A1C W/ EAG ESTIMATION Lab Collect    Microalbumin / creatinine urine ratio Lab Collect    Lipid panel Lab Collect Lab Collect    Acquired hypothyroidism     Thyroid function is stable  Continue current regimen  Cardiovascular and Mediastinum    Essential hypertension     /82  Continue current regimen  Other    Mixed hyperlipidemia     Stable  Continue statin                  Orders Placed This Encounter   Procedures    Diabetic foot exam     Standing Status:   Future Standing Expiration Date:   3/23/2023    Comprehensive metabolic panel Lab Collect     This is a patient instruction: Patient fasting for 8 hours or longer recommended  Standing Status:   Future     Standing Expiration Date:   3/23/2023    HEMOGLOBIN A1C W/ EAG ESTIMATION Lab Collect     Standing Status:   Future     Standing Expiration Date:   3/23/2023    Microalbumin / creatinine urine ratio Lab Collect     Standing Status:   Future     Standing Expiration Date:   3/23/2023    Lipid panel Lab Collect Lab Collect     This is a patient instruction: This test requires patient fasting for 10-12 hours or longer  Drinking of black coffee or black tea is acceptable  Standing Status:   Future     Standing Expiration Date:   3/23/2023       There are no Patient Instructions on file for this visit  Discussed with the patient and all questioned fully answered  He will call me if any problems arise  Follow-up appointment in 6 months       Counseled patient on diagnostic results, prognosis, risk and benefit of treatment options, instruction for management, importance of treatment compliance, Risk  factor reduction and impressions    YARI Valera

## 2022-04-04 DIAGNOSIS — E11.8 TYPE 2 DIABETES MELLITUS WITH COMPLICATION, WITH LONG-TERM CURRENT USE OF INSULIN (HCC): ICD-10-CM

## 2022-04-04 DIAGNOSIS — Z79.4 TYPE 2 DIABETES MELLITUS WITH COMPLICATION, WITH LONG-TERM CURRENT USE OF INSULIN (HCC): ICD-10-CM

## 2022-04-04 RX ORDER — BLOOD SUGAR DIAGNOSTIC
STRIP MISCELLANEOUS
Qty: 102 STRIP | Refills: 3 | Status: SHIPPED | OUTPATIENT
Start: 2022-04-04 | End: 2022-07-28

## 2022-04-04 RX ORDER — LANCETS
EACH MISCELLANEOUS
Qty: 300 EACH | Refills: 3 | Status: SHIPPED | OUTPATIENT
Start: 2022-04-04

## 2022-04-25 ENCOUNTER — OFFICE VISIT (OUTPATIENT)
Dept: INTERNAL MEDICINE CLINIC | Facility: CLINIC | Age: 87
End: 2022-04-25
Payer: MEDICARE

## 2022-04-25 VITALS
OXYGEN SATURATION: 98 % | SYSTOLIC BLOOD PRESSURE: 122 MMHG | BODY MASS INDEX: 27.07 KG/M2 | HEIGHT: 68 IN | WEIGHT: 178.6 LBS | HEART RATE: 61 BPM | DIASTOLIC BLOOD PRESSURE: 80 MMHG | TEMPERATURE: 96.7 F | RESPIRATION RATE: 12 BRPM

## 2022-04-25 DIAGNOSIS — I48.0 PAROXYSMAL ATRIAL FIBRILLATION (HCC): ICD-10-CM

## 2022-04-25 DIAGNOSIS — N18.32 STAGE 3B CHRONIC KIDNEY DISEASE (HCC): ICD-10-CM

## 2022-04-25 DIAGNOSIS — Z79.4 TYPE 2 DIABETES MELLITUS WITH COMPLICATION, WITH LONG-TERM CURRENT USE OF INSULIN (HCC): ICD-10-CM

## 2022-04-25 DIAGNOSIS — E78.2 MIXED HYPERLIPIDEMIA: ICD-10-CM

## 2022-04-25 DIAGNOSIS — E11.8 TYPE 2 DIABETES MELLITUS WITH COMPLICATION, WITH LONG-TERM CURRENT USE OF INSULIN (HCC): ICD-10-CM

## 2022-04-25 DIAGNOSIS — I10 ESSENTIAL HYPERTENSION: ICD-10-CM

## 2022-04-25 DIAGNOSIS — Z00.00 MEDICARE ANNUAL WELLNESS VISIT, SUBSEQUENT: Primary | ICD-10-CM

## 2022-04-25 PROCEDURE — G0439 PPPS, SUBSEQ VISIT: HCPCS | Performed by: INTERNAL MEDICINE

## 2022-04-25 PROCEDURE — 1123F ACP DISCUSS/DSCN MKR DOCD: CPT | Performed by: INTERNAL MEDICINE

## 2022-04-25 PROCEDURE — 99214 OFFICE O/P EST MOD 30 MIN: CPT | Performed by: INTERNAL MEDICINE

## 2022-04-25 RX ORDER — PREDNISOLONE ACETATE 10 MG/ML
1 SUSPENSION/ DROPS OPHTHALMIC 4 TIMES DAILY
COMMUNITY

## 2022-04-25 NOTE — PROGRESS NOTES
Assessment/Plan:    Problem List Items Addressed This Visit     None           There are no diagnoses linked to this encounter  No problem-specific Assessment & Plan notes found for this encounter  Subjective:      Patient ID: Kwaku Oliveira is a 80 y o  male  The patient has issues with spinal stenosis and is not a surgical candidate      The following portions of the patient's history were reviewed and updated as appropriate:   He has a past medical history of Benign hypertension, Chronic kidney disease (CKD), stage III (moderate), Coronary artery disease, Hypothyroidism, Ischemic cardiomyopathy, Mixed hyperlipidemia, Paroxysmal atrial fibrillation, PVD (peripheral vascular disease), and Type II diabetes mellitus  ,  does not have any pertinent problems on file  ,   has a past surgical history that includes Cardiac catheterization and Coronary artery bypass graft  ,  family history includes No Known Problems in his brother, father, maternal aunt, maternal grandfather, maternal grandmother, maternal uncle, mother, paternal aunt, paternal grandfather, paternal grandmother, paternal uncle, and sister  ,   reports that he has never smoked  He has never used smokeless tobacco  No history on file for alcohol use and drug use ,  has No Known Allergies     Current Outpatient Medications   Medication Sig Dispense Refill    Accu-Chek Softclix Lancets lancets Use to test blood sugar 3x daily   300 each 3    amLODIPine (NORVASC) 2 5 mg tablet TAKE 1 TABLET TWICE DAILY 180 tablet 3    apixaban (Eliquis) 5 mg Half tablet twice a day 180 tablet 3    Apoaequorin (PREVAGEN EXTRA STRENGTH PO) Take by mouth in the morning      atorvastatin (LIPITOR) 10 mg tablet TAKE 1 TABLET EVERY DAY 90 tablet 3    BD PEN NEEDLE CARMINA U/F 32G X 4 MM MISC USE 4 PEN NEEDLE DAILY AS DIRECTED  3    carvedilol (COREG) 6 25 mg tablet TAKE 1 TABLET (6 25 MG TOTAL) BY MOUTH 2 (TWO) TIMES A DAY WITH MEALS 180 tablet 3    Dulaglutide (Trulicity) 4 5 BF/0 1TK SOPN Inject 0 5 mL (4 5 mg total) under the skin once a week 6 mL 1    furosemide (LASIX) 20 mg tablet TAKE 1 TABLET EVERY DAY 90 tablet 3    glucose blood (Accu-Chek Guide) test strip Use to test blood sugar 3 times a day 102 strip 3    insulin glargine (Lantus SoloStar) 100 units/mL injection pen Inject 30 units daily  15 mL 2    isosorbide mononitrate (IMDUR) 30 mg 24 hr tablet TAKE 1 TABLET TWICE DAILY 180 tablet 3    levothyroxine 75 mcg tablet Take 1 tablet (75 mcg total) by mouth daily in the early morning Take 1 tablet daily except Sunday 90 tablet 3    Misc Natural Products (COLON HERBAL CLEANSER PO) Take by mouth      nitroglycerin (NITROSTAT) 0 4 mg SL tablet Place 1 tablet (0 4 mg total) under the tongue every 5 (five) minutes as needed for chest pain 100 tablet 3    Omega 3 1200 MG CAPS Take by mouth Take 4 tablets daily      potassium chloride (MICRO-K) 10 MEQ CR capsule TAKE 2 CAPSULES IN THE MORNING AND TAKE 1 CAPSULE IN THE EVENING 270 capsule 3    prednisoLONE acetate (prednisoLONE Acetate P-F) 1 % ophthalmic suspension 1 drop 4 (four) times a day       No current facility-administered medications for this visit  Review of Systems   Constitutional: Negative for chills, fatigue and fever  HENT: Negative  Respiratory: Negative for cough, chest tightness and shortness of breath  Cardiovascular: Negative for chest pain and palpitations  Gastrointestinal: Negative for abdominal pain, constipation, diarrhea, nausea and vomiting  Genitourinary: Negative  Musculoskeletal: Positive for back pain  Negative for myalgias  Skin: Negative  Neurological: Negative  Psychiatric/Behavioral: Negative for dysphoric mood  The patient is not nervous/anxious            Objective:  Vitals:    04/25/22 1456   BP: 122/80   BP Location: Left arm   Patient Position: Sitting   Cuff Size: Large   Pulse: 61   Resp: 12   Temp: (!) 96 7 °F (35 9 °C)   SpO2: 98% Weight: 81 kg (178 lb 9 6 oz)   Height: 5' 8" (1 727 m)     Body mass index is 27 16 kg/m²  Physical Exam  Vitals and nursing note reviewed  Constitutional:       Appearance: He is well-developed  HENT:      Head: Normocephalic and atraumatic  Eyes:      Pupils: Pupils are equal, round, and reactive to light  Cardiovascular:      Rate and Rhythm: Normal rate and regular rhythm  Heart sounds: Normal heart sounds  No murmur heard  Pulmonary:      Effort: Pulmonary effort is normal       Breath sounds: Normal breath sounds  No stridor  No rales  Abdominal:      General: Bowel sounds are normal  There is no distension  Palpations: Abdomen is soft  Tenderness: There is no abdominal tenderness  Musculoskeletal:         General: No deformity  Normal range of motion  Cervical back: Normal range of motion and neck supple  Skin:     General: Skin is warm and dry  Neurological:      Mental Status: He is alert and oriented to person, place, and time            PHQ-2/9 Depression Screening    Little interest or pleasure in doing things: 0 - not at all  Feeling down, depressed, or hopeless: 0 - not at all  PHQ-2 Score: 0  PHQ-2 Interpretation: Negative depression screen

## 2022-04-25 NOTE — PROGRESS NOTES
Assessment and Plan:     Problem List Items Addressed This Visit     None           Preventive health issues were discussed with patient, and age appropriate screening tests were ordered as noted in patient's After Visit Summary  Personalized health advice and appropriate referrals for health education or preventive services given if needed, as noted in patient's After Visit Summary       History of Present Illness:     Patient presents for Medicare Annual Wellness visit    Patient Care Team:  Taylor Thomas DO as PCP - General (Internal Medicine)     Problem List:     Patient Active Problem List   Diagnosis    Paroxysmal atrial fibrillation ([address] )    Type 2 diabetes mellitus with complication, with long-term current use of insulin ([address] )    Bradycardia    Acquired hypothyroidism    Stage 3b chronic kidney disease    Essential hypertension    Low back pain    Mixed hyperlipidemia    Rupture of tendon of biceps, long head    Spinal stenosis of lumbar region with neurogenic claudication    Stented coronary artery    Lightheadedness    Atherosclerosis of native arteries of extremities with intermittent claudication, bilateral legs ([address] )    Atherosclerosis of artery of extremity with ulceration ([address] )    Peripheral arterial disease ([address] )    Pulmonary fibrosis ([address] )    Basal cell carcinoma of skin of nose    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    Platelets decreased ([address] )    Asymptomatic bilateral carotid artery stenosis    Ischemic cardiomyopathy      Past Medical and Surgical History:     Past Medical History:   Diagnosis Date    Benign hypertension     Chronic kidney disease (CKD), stage III (moderate)     Coronary artery disease     Hypothyroidism     Ischemic cardiomyopathy     Mixed hyperlipidemia     Paroxysmal atrial fibrillation     PVD (peripheral vascular disease)     Type II diabetes mellitus      Past Surgical History:   Procedure Laterality Date    CARDIAC CATHETERIZATION      CORONARY ARTERY BYPASS GRAFT        Family History:     Family History   Problem Relation Age of Onset    No Known Problems Mother     No Known Problems Father     No Known Problems Sister     No Known Problems Brother     No Known Problems Maternal Aunt     No Known Problems Maternal Uncle     No Known Problems Paternal Aunt     No Known Problems Paternal Uncle     No Known Problems Maternal Grandmother     No Known Problems Maternal Grandfather     No Known Problems Paternal Grandmother     No Known Problems Paternal Grandfather     Anemia Neg Hx     Arrhythmia Neg Hx     Asthma Neg Hx     Clotting disorder Neg Hx     Fainting Neg Hx     Heart attack Neg Hx     Heart disease Neg Hx     Heart failure Neg Hx     Hyperlipidemia Neg Hx     Hypertension Neg Hx       Social History:     Social History     Socioeconomic History    Marital status: /Civil Union     Spouse name: None    Number of children: None    Years of education: None    Highest education level: None   Occupational History    Occupation: RETIRED   Tobacco Use    Smoking status: Never Smoker    Smokeless tobacco: Never Used   Vaping Use    Vaping Use: Never used   Substance and Sexual Activity    Alcohol use: None    Drug use: None    Sexual activity: None   Other Topics Concern    None   Social History Narrative        RETIRED     Social Determinants of Health     Financial Resource Strain: Not on file   Food Insecurity: Not on file   Transportation Needs: Not on file   Physical Activity: Not on file   Stress: Not on file   Social Connections: Not on file   Intimate Partner Violence: Not on file   Housing Stability: Not on file      Medications and Allergies:     Current Outpatient Medications   Medication Sig Dispense Refill    Accu-Chek Softclix Lancets lancets Use to test blood sugar 3x daily   300 each 3    amLODIPine (NORVASC) 2 5 mg tablet TAKE 1 TABLET TWICE DAILY 180 tablet 3  apixaban (Eliquis) 5 mg Half tablet twice a day 180 tablet 3    Apoaequorin (PREVAGEN EXTRA STRENGTH PO) Take by mouth in the morning      atorvastatin (LIPITOR) 10 mg tablet TAKE 1 TABLET EVERY DAY 90 tablet 3    BD PEN NEEDLE CARMINA U/F 32G X 4 MM MISC USE 4 PEN NEEDLE DAILY AS DIRECTED  3    carvedilol (COREG) 6 25 mg tablet TAKE 1 TABLET (6 25 MG TOTAL) BY MOUTH 2 (TWO) TIMES A DAY WITH MEALS 180 tablet 3    Dulaglutide (Trulicity) 4 5 OY/9 8DZ SOPN Inject 0 5 mL (4 5 mg total) under the skin once a week 6 mL 1    furosemide (LASIX) 20 mg tablet TAKE 1 TABLET EVERY DAY 90 tablet 3    glucose blood (Accu-Chek Guide) test strip Use to test blood sugar 3 times a day 102 strip 3    insulin glargine (Lantus SoloStar) 100 units/mL injection pen Inject 30 units daily  15 mL 2    isosorbide mononitrate (IMDUR) 30 mg 24 hr tablet TAKE 1 TABLET TWICE DAILY 180 tablet 3    levothyroxine 75 mcg tablet Take 1 tablet (75 mcg total) by mouth daily in the early morning Take 1 tablet daily except Sunday 90 tablet 3    Misc Natural Products (COLON HERBAL CLEANSER PO) Take by mouth      nitroglycerin (NITROSTAT) 0 4 mg SL tablet Place 1 tablet (0 4 mg total) under the tongue every 5 (five) minutes as needed for chest pain 100 tablet 3    Omega 3 1200 MG CAPS Take by mouth Take 4 tablets daily      potassium chloride (MICRO-K) 10 MEQ CR capsule TAKE 2 CAPSULES IN THE MORNING AND TAKE 1 CAPSULE IN THE EVENING 270 capsule 3    prednisoLONE acetate (prednisoLONE Acetate P-F) 1 % ophthalmic suspension 1 drop 4 (four) times a day       No current facility-administered medications for this visit       No Known Allergies   Immunizations:     Immunization History   Administered Date(s) Administered    COVID-19 MODERNA VACC 0 5 ML IM 01/22/2021, 02/19/2021    INFLUENZA 10/01/2012, 10/02/2015, 10/06/2017, 10/20/2019    Influenza Split High Dose Preservative Free IM 10/18/2018, 10/20/2019    Influenza, recombinant, quadrivalent,injectable, preservative free 10/12/2020    Pneumococcal Conjugate 13-Valent 10/06/2015    Pneumococcal Polysaccharide PPV23 09/15/2010      Health Maintenance: There are no preventive care reminders to display for this patient  Topic Date Due    DTaP,Tdap,and Td Vaccines (1 - Tdap) Never done    COVID-19 Vaccine (3 - Booster for Moderna series) 07/19/2021    Influenza Vaccine (1) 09/01/2021      Medicare Health Risk Assessment:     /80 (BP Location: Left arm, Patient Position: Sitting, Cuff Size: Large)   Pulse 61   Temp (!) 96 7 °F (35 9 °C)   Resp 12   Ht 5' 8" (1 727 m)   Wt 81 kg (178 lb 9 6 oz)   SpO2 98%   BMI 27 16 kg/m²      Tristan Soto is here for his Subsequent Wellness visit  Last Medicare Wellness visit information reviewed, patient interviewed and updates made to the record today  Health Risk Assessment:   Patient rates overall health as good  Patient feels that their physical health rating is same  Patient is satisfied with their life  Eyesight was rated as same  Hearing was rated as same  Patient feels that their emotional and mental health rating is same  Patients states they are never, rarely angry  Patient states they are never, rarely unusually tired/fatigued  Pain experienced in the last 7 days has been none  Patient states that he has experienced no weight loss or gain in last 6 months  Depression Screening:   PHQ-2 Score: 0      Fall Risk Screening: In the past year, patient has experienced: no history of falling in past year      Home Safety:  Patient does not have trouble with stairs inside or outside of their home  Patient has working smoke alarms and has working carbon monoxide detector  Home safety hazards include: none  Nutrition:   Current diet is Regular  Medications:   Patient is currently taking over-the-counter supplements  OTC medications include: see medication list  Patient is not able to manage medications       Activities of Daily Living (ADLs)/Instrumental Activities of Daily Living (IADLs):   Walk and transfer into and out of bed and chair?: Yes  Dress and groom yourself?: Yes    Bathe or shower yourself?: Yes    Feed yourself? Yes  Do your laundry/housekeeping?: No  Manage your money, pay your bills and track your expenses?: No  Make your own meals?: Yes    Do your own shopping?: Yes    Previous Hospitalizations:   Any hospitalizations or ED visits within the last 12 months?: No      Advance Care Planning:   Living will: Yes    Durable POA for healthcare: Yes    Advanced directive: Yes    Advanced directive counseling given: No    Five wishes given: No    Patient declined ACP directive: No    End of Life Decisions reviewed with patient: Yes    Provider agrees with end of life decisions: Yes      Cognitive Screening:   Provider or family/friend/caregiver concerned regarding cognition?: No    PREVENTIVE SCREENINGS      Cardiovascular Screening:    General: Screening Not Indicated and History Lipid Disorder      Diabetes Screening:     General: Screening Not Indicated and History Diabetes      Colorectal Cancer Screening:     General: Screening Not Indicated      Prostate Cancer Screening:    General: Screening Not Indicated      Osteoporosis Screening:    General: Screening Not Indicated      Abdominal Aortic Aneurysm (AAA) Screening:        General: Screening Not Indicated      Lung Cancer Screening:     General: Screening Not Indicated      Hepatitis C Screening:    General: Screening Not Indicated    Screening, Brief Intervention, and Referral to Treatment (SBIRT)    Screening  Typical number of drinks in a day: 0  Typical number of drinks in a week: 3  Interpretation: Low risk drinking behavior      Single Item Drug Screening:  How often have you used an illegal drug (including marijuana) or a prescription medication for non-medical reasons in the past year? never    Single Item Drug Screen Score: 0  Interpretation: Negative screen for possible drug use disorder      Smooth Beard, DO

## 2022-04-25 NOTE — ASSESSMENT & PLAN NOTE
Lab Results   Component Value Date    HGBA1C 7 1 (H) 55/16/7127   Trulicity 4 5 mg with PRN Glargine with sliding scale per endo

## 2022-04-26 ENCOUNTER — TELEPHONE (OUTPATIENT)
Dept: ADMINISTRATIVE | Facility: OTHER | Age: 87
End: 2022-04-26

## 2022-04-26 NOTE — TELEPHONE ENCOUNTER
----- Message from Nadir Zambrano sent at 4/25/2022  3:09 PM EDT -----  04/25/22 3:09 PM    Hello, our patient Braden Nuñez has had Diabetic Foot Exam completed/performed  Please assist in updating the patient chart by making an External outreach to [address] [place] [address] [phone] facility located in [facility]  The date of service is UNSURE OF EXACT DATE      Thank you,  Yuliet Sotne PG [facility]

## 2022-04-26 NOTE — TELEPHONE ENCOUNTER
Upon review of the In Basket request and the patient's chart, initial outreach has been made via fax, please see Contacts section for details       Thank you  Brittney Barnett MA

## 2022-04-26 NOTE — LETTER
Diabetic Foot Exam Form    Date Requested: 22  Patient: Supriya Shrestha  Patient : [id number]   Referring Provider: Luis Fernando Mancera DO    Diabetic Foot Exam Performed with shoes and socks removed        Yes         No     Date of Diabetic Foot Exam ______________________________  Risk Score ____________________________________________    Left Foot       Visual Inspection         Monofilament Testing Sensory Exam        Pedal Pulses         Additional Comments         Right Foot      Visual Inspection         Monofilament Testing Sensory Exam       Pedal Pulses         Additional Comments         Comments __________________________________________________________    Practice Providing Exam ______________________________________________    Exam Performed By (print name) _______________________________________      Provider Signature ___________________________________________________      These reports are needed for  compliance  Please fax this completed form and a copy of the Diabetic Foot Exam report to our office located at [place] [address] as soon as possible via [phone] attention [place]: Phone [phone]    We thank you for your assistance in treating our mutual patient

## 2022-05-04 NOTE — TELEPHONE ENCOUNTER
As a follow-up, a second attempt has been made for outreach via fax, please see Contacts section for details      Thank you  Nazario Patel MA

## 2022-05-12 NOTE — TELEPHONE ENCOUNTER
As a final attempt, a third outreach has been made via telephone call  Please see Contacts section for details  This encounter will be closed and completed by end of day  Should we receive the requested information because of previous outreach attempts, the requested patient's chart will be updated appropriately       Thank you  Niki Art MA

## 2022-05-16 NOTE — TELEPHONE ENCOUNTER
Upon review of the In Basket request we were able to locate, review, and update the patient chart as requested for Diabetic Foot Exam     Any additional questions or concerns should be emailed to the Practice Liaisons via [email or web address] email, please do not reply via In Basket      Thank you  Chandana Franklin MA

## 2022-05-22 ENCOUNTER — APPOINTMENT (EMERGENCY)
Dept: CT IMAGING | Facility: HOSPITAL | Age: 87
End: 2022-05-22
Payer: MEDICARE

## 2022-05-22 ENCOUNTER — HOSPITAL ENCOUNTER (EMERGENCY)
Facility: HOSPITAL | Age: 87
Discharge: HOME | End: 2022-05-22
Attending: FAMILY MEDICINE
Payer: MEDICARE

## 2022-05-22 VITALS
TEMPERATURE: 97.5 F | BODY MASS INDEX: 27.06 KG/M2 | OXYGEN SATURATION: 96 % | HEART RATE: 72 BPM | WEIGHT: 178 LBS | SYSTOLIC BLOOD PRESSURE: 145 MMHG | DIASTOLIC BLOOD PRESSURE: 66 MMHG | RESPIRATION RATE: 20 BRPM

## 2022-05-22 DIAGNOSIS — R20.2 NUMBNESS AND TINGLING IN RIGHT HAND: Primary | ICD-10-CM

## 2022-05-22 DIAGNOSIS — R20.0 NUMBNESS AND TINGLING IN RIGHT HAND: Primary | ICD-10-CM

## 2022-05-22 DIAGNOSIS — R42 LIGHTHEADEDNESS: ICD-10-CM

## 2022-05-22 LAB
2HR DELTA HS TROPONIN: 1 NG/L
ALBUMIN SERPL BCP-MCNC: 4 G/DL (ref 3.5–5)
ALP SERPL-CCNC: 63 U/L (ref 34–104)
ALT SERPL W P-5'-P-CCNC: 16 U/L (ref 7–52)
ANION GAP SERPL CALCULATED.3IONS-SCNC: 11 MMOL/L (ref 4–13)
APTT PPP: 28 SECONDS (ref 23–37)
AST SERPL W P-5'-P-CCNC: 20 U/L (ref 13–39)
BASOPHILS # BLD AUTO: 0.05 THOUSANDS/ΜL (ref 0–0.1)
BASOPHILS NFR BLD AUTO: 1 % (ref 0–1)
BILIRUB SERPL-MCNC: 0.91 MG/DL (ref 0.2–1)
BUN SERPL-MCNC: 25 MG/DL (ref 5–25)
CALCIUM SERPL-MCNC: 8.8 MG/DL (ref 8.4–10.2)
CARDIAC TROPONIN I PNL SERPL HS: 24 NG/L
CARDIAC TROPONIN I PNL SERPL HS: 25 NG/L
CHLORIDE SERPL-SCNC: 97 MMOL/L (ref 96–108)
CO2 SERPL-SCNC: 27 MMOL/L (ref 21–32)
CREAT SERPL-MCNC: 1.41 MG/DL (ref 0.6–1.3)
EOSINOPHIL # BLD AUTO: 0.18 THOUSAND/ΜL (ref 0–0.61)
EOSINOPHIL NFR BLD AUTO: 3 % (ref 0–6)
ERYTHROCYTE [DISTWIDTH] IN BLOOD BY AUTOMATED COUNT: 12.5 % (ref 11.6–15.1)
FLUAV RNA RESP QL NAA+PROBE: NEGATIVE
FLUBV RNA RESP QL NAA+PROBE: NEGATIVE
GFR SERPL CREATININE-BSD FRML MDRD: 43 ML/MIN/1.73SQ M
GLUCOSE SERPL-MCNC: 220 MG/DL (ref 65–140)
HCT VFR BLD AUTO: 41.7 % (ref 36.5–49.3)
HGB BLD-MCNC: 14 G/DL (ref 12–17)
IMM GRANULOCYTES # BLD AUTO: 0.02 THOUSAND/UL (ref 0–0.2)
IMM GRANULOCYTES NFR BLD AUTO: 0 % (ref 0–2)
INR PPP: 1.13 (ref 0.84–1.19)
LYMPHOCYTES # BLD AUTO: 1.03 THOUSANDS/ΜL (ref 0.6–4.47)
LYMPHOCYTES NFR BLD AUTO: 19 % (ref 14–44)
MCH RBC QN AUTO: 32.6 PG (ref 26.8–34.3)
MCHC RBC AUTO-ENTMCNC: 33.6 G/DL (ref 31.4–37.4)
MCV RBC AUTO: 97 FL (ref 82–98)
MONOCYTES # BLD AUTO: 0.59 THOUSAND/ΜL (ref 0.17–1.22)
MONOCYTES NFR BLD AUTO: 11 % (ref 4–12)
NEUTROPHILS # BLD AUTO: 3.54 THOUSANDS/ΜL (ref 1.85–7.62)
NEUTS SEG NFR BLD AUTO: 66 % (ref 43–75)
NRBC BLD AUTO-RTO: 0 /100 WBCS
PLATELET # BLD AUTO: 152 THOUSANDS/UL (ref 149–390)
PMV BLD AUTO: 9.6 FL (ref 8.9–12.7)
POTASSIUM SERPL-SCNC: 4.2 MMOL/L (ref 3.5–5.3)
PROT SERPL-MCNC: 7 G/DL (ref 6.4–8.4)
PROTHROMBIN TIME: 14.4 SECONDS (ref 11.6–14.5)
RBC # BLD AUTO: 4.29 MILLION/UL (ref 3.88–5.62)
RSV RNA RESP QL NAA+PROBE: NEGATIVE
SARS-COV-2 RNA RESP QL NAA+PROBE: NEGATIVE
SODIUM SERPL-SCNC: 135 MMOL/L (ref 135–147)
WBC # BLD AUTO: 5.41 THOUSAND/UL (ref 4.31–10.16)

## 2022-05-22 PROCEDURE — 70450 CT HEAD/BRAIN W/O DYE: CPT

## 2022-05-22 PROCEDURE — 36415 COLL VENOUS BLD VENIPUNCTURE: CPT | Performed by: PHYSICIAN ASSISTANT

## 2022-05-22 PROCEDURE — 0241U HB NFCT DS VIR RESP RNA 4 TRGT: CPT | Performed by: PHYSICIAN ASSISTANT

## 2022-05-22 PROCEDURE — 85730 THROMBOPLASTIN TIME PARTIAL: CPT | Performed by: PHYSICIAN ASSISTANT

## 2022-05-22 PROCEDURE — 80053 COMPREHEN METABOLIC PANEL: CPT | Performed by: PHYSICIAN ASSISTANT

## 2022-05-22 PROCEDURE — 84484 ASSAY OF TROPONIN QUANT: CPT | Performed by: PHYSICIAN ASSISTANT

## 2022-05-22 PROCEDURE — 93005 ELECTROCARDIOGRAM TRACING: CPT

## 2022-05-22 PROCEDURE — 99285 EMERGENCY DEPT VISIT HI MDM: CPT | Performed by: PHYSICIAN ASSISTANT

## 2022-05-22 PROCEDURE — 85025 COMPLETE CBC W/AUTO DIFF WBC: CPT | Performed by: PHYSICIAN ASSISTANT

## 2022-05-22 PROCEDURE — 85610 PROTHROMBIN TIME: CPT | Performed by: PHYSICIAN ASSISTANT

## 2022-05-22 PROCEDURE — 99284 EMERGENCY DEPT VISIT MOD MDM: CPT

## 2022-05-22 NOTE — ED PROVIDER NOTES
History  Chief Complaint   Patient presents with    Dizziness     Right hand tingling     80year-old male history of hypertension, hyperlipidemia, CAD, AFib on Eliquis presents accompanied by his wife complaining of right hand numbness  Patient notes that it started yesterday around 915 a m  when he was power washing a tractor using his right hand     Patient notes a history of carpal tunnel which he had a surgical repair on 20 years ago  He reports that although the symptoms feel similar in his right wrist and hand that it was associated with some dizziness  Patient does have a longstanding history of lightheadedness however he reports yesterday was different and that he was unsteady on his feet  His wife reports that he was generally weak for the rest the day  She denies the patient having any speech slurring or word-finding difficulty  No facial droop  Patient denies any other unilateral weakness  He did ambulate into the hospital today  Denies any visual changes or headache, chest pain, shortness of breath, abdominal pain or any other complaints or concerns at this time  He has not taken anything for his symptoms  Prior to Admission Medications   Prescriptions Last Dose Informant Patient Reported? Taking? Accu-Chek Softclix Lancets lancets   No No   Sig: Use to test blood sugar 3x daily     Apoaequorin (PREVAGEN EXTRA STRENGTH PO)   Yes No   Sig: Take by mouth in the morning   BD PEN NEEDLE CARMINA U/F 32G X 4 MM MISC  Self Yes No   Sig: USE 4 PEN NEEDLE DAILY AS DIRECTED   Dulaglutide (Trulicity) 4 5 XD/0 1SZ SOPN   No No   Sig: Inject 0 5 mL (4 5 mg total) under the skin once a week   Misc Natural Products (COLON HERBAL CLEANSER PO)   Yes No   Sig: Take by mouth   Omega 3 1200 MG CAPS  Self Yes No   Sig: Take by mouth Take 4 tablets daily   amLODIPine (NORVASC) 2 5 mg tablet   No No   Sig: TAKE 1 TABLET TWICE DAILY   apixaban (Eliquis) 5 mg   No No   Sig: Half tablet twice a day atorvastatin (LIPITOR) 10 mg tablet   No No   Sig: TAKE 1 TABLET EVERY DAY   carvedilol (COREG) 6 25 mg tablet  Self No No   Sig: TAKE 1 TABLET (6 25 MG TOTAL) BY MOUTH 2 (TWO) TIMES A DAY WITH MEALS   furosemide (LASIX) 20 mg tablet   No No   Sig: TAKE 1 TABLET EVERY DAY   glucose blood (Accu-Chek Guide) test strip   No No   Sig: Use to test blood sugar 3 times a day   insulin glargine (Lantus SoloStar) 100 units/mL injection pen   No No   Sig: Inject 30 units daily     isosorbide mononitrate (IMDUR) 30 mg 24 hr tablet  Self No No   Sig: TAKE 1 TABLET TWICE DAILY   levothyroxine 75 mcg tablet  Self No No   Sig: Take 1 tablet (75 mcg total) by mouth daily in the early morning Take 1 tablet daily except    nitroglycerin (NITROSTAT) 0 4 mg SL tablet   No No   Sig: Place 1 tablet (0 4 mg total) under the tongue every 5 (five) minutes as needed for chest pain   potassium chloride (MICRO-K) 10 MEQ CR capsule  Self No No   Sig: TAKE 2 CAPSULES IN THE MORNING AND TAKE 1 CAPSULE IN THE EVENING   prednisoLONE acetate (prednisoLONE Acetate P-F) 1 % ophthalmic suspension   Yes No   Si drop 4 (four) times a day      Facility-Administered Medications: None       Past Medical History:   Diagnosis Date    Benign hypertension     Chronic kidney disease (CKD), stage III (moderate)     Coronary artery disease     Hypothyroidism     Ischemic cardiomyopathy     Mixed hyperlipidemia     Paroxysmal atrial fibrillation     PVD (peripheral vascular disease)     Type II diabetes mellitus        Past Surgical History:   Procedure Laterality Date    CARDIAC CATHETERIZATION      CORONARY ARTERY BYPASS GRAFT         Family History   Problem Relation Age of Onset    No Known Problems Mother     No Known Problems Father     No Known Problems Sister     No Known Problems Brother     No Known Problems Maternal Aunt     No Known Problems Maternal Uncle     No Known Problems Paternal Aunt     No Known Problems Paternal Uncle     No Known Problems Maternal Grandmother     No Known Problems Maternal Grandfather     No Known Problems Paternal Grandmother     No Known Problems Paternal Grandfather     Anemia Neg Hx     Arrhythmia Neg Hx     Asthma Neg Hx     Clotting disorder Neg Hx     Fainting Neg Hx     Heart attack Neg Hx     Heart disease Neg Hx     Heart failure Neg Hx     Hyperlipidemia Neg Hx     Hypertension Neg Hx      I have reviewed and agree with the history as documented  E-Cigarette/Vaping    E-Cigarette Use Never User      E-Cigarette/Vaping Substances    Nicotine No     THC No     CBD No     Flavoring No     Other No     Unknown No      Social History     Tobacco Use    Smoking status: Never Smoker    Smokeless tobacco: Never Used   Vaping Use    Vaping Use: Never used   Substance Use Topics    Drug use: Never       Review of Systems   Constitutional: Negative for chills, fatigue and fever  HENT: Negative for congestion and sore throat  Eyes: Negative for pain  Respiratory: Negative for cough, chest tightness, shortness of breath and wheezing  Cardiovascular: Negative for chest pain, palpitations and leg swelling  Gastrointestinal: Negative for abdominal pain, constipation, diarrhea, nausea and vomiting  Endocrine: Negative for polyuria  Genitourinary: Negative for dysuria  Musculoskeletal: Negative for arthralgias, back pain, myalgias and neck pain  Skin: Negative for rash  Neurological: Positive for dizziness, light-headedness and numbness  Negative for syncope and headaches  All other systems reviewed and are negative  Physical Exam  Physical Exam  Vitals reviewed  Constitutional:       Appearance: Normal appearance  He is well-developed  HENT:      Head: Normocephalic and atraumatic        Mouth/Throat:      Mouth: Mucous membranes are moist    Eyes:      Conjunctiva/sclera: Conjunctivae normal    Cardiovascular:      Rate and Rhythm: Normal rate and regular rhythm  Heart sounds: Normal heart sounds  Pulmonary:      Effort: Pulmonary effort is normal       Breath sounds: Normal breath sounds  Abdominal:      General: Bowel sounds are normal       Palpations: Abdomen is soft  Tenderness: There is no abdominal tenderness  Musculoskeletal:         General: Normal range of motion  Cervical back: Normal range of motion  Skin:     General: Skin is warm and dry  Capillary Refill: Capillary refill takes less than 2 seconds  Neurological:      General: No focal deficit present  Mental Status: He is alert and oriented to person, place, and time  Comments: Gross sensation intact although somewhat diminished over flexor surface of right wrist and hand  No dysarthria, nystagmus, dysphagia, diplopia, aphasia, vertigo, ataxia, visions loss, dysmetria  Normal finger to nose, gait, strength and sensation in bilat upper and lower extremities unless otherwise noted  No pronator drift  Normal heel to shin  EOMI, no visual field defects  CN 2-12 intact   GCS 15  AOx3   Psychiatric:         Mood and Affect: Mood normal          Behavior: Behavior normal          Vital Signs  ED Triage Vitals   Temperature Pulse Respirations Blood Pressure SpO2   05/22/22 1104 05/22/22 0930 05/22/22 1104 05/22/22 0930 05/22/22 0930   97 5 °F (36 4 °C) 64 20 145/66 96 %      Temp Source Heart Rate Source Patient Position - Orthostatic VS BP Location FiO2 (%)   05/22/22 1104 -- -- -- --   Oral          Pain Score       05/22/22 0916       No Pain           Vitals:    05/22/22 0930 05/22/22 0945   BP: 145/66    Pulse: 64 72         Visual Acuity  Visual Acuity    Flowsheet Row Most Recent Value   L Pupil Size (mm) 3   R Pupil Size (mm) 3          ED Medications  Medications - No data to display    Diagnostic Studies  Results Reviewed     Procedure Component Value Units Date/Time    HS Troponin I 2hr [[id number]]  (Normal) Collected: 05/22/22 1125    Lab Status: Final result Specimen: Blood from Arm, Right Updated: 05/22/22 1158     hs TnI 2hr 25 ng/L      Delta 2hr hsTnI 1 ng/L     HS Troponin I 4hr [[id number]]     Lab Status: No result Specimen: Blood     COVID/FLU/RSV - 2 hour TAT [[id number]]  (Normal) Collected: 05/22/22 1044    Lab Status: Final result Specimen: Nasopharyngeal Swab Updated: 05/22/22 1138     SARS-CoV-2 Negative     INFLUENZA A PCR Negative     INFLUENZA B PCR Negative     RSV PCR Negative    Narrative:      FOR PEDIATRIC PATIENTS - copy/paste COVID Guidelines URL to browser: [email or web address]    SARS-CoV-2 assay is a Nucleic Acid Amplification assay intended for the  qualitative detection of nucleic acid from SARS-CoV-2 in nasopharyngeal  swabs  Results are for the presumptive identification of SARS-CoV-2 RNA  Positive results are indicative of infection with SARS-CoV-2, the virus  causing COVID-19, but do not rule out bacterial infection or co-infection  with other viruses  Laboratories within the [place] and its  territories are required to report all positive results to the appropriate  public health authorities  Negative results do not preclude SARS-CoV-2  infection and should not be used as the sole basis for treatment or other  patient management decisions  Negative results must be combined with  clinical observations, patient history, and epidemiological information  This test has not been FDA cleared or approved  This test has been authorized by FDA under an Emergency Use Authorization  (EUA)  This test is only authorized for the duration of time the  declaration that circumstances exist justifying the authorization of the  emergency use of an in vitro diagnostic tests for detection of SARS-CoV-2  virus and/or diagnosis of COVID-19 infection under section 564(b)(1) of  the Act, 21 U  S C  479CNY-6(L)(0), unless the authorization is terminated  or revoked sooner   The test has been validated but independent review by FDA  and CLIA is pending  Test performed using [employer] GeneXpert: This RT-PCR assay targets N2,  a region unique to SARS-CoV-2  A conserved region in the E-gene was chosen  for pan-Sarbecovirus detection which includes SARS-CoV-2      Protime-INR [[id number]]  (Normal) Collected: 05/22/22 0947    Lab Status: Final result Specimen: Blood from Arm, Right Updated: 05/22/22 1048     Protime 14 4 seconds      INR 1 13    APTT [[id number]]  (Normal) Collected: 05/22/22 0947    Lab Status: Final result Specimen: Blood from Arm, Right Updated: 05/22/22 1048     PTT 28 seconds     HS Troponin 0hr (reflex protocol) [[id number]]  (Normal) Collected: 05/22/22 0947    Lab Status: Final result Specimen: Blood from Arm, Right Updated: 05/22/22 1036     hs TnI 0hr 24 ng/L     Comprehensive metabolic panel [[id number]]  (Abnormal) Collected: 05/22/22 0947    Lab Status: Final result Specimen: Blood from Arm, Right Updated: 05/22/22 1031     Sodium 135 mmol/L      Potassium 4 2 mmol/L      Chloride 97 mmol/L      CO2 27 mmol/L      ANION GAP 11 mmol/L      BUN 25 mg/dL      Creatinine 1 41 mg/dL      Glucose 220 mg/dL      Calcium 8 8 mg/dL      AST 20 U/L      ALT 16 U/L      Alkaline Phosphatase 63 U/L      Total Protein 7 0 g/dL      Albumin 4 0 g/dL      Total Bilirubin 0 91 mg/dL      eGFR 43 ml/min/1 73sq m     Narrative:      Stephanie guidelines for Chronic Kidney Disease (CKD):     Stage 1 with normal or high GFR (GFR > 90 mL/min/1 73 square meters)    Stage 2 Mild CKD (GFR = 60-89 mL/min/1 73 square meters)    Stage 3A Moderate CKD (GFR = 45-59 mL/min/1 73 square meters)    Stage 3B Moderate CKD (GFR = 30-44 mL/min/1 73 square meters)    Stage 4 Severe CKD (GFR = 15-29 mL/min/1 73 square meters)    Stage 5 End Stage CKD (GFR <15 mL/min/1 73 square meters)  Note: GFR calculation is accurate only with a steady state creatinine    CBC and differential [[id number]] Collected: 05/22/22 0947    Lab Status: Final result Specimen: Blood from Arm, Right Updated: 05/22/22 1022     WBC 5 41 Thousand/uL      RBC 4 29 Million/uL      Hemoglobin 14 0 g/dL      Hematocrit 41 7 %      MCV 97 fL      MCH 32 6 pg      MCHC 33 6 g/dL      RDW 12 5 %      MPV 9 6 fL      Platelets 624 Thousands/uL      nRBC 0 /100 WBCs      Neutrophils Relative 66 %      Immat GRANS % 0 %      Lymphocytes Relative 19 %      Monocytes Relative 11 %      Eosinophils Relative 3 %      Basophils Relative 1 %      Neutrophils Absolute 3 54 Thousands/µL      Immature Grans Absolute 0 02 Thousand/uL      Lymphocytes Absolute 1 03 Thousands/µL      Monocytes Absolute 0 59 Thousand/µL      Eosinophils Absolute 0 18 Thousand/µL      Basophils Absolute 0 05 Thousands/µL                  CT head without contrast   Final Result by Chucky Villagomez DO (05/22 1001)   Stable cerebral atrophy with chronic small vessel ischemic white matter disease  No acute intracranial abnormality  If there is continued concern for acute cerebral ischemia, recommend follow-up neurology consultation and/or MRI of the brain with diffusion-weighted sequencing barring no contraindications to MRI  Workstation performed: [id number]                    Procedures  Procedures         ED Course  ED Course as of 05/22/22 1346   Sun May 22, 2022   1053 Upon re-evaluation the patient, he states his symptoms have almost completely resolved  He ambulated to the bathroom without difficulty and at his baseline  He denies any vertiginous sensation and only describes lightheadedness which is a chronic issue for the patient  Offered admission however the patient prefers to go home  Discussed case with Dr Inderjit Gabriel of Neuro that does not feel patient warrants MRI at this time                                 SBIRT 22yo+    Flowsheet Row Most Recent Value   SBIRT (25 yo +)    In order to provide better care to our patients, we are screening all of our patients for alcohol and drug use  Would it be okay to ask you these screening questions? Yes Filed at: 05/22/2022 5069   Initial Alcohol Screen: US AUDIT-C     1  How often do you have a drink containing alcohol? 0 Filed at: 05/22/2022 0920   3a  Male UNDER 65: How often do you have five or more drinks on one occasion? 0 Filed at: 05/22/2022 0920   Audit-C Score 0 Filed at: 05/22/2022 9870   KAY: How many times in the past year have you    Used an illegal drug or used a prescription medication for non-medical reasons? Never Filed at: 05/22/2022 0920                    MDM  Number of Diagnoses or Management Options  Lightheadedness  Numbness and tingling in right hand  Diagnosis management comments: Patient is a clinically well-appearing 66-year-old male coming in with a 1 day history of lightheadedness which is a chronic issue for the patient that has since resolved  Patient also notes some right hand numbness that he believes feels similar to prior episodes of carpal tunnel  Hand numbness resolved while in the ED  Patient ambulated at his baseline  Patient was offered admission for further observation and plan for Neurology consult and MRI  Patient politely declines stating that he preferred to go home  Discussed case with Neurology as seen in ED course  Recommend patient follow-up with his PCP and return to ER if symptoms change or worsen  All his questions were answered along with the patient's wife and they were agreeable to plan        Disposition  Final diagnoses:   Numbness and tingling in right hand   Lightheadedness     Time reflects when diagnosis was documented in both MDM as applicable and the Disposition within this note     Time User Action Codes Description Comment    5/22/2022 12:03 PM Vin Latimer Add [R20 0,  R20 2] Numbness and tingling in right hand     5/22/2022 12:03 PM Vin Latimer Add [R42] [address]       ED Disposition     ED Disposition   Discharge    Condition   Stable    Date/Time   Sun May 22, 2022 12:03 PM    Comment   [facility] discharge to home/self care  Follow-up Information     Follow up With Specialties Details Why [address], DO Internal Medicine Schedule an appointment as soon as possible for a visit   [place]  [phone]  [phone]            Discharge Medication List as of 5/22/2022 12:04 PM      CONTINUE these medications which have NOT CHANGED    Details   Accu-Chek Softclix Lancets lancets Use to test blood sugar 3x daily  , Normal      amLODIPine (NORVASC) 2 5 mg tablet TAKE 1 TABLET TWICE DAILY, Normal      apixaban (Eliquis) 5 mg Half tablet twice a day, No Print      Apoaequorin (PREVAGEN EXTRA STRENGTH PO) Take by mouth in the morning, Historical Med      atorvastatin (LIPITOR) 10 mg tablet TAKE 1 TABLET EVERY DAY, Normal      BD PEN NEEDLE CARMINA U/F 32G X 4 MM MISC USE 4 PEN NEEDLE DAILY AS DIRECTED, Historical Med      carvedilol (COREG) 6 25 mg tablet TAKE 1 TABLET (6 25 MG TOTAL) BY MOUTH 2 (TWO) TIMES A DAY WITH MEALS, Starting Mon 1/3/2022, Normal      Dulaglutide (Trulicity) 4 5 LW/8 8UA SOPN Inject 0 5 mL (4 5 mg total) under the skin once a week, Starting Wed 3/23/2022, Normal      furosemide (LASIX) 20 mg tablet TAKE 1 TABLET EVERY DAY, Normal      glucose blood (Accu-Chek Guide) test strip Use to test blood sugar 3 times a day, Normal      insulin glargine (Lantus SoloStar) 100 units/mL injection pen Inject 30 units daily  , Normal      isosorbide mononitrate (IMDUR) 30 mg 24 hr tablet TAKE 1 TABLET TWICE DAILY, Normal      levothyroxine 75 mcg tablet Take 1 tablet (75 mcg total) by mouth daily in the early morning Take 1 tablet daily except Sunday, Starting Wed 12/15/2021, Normal      Misc Natural Products (COLON HERBAL CLEANSER PO) Take by mouth, Historical Med      nitroglycerin (NITROSTAT) 0 4 mg SL tablet Place 1 tablet (0 4 mg total) under the tongue every 5 (five) minutes as needed for chest pain, Starting Wed 3/2/2022, Normal      Omega 3 1200 MG CAPS Take by mouth Take 4 tablets daily, Historical Med      potassium chloride (MICRO-K) 10 MEQ CR capsule TAKE 2 CAPSULES IN THE MORNING AND TAKE 1 CAPSULE IN THE EVENING, Normal      prednisoLONE acetate (prednisoLONE Acetate P-F) 1 % ophthalmic suspension 1 drop 4 (four) times a day, Historical Med             No discharge procedures on file      PDMP Review       Value Time User    PDMP Reviewed  Yes 10/19/2021  3:41 PM Ever Bateman DO          ED Provider  Electronically Signed by           Guillermina Kussmaul, PA-C  05/22/22 0904

## 2022-05-22 NOTE — DISCHARGE INSTRUCTIONS
Follow-up with family doctor  Return to the ER with any significant change or worsening of your symptoms

## 2022-05-22 NOTE — ED NOTES
orthostats  Lying  157/87 -67-97%                    Sitting 129/46-72-97%                    Standing 163/93-78-97%     Dawood Mayorga RN  05/22/22 9064

## 2022-05-23 LAB
ATRIAL RATE: 65 BPM
QRS AXIS: 1 DEGREES
QRSD INTERVAL: 102 MS
QT INTERVAL: 418 MS
QTC INTERVAL: 434 MS
T WAVE AXIS: 168 DEGREES
VENTRICULAR RATE: 65 BPM

## 2022-05-23 PROCEDURE — 93010 ELECTROCARDIOGRAM REPORT: CPT | Performed by: INTERNAL MEDICINE

## 2022-05-24 ENCOUNTER — OFFICE VISIT (OUTPATIENT)
Dept: INTERNAL MEDICINE CLINIC | Facility: CLINIC | Age: 87
End: 2022-05-24
Payer: MEDICARE

## 2022-05-24 VITALS
HEIGHT: 68 IN | TEMPERATURE: 96.7 F | WEIGHT: 178.8 LBS | HEART RATE: 77 BPM | OXYGEN SATURATION: 98 % | RESPIRATION RATE: 14 BRPM | SYSTOLIC BLOOD PRESSURE: 148 MMHG | DIASTOLIC BLOOD PRESSURE: 86 MMHG | BODY MASS INDEX: 27.1 KG/M2

## 2022-05-24 DIAGNOSIS — H81.10 BENIGN PAROXYSMAL POSITIONAL VERTIGO, UNSPECIFIED LATERALITY: Primary | ICD-10-CM

## 2022-05-24 DIAGNOSIS — Z79.4 TYPE 2 DIABETES MELLITUS WITH COMPLICATION, WITH LONG-TERM CURRENT USE OF INSULIN (HCC): ICD-10-CM

## 2022-05-24 DIAGNOSIS — E11.8 TYPE 2 DIABETES MELLITUS WITH COMPLICATION, WITH LONG-TERM CURRENT USE OF INSULIN (HCC): ICD-10-CM

## 2022-05-24 DIAGNOSIS — E03.9 ACQUIRED HYPOTHYROIDISM: ICD-10-CM

## 2022-05-24 DIAGNOSIS — N18.32 STAGE 3B CHRONIC KIDNEY DISEASE (HCC): ICD-10-CM

## 2022-05-24 DIAGNOSIS — I48.0 PAROXYSMAL ATRIAL FIBRILLATION (HCC): ICD-10-CM

## 2022-05-24 DIAGNOSIS — I10 ESSENTIAL HYPERTENSION: ICD-10-CM

## 2022-05-24 PROBLEM — L97.909 ATHEROSCLEROSIS OF ARTERY OF EXTREMITY WITH ULCERATION (HCC): Status: RESOLVED | Noted: 2019-12-17 | Resolved: 2022-05-24

## 2022-05-24 PROBLEM — I70.299 ATHEROSCLEROSIS OF ARTERY OF EXTREMITY WITH ULCERATION (HCC): Status: RESOLVED | Noted: 2019-12-17 | Resolved: 2022-05-24

## 2022-05-24 PROBLEM — D69.6 PLATELETS DECREASED (HCC): Status: RESOLVED | Noted: 2020-11-18 | Resolved: 2022-05-24

## 2022-05-24 PROCEDURE — 99214 OFFICE O/P EST MOD 30 MIN: CPT | Performed by: INTERNAL MEDICINE

## 2022-05-24 RX ORDER — MECLIZINE HYDROCHLORIDE 25 MG/1
25 TABLET ORAL EVERY 8 HOURS PRN
Qty: 30 TABLET | Refills: 0 | Status: SHIPPED | OUTPATIENT
Start: 2022-05-24

## 2022-05-24 NOTE — PROGRESS NOTES
Assessment/Plan:    Problem List Items Addressed This Visit        Endocrine    Type 2 diabetes mellitus with complication, with long-term current use of insulin ([facility] [address] )       Lab Results   Component Value Date    HGBA1C 7 1 (H) 03/16/2022   A1c good for 89 year no change at this time           Acquired hypothyroidism     TSH reviewed and noted to be good  No change in TSH              Cardiovascular and Mediastinum    Paroxysmal atrial fibrillation (HCC)     HR regular and we will see how things go  Essential hypertension       Genitourinary    Stage 3b chronic kidney disease     Lab Results   Component Value Date    EGFR 43 05/22/2022    EGFR 49 03/16/2022    EGFR 48 10/12/2021    CREATININE 1 41 (H) 05/22/2022    CREATININE 1 28 03/16/2022    CREATININE 1 32 (H) 10/12/2021   Dehydration noted             Other Visit Diagnoses     Benign paroxysmal positional vertigo, unspecified laterality    -  Primary    Relevant Medications    meclizine (ANTIVERT) 25 mg tablet           Diagnoses and all orders for this visit:    Benign paroxysmal positional vertigo, unspecified laterality  -     meclizine (ANTIVERT) 25 mg tablet; Take 1 tablet (25 mg total) by mouth every 8 (eight) hours as needed for dizziness    Type 2 diabetes mellitus with complication, with long-term current use of insulin (HCC)    Essential hypertension    Paroxysmal atrial fibrillation (HCC)    Acquired hypothyroidism    Stage 3b chronic kidney disease      Type 2 diabetes mellitus with complication, with long-term current use of insulin (HCC)    Lab Results   Component Value Date    HGBA1C 7 1 (H) 03/16/2022   A1c good for 89 year no change at this time    Acquired hypothyroidism  TSH reviewed and noted to be good  No change in TSH    Paroxysmal atrial fibrillation (HCC)  HR regular and we will see how things go      Stage 3b chronic kidney disease  Lab Results   Component Value Date    EGFR 43 05/22/2022    EGFR 49 03/16/2022    EGFR 48 10/12/2021    CREATININE 1 41 (H) 05/22/2022    CREATININE 1 28 03/16/2022    CREATININE 1 32 (H) 10/12/2021   Dehydration noted        Subjective:      Patient ID: Alli Aguilera is a 80 y o  male  Weight stable  Reviewed CT of head from ER visit as well as labs  Dehydration noted  Noted the 80-90 temps on Sunday and the patient's age of 80  He BP is good, but we will decrease the Amlodipine  The patient has a history of diastolic HTN per echo from cardio  The patient recent labs reviewed  The patient did have a marked change in his orthostatic BP reading with moving from lying to sitting  Discussed BPPV as another possible diagnosis  A1c is good and TSH revewed and also noted to be good  No anemia noted  The following portions of the patient's history were reviewed and updated as appropriate:   He has a past medical history of Benign hypertension, Chronic kidney disease (CKD), stage III (moderate), Coronary artery disease, Hypothyroidism, Ischemic cardiomyopathy, Mixed hyperlipidemia, Paroxysmal atrial fibrillation, PVD (peripheral vascular disease), and Type II diabetes mellitus  ,  does not have any pertinent problems on file  ,   has a past surgical history that includes Cardiac catheterization and Coronary artery bypass graft  ,  family history includes No Known Problems in his brother, father, maternal aunt, maternal grandfather, maternal grandmother, maternal uncle, mother, paternal aunt, paternal grandfather, paternal grandmother, paternal uncle, and sister  ,   reports that he has never smoked  He has never used smokeless tobacco  He reports that he does not use drugs  No history on file for alcohol use ,  has No Known Allergies     Current Outpatient Medications   Medication Sig Dispense Refill    Accu-Chek Softclix Lancets lancets Use to test blood sugar 3x daily   300 each 3    apixaban (Eliquis) 5 mg Half tablet twice a day 180 tablet 3    Apoaequorin (PREVAGEN EXTRA STRENGTH PO) Take by mouth in the morning      atorvastatin (LIPITOR) 10 mg tablet TAKE 1 TABLET EVERY DAY 90 tablet 3    BD PEN NEEDLE CARMINA U/F 32G X 4 MM MISC USE 4 PEN NEEDLE DAILY AS DIRECTED  3    carvedilol (COREG) 6 25 mg tablet TAKE 1 TABLET (6 25 MG TOTAL) BY MOUTH 2 (TWO) TIMES A DAY WITH MEALS 180 tablet 3    Dulaglutide (Trulicity) 4 5 DW/9 2ZU SOPN Inject 0 5 mL (4 5 mg total) under the skin once a week 6 mL 1    furosemide (LASIX) 20 mg tablet TAKE 1 TABLET EVERY DAY 90 tablet 3    glucose blood (Accu-Chek Guide) test strip Use to test blood sugar 3 times a day 102 strip 3    insulin glargine (Lantus SoloStar) 100 units/mL injection pen Inject 30 units daily  15 mL 2    isosorbide mononitrate (IMDUR) 30 mg 24 hr tablet TAKE 1 TABLET TWICE DAILY 180 tablet 3    levothyroxine 75 mcg tablet Take 1 tablet (75 mcg total) by mouth daily in the early morning Take 1 tablet daily except Sunday 90 tablet 3    meclizine (ANTIVERT) 25 mg tablet Take 1 tablet (25 mg total) by mouth every 8 (eight) hours as needed for dizziness 30 tablet 0    Misc Natural Products (COLON HERBAL CLEANSER PO) Take by mouth      nitroglycerin (NITROSTAT) 0 4 mg SL tablet Place 1 tablet (0 4 mg total) under the tongue every 5 (five) minutes as needed for chest pain 100 tablet 3    Omega 3 1200 MG CAPS Take by mouth Take 4 tablets daily      potassium chloride (MICRO-K) 10 MEQ CR capsule TAKE 2 CAPSULES IN THE MORNING AND TAKE 1 CAPSULE IN THE EVENING 270 capsule 3    prednisoLONE acetate (PRED FORTE) 1 % ophthalmic suspension 1 drop 4 (four) times a day       No current facility-administered medications for this visit  Review of Systems   Constitutional: Negative for chills, fatigue and fever  HENT: Negative  Respiratory: Negative for cough, chest tightness and shortness of breath  Cardiovascular: Negative for chest pain and palpitations     Gastrointestinal: Negative for abdominal pain, constipation, diarrhea, nausea and vomiting  Genitourinary: Negative  Musculoskeletal: Negative for back pain and myalgias  Skin: Negative  Neurological: Positive for dizziness and weakness  Psychiatric/Behavioral: Negative for dysphoric mood  The patient is not nervous/anxious  Objective:  Vitals:    05/24/22 0758   BP: 112/64   BP Location: Left arm   Patient Position: Sitting   Cuff Size: Large   Pulse: 74   Resp: 14   Temp: (!) 96 7 °F (35 9 °C)   SpO2: 98%   Weight: 81 1 kg (178 lb 12 8 oz)   Height: 5' 8" (1 727 m)     Body mass index is 27 19 kg/m²  Physical Exam  Vitals and nursing note reviewed  Constitutional:       Appearance: He is well-developed  HENT:      Head: Normocephalic and atraumatic  Eyes:      Pupils: Pupils are equal, round, and reactive to light  Cardiovascular:      Rate and Rhythm: Normal rate and regular rhythm  Heart sounds: Normal heart sounds  No murmur heard  Pulmonary:      Effort: Pulmonary effort is normal       Breath sounds: Normal breath sounds  No stridor  No rales  Abdominal:      General: Bowel sounds are normal  There is no distension  Palpations: Abdomen is soft  Tenderness: There is no abdominal tenderness  Musculoskeletal:         General: No deformity  Normal range of motion  Cervical back: Normal range of motion and neck supple  Skin:     General: Skin is warm and dry  Neurological:      Mental Status: He is alert and oriented to person, place, and time  Coordination: Romberg sign negative        Gait: Tandem walk abnormal           PHQ-2/9 Depression Screening

## 2022-05-24 NOTE — ASSESSMENT & PLAN NOTE
Lab Results   Component Value Date    EGFR 43 05/22/2022    EGFR 49 03/16/2022    EGFR 48 10/12/2021    CREATININE 1 41 (H) 05/22/2022    CREATININE 1 28 03/16/2022    CREATININE 1 32 (H) 10/12/2021   Dehydration noted

## 2022-05-24 NOTE — ASSESSMENT & PLAN NOTE
Lab Results   Component Value Date    HGBA1C 7 1 (H) 03/16/2022   A1c good for 89 year no change at this time

## 2022-05-27 ENCOUNTER — TELEPHONE (OUTPATIENT)
Dept: OTHER | Facility: OTHER | Age: 87
End: 2022-05-27

## 2022-05-27 ENCOUNTER — TELEPHONE (OUTPATIENT)
Dept: INTERNAL MEDICINE CLINIC | Facility: CLINIC | Age: 87
End: 2022-05-27

## 2022-05-27 ENCOUNTER — NURSE TRIAGE (OUTPATIENT)
Dept: OTHER | Facility: OTHER | Age: 87
End: 2022-05-27

## 2022-05-27 DIAGNOSIS — H81.10 BENIGN PAROXYSMAL POSITIONAL VERTIGO, UNSPECIFIED LATERALITY: Primary | ICD-10-CM

## 2022-05-27 NOTE — TELEPHONE ENCOUNTER
----- Message from Maria Del Carmen Walters sent at 5/27/2022  8:31 AM EDT -----  Morning! I just received a call from Ko's wife regarding his continued dizziness  Didn't improve after Dr Lacie Dubon stopped his amlodipine  I told her Dr Carrie Maloney is not in until later next week but to reach out to Dr Ceci Maurer to see if there is something he recommends to further investigate the dizziness  I asked her if he sees neurology and she said no  If any thing, I can ask dr Matthew Cruz opinion since dr kaba is not in  His wife asked if the PCP office can call their house and speak to ko because she's working at the school right now

## 2022-05-27 NOTE — TELEPHONE ENCOUNTER
Regarding: Symptom on the side of the head when touched  ----- Message from [facility] sent at 5/27/2022  3:33 PM EDT -----  "My  side of the head by his ear feels funny when he touches it"

## 2022-05-27 NOTE — TELEPHONE ENCOUNTER
Patients wife calling states that the patient has a weird feeling on the side of his head  He was seen in the ED earlier this week, and then at his family doctor  He was prescribed meclizine and he has been taking it  He states that he is slightly dizzy, and when he touches the side of his head it feels funny  Patient states he has a follow up scheduled on Tuesday  As per protocol patient is okay to stay home until then  Patient denies any concerning symptoms he is able to walk without assistance and denies nausea vomiting earache or headache  He states he will call back if he has any concerns or changes  Reason for Disposition   [1] MODERATE dizziness (e g , vertigo; feels very unsteady, interferes with normal activities) AND [2] has been evaluated by physician for this    Answer Assessment - Initial Assessment Questions  1  DESCRIPTION: "Describe your dizziness "      Just feels a little dizzy  2  VERTIGO: "Do you feel like either you or the room is spinning or tilting?"       No  3  LIGHTHEADED: "Do you feel lightheaded?" (e g , somewhat faint, woozy, weak upon standing)      no  4  SEVERITY: "How bad is it?"  "Can you walk?"    - MILD: Feels unsteady but walking normally  - MODERATE: Feels very unsteady when walking, but not falling; interferes with normal activities (e g , school, work)   - SEVERE: Unable to walk without falling, or requires assistance to walk without falling  Yes can walk  5  ONSET:  "When did the dizziness begin?"      Last week  6  AGGRAVATING FACTORS: "Does anything make it worse?" (e g , standing, change in head position)      No  7  CAUSE: "What do you think is causing the dizziness?"      Unsure was dx with vertigo earlier this week  8   RECURRENT SYMPTOM: "Have you had dizziness before?" If Yes, ask: "When was the last time?" "What happened that time?"      No   9  OTHER SYMPTOMS: "Do you have any other symptoms?" (e g , headache, weakness, numbness, vomiting, earache)      No   10   PREGNANCY: "Is there any chance you are pregnant?" "When was your last menstrual period?"        No    Protocols used: DIZZINESS - VERTIGO-ADULT-AH

## 2022-05-31 NOTE — TELEPHONE ENCOUNTER
Patient called the office this morning and didn't want to wait until august to be seen  I offered that they can see family doctor or they have to option to travel to [place] or [place]  Patient's wife insisted that they already seen family dr and will call down to [place]

## 2022-06-07 PROBLEM — H90.3 SENSORINEURAL HEARING LOSS (SNHL) OF BOTH EARS: Status: ACTIVE | Noted: 2022-06-07

## 2022-06-07 PROBLEM — H61.23 BILATERAL IMPACTED CERUMEN: Status: ACTIVE | Noted: 2022-06-07

## 2022-06-09 ENCOUNTER — HOSPITAL ENCOUNTER (OUTPATIENT)
Dept: MRI IMAGING | Facility: HOSPITAL | Age: 87
Discharge: HOME | End: 2022-06-09
Payer: MEDICARE

## 2022-06-09 DIAGNOSIS — R42 VERTIGO: ICD-10-CM

## 2022-06-09 PROCEDURE — G1004 CDSM NDSC: HCPCS

## 2022-06-09 PROCEDURE — A9585 GADOBUTROL INJECTION: HCPCS | Performed by: INTERNAL MEDICINE

## 2022-06-09 PROCEDURE — 70553 MRI BRAIN STEM W/O & W/DYE: CPT

## 2022-06-09 RX ADMIN — GADOBUTROL 8 ML: 604.72 INJECTION INTRAVENOUS at 16:00

## 2022-06-10 ENCOUNTER — OFFICE VISIT (OUTPATIENT)
Dept: INTERNAL MEDICINE CLINIC | Facility: CLINIC | Age: 87
End: 2022-06-10
Payer: MEDICARE

## 2022-06-10 VITALS
SYSTOLIC BLOOD PRESSURE: 130 MMHG | HEIGHT: 68 IN | DIASTOLIC BLOOD PRESSURE: 80 MMHG | HEART RATE: 78 BPM | WEIGHT: 174 LBS | BODY MASS INDEX: 26.37 KG/M2 | TEMPERATURE: 98.4 F | OXYGEN SATURATION: 98 %

## 2022-06-10 DIAGNOSIS — I67.9 CEREBRAL VASCULAR DISEASE: Primary | ICD-10-CM

## 2022-06-10 DIAGNOSIS — I10 ESSENTIAL HYPERTENSION: ICD-10-CM

## 2022-06-10 DIAGNOSIS — H81.10 BENIGN PAROXYSMAL POSITIONAL VERTIGO, UNSPECIFIED LATERALITY: ICD-10-CM

## 2022-06-10 PROBLEM — H81.13 BENIGN PAROXYSMAL POSITIONAL VERTIGO DUE TO BILATERAL VESTIBULAR DISORDER: Status: ACTIVE | Noted: 2022-06-10

## 2022-06-10 PROCEDURE — 99214 OFFICE O/P EST MOD 30 MIN: CPT | Performed by: INTERNAL MEDICINE

## 2022-06-10 RX ORDER — AMLODIPINE BESYLATE 2.5 MG/1
2.5 TABLET ORAL 2 TIMES DAILY
COMMUNITY
End: 2022-06-23

## 2022-06-10 RX ORDER — ATORVASTATIN CALCIUM 80 MG/1
80 TABLET, FILM COATED ORAL DAILY
Qty: 90 TABLET | Refills: 1 | Status: SHIPPED | OUTPATIENT
Start: 2022-06-10

## 2022-06-10 RX ORDER — CLOPIDOGREL BISULFATE 75 MG/1
75 TABLET ORAL DAILY
Qty: 90 TABLET | Refills: 1 | Status: SHIPPED | OUTPATIENT
Start: 2022-06-10 | End: 2022-12-07

## 2022-06-10 RX ORDER — CLOPIDOGREL BISULFATE 75 MG/1
75 TABLET ORAL DAILY
Qty: 30 TABLET | Refills: 0 | Status: SHIPPED | OUTPATIENT
Start: 2022-06-10 | End: 2022-12-07

## 2022-06-10 NOTE — PROGRESS NOTES
Assessment/Plan:    Problem List Items Addressed This Visit        Cardiovascular and Mediastinum    Essential hypertension     BP is okay  The patient restarted Amlodipine 10 mg QDay             Relevant Medications    amLODIPine (NORVASC) 2 5 mg tablet    Cerebral vascular disease - Primary     Add Plavix 75 mg QDay  Discussed risk with Eliquis  Increased the Atorvastatin to 80 mg QDay             Relevant Medications    clopidogrel (Plavix) 75 mg tablet    clopidogrel (Plavix) 75 mg tablet    atorvastatin (LIPITOR) 80 mg tablet    Other Relevant Orders    Ambulatory Referral to Physical Therapy      Other Visit Diagnoses     Benign paroxysmal positional vertigo, unspecified laterality        Relevant Orders    Ambulatory Referral to Physical Therapy           Diagnoses and all orders for this visit:    Cerebral vascular disease  -     clopidogrel (Plavix) 75 mg tablet; Take 1 tablet (75 mg total) by mouth daily  -     clopidogrel (Plavix) 75 mg tablet; Take 1 tablet (75 mg total) by mouth daily  -     atorvastatin (LIPITOR) 80 mg tablet; Take 1 tablet (80 mg total) by mouth daily  -     Ambulatory Referral to Physical Therapy; Future    Essential hypertension    Benign paroxysmal positional vertigo, unspecified laterality  -     Ambulatory Referral to Physical Therapy; Future    Other orders  -     amLODIPine (NORVASC) 2 5 mg tablet; Take 2 5 mg by mouth 2 (two) times a day      Essential hypertension  BP is okay  The patient restarted Amlodipine 10 mg QDay      Cerebral vascular disease  Add Plavix 75 mg QDay  Discussed risk with Eliquis  Increased the Atorvastatin to 80 mg QDay      Benign paroxysmal positional vertigo due to bilateral vestibular disorder  Vestibular Rehab  Reviewed the note from ENT        Subjective:      Patient ID: Lucy Espino is a 80 y o  male      HPI    The following portions of the patient's history were reviewed and updated as appropriate:   He has a past medical history of Benign hypertension, Chronic kidney disease (CKD), stage III (moderate), Coronary artery disease, Hypothyroidism, Ischemic cardiomyopathy, Mixed hyperlipidemia, Paroxysmal atrial fibrillation, PVD (peripheral vascular disease), and Type II diabetes mellitus  ,  does not have any pertinent problems on file  ,   has a past surgical history that includes Cardiac catheterization and Coronary artery bypass graft  ,  family history includes No Known Problems in his brother, father, maternal aunt, maternal grandfather, maternal grandmother, maternal uncle, mother, paternal aunt, paternal grandfather, paternal grandmother, paternal uncle, and sister  ,   reports that he has never smoked  He has never used smokeless tobacco  He reports that he does not use drugs  No history on file for alcohol use ,  has No Known Allergies     Current Outpatient Medications   Medication Sig Dispense Refill    amLODIPine (NORVASC) 2 5 mg tablet Take 2 5 mg by mouth 2 (two) times a day      apixaban (Eliquis) 5 mg Half tablet twice a day 180 tablet 3    atorvastatin (LIPITOR) 80 mg tablet Take 1 tablet (80 mg total) by mouth daily 90 tablet 1    carvedilol (COREG) 6 25 mg tablet TAKE 1 TABLET (6 25 MG TOTAL) BY MOUTH 2 (TWO) TIMES A DAY WITH MEALS 180 tablet 3    clopidogrel (Plavix) 75 mg tablet Take 1 tablet (75 mg total) by mouth daily 30 tablet 0    clopidogrel (Plavix) 75 mg tablet Take 1 tablet (75 mg total) by mouth daily 90 tablet 1    Dulaglutide (Trulicity) 4 5 RF/3 6EK SOPN Inject 0 5 mL (4 5 mg total) under the skin once a week 6 mL 1    isosorbide mononitrate (IMDUR) 30 mg 24 hr tablet TAKE 1 TABLET TWICE DAILY 180 tablet 3    levothyroxine 75 mcg tablet Take 1 tablet (75 mcg total) by mouth daily in the early morning Take 1 tablet daily except Sunday 90 tablet 3    nitroglycerin (NITROSTAT) 0 4 mg SL tablet Place 1 tablet (0 4 mg total) under the tongue every 5 (five) minutes as needed for chest pain 100 tablet 3    Omega 3 1200 MG CAPS Take by mouth Take 4 tablets daily      potassium chloride (MICRO-K) 10 MEQ CR capsule TAKE 2 CAPSULES IN THE MORNING AND TAKE 1 CAPSULE IN THE EVENING 270 capsule 3    prednisoLONE acetate (PRED FORTE) 1 % ophthalmic suspension 1 drop 4 (four) times a day      Accu-Chek Softclix Lancets lancets Use to test blood sugar 3x daily  300 each 3    Apoaequorin (PREVAGEN EXTRA STRENGTH PO) Take by mouth in the morning      BD PEN NEEDLE CARMINA U/F 32G X 4 MM MISC USE 4 PEN NEEDLE DAILY AS DIRECTED  3    furosemide (LASIX) 20 mg tablet TAKE 1 TABLET EVERY DAY 90 tablet 3    glucose blood (Accu-Chek Guide) test strip Use to test blood sugar 3 times a day 102 strip 3    insulin glargine (Lantus SoloStar) 100 units/mL injection pen Inject 30 units daily  15 mL 2    meclizine (ANTIVERT) 25 mg tablet Take 1 tablet (25 mg total) by mouth every 8 (eight) hours as needed for dizziness 30 tablet 0    Misc Natural Products (COLON HERBAL CLEANSER PO) Take by mouth       No current facility-administered medications for this visit  Review of Systems   Constitutional: Negative for chills, fatigue and fever  HENT: Negative  Respiratory: Negative for cough, chest tightness and shortness of breath  Cardiovascular: Negative for chest pain and palpitations  Gastrointestinal: Negative for abdominal pain, constipation, diarrhea, nausea and vomiting  Genitourinary: Negative  Musculoskeletal: Negative for back pain and myalgias  Skin: Negative  Neurological: Positive for dizziness and numbness  Psychiatric/Behavioral: Negative for dysphoric mood  The patient is not nervous/anxious  Objective:  Vitals:    06/10/22 1342   BP: 130/80   Pulse: 78   Temp: 98 4 °F (36 9 °C)   SpO2: 98%   Weight: 78 9 kg (174 lb)   Height: 5' 8" (1 727 m)     Body mass index is 26 46 kg/m²  Physical Exam  Vitals and nursing note reviewed  Constitutional:       Appearance: He is well-developed     HENT: Head: Normocephalic and atraumatic  Eyes:      Pupils: Pupils are equal, round, and reactive to light  Cardiovascular:      Rate and Rhythm: Normal rate and regular rhythm  Heart sounds: Normal heart sounds  No murmur heard  Pulmonary:      Effort: Pulmonary effort is normal       Breath sounds: Normal breath sounds  No stridor  No rales  Abdominal:      General: Bowel sounds are normal  There is no distension  Palpations: Abdomen is soft  Tenderness: There is no abdominal tenderness  Musculoskeletal:         General: No deformity  Normal range of motion  Cervical back: Normal range of motion and neck supple  Skin:     General: Skin is warm and dry  Neurological:      Mental Status: He is alert and oriented to person, place, and time            PHQ-2/9 Depression Screening

## 2022-06-14 ENCOUNTER — EVALUATION (OUTPATIENT)
Dept: PHYSICAL THERAPY | Facility: HOME HEALTHCARE | Age: 87
End: 2022-06-14
Payer: MEDICARE

## 2022-06-14 DIAGNOSIS — H81.10 BENIGN PAROXYSMAL POSITIONAL VERTIGO, UNSPECIFIED LATERALITY: Primary | ICD-10-CM

## 2022-06-14 DIAGNOSIS — I67.9 CEREBRAL VASCULAR DISEASE: ICD-10-CM

## 2022-06-14 PROCEDURE — 97161 PT EVAL LOW COMPLEX 20 MIN: CPT | Performed by: PHYSICAL THERAPIST

## 2022-06-14 PROCEDURE — 97112 NEUROMUSCULAR REEDUCATION: CPT | Performed by: PHYSICAL THERAPIST

## 2022-06-14 PROCEDURE — 97140 MANUAL THERAPY 1/> REGIONS: CPT | Performed by: PHYSICAL THERAPIST

## 2022-06-14 NOTE — PROGRESS NOTES
PT Evaluation  and PT Discharge    Today's date: 2022  Patient name: Tommy Calloway  : 1933  MRN: [id number]  Referring provider: Diego Mittal DO  Dx:   Encounter Diagnosis     ICD-10-CM    1  Benign paroxysmal positional vertigo, unspecified laterality  H81 10 Ambulatory Referral to Physical Therapy   2  Cerebral vascular disease  I67 9 Ambulatory Referral to Physical Therapy                  Assessment  Assessment details: Pt Raul Heller is a 80 y o  who presents to OPPT for vestibular assessment  PT notes that pt was negative for all assessments this date;  (-) [place]-Hallpike bilaterally; (-) VOR assessments; (-) modified VBI; (-) orthostatic hypotension  Pt has baseline dizziness which he describes as feeling "woozy" throughout session which was only diminished when lying in supine positioning  Pt did see ENT prior to PT assessment and BPPV was also ruled out by specialist    PT feels that symptoms may be cervicogenic, possibly related to recent cerebrovascular incident, or medication interactions  PT is advising return to PCP for further assessment; possible MRI on C/S to further rule in/out origin of symptoms  He will be DC from OPPT as symptoms are not consistent with BPPV/vestibular hypofunction at this time  Thank you for this referral    Other impairment: dizziness     Goals  Consult only     Plan  Plan details: DC from OPPT with recommendation to follow up with PCP   Patient would benefit from: skilled physical therapy  Planned therapy interventions: manual therapy, neuromuscular re-education, postural training and patient education  Duration in visits: 1  Plan of Care beginning date: 2022  Plan of Care expiration date: 2022  Treatment plan discussed with: patient, referring physician and family        Subjective Evaluation    History of Present Illness  Mechanism of injury: Pt reporting that he gets "woozy feeling" all day long   The dizziness subsides when he is laying down  He denies any room spinning sensation or worsening with positional changes  He was seen 3 weeks ago in ED and dx with CVA  However, pt reporting that the dizziness was present prior to this incident and now unchanged  He followed up with PCP following ED visits with c/o continued dizziness  Pt did also see ENT who ruled out vertigo and feels symptoms may be cervicogenic in nature  PCP referral to OPPT for vestibular assessment  Quality of life: good    Pain  No pain reported    Social Support  Lives with: spouse    Employment status: not working    Diagnostic Tests  MRI studies: abnormal  CT scan: abnormal  Treatments  Current treatment: physical therapy  Patient Goals  Patient goal: "get rid of the dizzines"          Objective     Concurrent Complaints  Positive for hearing loss  Negative for headaches, nausea/motion sickness, tinnitus, visual change, aural fullness and poor concentration    Active Range of Motion   Cervical/Thoracic Spine       Cervical    Flexion:  Restriction level: moderate  Extension:  Restriction level: moderate  Left lateral flexion:  Restriction level: maximal  Right lateral flexion:  Restriction level maximal  Left rotation:  Restriction level: maximal  Right rotation:  Restriction level: maximal    Tests     Additional Tests Details  Cervical compression in seated did not changes patients baseline dizziness     Ambulation     Ambulation: Level Surfaces     Additional Level Surfaces Ambulation Details  No use of AD   Denies any recent falls   Neuro Exam:     Dizziness  Positive for light-headedness and floating or swimming (woozy)  Negative for disequilibrium, vertigo, oscillopsia, motion sickness, rocking or swaying and diplopia  Exacerbating factors  Positive for supine to/from sitting  Negative for bending over, rolling in bed, looking up, walking, turning head, optokinetic movement and walking in busy environment       Headaches   Patient reports headaches: No  Cervical exam   Modified VBI   Left: asymptomatic  Right: asymptomatic    Oculomotor exam   Oculomotor ROM: WNL  Resting nystagmus: not present   Smooth pursuits: within normal limits  Vertical saccades: normal  Horizontal saccades: normal    Positional testing   [place]-Hallpike   Left posterior canal: WNL  Right posterior canal: WNL    Transfers Sit to stand: independent Sit to supine: independent Supine to sit: minimum assist              Re-eval Date: 7/14/22      Precautions: vertigo; imbalance        Manuals 6/14        BP supine 130/80  BP seated   125/80        C/S compression (-)         Modified VBI (-) bilaterally                Neuro Re-Ed         [place]-Hallpike  (-) bilaterally        Epley         VOR x 1  60" R/L U/D  (-) sx        VOR x 2  60" R/L U/D  (-) sx                                 Ther Ex        NuStep - sx dump                                                                 Ther Activity                        Gait Training                        Modalities

## 2022-06-17 ENCOUNTER — TELEPHONE (OUTPATIENT)
Dept: CARDIOLOGY CLINIC | Facility: CLINIC | Age: 87
End: 2022-06-17

## 2022-06-17 ENCOUNTER — TELEPHONE (OUTPATIENT)
Dept: INTERNAL MEDICINE CLINIC | Facility: CLINIC | Age: 87
End: 2022-06-17

## 2022-06-17 NOTE — TELEPHONE ENCOUNTER
Can you please review ko's chart as they are requesting an appt with you which we do not have  We found a spot with ankit next week  Apparently he had PT done and there were some issues so dr Ginnie Cowden reviewed his chart and said he should see cardio  Lashaun Serum looks like more neuro stuff to me but we still added him next week with ankit

## 2022-06-23 ENCOUNTER — OFFICE VISIT (OUTPATIENT)
Dept: CARDIOLOGY CLINIC | Facility: CLINIC | Age: 87
End: 2022-06-23
Payer: MEDICARE

## 2022-06-23 VITALS
WEIGHT: 179 LBS | BODY MASS INDEX: 27.13 KG/M2 | HEIGHT: 68 IN | SYSTOLIC BLOOD PRESSURE: 104 MMHG | DIASTOLIC BLOOD PRESSURE: 52 MMHG | HEART RATE: 66 BPM

## 2022-06-23 DIAGNOSIS — I35.0 AORTIC VALVE STENOSIS, ETIOLOGY OF CARDIAC VALVE DISEASE UNSPECIFIED: Primary | ICD-10-CM

## 2022-06-23 DIAGNOSIS — R42 LIGHTHEADEDNESS: ICD-10-CM

## 2022-06-23 DIAGNOSIS — I25.5 ISCHEMIC CARDIOMYOPATHY: ICD-10-CM

## 2022-06-23 DIAGNOSIS — I10 ESSENTIAL HYPERTENSION: ICD-10-CM

## 2022-06-23 DIAGNOSIS — I48.91 ATRIAL FIBRILLATION, UNSPECIFIED TYPE (HCC): ICD-10-CM

## 2022-06-23 DIAGNOSIS — I48.0 PAROXYSMAL ATRIAL FIBRILLATION (HCC): ICD-10-CM

## 2022-06-23 PROCEDURE — 99214 OFFICE O/P EST MOD 30 MIN: CPT | Performed by: NURSE PRACTITIONER

## 2022-06-23 NOTE — ASSESSMENT & PLAN NOTE
Sinus on exam  Continue carvedilol 6 25 mg b i d    Eliquis 2 5 mg b i d  (dose adjusted for age and renal function)

## 2022-06-23 NOTE — ASSESSMENT & PLAN NOTE
EF 40% on last echo  Continue beta-blocker, low-dose Lasix, nitrates  He is not on an Ace/Arb due to renal function

## 2022-06-23 NOTE — PATIENT INSTRUCTIONS
Stop Amlodipine  Continue to monitor blood pressure at home--call us if readings are consistently above 150/90  Take Eliquis 2 5 mg twice daily--you may take 1/2 of your 5 mg tablets twice daily until they are gone    The new prescription is for 2 5 mg tablets

## 2022-06-23 NOTE — ASSESSMENT & PLAN NOTE
Blood pressure is a little on the low side and noted to be orthostatic    Discontinue amlodipine and continue to monitor

## 2022-06-23 NOTE — PROGRESS NOTES
Patient ID: Alli Aguilera is a 80 y o  male  Plan:      Paroxysmal atrial fibrillation (HCC)  Sinus on exam  Continue carvedilol 6 25 mg b i d  Eliquis 2 5 mg b i d  (dose adjusted for age and renal function)    Essential hypertension  Blood pressure is a little on the low side and noted to be orthostatic  Discontinue amlodipine and continue to monitor    Ischemic cardiomyopathy  EF 40% on last echo  Continue beta-blocker, low-dose Lasix, nitrates  He is not on an Ace/Arb due to renal function      Aortic valve stenosis  Mild by last echo  Repeat echocardiogram    Lightheadedness  May be related to orthostatic hypotension  No concerning EKG findings on most recent EKG  Discontinue amlodipine       Follow up Plan/Summary Comments:  Blood pressure is noted to be low today  I have discontinued amlodipine altogether  I've asked for Otto Arce and his wife to continue to monitor blood pressures at home and to notify our office for consistent readings above 150/90  Will check an echocardiogram to reassess structure and function as well as his aortic valve  I reviewed his medication list and determined that he had been taking only 2 5 mg of Eliquis daily  We reviewed the appropriate dosing and a new prescription was provided  Jevon Dennison is scheduled to follow up with his primary cardiologist in July  He will be re-evaluated at that time regarding the symptoms and adjustments made  I advised him to call our office with any concerns prior to that time  HPI:  I had the pleasure of seeing Jevon Dennison in the office today for an ER follow-up visit  He was evaluated in the emergency department at [address] 05/22/2022 with dizziness and right hand tingling  CT of the head was unremarkable  He was noted to be mildly dehydrated on laboratory studies  He was seen in follow-up by his PCP several days after his ER visit and  Amlodipine was decreased for orthostatic hypotension    He also underwent an MRI and is scheduled for some additional vascular studies  He was referred to Cardiology by his PCP for evaluation of these symptoms  Tashi reports dizziness, however on further questioning denies a sensation of spinning  He says he has a different feeling in his head on the right side compared to the left but continues to describe it as "dizzy "  He denies feeling lightheaded or feeling near syncopal   There have not been any reported syncopal episodes  His symptoms are present when sitting and standing, do not improve after standing for a period of time and only resolve when lying down in bed at night  He denies any chest pain, pressure, tightness, shortness of breath, palpitations  He denies any bleeding problems on his anticoagulation  Review of Systems   10  point ROS  was otherwise non pertinent or negative except as per HPI or as below  Gait: Normal      Most recent or relevant cardiac/vascular testing:    Echo 11/24/2021 EF 40%, moderate diastolic dysfunction  Mild AS, TR  Mildly dilated aortic root  Objective:     /52   Pulse 66   Ht 5' 8" (1 727 m)   Wt 81 2 kg (179 lb)   BMI 27 22 kg/m²     PHYSICAL EXAM:    General:  Normal appearance, no acute distress  Eyes:  Anicteric  Oral mucosa:  Moist   Neck:  No JVD  Carotid upstrokes are brisk without bruits  No masses  Chest:  Scattered coarse breath sounds  Cardiac:  No palpable PMI  Normal S1 and S2   8-4/3 systolic murmur, RSB  Abdomen:  Soft and nontender  No palpable organomegaly or aortic enlargement  Extremities:  Trace to +1 bilateral ankle edema  Musculoskeletal:  Symmetric  Vascular:  Pedal pulses are intact  Neuro:  Grossly symmetric  Psych:  Alert and oriented x3  No Known Allergies    Current Outpatient Medications:     Accu-Chek Softclix Lancets lancets, Use to test blood sugar 3x daily  , Disp: 300 each, Rfl: 3    apixaban (Eliquis) 2 5 mg, Take 1 tablet (2 5 mg total) by mouth 2 (two) times a day, Disp: 180 tablet, Rfl: 3    atorvastatin (LIPITOR) 80 mg tablet, Take 1 tablet (80 mg total) by mouth daily, Disp: 90 tablet, Rfl: 1    BD PEN NEEDLE CARMINA U/F 32G X 4 MM MISC, USE 4 PEN NEEDLE DAILY AS DIRECTED, Disp: , Rfl: 3    carvedilol (COREG) 6 25 mg tablet, TAKE 1 TABLET (6 25 MG TOTAL) BY MOUTH 2 (TWO) TIMES A DAY WITH MEALS, Disp: 180 tablet, Rfl: 3    clopidogrel (Plavix) 75 mg tablet, Take 1 tablet (75 mg total) by mouth daily, Disp: 90 tablet, Rfl: 1    Dulaglutide (Trulicity) 4 5 YB/0 8VQ SOPN, Inject 0 5 mL (4 5 mg total) under the skin once a week, Disp: 6 mL, Rfl: 1    furosemide (LASIX) 20 mg tablet, TAKE 1 TABLET EVERY DAY, Disp: 90 tablet, Rfl: 3    glucose blood (Accu-Chek Guide) test strip, Use to test blood sugar 3 times a day, Disp: 102 strip, Rfl: 3    insulin glargine (Lantus SoloStar) 100 units/mL injection pen, Inject 30 units daily  , Disp: 15 mL, Rfl: 2    isosorbide mononitrate (IMDUR) 30 mg 24 hr tablet, TAKE 1 TABLET TWICE DAILY, Disp: 180 tablet, Rfl: 3    levothyroxine 75 mcg tablet, Take 1 tablet (75 mcg total) by mouth daily in the early morning Take 1 tablet daily except Sunday, Disp: 90 tablet, Rfl: 3    Omega 3 1200 MG CAPS, Take by mouth Take 4 tablets daily, Disp: , Rfl:     potassium chloride (MICRO-K) 10 MEQ CR capsule, TAKE 2 CAPSULES IN THE MORNING AND TAKE 1 CAPSULE IN THE EVENING, Disp: 270 capsule, Rfl: 3    prednisoLONE acetate (PRED FORTE) 1 % ophthalmic suspension, 1 drop 4 (four) times a day, Disp: , Rfl:     Apoaequorin (PREVAGEN EXTRA STRENGTH PO), Take by mouth in the morning (Patient not taking: Reported on 6/23/2022), Disp: , Rfl:     clopidogrel (Plavix) 75 mg tablet, Take 1 tablet (75 mg total) by mouth daily (Patient not taking: Reported on 6/23/2022), Disp: 30 tablet, Rfl: 0    meclizine (ANTIVERT) 25 mg tablet, Take 1 tablet (25 mg total) by mouth every 8 (eight) hours as needed for dizziness (Patient not taking: Reported on 6/23/2022), Disp: 30 tablet, Rfl: 0    Misc Natural Products (COLON HERBAL CLEANSER PO), Take by mouth, Disp: , Rfl:     nitroglycerin (NITROSTAT) 0 4 mg SL tablet, Place 1 tablet (0 4 mg total) under the tongue every 5 (five) minutes as needed for chest pain (Patient not taking: Reported on 6/23/2022), Disp: 100 tablet, Rfl: 3  Past Medical History:   Diagnosis Date    Benign hypertension     Chronic kidney disease (CKD), stage III (moderate)     Coronary artery disease     Hypothyroidism     Ischemic cardiomyopathy     Mixed hyperlipidemia     Paroxysmal atrial fibrillation     PVD (peripheral vascular disease)     Type II diabetes mellitus      Past Surgical History:   Procedure Laterality Date    CARDIAC CATHETERIZATION      CORONARY ARTERY BYPASS GRAFT         CMP:   Lab Results   Component Value Date     02/23/2015    K 4 2 05/22/2022    K 4 3 02/23/2015    CL 97 05/22/2022     02/23/2015    CO2 27 05/22/2022    CO2 26 7 02/23/2015    BUN 25 05/22/2022    BUN 28 (H) 02/23/2015    CREATININE 1 41 (H) 05/22/2022    CREATININE 1 42 (H) 11/18/2015    GLUCOSE 155 (H) 02/23/2015    EGFR 43 05/22/2022     Lipid Profile:    Lab Results   Component Value Date    CHOL 159 06/08/2015    TRIG 154 (H) 03/16/2022    TRIG 114 06/08/2015    HDL 43 03/16/2022    HDL 54 06/08/2015         Social History     Tobacco Use   Smoking Status Never Smoker   Smokeless Tobacco Never Used

## 2022-06-23 NOTE — ASSESSMENT & PLAN NOTE
May be related to orthostatic hypotension  No concerning EKG findings on most recent EKG    Discontinue amlodipine

## 2022-07-11 ENCOUNTER — HOSPITAL ENCOUNTER (OUTPATIENT)
Dept: NON INVASIVE DIAGNOSTICS | Facility: HOSPITAL | Age: 87
Discharge: HOME | End: 2022-07-11
Attending: SURGERY
Payer: MEDICARE

## 2022-07-11 DIAGNOSIS — I70.213 ATHEROSCLEROSIS OF NATIVE ARTERIES OF EXTREMITIES WITH INTERMITTENT CLAUDICATION, BILATERAL LEGS (HCC): ICD-10-CM

## 2022-07-11 DIAGNOSIS — I65.23 ASYMPTOMATIC BILATERAL CAROTID ARTERY STENOSIS: ICD-10-CM

## 2022-07-11 PROCEDURE — 93925 LOWER EXTREMITY STUDY: CPT

## 2022-07-11 PROCEDURE — 93923 UPR/LXTR ART STDY 3+ LVLS: CPT

## 2022-07-11 PROCEDURE — 93880 EXTRACRANIAL BILAT STUDY: CPT

## 2022-07-12 PROCEDURE — 93880 EXTRACRANIAL BILAT STUDY: CPT | Performed by: SURGERY

## 2022-07-12 PROCEDURE — 93925 LOWER EXTREMITY STUDY: CPT | Performed by: SURGERY

## 2022-07-12 PROCEDURE — 93922 UPR/L XTREMITY ART 2 LEVELS: CPT | Performed by: SURGERY

## 2022-07-20 ENCOUNTER — HOSPITAL ENCOUNTER (OUTPATIENT)
Dept: NON INVASIVE DIAGNOSTICS | Facility: CLINIC | Age: 87
Discharge: HOME | End: 2022-07-20
Payer: MEDICARE

## 2022-07-20 VITALS
HEIGHT: 68 IN | WEIGHT: 179 LBS | SYSTOLIC BLOOD PRESSURE: 104 MMHG | HEART RATE: 59 BPM | BODY MASS INDEX: 27.13 KG/M2 | DIASTOLIC BLOOD PRESSURE: 52 MMHG

## 2022-07-20 DIAGNOSIS — I35.0 AORTIC VALVE STENOSIS, ETIOLOGY OF CARDIAC VALVE DISEASE UNSPECIFIED: ICD-10-CM

## 2022-07-20 LAB
AORTIC ROOT: 3.3 CM
AORTIC VALVE MEAN VELOCITY: 16.4 M/S
APICAL FOUR CHAMBER EJECTION FRACTION: 43 %
ASCENDING AORTA: 3.6 CM
AV AREA BY CONTINUOUS VTI: 1.2 CM2
AV AREA PEAK VELOCITY: 1.2 CM2
AV LVOT MEAN GRADIENT: 2 MMHG
AV LVOT PEAK GRADIENT: 3 MMHG
AV MEAN GRADIENT: 12 MMHG
AV PEAK GRADIENT: 21 MMHG
AV VALVE AREA: 1.2 CM2
AV VELOCITY RATIO: 0.37
DOP CALC AO PEAK VEL: 2.27 M/S
DOP CALC AO VTI: 52.39 CM
DOP CALC LVOT AREA: 3.14 CM2
DOP CALC LVOT DIAMETER: 2 CM
DOP CALC LVOT PEAK VEL VTI: 20.05 CM
DOP CALC LVOT PEAK VEL: 0.85 M/S
DOP CALC LVOT STROKE INDEX: 30.8 ML/M2
DOP CALC LVOT STROKE VOLUME: 62.96 CM3
E WAVE DECELERATION TIME: 249 MS
FRACTIONAL SHORTENING: 18 % (ref 28–44)
INTERVENTRICULAR SEPTUM IN DIASTOLE (PARASTERNAL SHORT AXIS VIEW): 1.1 CM
INTERVENTRICULAR SEPTUM: 1.1 CM (ref 0.6–1.1)
LAAS-AP2: 24.5 CM2
LAAS-AP4: 23.5 CM2
LEFT ATRIUM AREA SYSTOLE SINGLE PLANE A4C: 22.6 CM2
LEFT ATRIUM SIZE: 4.4 CM
LEFT INTERNAL DIMENSION IN SYSTOLE: 4.2 CM (ref 2.1–4)
LEFT VENTRICULAR INTERNAL DIMENSION IN DIASTOLE: 5.1 CM (ref 3.5–6)
LEFT VENTRICULAR POSTERIOR WALL IN END DIASTOLE: 1.1 CM
LEFT VENTRICULAR STROKE VOLUME: 46 ML
LVSV (TEICH): 46 ML
MV PEAK A VEL: 0.76 M/S
MV PEAK E VEL: 116 CM/S
MV STENOSIS PRESSURE HALF TIME: 72 MS
MV VALVE AREA P 1/2 METHOD: 3.06 CM2
RIGHT ATRIUM AREA SYSTOLE A4C: 15.7 CM2
RIGHT VENTRICLE ID DIMENSION: 3.9 CM
SL CV LEFT ATRIUM LENGTH A2C: 5.9 CM
SL CV LV EF: 40
SL CV PED ECHO LEFT VENTRICLE DIASTOLIC VOLUME (MOD BIPLANE) 2D: 125 ML
SL CV PED ECHO LEFT VENTRICLE SYSTOLIC VOLUME (MOD BIPLANE) 2D: 79 ML
TR MAX PG: 21 MMHG
TR PEAK VELOCITY: 2.3 M/S
TRICUSPID VALVE PEAK REGURGITATION VELOCITY: 2.32 M/S

## 2022-07-20 PROCEDURE — 93306 TTE W/DOPPLER COMPLETE: CPT

## 2022-07-20 PROCEDURE — 93306 TTE W/DOPPLER COMPLETE: CPT | Performed by: INTERNAL MEDICINE

## 2022-07-28 DIAGNOSIS — E11.8 TYPE 2 DIABETES MELLITUS WITH COMPLICATION, WITH LONG-TERM CURRENT USE OF INSULIN (HCC): ICD-10-CM

## 2022-07-28 DIAGNOSIS — Z79.4 TYPE 2 DIABETES MELLITUS WITH COMPLICATION, WITH LONG-TERM CURRENT USE OF INSULIN (HCC): ICD-10-CM

## 2022-07-28 RX ORDER — BLOOD SUGAR DIAGNOSTIC
STRIP MISCELLANEOUS
Qty: 100 STRIP | Refills: 4 | Status: SHIPPED | OUTPATIENT
Start: 2022-07-28

## 2022-08-03 ENCOUNTER — OFFICE VISIT (OUTPATIENT)
Dept: CARDIOLOGY CLINIC | Facility: CLINIC | Age: 87
End: 2022-08-03
Payer: MEDICARE

## 2022-08-03 VITALS
BODY MASS INDEX: 26.37 KG/M2 | WEIGHT: 174 LBS | DIASTOLIC BLOOD PRESSURE: 80 MMHG | HEART RATE: 60 BPM | SYSTOLIC BLOOD PRESSURE: 126 MMHG | HEIGHT: 68 IN

## 2022-08-03 DIAGNOSIS — I25.5 ISCHEMIC CARDIOMYOPATHY: ICD-10-CM

## 2022-08-03 DIAGNOSIS — J84.10 PULMONARY FIBROSIS (HCC): ICD-10-CM

## 2022-08-03 DIAGNOSIS — R42 DISEQUILIBRIUM: Primary | ICD-10-CM

## 2022-08-03 DIAGNOSIS — I35.0 AORTIC VALVE STENOSIS, ETIOLOGY OF CARDIAC VALVE DISEASE UNSPECIFIED: ICD-10-CM

## 2022-08-03 DIAGNOSIS — I48.0 PAROXYSMAL ATRIAL FIBRILLATION (HCC): ICD-10-CM

## 2022-08-03 PROCEDURE — 99214 OFFICE O/P EST MOD 30 MIN: CPT | Performed by: INTERNAL MEDICINE

## 2022-08-03 NOTE — PATIENT INSTRUCTIONS
Dizziness   WHAT YOU NEED TO KNOW:   What is dizziness? Dizziness is a feeling of being off balance or unsteady  Common causes of dizziness are an inner ear fluid imbalance or a lack of oxygen in your blood  Dizziness may be acute (lasts 3 days or less) or chronic (lasts longer than 3 days)  You may have dizzy spells that last from seconds to a few hours  What increases my risk for dizziness? Dizziness may get worse during certain activities or when you move a certain way  The following may also increase your risk for dizziness:  Older age    An infection, ear surgery, or an inner ear condition, such as Ménière disease    Stroke, a brain tumor, or a recent head trauma     An injury that causes a large amount of blood loss    Heart or blood pressure problems     Exposure to chemicals, or long-term alcohol use     Medicines used to treat high blood pressure, seizure disorders, or anxiety and depression     A nerve disorder, such as multiple sclerosis    What signs and symptoms may happen with dizziness? A feeling that your surroundings are moving even though you are standing still    Ringing in your ears or hearing loss     Feeling faint or lightheaded     Weakness or unsteadiness     Double vision or eye movements you cannot control    Nausea or vomiting     Confusion    How is the cause of dizziness diagnosed? Your healthcare provider may ask when the dizziness started  Tell the provider if you have dizzy spells, and how long they last  Tell him or her what happens before you become dizzy  The provider will ask if you have other health conditions and if you take any medicines  He or she will check your blood pressure and pulse to see if your dizziness may be related to your heart  Your balance, strength, reflexes, and the way you walk may also be checked  You may need any of the following tests to help find the cause of your dizziness: An EKG  records the electrical activity of your heart   An EKG can be used to check for an abnormal heart beat or heart damage  Blood tests  will check your blood sugar level, infection, and your blood cell levels  CT or MRI  pictures check for a stroke, head injury, or brain tumor  They also check for brain bleeding or swelling  You may be given contrast liquid to help your brain show up better in the pictures  Tell the healthcare provider if you have ever had an allergic reaction to contrast liquid  Do not enter the MRI room with anything metal  Metal can cause serious injury  Tell the healthcare provider if you have any metal in or on your body  How is dizziness treated? Treatment will depend on the cause of your dizziness  Your healthcare provider may give you oxygen or medicines to decrease your dizziness and nausea  He may also refer you to a specialist  [place] 23 may need to be admitted to the hospital for treatment  How can I manage my symptoms? Do not drive  or operate heavy machinery when you are dizzy  Get up slowly  from sitting or lying down  Drink plenty of liquids  Liquids help prevent dehydration  Ask how much liquid to drink each day and which liquids are best for you  When should I seek immediate care? You have a headache and a stiff neck  You have shaking chills and a fever  You vomit over and over with no relief  Your vomit or bowel movements are red or black  You have pain in your chest, back, or abdomen  You have numbness, especially in your face, arms, or legs  You have trouble moving your arms or legs  You are confused  When should I contact my healthcare provider? You have a fever  Your symptoms do not get better with treatment  You have questions or concerns about your condition or care  CARE AGREEMENT:   You have the right to help plan your care  Learn about your health condition and how it may be treated   Discuss treatment options with your healthcare providers to decide what care you want to receive  You always have the right to refuse treatment  The above information is an  only  It is not intended as medical advice for individual conditions or treatments  Talk to your doctor, nurse or pharmacist before following any medical regimen to see if it is safe and effective for you  © Copyright [address] 2022 Information is for End User's use only and may not be sold, redistributed or otherwise used for commercial purposes   All illustrations and images included in CareNotes® are the copyrighted property of [employer]  or [address] [place] [address]

## 2022-08-03 NOTE — PROGRESS NOTES
Subjective:        Patient ID: Miguel Dias is a 80 y o  male  Chief Complaint:  Miriam Brady is here to have a cardiac checkup due to some dizziness  On questioning he is not really having any dizziness or vertigo  He describes more lightheadedness but then on further questioning it seems if he is having more chronic disequilibrium  Recent MRI reviewed, several small strokes elucidated  Watching him walk was somewhat trying, he does have significant back pain issues, and he was unsteady just standing up  He did not tilt  Blood pressure has been normal at home and here today  He is not having any chest pains or alarming shortness of breath, nothing out of the ordinary  No edema orthopnea  No tachy palpitations  Recent echo stable findings mild LV dysfunction, no new regional wall motion abnormality, mild to moderate aortic stenosis, nothing that would explain any of his symptoms I told him  Recent EKG stable  No myalgias on higher dose atorvastatin, on Plavix  I told him this is very reasonable with his recent CNS vascular findings  Tells me the dentist wants to know how long he needs to hold his blood thinner for dental extraction  The following portions of the patient's history were reviewed and updated as appropriate: allergies, current medications, past family history, past medical history, past social history, past surgical history and problem list   Review of Systems   Constitutional: Negative for chills, diaphoresis, malaise/fatigue and weight gain  HENT: Negative for nosebleeds and stridor  Eyes: Negative for double vision, vision loss in left eye, vision loss in right eye and visual disturbance  Cardiovascular: Negative for chest pain, claudication, cyanosis, dyspnea on exertion, irregular heartbeat, leg swelling, near-syncope, orthopnea, palpitations, paroxysmal nocturnal dyspnea and syncope  Respiratory: Negative for cough, shortness of breath, snoring and wheezing  Endocrine: Negative for polydipsia, polyphagia and polyuria  Hematologic/Lymphatic: Negative for bleeding problem  Does not bruise/bleed easily  Skin: Negative for flushing and rash  Musculoskeletal: Negative for falls and myalgias  Gastrointestinal: Negative for abdominal pain, heartburn, hematemesis, hematochezia, melena and nausea  Genitourinary: Negative for hematuria  Neurological: Positive for light-headedness and loss of balance  Negative for brief paralysis, dizziness, focal weakness, headaches and vertigo  Psychiatric/Behavioral: Negative for altered mental status and substance abuse  Allergic/Immunologic: Negative for hives  Objective:      /80 (BP Location: Right arm, Cuff Size: Standard)   Pulse 60   Ht 5' 8" (1 727 m)   Wt 78 9 kg (174 lb)   BMI 26 46 kg/m²   Physical Exam  Constitutional:       General: He is not in acute distress  Appearance: He is well-developed  HENT:      Head: Normocephalic and atraumatic  Eyes:      General: No scleral icterus  Pupils: Pupils are equal, round, and reactive to light  Neck:      Thyroid: No thyromegaly  Vascular: No carotid bruit or JVD  Cardiovascular:      Rate and Rhythm: Normal rate and regular rhythm  Heart sounds: Murmur (Grade 2 VELASQUEZ ) heard  No friction rub  No gallop  Pulmonary:      Effort: Pulmonary effort is normal  No respiratory distress  Breath sounds: Normal breath sounds  No stridor  No wheezing or rales  Abdominal:      General: Bowel sounds are normal  There is no distension  Palpations: Abdomen is soft  There is no mass  Tenderness: There is no abdominal tenderness  Musculoskeletal:         General: No deformity  Cervical back: Normal range of motion and neck supple  Right lower leg: No edema  Left lower leg: No edema  Skin:     General: Skin is warm and dry  Coloration: Skin is not pale  Findings: No erythema     Neurological: Mental Status: He is alert and oriented to person, place, and time        Coordination: Coordination normal    Psychiatric:         Mood and Affect: Mood normal          Behavior: Behavior normal          Lab Review:   Hospital Outpatient Visit on 07/20/2022   Component Date Value    AV area peak yuriy 07/20/2022 1 2     LA size 07/20/2022 4 4     Aortic valve mean veloci* 07/20/2022 16 40     Triscuspid Valve Regurgi* 07/20/2022 21 0     Tricuspid valve peak reg* 07/20/2022 2 32     LVPWd 07/20/2022 1 10     Left Atrium Area-systoli* 07/20/2022 24 5     Left Atrium Area-systoli* 07/20/2022 23 5     TR Peak Yuriy 07/20/2022 2 3     IVSd 07/20/2022 3 19     LV DIASTOLIC VOLUME (MOD* 01/66/6842 125     LEFT VENTRICLE SYSTOLIC * 04/47/9613 79     Left ventricular stroke * 07/20/2022 46 00     A4C EF 07/20/2022 43     LA length (A2C) 07/20/2022 5 90     LVIDd 07/20/2022 5 10     IVS 07/20/2022 1 1     LVIDS 07/20/2022 4 20     FS 07/20/2022 18     Asc Ao 07/20/2022 3 6     Ao root 07/20/2022 3 30     RVID d 07/20/2022 3 9     LVOT mn grad 07/20/2022 2 0     AV area by cont VTI 07/20/2022 1 2     AV mean gradient 07/20/2022 12     AV LVOT peak gradient 07/20/2022 3     MV valve area p 1/2 meth* 07/20/2022 3 06     E wave deceleration time 07/20/2022 249     LVOT diameter 07/20/2022 2 0     LVOT peak yuriy 07/20/2022 0 85     LVOT peak VTI 07/20/2022 20 05     Ao peak yuriy retrograde 07/20/2022 2 27     Ao VTI 07/20/2022 52 39     LVOT stroke volume 07/20/2022 62 96     AV peak gradient 07/20/2022 21     MV Peak E Yuriy 07/20/2022 116     MV Peak A Yuriy 07/20/2022 0 76     VALERY A4C 07/20/2022 22 6     RAA A4C 07/20/2022 15 7     MV stenosis pressure 1/2* 07/20/2022 72     LVOT SI 07/20/2022 30 80     LVSV, 2D 07/20/2022 46     LVOT area 07/20/2022 3 14     AV Velocity Ratio 07/20/2022 0 37     AV valve area 07/20/2022 1 20     LV EF 07/20/2022 40      VAS carotid complete study    Result Date: 7/12/2022  Narrative:  [facility]: Indications:  Patient presents for surveillance of carotid artery disease  Patient complains of dizziness  Operative History: CABG Risk Factors The patient has history of HTN, Diabetes (IDDM), Hyperlipidemia, CKD and CAD  FINDINGS:  Right        Impression  PSV  EDV (cm/s)  Direction of Flow  Ratio  Dist  ICA                 72          27                      1 22  Mid  ICA                  52          16                      0 88  Prox  ICA    1 - 49%      36           9                      0 60  Dist CCA                  44          13                            Mid CCA                   59          15                      1 22  Prox CCA                  49           8                      0 99  Ext Carotid               68           0                      1 15  Prox Vert                 30          10  Antegrade                 Subclavian                78           8                            Innominate                49          12                             Left         Impression  PSV  EDV (cm/s)  Direction of Flow  Ratio  Dist  ICA                 36           9                      0 79  Mid  ICA                  57          16                      1 26  Prox  ICA    50 - 69%    135          28                      2 98  Dist CCA                  44           5                            Mid CCA                   45           9                      0 78  Prox CCA                  58           9                            Ext Carotid               57           0                      1 26  Prox Vert                 30          10  Antegrade                 Subclavian               105           4                               CONCLUSION:  Impression RIGHT: There is <50% stenosis noted in the internal carotid artery  Plaque is heterogenous and irregular  Vertebral artery flow is antegrade  There is no significant subclavian artery disease  LEFT: There is 50-69% stenosis noted in the internal carotid artery  Dense calcific plaque is heterogenous and irregular  Vertebral artery flow is antegrade  There is no significant subclavian artery disease  Compared to previous study on 11/04/2021, there is no significant interval change  Recommend repeat testing in 6 month as per protocol unless otherwise indicated  Internal carotid artery stenosis determination by consensus criteria from: Fiordaliza Braden et al  Carotid Artery Stenosis: Gray-Scale and Doppler US Diagnosis - Society of Radiologists in [facility] [address], Radiology 2003; [id number]  SIGNATURE: Electronically Signed by: Hanna Stanton MD on 2022-07-12 04:42:26 PM    VAS lower limb arterial duplex, complete bilateral    Result Date: 7/12/2022  Narrative:  [facility]PORT [facility]: Indications:  6 month surveillance for progression of disease  The patient denies claudication symptoms  Operative History: CABG Risk Factors The patient has history of HTN, Diabetes (IDDM), Hyperlipidemia, CKD and CAD  FINDINGS:  Right                  Impression  PSV (cm/s)  AP (cm)  Common Femoral Artery                      57           Prox Profunda                              52           Prox SFA                                   48           Mid SFA                                    53           Dist SFA                                   49           Proximal Pop           Ectatic             34      1 0  Distal Pop                                 23           Tibioperoneal                              32           Prox Post Tibial                            5           Dist Post Tibial                           10           Dist  Ant   Tibial                          81            Left                   Impression  PSV (cm/s)  AP (cm)  Common Femoral Artery                      38           Prox Profunda                              27           Prox SFA 34           Mid SFA                                    48           Dist SFA                                   36           Proximal Pop           Ectatic             32      1 0  Distal Pop                                 35           Tibioperoneal                              35           Dist Post Tibial                           64           Dist  Ant  Tibial                          41              CONCLUSION:  Impression: RIGHT LOWER LIMB: The resting evaluation shows diffuse atherosclerotic arterial disease without evidence of any significant stenosis in the femoro-popliteal arteries  The proximal popliteal artery is ectatic at 10mm  There is evidence of tibio-peroneal arterial disease: Occlusion of the posterior tibial artery  Ankle/Brachial index:  noncompressible  (Prior: 0 68  ) PVR/ PPG tracings are dampened  Metatarsal pressure of 97 mmHg  Great toe pressure of  71mmHg, within the healing range  LEFT LOWER LIMB: The resting evaluation shows diffuse atherosclerotic arterial disease without evidence of significant stenosis in the femoro-popliteal arteries  There is evidence of tibio-peroneal arterial disease  The proximal popliteal artery is ectatic at 10mm  Ankle/Brachial index:  noncpmpressible  (Prior:0 94  ) PVR/ PPG tracings are  dampened  Metatarsal pressure of  140mmHg Great toe pressure of  79mmHg, within the healing range   Compared to previous study on 12/10/2021 , there is has been no significant progression of disease  SIGNATURE: Electronically Signed by: Sylvia Milner MD on 2022-07-12 04:40:43 PM    Echo complete w/ contrast if indicated    Result Date: 7/20/2022  Narrative: [facility]  Left Ventricle: Left ventricular cavity size is normal  Wall thickness is mildly increased  The left ventricular ejection fraction is 40%  Systolic function is moderately reduced  There is mild global hypokinesis with regional variation worst in the basal to mid septal walls along with basal to mid inferior wall  Iverson Spatz Right Ventricle: Systolic function is low normal  Normal tricuspid annular plane systolic excursion (TAPSE) > 1 7 cm    Left Atrium: The atrium is mildly dilated    Aortic Valve: The aortic valve is probable trileaflet  The leaflets are calcified  There is reduced mobility  There is mild to moderate stenosis    Mitral Valve: There is annular calcification  There is mild to moderate regurgitation    Tricuspid Valve: There is mild regurgitation  Assessment:       1  Disequilibrium     2  Aortic valve stenosis, etiology of cardiac valve disease unspecified     3  Paroxysmal atrial fibrillation (HCC)     4  Pulmonary fibrosis ([address] )     5  Ischemic cardiomyopathy          Plan:       I told Tashi I find no cardiac culprit for his symptoms, they seem more mechanical/musculoskeletal/neurogenic in etiology  Disequilibrium seems to be the issue, he is a fall risk, I recommended he be considered for walking with a cane or another stabilizer  He has no signs or symptoms reminiscent of unstable angina, volume excess last decompensated heart failure, nor malignant dysrhythmia  Dizziness all day is certainly noncardiac in etiology  Blood pressure is improved off of the amlodipine, I recommend this be discontinued indefinitely  The remainder of his cardiac regimen is optimal, I told him the recent changes with anti-platelet therapy and statin therapy is more than reasonable and I agree with it wholeheartedly  I made no changes today, ordered no acute cardiac testing, plan to see him back in 3 months, asked him to call sooner with any concerning potential cardiac symptoms in the meantime

## 2022-08-19 NOTE — PROGRESS NOTES
Assessment/Plan:    Problem List Items Addressed This Visit        Endocrine    Type 2 diabetes mellitus with complication, with long-term current use of insulin ([facility] [address] )       Lab Results   Component Value Date    HGBA1C 7 3 (H) 09/17/2022   Continue glargine            Cardiovascular and Mediastinum    Essential hypertension (Chronic)     Furosemide for BP         Paroxysmal atrial fibrillation (HCC)    Cerebral vascular disease   Other Visit Diagnoses     Vasomotor rhinitis    -  Primary    Orthostatic hypotension               Diagnoses and all orders for this visit:    Vasomotor rhinitis  -     Discontinue: azelastine (ASTELIN) 0 1 % nasal spray; 1 spray into each nostril 2 (two) times a day Use in each nostril as directed  -     Discontinue: azelastine (ASTELIN) 0 1 % nasal spray; 1 spray into each nostril 2 (two) times a day Use in each nostril as directed    Essential hypertension    Type 2 diabetes mellitus with complication, with long-term current use of insulin (HCC)    Paroxysmal atrial fibrillation (HCC)    Cerebral vascular disease  -     Discontinue: atorvastatin (LIPITOR) 80 mg tablet; Take 1 tablet (80 mg total) by mouth daily    Orthostatic hypotension  -     Discontinue: midodrine (PROAMATINE) 2 5 mg tablet; Take 1 tablet (2 5 mg total) by mouth 3 (three) times a day    Other orders  -     Discontinue: amoxicillin (AMOXIL) 500 mg capsule      Type 2 diabetes mellitus with complication, with long-term current use of insulin (HCC)    Lab Results   Component Value Date    HGBA1C 7 3 (H) 09/17/2022   Continue glargine    Essential hypertension  Furosemide for BP        Subjective:      Patient ID: Ronal Mina is a 80 y o  male  Here for review of BP        The following portions of the patient's history were reviewed and updated as appropriate:   He has a past medical history of Benign hypertension, CHF (congestive heart failure) ([facility] [address] ), Chronic kidney disease (CKD), stage III (moderate), Coronary "artery disease, Hypothyroidism, Ischemic cardiomyopathy, Mixed hyperlipidemia, Paroxysmal atrial fibrillation, PVD (peripheral vascular disease), and Type II diabetes mellitus  ,  does not have any pertinent problems on file  ,   has a past surgical history that includes Cardiac catheterization and Coronary artery bypass graft  ,  family history includes No Known Problems in his brother, father, maternal aunt, maternal grandfather, maternal grandmother, maternal uncle, mother, paternal aunt, paternal grandfather, paternal grandmother, paternal uncle, and sister  ,   reports that he has never smoked  He has never used smokeless tobacco  He reports that he does not currently use alcohol  He reports that he does not use drugs  ,  has No Known Allergies     Current Outpatient Medications   Medication Sig Dispense Refill   • Accu-Chek Softclix Lancets lancets Use to test blood sugar 3x daily  300 each 3   • BD PEN NEEDLE CARMINA U/F 32G X 4 MM MISC Check sugar 3 times a day  3     No current facility-administered medications for this visit  Review of Systems   Constitutional: Negative for chills, fatigue and fever  HENT: Negative  Respiratory: Negative for cough, chest tightness and shortness of breath  Cardiovascular: Negative for chest pain and palpitations  Gastrointestinal: Negative for abdominal pain, constipation, diarrhea, nausea and vomiting  Genitourinary: Negative  Musculoskeletal: Negative for back pain and myalgias  Skin: Negative  Neurological: Negative  Psychiatric/Behavioral: Negative for dysphoric mood  The patient is not nervous/anxious  Objective:  Vitals:    08/19/22 0944   BP: 134/80   Pulse: 68   Temp: 97 5 °F (36 4 °C)   TempSrc: Tympanic   SpO2: 98%   Weight: 79 7 kg (175 lb 9 6 oz)   Height: 5' 8\" (1 727 m)     Body mass index is 26 7 kg/m²  Physical Exam  Vitals and nursing note reviewed  Constitutional:       Appearance: He is well-developed     HENT:      Head: " Normocephalic and atraumatic  Eyes:      Pupils: Pupils are equal, round, and reactive to light  Cardiovascular:      Rate and Rhythm: Normal rate and regular rhythm  Heart sounds: Normal heart sounds  No murmur heard  Pulmonary:      Effort: Pulmonary effort is normal       Breath sounds: Normal breath sounds  No stridor  No rales  Abdominal:      General: Bowel sounds are normal  There is no distension  Palpations: Abdomen is soft  Tenderness: There is no abdominal tenderness  Musculoskeletal:         General: No deformity  Normal range of motion  Cervical back: Normal range of motion and neck supple  Skin:     General: Skin is warm and dry  Neurological:      Mental Status: He is alert and oriented to person, place, and time            PHQ-2/9 Depression Screening

## 2022-08-30 ENCOUNTER — OFFICE VISIT (OUTPATIENT)
Dept: VASCULAR SURGERY | Facility: CLINIC | Age: 87
End: 2022-08-30
Payer: MEDICARE

## 2022-08-30 ENCOUNTER — TELEPHONE (OUTPATIENT)
Dept: GASTROENTEROLOGY | Facility: CLINIC | Age: 87
End: 2022-08-30

## 2022-08-30 VITALS
SYSTOLIC BLOOD PRESSURE: 118 MMHG | WEIGHT: 173 LBS | DIASTOLIC BLOOD PRESSURE: 62 MMHG | TEMPERATURE: 96.6 F | HEIGHT: 68 IN | BODY MASS INDEX: 26.22 KG/M2 | HEART RATE: 70 BPM

## 2022-08-30 DIAGNOSIS — I70.213 ATHEROSCLEROSIS OF NATIVE ARTERIES OF EXTREMITIES WITH INTERMITTENT CLAUDICATION, BILATERAL LEGS (HCC): ICD-10-CM

## 2022-08-30 DIAGNOSIS — E11.8 TYPE 2 DIABETES MELLITUS WITH COMPLICATION, WITH LONG-TERM CURRENT USE OF INSULIN (HCC): ICD-10-CM

## 2022-08-30 DIAGNOSIS — N18.32 STAGE 3B CHRONIC KIDNEY DISEASE (HCC): ICD-10-CM

## 2022-08-30 DIAGNOSIS — I65.23 ASYMPTOMATIC BILATERAL CAROTID ARTERY STENOSIS: Primary | ICD-10-CM

## 2022-08-30 DIAGNOSIS — I63.40 CEREBROVASCULAR ACCIDENT (CVA) DUE TO EMBOLISM OF CEREBRAL ARTERY (HCC): ICD-10-CM

## 2022-08-30 DIAGNOSIS — I48.0 PAROXYSMAL ATRIAL FIBRILLATION (HCC): ICD-10-CM

## 2022-08-30 DIAGNOSIS — I35.0 AORTIC VALVE STENOSIS, ETIOLOGY OF CARDIAC VALVE DISEASE UNSPECIFIED: ICD-10-CM

## 2022-08-30 DIAGNOSIS — E78.2 MIXED HYPERLIPIDEMIA: ICD-10-CM

## 2022-08-30 DIAGNOSIS — I73.9 PERIPHERAL ARTERIAL DISEASE (HCC): ICD-10-CM

## 2022-08-30 DIAGNOSIS — Z79.4 TYPE 2 DIABETES MELLITUS WITH COMPLICATION, WITH LONG-TERM CURRENT USE OF INSULIN (HCC): ICD-10-CM

## 2022-08-30 PROCEDURE — 99215 OFFICE O/P EST HI 40 MIN: CPT | Performed by: SURGERY

## 2022-08-30 NOTE — PATIENT INSTRUCTIONS
1) PAD  -your test shows some blockages in the arteries that may be the cause of your leg tiredness when you walk, but not the cause of the severe pain you are having in your back and down your legs  -we are going to keep an eye on this with ultrasound on a 12 month basis  -please continue to apply moisturizing cream to the feet daily  -if you get any foot or leg wounds, please come in for a sooner appointment    2) Carotid stenosis  -your ultrasound showed a mild blockage on the right and a moderate blockage on the left  -I do not think this was the cause of your stroke  -we are going to monitor this with ultrasound on a 6 month basis  -if you have any stroke symptoms, please go to the ER for evaluation    3) Medications  -please continue your aspirin, plavix, eliquis and statin as well as the rest of your prescribed medications

## 2022-08-30 NOTE — TELEPHONE ENCOUNTER
This is a reminder; patient is due for 1 yr f/u res rev w/ CV 2/28/2023 and LEADS 8/30/2023   Please call patient and schedule the following by the dates provided  Patient's appointment(s) are due on or after 08/30/2023      Dopplers  [] Abdominal Aorta Iliac (AOIL)  [x] Carotid (CV) scheduled on 2/28/2023  [] Celiac and/or Mesenteric  [] Endovascular Aortic Repair (EVAR)   [] Hemodialysis Access (HD)   [x] Lower Limb Arterial (NIMCO)  Scheduled on 8/30/2023  [] Lower Limb Venous Duplex with Reflux (LEVDR)  [] Renal Artery  [] Upper Limb Arterial (UEA)    [] Upper Limb Venous (UEV)    Advanced Imaging   [] CTA head/neck    [] CTA abdomen    [] CTA abdomen & pelvis    [] CT abdomen with/ without contrast  [] CT abdomen with contrast  [] CT abdomen without contrast    [] CT abdomen & pelvis with/ without contrast  [] CT abdomen & pelvis with contrast  [] CT abdomen & pelvis without contrast    Office Visit   [] New patient, patient last seen over 3 years ago  [] Risk factor modification (RFM)   [x] Follow up  due on or after 08/30/2023

## 2022-08-30 NOTE — PROGRESS NOTES
Assessment/Plan:    Pt is an 79 yo M w/ DM, hypothyroidism, pulmonary fibrosis, afib (Eliquis), ICM, HTN, SCC, BCC, CKD, CAD s/p stent, HLD, recent L CVA; hx B CVA, PAD, asymptomatic carotid stenosis    Asymptomatic bilateral carotid artery stenosis  -     VAS carotid complete study;  Future  -reviewed carotid DU which showed R ICA <50% and L ICA 50-69%  -I would consider this asymptomatic as his lesion is quite low to cause stroke and he has hx of B CVA on MRI  -discussed recommendations for carotid disease; we will continue surveillance on a 6 month basis at this time; instructed pt to seek care immediately if further stroke symptoms were to occur  -repeat DU in 6 months    Type 2 diabetes mellitus with complication, with long-term current use of insulin (HCC)  -A1C: 7 1 (3/22)    Peripheral arterial disease (HCC)  Atherosclerosis of native arteries of extremities with intermittent claudication, bilateral legs (HCC)  -     VAS lower limb arterial duplex, complete bilateral; Future  -reviewed LEADs which show R: NC/97/71 and L: NC/140/79 w/ tibial disease B and pop ectasia 10/10mm  -patient may be having mild claudication but his walking is most limited by his back pain with sciatica; he has B palpable DP pulses on exam today and no wounds (prior heel crack now healed); thus, no intervention is warranted at this time  -repeat LEADs in 12 months  -f/u 1 year or sooner if any new symptoms occur    Cerebrovascular 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)  Paroxysmal atrial fibrillation ([address] )  Aortic valve stenosis, etiology of cardiac valve disease unspecified  -recent L side stroke; presented with R hand weakness which resolved and L facial numbness which is persistent  -his MRI also showed multiple B chronic CVAs  -because of the bilaterality, I think that cardioembolic is his most likely etiology  -he is optimally medically managed currently with the addition of plavix and increased dose statin    Stage 3b chronic kidney disease  -Cr: 1 41    GFR: 43    Mixed hyperlipidemia  Medications  -continue ASA/statin daily  -also on Eliquis for afib  -plavix newly added after most recent stroke          Subjective:      Patient ID: Molly Garcia is a 80 y o  male  Patient presents to review LEAD done on 7/11/22 and CV done on 7/11/22  PT c/o pain with walking able to walk about 50 feet before needing to stop and rest  PT denies rest pain and open wounds  PT denies any TIA or stroke symptoms  PT is taking Eliquis, Atorvastatin and Plavix  PT is a non smoker  HPI:    Patient presents to review testing  Patient has pain in the lumbar spine  He is s/p 2 operations for this  He has pain that radiates down the legs  When he is walking, he has back pain and his legs get tired  He can walk about 100ft before he has to take a break  The legs are equal   This is equal in the calves and thighs  Denies rest pain or wounds  This has been present since his last spine operation a couple years ago, about '23  Recently he had an episode of R hand numbness which resolved and L facial numbness which is persistent  Wife describes this as "oozy on one side"  Denies amaurosis, facial droop, garbled speech, unilateral weakness/numbness other than that described above  Takes ASA, statin (recent dose increase), and Eliquis (afib), and plavix (which was recently added with the stroke)    Never smoker      The following portions of the patient's history were reviewed and updated as appropriate: allergies, current medications, past family history, past medical history, past social history, past surgical history and problem list     Review of Systems   Constitutional: Negative  HENT: Negative  Eyes: Negative  Negative for visual disturbance  Respiratory: Negative  Negative for shortness of breath  Cardiovascular: Negative  Negative for chest pain and leg swelling  Gastrointestinal: Negative  Endocrine: Negative  Genitourinary: Negative  Musculoskeletal: Positive for back pain and gait problem  Leg pain when walking   Skin: Negative  Negative for wound  Allergic/Immunologic: Negative  Neurological: Positive for numbness (L face and R hand)  Negative for facial asymmetry, speech difficulty and weakness  Hematological: Negative  Psychiatric/Behavioral: Negative  Objective:      /62 (BP Location: Right leg, Patient Position: Sitting, Cuff Size: Standard)   Pulse 70   Temp (!) 96 6 °F (35 9 °C) (Tympanic)   Ht 5' 8" (1 727 m)   Wt 78 5 kg (173 lb)   BMI 26 30 kg/m²          Physical Exam  Vitals and nursing note reviewed  Constitutional:       Appearance: He is well-developed  HENT:      Head: Normocephalic and atraumatic  Eyes:      Conjunctiva/sclera: Conjunctivae normal    Cardiovascular:      Rate and Rhythm: Normal rate and regular rhythm  Pulses:           Carotid pulses are on the right side with bruit and on the left side with bruit  Radial pulses are 2+ on the right side and 2+ on the left side  Dorsalis pedis pulses are 2+ on the right side and 2+ on the left side  Posterior tibial pulses are 0 on the right side and 0 on the left side  Heart sounds: Murmur (strong) heard  Pulmonary:      Effort: Pulmonary effort is normal  No respiratory distress  Breath sounds: Normal breath sounds  No wheezing or rales  Abdominal:      General: There is no distension  Palpations: Abdomen is soft  Tenderness: There is no abdominal tenderness  There is no rebound  Musculoskeletal:         General: Normal range of motion  Cervical back: Normal range of motion and neck supple  Right lower leg: No edema  Left lower leg: No edema  Skin:     General: Skin is warm and dry  Comments: No wounds   Neurological:      Mental Status: He is alert and oriented to person, place, and time     Psychiatric: Behavior: Behavior normal            I have reviewed and made appropriate changes to the review of systems input by the medical assistant      Vitals:    08/30/22 0834 08/30/22 0843   BP: 122/54 118/62   BP Location: Left arm Right leg   Patient Position: Sitting Sitting   Cuff Size: Standard Standard   Pulse: 70    Temp: (!) 96 6 °F (35 9 °C)    TempSrc: Tympanic    Weight: 78 5 kg (173 lb)    Height: 5' 8" (1 727 m)        Patient Active Problem List   Diagnosis    Paroxysmal atrial fibrillation (HCC)    Type 2 diabetes mellitus with complication, with long-term current use of insulin (HCC)    Bradycardia    Acquired hypothyroidism    Stage 3b chronic kidney disease    Essential hypertension    Low back pain    Mixed hyperlipidemia    Rupture of tendon of biceps, long head    Spinal stenosis of lumbar region with neurogenic claudication    Stented coronary artery    Lightheadedness    Atherosclerosis of native arteries of extremities with intermittent claudication, bilateral legs (HCC)    Peripheral arterial disease (HCC)    Pulmonary fibrosis (HCC)    Basal cell carcinoma of skin of nose    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    Asymptomatic bilateral carotid artery stenosis    Ischemic cardiomyopathy    Bilateral impacted cerumen    Sensorineural hearing loss (SNHL) of both ears    Cerebral vascular disease    Benign paroxysmal positional vertigo due to bilateral vestibular disorder    Aortic valve stenosis       Past Surgical History:   Procedure Laterality Date    CARDIAC CATHETERIZATION      CORONARY ARTERY BYPASS GRAFT         Family History   Problem Relation Age of Onset    No Known Problems Mother     No Known Problems Father     No Known Problems Sister     No Known Problems Brother     No Known Problems Maternal Aunt     No Known Problems Maternal Uncle     No Known Problems Paternal Aunt     No Known Problems Paternal Uncle     No Known Problems Maternal Grandmother     No Known Problems Maternal Grandfather     No Known Problems Paternal Grandmother     No Known Problems Paternal Grandfather     Anemia Neg Hx     Arrhythmia Neg Hx     Asthma Neg Hx     Clotting disorder Neg Hx     Fainting Neg Hx     Heart attack Neg Hx     Heart disease Neg Hx     Heart failure Neg Hx     Hyperlipidemia Neg Hx     Hypertension Neg Hx        Social History     Socioeconomic History    Marital status: /Civil Union     Spouse name: Not on file    Number of children: Not on file    Years of education: Not on file    Highest education level: Not on file   Occupational History    Occupation: RETIRED   Tobacco Use    Smoking status: Never Smoker    Smokeless tobacco: Never Used   Vaping Use    Vaping Use: Never used   Substance and Sexual Activity    Alcohol use: Not Currently    Drug use: Never    Sexual activity: Not on file   Other Topics Concern    Not on file   Social History Narrative        RETIRED     Social Determinants of Health     Financial Resource Strain: Not on file   Food Insecurity: Not on file   Transportation Needs: Not on file   Physical Activity: Not on file   Stress: Not on file   Social Connections: Not on file   Intimate Partner Violence: Not on file   Housing Stability: Not on file       No Known Allergies      Current Outpatient Medications:     Accu-Chek Guide test strip, USE TO TEST BLOOD SUGAR 3 TIMES A DAY, Disp: 100 strip, Rfl: 4    Accu-Chek Softclix Lancets lancets, Use to test blood sugar 3x daily  , Disp: 300 each, Rfl: 3    amoxicillin (AMOXIL) 500 mg capsule, , Disp: , Rfl:     apixaban (Eliquis) 2 5 mg, Take 1 tablet (2 5 mg total) by mouth 2 (two) times a day, Disp: 180 tablet, Rfl: 3    Apoaequorin (PREVAGEN EXTRA STRENGTH PO), Take by mouth in the morning, Disp: , Rfl:     atorvastatin (LIPITOR) 80 mg tablet, Take 1 tablet (80 mg total) by mouth daily, Disp: 90 tablet, Rfl: 1    azelastine (ASTELIN) 0 1 % nasal spray, 1 spray into each nostril 2 (two) times a day Use in each nostril as directed, Disp: 90 mL, Rfl: 1    azelastine (ASTELIN) 0 1 % nasal spray, 1 spray into each nostril 2 (two) times a day Use in each nostril as directed, Disp: 30 mL, Rfl: 0    BD PEN NEEDLE CARMINA U/F 32G X 4 MM MISC, Check sugar 3 times a day, Disp: , Rfl: 3    carvedilol (COREG) 6 25 mg tablet, TAKE 1 TABLET (6 25 MG TOTAL) BY MOUTH 2 (TWO) TIMES A DAY WITH MEALS, Disp: 180 tablet, Rfl: 3    clopidogrel (Plavix) 75 mg tablet, Take 1 tablet (75 mg total) by mouth daily, Disp: 30 tablet, Rfl: 0    clopidogrel (Plavix) 75 mg tablet, Take 1 tablet (75 mg total) by mouth daily, Disp: 90 tablet, Rfl: 1    Dulaglutide (Trulicity) 4 5 WF/8 4ZT SOPN, Inject 0 5 mL (4 5 mg total) under the skin once a week, Disp: 6 mL, Rfl: 1    furosemide (LASIX) 20 mg tablet, TAKE 1 TABLET EVERY DAY, Disp: 90 tablet, Rfl: 3    Insulin Glargine Solostar (Lantus SoloStar) 100 UNIT/ML SOPN, INJECT 30 UNITS EVERY DAY (Patient taking differently: if needed), Disp: 30 mL, Rfl: 1    isosorbide mononitrate (IMDUR) 30 mg 24 hr tablet, TAKE 1 TABLET TWICE DAILY, Disp: 180 tablet, Rfl: 3    levothyroxine 75 mcg tablet, Take 1 tablet (75 mcg total) by mouth daily in the early morning Take 1 tablet daily except Sunday, Disp: 90 tablet, Rfl: 3    meclizine (ANTIVERT) 25 mg tablet, Take 1 tablet (25 mg total) by mouth every 8 (eight) hours as needed for dizziness, Disp: 30 tablet, Rfl: 0    midodrine (PROAMATINE) 2 5 mg tablet, Take 1 tablet (2 5 mg total) by mouth 3 (three) times a day, Disp: 90 tablet, Rfl: 5    Misc Natural Products (COLON HERBAL CLEANSER PO), Take by mouth, Disp: , Rfl:     nitroglycerin (NITROSTAT) 0 4 mg SL tablet, Place 1 tablet (0 4 mg total) under the tongue every 5 (five) minutes as needed for chest pain, Disp: 100 tablet, Rfl: 3    Omega 3 1200 MG CAPS, Take by mouth Take 4 tablets daily, Disp: , Rfl:     potassium chloride (MICRO-K) 10 MEQ CR capsule, TAKE 2 CAPSULES IN THE MORNING AND TAKE 1 CAPSULE IN THE EVENING, Disp: 270 capsule, Rfl: 3    prednisoLONE acetate (PRED FORTE) 1 % ophthalmic suspension, 1 drop 4 (four) times a day, Disp: , Rfl:

## 2022-09-13 ENCOUNTER — APPOINTMENT (OUTPATIENT)
Dept: LAB | Facility: CLINIC | Age: 87
End: 2022-09-13
Payer: MEDICARE

## 2022-09-13 DIAGNOSIS — E11.8 TYPE 2 DIABETES MELLITUS WITH COMPLICATION, WITH LONG-TERM CURRENT USE OF INSULIN (HCC): ICD-10-CM

## 2022-09-13 DIAGNOSIS — Z79.4 TYPE 2 DIABETES MELLITUS WITH COMPLICATION, WITH LONG-TERM CURRENT USE OF INSULIN (HCC): ICD-10-CM

## 2022-09-13 LAB
CHOLEST SERPL-MCNC: 108 MG/DL
HDLC SERPL-MCNC: 42 MG/DL
LDLC SERPL CALC-MCNC: 43 MG/DL (ref 0–100)
NONHDLC SERPL-MCNC: 66 MG/DL
TRIGL SERPL-MCNC: 115 MG/DL

## 2022-09-13 PROCEDURE — 80061 LIPID PANEL: CPT

## 2022-09-13 PROCEDURE — 36415 COLL VENOUS BLD VENIPUNCTURE: CPT

## 2022-09-15 NOTE — PROGRESS NOTES
Established Patient Progress Note      Chief Complaint   Patient presents with    Diabetes Type 2        History of Present Illness:   Marlene Mcghee is a 80 y o  male with T2DM and hypothyroidism presenting to the office today for follow up      PMH significant for HTN, HLD, CAD s/p stent, paroxysmal atrial fibrillation with anticoagulant therapy, peripheral vascular disease, and CKD stage IIIB  At patient's last appointments, he has been instructed not to use his Lantus as correction insulin  However, he continues to do this  He is using this p r n  [place], he does not seem to have episodes of hypoglycemia  We did increase his Trulicity to 4 5 mg at last appointment with the hope of reducing the need for insulin completely  He was seen in the ED on 5/22/2022 for dizziness and R hand numbness/tingling  Recent MRI demonstrated several small strokes  He is now following with cardiology  Several of his medications have been adjusted  Component      Latest Ref Rng & Units 11/23/2021 3/16/2022 9/17/2022           3:02 PM  6:56 AM  9:11 AM   Hemoglobin A1C      Normal 3 8-5 6%; PreDiabetic 5 7-6 4%; Diabetic >=6 5%; Glycemic control for adults with diabetes <7 0% % 7 2 (A) 7 1 (H) 7 3 (H)   eAG, EST AVG Glucose      mg/dl  157 163       Home blood glucose readings: 120-220; no episodes of hypoglycemia     Current regimen:   Trulicity 4 5 mg once weekly  Lantus-as needed    Last Eye Exam: UTD  Last Foot Exam: UTD    For hypothyroidism, he is taking 75 mcg of levothyroxine daily  He reports taking this consistently and correctly  TFT from March were stable  For hypertension, he is taking 30 mg of Imdur or daily and 6 25 mg of carvedilol twice daily  He denies headache and orthostatic hypotension  For hyperlipidemia, he is taking 80 mg of atorvastatin nightly  He denies myalgias    Patient Active Problem List   Diagnosis    Paroxysmal atrial fibrillation (HCC)    Type 2 diabetes mellitus with complication, with long-term current use of insulin (HCC)    Bradycardia    Acquired hypothyroidism    Stage 3b chronic kidney disease    Essential hypertension    Low back pain    Mixed hyperlipidemia    Rupture of tendon of biceps, long head    Spinal stenosis of lumbar region with neurogenic claudication    Stented coronary artery    Lightheadedness    Atherosclerosis of native arteries of extremities with intermittent claudication, bilateral legs (HCC)    Peripheral arterial disease (HCC)    Pulmonary fibrosis (HCC)    Basal cell carcinoma of skin of nose    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    Asymptomatic bilateral carotid artery stenosis    Ischemic cardiomyopathy    Bilateral impacted cerumen    Sensorineural hearing loss (SNHL) of both ears    Cerebral vascular disease    Benign paroxysmal positional vertigo due to bilateral vestibular disorder    Aortic valve stenosis    Cerebrovascular 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)      Past Medical History:   Diagnosis Date    Benign hypertension     Chronic kidney disease (CKD), stage III (moderate)     Coronary artery disease     Hypothyroidism     Ischemic cardiomyopathy     Mixed hyperlipidemia     Paroxysmal atrial fibrillation     PVD (peripheral vascular disease)     Type II diabetes mellitus       Past Surgical History:   Procedure Laterality Date    CARDIAC CATHETERIZATION      CORONARY ARTERY BYPASS GRAFT        Family History   Problem Relation Age of Onset    No Known Problems Mother     No Known Problems Father     No Known Problems Sister     No Known Problems Brother     No Known Problems Maternal Aunt     No Known Problems Maternal Uncle     No Known Problems Paternal Aunt     No Known Problems Paternal Uncle     No Known Problems Maternal Grandmother     No Known Problems Maternal Grandfather     No Known Problems Paternal Grandmother     No Known Problems Paternal Grandfather     Anemia Neg Hx     Arrhythmia Neg Hx     Asthma Neg Hx     Clotting disorder Neg Hx     Fainting Neg Hx     Heart attack Neg Hx     Heart disease Neg Hx     Heart failure Neg Hx     Hyperlipidemia Neg Hx     Hypertension Neg Hx      Social History     Tobacco Use    Smoking status: Never Smoker    Smokeless tobacco: Never Used   Substance Use Topics    Alcohol use: Not Currently     No Known Allergies      Current Outpatient Medications:     Accu-Chek Guide test strip, USE TO TEST BLOOD SUGAR 3 TIMES A DAY, Disp: 100 strip, Rfl: 4    Accu-Chek Softclix Lancets lancets, Use to test blood sugar 3x daily  , Disp: 300 each, Rfl: 3    apixaban (Eliquis) 2 5 mg, Take 1 tablet (2 5 mg total) by mouth 2 (two) times a day, Disp: 180 tablet, Rfl: 3    Apoaequorin (PREVAGEN EXTRA STRENGTH PO), Take by mouth in the morning, Disp: , Rfl:     atorvastatin (LIPITOR) 80 mg tablet, Take 1 tablet (80 mg total) by mouth daily, Disp: 90 tablet, Rfl: 1    azelastine (ASTELIN) 0 1 % nasal spray, 1 spray into each nostril 2 (two) times a day Use in each nostril as directed, Disp: 90 mL, Rfl: 1    BD PEN NEEDLE CARMINA U/F 32G X 4 MM MISC, Check sugar 3 times a day, Disp: , Rfl: 3    carvedilol (COREG) 6 25 mg tablet, TAKE 1 TABLET (6 25 MG TOTAL) BY MOUTH 2 (TWO) TIMES A DAY WITH MEALS, Disp: 180 tablet, Rfl: 3    clopidogrel (Plavix) 75 mg tablet, Take 1 tablet (75 mg total) by mouth daily, Disp: 30 tablet, Rfl: 0    clopidogrel (Plavix) 75 mg tablet, Take 1 tablet (75 mg total) by mouth daily, Disp: 90 tablet, Rfl: 1    Dulaglutide (Trulicity) 4 5 PG/1 8JM SOPN, Inject 0 5 mL (4 5 mg total) under the skin once a week, Disp: 6 mL, Rfl: 1    furosemide (LASIX) 20 mg tablet, TAKE 1 TABLET EVERY DAY, Disp: 90 tablet, Rfl: 3    Insulin Glargine Solostar (Lantus SoloStar) 100 UNIT/ML SOPN, INJECT 30 UNITS EVERY DAY (Patient taking differently: if needed), Disp: 30 mL, Rfl: 1    isosorbide mononitrate (IMDUR) 30 mg 24 hr tablet, TAKE 1 TABLET TWICE DAILY, Disp: 180 tablet, Rfl: 3    levothyroxine 75 mcg tablet, Take 1 tablet (75 mcg total) by mouth daily in the early morning Take 1 tablet daily except Sunday, Disp: 90 tablet, Rfl: 3    meclizine (ANTIVERT) 25 mg tablet, Take 1 tablet (25 mg total) by mouth every 8 (eight) hours as needed for dizziness, Disp: 30 tablet, Rfl: 0    midodrine (PROAMATINE) 2 5 mg tablet, Take 1 tablet (2 5 mg total) by mouth 3 (three) times a day, Disp: 90 tablet, Rfl: 5    Misc Natural Products (COLON HERBAL CLEANSER PO), Take by mouth, Disp: , Rfl:     nitroglycerin (NITROSTAT) 0 4 mg SL tablet, Place 1 tablet (0 4 mg total) under the tongue every 5 (five) minutes as needed for chest pain, Disp: 100 tablet, Rfl: 3    Omega 3 1200 MG CAPS, Take by mouth Take 4 tablets daily, Disp: , Rfl:     potassium chloride (MICRO-K) 10 MEQ CR capsule, TAKE 2 CAPSULES IN THE MORNING AND TAKE 1 CAPSULE IN THE EVENING, Disp: 270 capsule, Rfl: 3    prednisoLONE acetate (PRED FORTE) 1 % ophthalmic suspension, 1 drop 4 (four) times a day, Disp: , Rfl:     amoxicillin (AMOXIL) 500 mg capsule, , Disp: , Rfl:     azelastine (ASTELIN) 0 1 % nasal spray, 1 spray into each nostril 2 (two) times a day Use in each nostril as directed, Disp: 30 mL, Rfl: 0    Review of Systems   Constitutional: Positive for fatigue  Negative for activity change, appetite change and unexpected weight change  HENT: Negative for dental problem, sore throat, trouble swallowing and voice change  Eyes: Negative for visual disturbance  Respiratory: Negative for cough, chest tightness and shortness of breath  Cardiovascular: Negative for chest pain, palpitations and leg swelling  Gastrointestinal: Negative for constipation, diarrhea, nausea and vomiting  Endocrine: Negative for cold intolerance, heat intolerance, polydipsia, polyphagia and polyuria  Genitourinary: Negative for frequency  Musculoskeletal: Positive for arthralgias, back pain and gait problem  Negative for myalgias  Skin: Negative for wound  Allergic/Immunologic: Negative for environmental allergies and food allergies  Neurological: Positive for numbness  Negative for dizziness, weakness, light-headedness and headaches  Hematological: Does not bruise/bleed easily  Psychiatric/Behavioral: Negative for decreased concentration, dysphoric mood and sleep disturbance  The patient is not nervous/anxious  Physical Exam:  Body mass index is 26 3 kg/m²  /70   Pulse 69   Ht 5' 8" (1 727 m)   Wt 78 5 kg (173 lb)   SpO2 98%   BMI 26 30 kg/m²    Wt Readings from Last 3 Encounters:   09/19/22 78 5 kg (173 lb)   08/30/22 78 5 kg (173 lb)   08/19/22 79 7 kg (175 lb 9 6 oz)       Physical Exam  Vitals reviewed  Constitutional:       General: He is not in acute distress  Appearance: He is well-developed  He is not ill-appearing  HENT:      Head: Normocephalic and atraumatic  Eyes:      Pupils: Pupils are equal, round, and reactive to light  Neck:      Thyroid: No thyromegaly  Cardiovascular:      Rate and Rhythm: Normal rate and regular rhythm  Pulses: Normal pulses  Heart sounds: Normal heart sounds  Pulmonary:      Effort: Pulmonary effort is normal       Breath sounds: Normal breath sounds  Abdominal:      General: Bowel sounds are normal  There is no distension  Palpations: Abdomen is soft  Tenderness: There is no abdominal tenderness  Musculoskeletal:      Cervical back: Normal range of motion and neck supple  Right lower leg: No edema  Left lower leg: No edema  Lymphadenopathy:      Cervical: No cervical adenopathy  Skin:     General: Skin is warm and dry  Capillary Refill: Capillary refill takes less than 2 seconds  Neurological:      Mental Status: He is alert and oriented to person, place, and time        Gait: Gait normal    Psychiatric:         Mood and Affect: Mood normal          Behavior: Behavior normal            Labs:   Lab Results   Component Value Date    HGBA1C 7 3 (H) 09/17/2022    HGBA1C 7 1 (H) 03/16/2022    HGBA1C 7 2 (A) 11/23/2021     Lab Results   Component Value Date    CREATININE 1 14 09/17/2022    CREATININE 1 41 (H) 05/22/2022    CREATININE 1 28 03/16/2022    BUN 16 09/17/2022     02/23/2015    K 4 0 09/17/2022    CL 97 09/17/2022    CO2 30 09/17/2022     eGFR   Date Value Ref Range Status   09/17/2022 56 ml/min/1 73sq m Final     Lab Results   Component Value Date    CHOL 159 06/08/2015    HDL 42 09/13/2022    TRIG 115 09/13/2022     Lab Results   Component Value Date    ALT 12 09/17/2022    AST 17 09/17/2022    ALKPHOS 73 09/17/2022    BILITOT 0 8 06/08/2015     Lab Results   Component Value Date    [id number] 3 190 03/16/2022    PMG8WBQKYOIX 3 030 08/16/2021    [id number] 3 510 03/17/2021     Lab Results   Component Value Date    FREET4 1 20 03/16/2022       Impression & Plan:    Problem List Items Addressed This Visit        Endocrine    Type 2 diabetes mellitus with complication, with long-term current use of insulin ([address] ) - Primary     Continue current regimen  Counseled patient on the potential for hypoglycemia  Recommended taking basal insulin only once daily  Counseled on appropriate hypoglycemia surveillance and treatment  Patient knows to notify me with persistent hyperglycemia or any episodes of hypoglycemia  Continue healthy diet  Continue with physical activity as tolerated  Lab Results   Component Value Date    HGBA1C 7 3 (H) 09/17/2022            Relevant Orders    Comprehensive metabolic panel    Hemoglobin A1C    Lipid Panel with Direct LDL reflex    Microalbumin / creatinine urine ratio    Acquired hypothyroidism     Continue with 75 mcg of levothyroxine daily  Check TSh and free T4 prior to next appointment           Relevant Orders    TSH, 3rd generation    T4, free       Cardiovascular and Mediastinum Essential hypertension     BP stable at 140/70  Continue current regimen  Other    Mixed hyperlipidemia     Lipid panel is stable  Continue 80 mg of atorvastatin  Orders Placed This Encounter   Procedures    Comprehensive metabolic panel     This is a patient instruction: Patient fasting for 8 hours or longer recommended  Standing Status:   Future     Standing Expiration Date:   9/19/2023    Hemoglobin A1C     Standing Status:   Future     Standing Expiration Date:   9/19/2023    Lipid Panel with Direct LDL reflex     This is a patient instruction: This test requires patient fasting for 10-12 hours or longer  Drinking of black coffee or black tea is acceptable  Standing Status:   Future     Standing Expiration Date:   9/19/2023    Microalbumin / creatinine urine ratio     Standing Status:   Future     Standing Expiration Date:   9/19/2023    TSH, 3rd generation     This is a patient instruction: This test is non-fasting  Please drink two glasses of water morning of bloodwork  Standing Status:   Future     Standing Expiration Date:   9/19/2023    T4, free     Standing Status:   Future     Standing Expiration Date:   9/19/2023       There are no Patient Instructions on file for this visit  Discussed with the patient and all questioned fully answered  He will call me if any problems arise  Follow-up appointment in 6 months       Counseled patient on diagnostic results, prognosis, risk and benefit of treatment options, instruction for management, importance of treatment compliance, Risk  factor reduction and impressions    YARI Garcia

## 2022-09-17 ENCOUNTER — APPOINTMENT (OUTPATIENT)
Dept: LAB | Facility: HOSPITAL | Age: 87
End: 2022-09-17
Payer: MEDICARE

## 2022-09-17 DIAGNOSIS — Z79.4 TYPE 2 DIABETES MELLITUS WITH COMPLICATION, WITH LONG-TERM CURRENT USE OF INSULIN (HCC): Primary | ICD-10-CM

## 2022-09-17 DIAGNOSIS — E11.8 TYPE 2 DIABETES MELLITUS WITH COMPLICATION, WITH LONG-TERM CURRENT USE OF INSULIN (HCC): Primary | ICD-10-CM

## 2022-09-17 LAB
ALBUMIN SERPL BCP-MCNC: 4.3 G/DL (ref 3.5–5)
ALP SERPL-CCNC: 73 U/L (ref 34–104)
ALT SERPL W P-5'-P-CCNC: 12 U/L (ref 7–52)
ANION GAP SERPL CALCULATED.3IONS-SCNC: 9 MMOL/L (ref 4–13)
AST SERPL W P-5'-P-CCNC: 17 U/L (ref 13–39)
BILIRUB SERPL-MCNC: 1.47 MG/DL (ref 0.2–1)
BUN SERPL-MCNC: 16 MG/DL (ref 5–25)
CALCIUM SERPL-MCNC: 9.5 MG/DL (ref 8.4–10.2)
CHLORIDE SERPL-SCNC: 97 MMOL/L (ref 96–108)
CO2 SERPL-SCNC: 30 MMOL/L (ref 21–32)
CREAT SERPL-MCNC: 1.14 MG/DL (ref 0.6–1.3)
EST. AVERAGE GLUCOSE BLD GHB EST-MCNC: 163 MG/DL
GFR SERPL CREATININE-BSD FRML MDRD: 56 ML/MIN/1.73SQ M
GLUCOSE P FAST SERPL-MCNC: 137 MG/DL (ref 65–99)
HBA1C MFR BLD: 7.3 %
POTASSIUM SERPL-SCNC: 4 MMOL/L (ref 3.5–5.3)
PROT SERPL-MCNC: 7.1 G/DL (ref 6.4–8.4)
SODIUM SERPL-SCNC: 136 MMOL/L (ref 135–147)

## 2022-09-17 PROCEDURE — 80053 COMPREHEN METABOLIC PANEL: CPT

## 2022-09-17 PROCEDURE — 83036 HEMOGLOBIN GLYCOSYLATED A1C: CPT

## 2022-09-17 PROCEDURE — 36415 COLL VENOUS BLD VENIPUNCTURE: CPT

## 2022-09-19 ENCOUNTER — OFFICE VISIT (OUTPATIENT)
Dept: ENDOCRINOLOGY | Facility: CLINIC | Age: 87
End: 2022-09-19
Payer: MEDICARE

## 2022-09-19 VITALS
SYSTOLIC BLOOD PRESSURE: 140 MMHG | HEIGHT: 68 IN | BODY MASS INDEX: 26.22 KG/M2 | HEART RATE: 69 BPM | WEIGHT: 173 LBS | OXYGEN SATURATION: 98 % | DIASTOLIC BLOOD PRESSURE: 70 MMHG

## 2022-09-19 DIAGNOSIS — Z79.4 TYPE 2 DIABETES MELLITUS WITH COMPLICATION, WITH LONG-TERM CURRENT USE OF INSULIN (HCC): Primary | ICD-10-CM

## 2022-09-19 DIAGNOSIS — E11.8 TYPE 2 DIABETES MELLITUS WITH COMPLICATION, WITH LONG-TERM CURRENT USE OF INSULIN (HCC): Primary | ICD-10-CM

## 2022-09-19 DIAGNOSIS — E78.2 MIXED HYPERLIPIDEMIA: ICD-10-CM

## 2022-09-19 DIAGNOSIS — I10 ESSENTIAL HYPERTENSION: ICD-10-CM

## 2022-09-19 DIAGNOSIS — E03.9 ACQUIRED HYPOTHYROIDISM: ICD-10-CM

## 2022-09-19 PROCEDURE — 99214 OFFICE O/P EST MOD 30 MIN: CPT | Performed by: NURSE PRACTITIONER

## 2022-09-19 NOTE — ASSESSMENT & PLAN NOTE
Continue current regimen  Counseled patient on the potential for hypoglycemia  Recommended taking basal insulin only once daily  Counseled on appropriate hypoglycemia surveillance and treatment  Patient knows to notify me with persistent hyperglycemia or any episodes of hypoglycemia  Continue healthy diet  Continue with physical activity as tolerated     Lab Results   Component Value Date    HGBA1C 7 3 (H) 09/17/2022

## 2022-10-11 PROBLEM — H61.23 BILATERAL IMPACTED CERUMEN: Status: RESOLVED | Noted: 2022-06-07 | Resolved: 2022-10-11

## 2022-10-14 DIAGNOSIS — Z79.4 TYPE 2 DIABETES MELLITUS WITH COMPLICATION, WITH LONG-TERM CURRENT USE OF INSULIN (HCC): ICD-10-CM

## 2022-10-14 DIAGNOSIS — E11.8 TYPE 2 DIABETES MELLITUS WITH COMPLICATION, WITH LONG-TERM CURRENT USE OF INSULIN (HCC): ICD-10-CM

## 2022-10-14 RX ORDER — DULAGLUTIDE 4.5 MG/.5ML
4.5 INJECTION, SOLUTION SUBCUTANEOUS WEEKLY
Qty: 6 ML | Refills: 1 | Status: SHIPPED | OUTPATIENT
Start: 2022-10-14

## 2022-10-14 NOTE — TELEPHONE ENCOUNTER
Fax from [facility] requesting refill on patients Trulicity was electronically sent

## 2022-10-16 NOTE — TELEPHONE ENCOUNTER
He may  Though might increase his risk slightly for recurrent afib 
Patient called with complaints of lightheadedness and states its from the Amiodarone  He was wondering if he could cut it in half and take 50 mg qd instead of 100 mg qd    Please advise
Spoke with wife and they will start the 50 mg qd and patient's wife requested an earlier appointment, gave patient an appointment in August 
Yes-Patient/Caregiver accepts free interpretation services...

## 2022-10-18 DIAGNOSIS — I95.1 ORTHOSTATIC HYPOTENSION: ICD-10-CM

## 2022-10-18 RX ORDER — MIDODRINE HYDROCHLORIDE 2.5 MG/1
2.5 TABLET ORAL 3 TIMES DAILY
Qty: 270 TABLET | Refills: 1 | Status: SHIPPED | OUTPATIENT
Start: 2022-10-18 | End: 2023-04-16

## 2022-11-11 ENCOUNTER — APPOINTMENT (EMERGENCY)
Dept: RADIOLOGY | Facility: HOSPITAL | Age: 87
End: 2022-11-11

## 2022-11-11 ENCOUNTER — APPOINTMENT (EMERGENCY)
Dept: CT IMAGING | Facility: HOSPITAL | Age: 87
End: 2022-11-11

## 2022-11-11 ENCOUNTER — HOSPITAL ENCOUNTER (EMERGENCY)
Facility: HOSPITAL | Age: 87
End: 2022-11-11
Attending: EMERGENCY MEDICINE

## 2022-11-11 ENCOUNTER — TELEPHONE (OUTPATIENT)
Dept: NEUROSURGERY | Facility: CLINIC | Age: 87
End: 2022-11-11

## 2022-11-11 VITALS
DIASTOLIC BLOOD PRESSURE: 92 MMHG | RESPIRATION RATE: 16 BRPM | BODY MASS INDEX: 29.46 KG/M2 | WEIGHT: 193.78 LBS | TEMPERATURE: 96.3 F | HEART RATE: 80 BPM | OXYGEN SATURATION: 97 % | SYSTOLIC BLOOD PRESSURE: 192 MMHG

## 2022-11-11 DIAGNOSIS — I50.9 CHF (CONGESTIVE HEART FAILURE) (HCC): ICD-10-CM

## 2022-11-11 DIAGNOSIS — J96.90 RESPIRATORY FAILURE (HCC): ICD-10-CM

## 2022-11-11 DIAGNOSIS — S06.5XAA SDH (SUBDURAL HEMATOMA): Primary | ICD-10-CM

## 2022-11-11 PROBLEM — S02.2XXA NASAL BONE FRACTURES: Status: ACTIVE | Noted: 2022-01-01

## 2022-11-11 LAB
ABO GROUP BLD: NORMAL
ALBUMIN SERPL BCP-MCNC: 4.2 G/DL (ref 3.5–5)
ALP SERPL-CCNC: 71 U/L (ref 34–104)
ALT SERPL W P-5'-P-CCNC: 16 U/L (ref 7–52)
ANION GAP SERPL CALCULATED.3IONS-SCNC: 7 MMOL/L (ref 4–13)
APTT PPP: 26 SECONDS (ref 23–37)
ARTERIAL PATENCY WRIST A: YES
AST SERPL W P-5'-P-CCNC: 21 U/L (ref 13–39)
BASE EX.OXY STD BLDV CALC-SCNC: 88.4 % (ref 60–80)
BASE EXCESS BLDA CALC-SCNC: -1.5 MMOL/L
BASE EXCESS BLDV CALC-SCNC: -1 MMOL/L
BASOPHILS # BLD AUTO: 0.07 THOUSANDS/ÂΜL (ref 0–0.1)
BASOPHILS NFR BLD AUTO: 1 % (ref 0–1)
BETA-HYDROXYBUTYRATE: 0.2 MMOL/L
BILIRUB SERPL-MCNC: 1.34 MG/DL (ref 0.2–1)
BLD GP AB SCN SERPL QL: NEGATIVE
BNP SERPL-MCNC: 419 PG/ML (ref 0–100)
BUN SERPL-MCNC: 18 MG/DL (ref 5–25)
CALCIUM SERPL-MCNC: 9.4 MG/DL (ref 8.4–10.2)
CARDIAC TROPONIN I PNL SERPL HS: 233 NG/L
CHLORIDE SERPL-SCNC: 98 MMOL/L (ref 96–108)
CO2 SERPL-SCNC: 28 MMOL/L (ref 21–32)
CREAT SERPL-MCNC: 1.22 MG/DL (ref 0.6–1.3)
EOSINOPHIL # BLD AUTO: 0.41 THOUSAND/ÂΜL (ref 0–0.61)
EOSINOPHIL NFR BLD AUTO: 6 % (ref 0–6)
ERYTHROCYTE [DISTWIDTH] IN BLOOD BY AUTOMATED COUNT: 12.7 % (ref 11.6–15.1)
GFR SERPL CREATININE-BSD FRML MDRD: 52 ML/MIN/1.73SQ M
GLUCOSE SERPL-MCNC: 315 MG/DL (ref 65–140)
HCO3 BLDA-SCNC: 23.5 MMOL/L (ref 22–28)
HCO3 BLDV-SCNC: 25.8 MMOL/L (ref 24–30)
HCT VFR BLD AUTO: 42.1 % (ref 36.5–49.3)
HGB BLD-MCNC: 14.3 G/DL (ref 12–17)
HOROWITZ INDEX BLDA+IHG-RTO: 70 MM[HG]
IMM GRANULOCYTES # BLD AUTO: 0.02 THOUSAND/UL (ref 0–0.2)
IMM GRANULOCYTES NFR BLD AUTO: 0 % (ref 0–2)
INR PPP: 1.26 (ref 0.84–1.19)
LYMPHOCYTES # BLD AUTO: 1.41 THOUSANDS/ÂΜL (ref 0.6–4.47)
LYMPHOCYTES NFR BLD AUTO: 19 % (ref 14–44)
MAGNESIUM SERPL-MCNC: 1.8 MG/DL (ref 1.9–2.7)
MCH RBC QN AUTO: 33 PG (ref 26.8–34.3)
MCHC RBC AUTO-ENTMCNC: 34 G/DL (ref 31.4–37.4)
MCV RBC AUTO: 97 FL (ref 82–98)
MONOCYTES # BLD AUTO: 0.59 THOUSAND/ÂΜL (ref 0.17–1.22)
MONOCYTES NFR BLD AUTO: 8 % (ref 4–12)
NEUTROPHILS # BLD AUTO: 4.87 THOUSANDS/ÂΜL (ref 1.85–7.62)
NEUTS SEG NFR BLD AUTO: 66 % (ref 43–75)
NRBC BLD AUTO-RTO: 0 /100 WBCS
O2 CT BLDA-SCNC: 20.3 ML/DL (ref 16–23)
O2 CT BLDV-SCNC: 20.3 ML/DL
OXYHGB MFR BLDA: 94.5 % (ref 94–97)
PCO2 BLDA: 40.5 MM HG (ref 36–44)
PCO2 BLDV: 50.6 MM HG (ref 42–50)
PEEP RESPIRATORY: 6 CM[H2O]
PH BLDA: 7.38 [PH] (ref 7.35–7.45)
PH BLDV: 7.33 [PH] (ref 7.3–7.4)
PLATELET # BLD AUTO: 178 THOUSANDS/UL (ref 149–390)
PMV BLD AUTO: 9.2 FL (ref 8.9–12.7)
PO2 BLDA: 80.3 MM HG (ref 75–129)
PO2 BLDV: 61.9 MM HG (ref 35–45)
POTASSIUM SERPL-SCNC: 4.2 MMOL/L (ref 3.5–5.3)
PROT SERPL-MCNC: 7.5 G/DL (ref 6.4–8.4)
PROTHROMBIN TIME: 15.8 SECONDS (ref 11.6–14.5)
RBC # BLD AUTO: 4.33 MILLION/UL (ref 3.88–5.62)
RH BLD: NEGATIVE
SODIUM SERPL-SCNC: 133 MMOL/L (ref 135–147)
SPECIMEN EXPIRATION DATE: NORMAL
SPECIMEN SOURCE: NORMAL
VENT AC: 14
VT SETTING VENT: 450 ML
WBC # BLD AUTO: 7.37 THOUSAND/UL (ref 4.31–10.16)

## 2022-11-11 RX ORDER — NITROGLYCERIN 20 MG/100ML
5-200 INJECTION INTRAVENOUS
Status: DISCONTINUED | OUTPATIENT
Start: 2022-11-11 | End: 2022-11-11 | Stop reason: HOSPADM

## 2022-11-11 RX ORDER — FUROSEMIDE 10 MG/ML
40 INJECTION INTRAMUSCULAR; INTRAVENOUS ONCE
Status: COMPLETED | OUTPATIENT
Start: 2022-11-11 | End: 2022-11-11

## 2022-11-11 RX ORDER — PROPOFOL 10 MG/ML
5-50 INJECTION, EMULSION INTRAVENOUS
Status: DISCONTINUED | OUTPATIENT
Start: 2022-11-11 | End: 2022-11-11 | Stop reason: HOSPADM

## 2022-11-11 RX ORDER — FENTANYL CITRATE-0.9 % NACL/PF 10 MCG/ML
100 PLASTIC BAG, INJECTION (ML) INTRAVENOUS CONTINUOUS
Status: DISCONTINUED | OUTPATIENT
Start: 2022-11-11 | End: 2022-11-11 | Stop reason: HOSPADM

## 2022-11-11 RX ORDER — SUCCINYLCHOLINE/SOD CL,ISO/PF 100 MG/5ML
120 SYRINGE (ML) INTRAVENOUS ONCE
Status: COMPLETED | OUTPATIENT
Start: 2022-11-11 | End: 2022-11-11

## 2022-11-11 RX ORDER — FENTANYL CITRATE 50 UG/ML
INJECTION, SOLUTION INTRAMUSCULAR; INTRAVENOUS CODE/TRAUMA/SEDATION MEDICATION
Status: COMPLETED | OUTPATIENT
Start: 2022-11-11 | End: 2022-11-11

## 2022-11-11 RX ORDER — ETOMIDATE 2 MG/ML
20 INJECTION INTRAVENOUS ONCE
Status: COMPLETED | OUTPATIENT
Start: 2022-11-11 | End: 2022-11-11

## 2022-11-11 RX ADMIN — PROPOFOL 30 MCG/KG/MIN: 10 INJECTION, EMULSION INTRAVENOUS at 03:20

## 2022-11-11 RX ADMIN — FUROSEMIDE 40 MG: 10 INJECTION, SOLUTION INTRAMUSCULAR; INTRAVENOUS at 02:59

## 2022-11-11 RX ADMIN — NITROGLYCERIN 20 MCG/MIN: 20 INJECTION INTRAVENOUS at 02:59

## 2022-11-11 RX ADMIN — NOREPINEPHRINE BITARTRATE 5 MCG/MIN: 1 INJECTION INTRAVENOUS at 04:04

## 2022-11-11 RX ADMIN — Medication 120 MG: at 03:14

## 2022-11-11 RX ADMIN — ETOMIDATE 20 MG: 2 INJECTION INTRAVENOUS at 03:10

## 2022-11-11 RX ADMIN — FENTANYL CITRATE 100 MCG: 50 INJECTION INTRAMUSCULAR; INTRAVENOUS at 04:23

## 2022-11-11 RX ADMIN — IOHEXOL 100 ML: 350 INJECTION, SOLUTION INTRAVENOUS at 02:48

## 2022-11-11 RX ADMIN — SODIUM CHLORIDE 250 ML: 0.9 INJECTION, SOLUTION INTRAVENOUS at 04:08

## 2022-11-11 RX ADMIN — Medication 100 MCG/HR: at 04:26

## 2022-11-11 RX ADMIN — Medication 2000 UNITS: at 04:20

## 2022-11-11 NOTE — ED NOTES
Patient transported to [address] [place] [address] [id number], [address] [facility]  11/11/22 4902

## 2022-11-11 NOTE — NUTRITION
11/11/22 1309   Biochemical Data,Medical Tests, and Procedures   Biochemical Data/Medical Tests/Procedures Lab values reviewed; Meds reviewed   Nutrition-Focused Physical Exam   Nutrition-Focused Physical Exam Findings RN skin assessment reviewed   Nutrition-Focused Physical Exam Findings OGT   Medical-Related Concerns PMH: T2DM , CAD, CKD3,HLD  Admitted s/p fall with ICH  had progressive worsening of his respiratory status and because of inability to begin BiPAP 2/2 facial injuries pt was intubated   Recommendations/Interventions   Interventions/Recommendations Initiate EN   Recommendations to Provider 1  Recommend starting EN with Pivot 1 5 at 50 mL/hr  Start at 10 mL and advance by 10 mL q 4 hrs to goal  Provides: 1200 mL TV, 1800 kcal ( 2077 kcal including propofol), 113g protein , 900 mL free water  2  Minimum FWF 30 mL q 4 hrs for tube patency   Defer additional FWF to provider in setting of [address]

## 2022-11-11 NOTE — TRAUMA DOCUMENTATION
Pt w/ waxing and waning bp  Nitro stopped when bp dropped, levo started, then stopped when bp arnold above 130sbp  At time of [facility] arrival bp just rising from maps below 70 and arnold to 160 then 190 sbp  Nitro and levo gtt's given to [facility]  Fentanyl gtt started upon rising above map of 70  Propofol was off  Restarted by [facility] upon bp rising on their arrival   [place] emptied, ngt capped  Bear hugger stopped  Temp did rise from 95 to 96 3 at handover of care to [facility]  Family was brought bedside prior to [facility] arrival  Plan of care w/ family  Pt lightly sedated at that time  Pt did open eyes and was calm and nodded when advised to keep head and body still  ccollar remained intact  Pt eyes open following commands remaining calm and still for transfer to [facility] stretcher  Pt departed er w/ [facility] crew  All questions of family answered  Belongings taken by family, wife and son

## 2022-11-11 NOTE — CONSULTS
[address]  4/25/1933, 80 y o  male MRN: [id number]  Unit/Bed#: ICU 07 Encounter: [id number]  Primary Care Provider: Chris Guzman DO   Date and time admitted to hospital: 11/11/2022  5:15 AM    Inpatient consult to Neurosurgery  Consult performed by: Natalya Goodson PA-C  Consult ordered by: Marianne Galdamez DO        * Subdural hematoma  Assessment & Plan  • Patient presented s/p fall with head strike  • No LOC  • Eliquis for a-fib and Plavix for prior stroke  o CAD with prior cardiac stenting     Imaging:   • CT head 11/11/2020 2:  2 mm subdural seen along the inferior anterior cranial fossa and dural falx best seen on coronal imaging  Plan:   · ongoing frequent neurological checks  · Recommend STAT CT head for decline in GCS >2 points in 1 hour  · Recommend repeat CT head- scheduled for 1200     · Keppra seizure ppx per trauma team   · DVT ppx: SCD's only  Recommend stable CT head prior to initiation of pharm DVT ppx  · Hold all AC/AP medication at this time  · Reversal for eliquis with [address] and vitamin K   · No evidence of reversal with DDAVP for plavix usage  · PT/OT evaluation when appropriate   · Ongoing medical management and pain control per primary team   · Wean to extubate as tolerated  · Cardiology following for increasing troponin   · CM following for dispo planning  · Neurosurgery will continue to follow with completion of imaging  Call with questions or concerns  History of Present Illness   HPI: Ck Villa is a 80 y o  male with PMH including CAD, cardiomyopathy, AFib, PVD, diabetes, CKD, hypertension, CVA who presents after a fall with head strike at home  Patient reportedly fell hitting his head on the nightstand  Of note patient takes Eliquis for his atrial fibrillation and Plavix for history of a CVA  There is no reported loss of consciousness per family    He was noted to have facial trauma with epistaxis requiring rhino rocket  Due to inability to utilize bipap at that time the decision was made to intubate patient int he ED  He was transferred to [facility] for trauma/critical care management  He has significant dried blood on his face as well as extensive facial bruising  Patient is intubated at this time on sedation  With sedation held he is able to open his eyes and follow commands  CT imaging was discussed with patient wife and patient's son in room      Review of Systems   Unable to perform ROS: Intubated       Historical Information   Past Medical History:   Diagnosis Date   • Benign hypertension    • Chronic kidney disease (CKD), stage III (moderate)    • Coronary artery disease    • Hypothyroidism    • Ischemic cardiomyopathy    • Mixed hyperlipidemia    • Paroxysmal atrial fibrillation    • PVD (peripheral vascular disease)    • Type II diabetes mellitus      Past Surgical History:   Procedure Laterality Date   • CARDIAC CATHETERIZATION     • CORONARY ARTERY BYPASS GRAFT       Social History     Substance and Sexual Activity   Alcohol Use Not Currently     Social History     Substance and Sexual Activity   Drug Use Never     Social History     Tobacco Use   Smoking Status Never Smoker   Smokeless Tobacco Never Used     Family History   Problem Relation Age of Onset   • No Known Problems Mother    • No Known Problems Father    • No Known Problems Sister    • No Known Problems Brother    • No Known Problems Maternal Aunt    • No Known Problems Maternal Uncle    • No Known Problems Paternal Aunt    • No Known Problems Paternal Uncle    • No Known Problems Maternal Grandmother    • No Known Problems Maternal Grandfather    • No Known Problems Paternal Grandmother    • No Known Problems Paternal Grandfather    • Anemia Neg Hx    • Arrhythmia Neg Hx    • Asthma Neg Hx    • Clotting disorder Neg Hx    • Fainting Neg Hx    • Heart attack Neg Hx    • Heart disease Neg Hx    • Heart failure Neg Hx    • Hyperlipidemia Neg Hx    • Hypertension Neg Hx        Meds/Allergies   all current active meds have been reviewed, current meds:   Current Facility-Administered Medications   Medication Dose Route Frequency   • atorvastatin (LIPITOR) tablet 80 mg  80 mg Oral Daily With Dinner   • bisacodyl (DULCOLAX) rectal suppository 10 mg  10 mg Rectal Daily PRN   • chlorhexidine (PERIDEX) 0 12 % oral rinse 15 mL  15 mL Mouth/Throat Q12H [address]   • fentaNYL 1000 mcg in sodium chloride 0 9% 100mL infusion  50 mcg/hr Intravenous Continuous   • fentanyl citrate (PF) 100 MCG/2ML 50 mcg  50 mcg Intravenous Q1H PRN   • insulin lispro (HumaLOG) 100 units/mL subcutaneous injection 1-6 Units  1-6 Units Subcutaneous Q6H [address]   • levETIRAcetam (KEPPRA) 1,000 mg in sodium chloride 0 9 % 100 mL IVPB  1,000 mg Intravenous Once   • levETIRAcetam (KEPPRA) oral solution 500 mg  500 mg Oral Q12H [address]   • levothyroxine tablet 75 mcg  75 mcg Oral Early Morning   • magnesium sulfate 2 g/50 mL IVPB (premix) 2 g  2 g Intravenous Once   • omeprazole (PRILOSEC) suspension 2 mg/mL  20 mg Oral Early Morning   • propofol (DIPRIVAN) 1000 mg in 100 mL infusion (premix)  5-50 mcg/kg/min Intravenous Titrated   • senna-docusate sodium (SENOKOT S) 8 6-50 mg per tablet 1 tablet  1 tablet Oral BID   • tetanus-diphtheria-acellular pertussis (BOOSTRIX) IM injection 0 5 mL  0 5 mL Intramuscular Once    and PTA meds:   Prior to Admission Medications   Prescriptions Last Dose Informant Patient Reported? Taking? Accu-Chek Guide test strip  Self No No   Sig: USE TO TEST BLOOD SUGAR 3 TIMES A DAY   Accu-Chek Softclix Lancets lancets  Self No No   Sig: Use to test blood sugar 3x daily     Apoaequorin (PREVAGEN EXTRA STRENGTH PO)  Self Yes No   Sig: Take by mouth in the morning   BD PEN NEEDLE CARMINA U/F 32G X 4 MM MISC  Self Yes No   Sig: Check sugar 3 times a day   Dulaglutide (Trulicity) 4 5 LE/5 4TX SOPN   No No   Sig: Inject 0 5 mL (4 5 mg total) under the skin once a week Insulin Glargine Solostar (Lantus SoloStar) 100 UNIT/ML SOPN   No No   Sig: INJECT 30 UNITS EVERY DAY   Patient taking differently: if needed   Misc Natural Products (COLON HERBAL CLEANSER PO)  Self Yes No   Sig: Take by mouth   Omega 3 1200 MG CAPS  Self Yes No   Sig: Take by mouth Take 4 tablets daily   amoxicillin (AMOXIL) 500 mg capsule  Self Yes No   apixaban (Eliquis) 2 5 mg  Self No No   Sig: Take 1 tablet (2 5 mg total) by mouth 2 (two) times a day   atorvastatin (LIPITOR) 80 mg tablet  Self No No   Sig: Take 1 tablet (80 mg total) by mouth daily   azelastine (ASTELIN) 0 1 % nasal spray  Self No No   Si spray into each nostril 2 (two) times a day Use in each nostril as directed   azelastine (ASTELIN) 0 1 % nasal spray  Self No No   Si spray into each nostril 2 (two) times a day Use in each nostril as directed   carvedilol (COREG) 6 25 mg tablet  Self No No   Sig: TAKE 1 TABLET (6 25 MG TOTAL) BY MOUTH 2 (TWO) TIMES A DAY WITH MEALS   clopidogrel (Plavix) 75 mg tablet  Self No No   Sig: Take 1 tablet (75 mg total) by mouth daily   clopidogrel (Plavix) 75 mg tablet  Self No No   Sig: Take 1 tablet (75 mg total) by mouth daily   furosemide (LASIX) 20 mg tablet  Self No No   Sig: TAKE 1 TABLET EVERY DAY   isosorbide mononitrate (IMDUR) 30 mg 24 hr tablet  Self No No   Sig: TAKE 1 TABLET TWICE DAILY   levothyroxine 75 mcg tablet  Self No No   Sig: Take 1 tablet (75 mcg total) by mouth daily in the early morning Take 1 tablet daily except    meclizine (ANTIVERT) 25 mg tablet  Self No No   Sig: Take 1 tablet (25 mg total) by mouth every 8 (eight) hours as needed for dizziness   midodrine (PROAMATINE) 2 5 mg tablet   No No   Sig: Take 1 tablet (2 5 mg total) by mouth 3 (three) times a day   nitroglycerin (NITROSTAT) 0 4 mg SL tablet  Self No No   Sig: Place 1 tablet (0 4 mg total) under the tongue every 5 (five) minutes as needed for chest pain   potassium chloride (MICRO-K) 10 MEQ CR capsule  Self No No   Sig: TAKE 2 CAPSULES IN THE MORNING AND TAKE 1 CAPSULE IN THE EVENING   prednisoLONE acetate (PRED FORTE) 1 % ophthalmic suspension  Self Yes No   Si drop 4 (four) times a day      Facility-Administered Medications: None     No Known Allergies    Objective   I/O       11/09 0701  11/10 0700 11/10 0701  11/11 0700 11/11 0701  11/12 07    I V  (mL/kg)   31 8 (0 4)    Total Intake(mL/kg)   31 8 (0 4)    Urine (mL/kg/hr)   350 (1 5)    Total Output   350    Net   -318 3                 Physical Exam  Constitutional:       Appearance: Normal appearance  HENT:      Head: Normocephalic  Comments: Significant dried blood and facial ecchymosis  Eyes:      Extraocular Movements: Extraocular movements intact  Pupils: Pupils are equal, round, and reactive to light  Neck:      Comments: Cervical collar in place at this time  Musculoskeletal:      Cervical back: Neck supple  Neurological:      Mental Status: He is alert  Comments: GCS 11T   E4, V1T, M6  fc x4  Eyes open to voice but maintains contact spont  Coughing in the vent  Tracking examiner  Pupils equal and reactive to light 2-3mm bilaterally  Neurologic Exam     Cranial Nerves     CN III, IV, VI   Pupils are equal, round, and reactive to light  Motor Exam FC x4     Sensory Exam   Light touch normal      Gait, Coordination, and Reflexes     Tremor   Resting tremor: absent  Action tremor: absent      Vitals:Blood pressure 158/88, pulse 74, temperature 97 5 °F (36 4 °C), temperature source Rectal, resp  rate 18, height 5' 8" (1 727 m), weight 83 5 kg (184 lb 1 4 oz), SpO2 100 %  ,Body mass index is 27 99 kg/m²       Lab Results:   Results from last 7 days   Lab Units 22  0544 22  0540 22  0217   WBC Thousand/uL 12 96*  --  7 37   HEMOGLOBIN g/dL 13 7  --  14 3   I STAT HEMOGLOBIN g/dl  --  13 9  --    HEMATOCRIT % 40 7  --  42 1   HEMATOCRIT, ISTAT %  --  41  --    PLATELETS Thousands/uL 186  --  178   NEUTROS PCT % 86*  --  66   MONOS PCT % 5  --  8     Results from last 7 days   Lab Units 11/11/22  0544 11/11/22  0540 11/11/22  0217   POTASSIUM mmol/L 4 0  --  4 2   CHLORIDE mmol/L 102  --  98   CO2 mmol/L 25  --  28   CO2, I-STAT mmol/L  --  29  --    BUN mg/dL 20  --  18   CREATININE mg/dL 1 36*  --  1 22   CALCIUM mg/dL 8 6  --  9 4   ALK PHOS U/L  --   --  71   ALT U/L  --   --  16   AST U/L  --   --  21   GLUCOSE, ISTAT mg/dl  --  301*  --      Results from last 7 days   Lab Units 11/11/22  0544 11/11/22  0327   MAGNESIUM mg/dL 1 8 1 8*     Results from last 7 days   Lab Units 11/11/22  0544   PHOSPHORUS mg/dL 3 5     Results from last 7 days   Lab Units 11/11/22  0217   INR  1 26*   PTT seconds 26     No results found for: TROPONINT  ABG:  Lab Results   Component Value Date    PHART 7 381 11/11/2022    QGY0JPO 40 5 11/11/2022    PO2ART 80 3 11/11/2022    EUF4AES 23 5 11/11/2022    BEART -1 5 11/11/2022    SOURCE Radial, Left 11/11/2022       Imaging Studies: I have personally reviewed pertinent reports  and I have personally reviewed pertinent films in PACS    XR chest portable    Result Date: 11/11/2022  Impression: Worsening pulmonary edema  ET tube 4 cm above the desiree  Workstation performed: [id number]     XR Trauma chest portable    Result Date: 11/11/2022  Impression: No acute displaced fracture  Moderate pulmonary edema  Workstation performed: [id number]     TRAUMA - CT head wo contrast    Result Date: 11/11/2022  Impression: 2 mm subdural seen along the inferior anterior cranial fossa on the coronal exam and along the dural falx also best seen on the coronal exam  No mass effect or midline shift  I personally discussed this study with Evelio Mireles on 11/11/2022 at 3:52 AM  Workstation performed: [id number]     CT facial bones without contrast    Result Date: 11/11/2022  Impression: Comminuted bilateral nasal bone fractures  A tube is seen with its tip in the right nasal cavity    Please see concurrently reported CT of the brain  I personally discussed this study with Dr Kavita Ortiz on 11/11/2022 at 5:10 AM  Workstation performed: [id number]     TRAUMA - CT spine cervical wo contrast    Result Date: 11/11/2022  Impression: No cervical spine fracture or traumatic malalignment  I personally discussed this study with aKthy Gibbs on 11/11/2022 at 3:52 AM  Workstation performed: [id number]     XR pelvis ap only 1 or 2 vw    Result Date: 11/11/2022  Impression: No acute osseous abnormality  Workstation performed: [id number]     TRAUMA - CT chest abdomen pelvis w contrast    Result Date: 11/11/2022  Impression: Apical and basilar interlobular septal thickening with subpleural reticular changes peripherally throughout the lungs suggesting underlying interstitial process; interlobular septal thickening has developed in the interval and was not present on the prior CT from 10/21/2022 raising the possibility of pulmonary edema versus a worsening interstitial reticular process  Pretracheal/subcarinal adenopathy measuring up to 1 2 cm  Recommend pulmonary consultation  I personally discussed this study with Kathy Gibbs on 11/11/2022 at 3:51 AM  Workstation performed: [id number]     XR Trauma multiple ([facility] trauma bay ONLY)    Result Date: 11/11/2022  Impression: Pulmonary edema persists though has improved since radiograph performed 2 hours prior Workstation performed: [id number]       EKG, Pathology, and Other Studies: I have personally reviewed pertinent reports  VTE Prophylaxis: Sequential compression device (Venodyne)  and Reason for no pharmacologic prophylaxis SDH    Code Status: Level 1 - Full Code  Advance Directive and Living Will:      Power of :    POLST:      Counseling / Coordination of Care  I spent 20 minutes with the patient

## 2022-11-11 NOTE — ED PROCEDURE NOTE
PROCEDURE  CriticalCare Time  Performed by: Ange Marino MD  Authorized by: Ange Marino MD     Critical care provider statement:     Critical care time (minutes):  135    Critical care start time:  11/11/2022 2:00 AM    Critical care end time:  11/11/2022 4:35 AM    Critical care time was exclusive of:  Separately billable procedures and treating other patients    Critical care was necessary to treat or prevent imminent or life-threatening deterioration of the following conditions:  Trauma, shock, respiratory failure, circulatory failure and CNS failure or compromise    Critical care was time spent personally by me on the following activities:  Examination of patient, evaluation of patient's response to treatment, discussions with primary provider, discussions with consultants, development of treatment plan with patient or surrogate, ventilator management, review of old charts, re-evaluation of patient's condition, ordering and review of radiographic studies, ordering and review of laboratory studies and ordering and performing treatments and interventions         Ange Marino MD  11/11/22 9923

## 2022-11-11 NOTE — PROGRESS NOTES
ICU Acceptance Note - Critical Care   Ck Villa 80 y o  male MRN: [id number]  Unit/Bed#: ICU 07 Encounter: [id number]        ----------------------------------------------------------------------------------------  HPI: Patient 80year old male with a significant past medical history of ischemic cardiomyopathy, atrial fibrillation on eliquis, currently presenting as a trauma transfer from [facility] secondary to a SDH  He initially presented following a mechanical fall and headstrike, and arrived to the ED awake, alert, and oriented  He was short of breath and initially treated as a CHF exacerbation and placed on a nitroglycerin drip  He is having epistaxis secondary to his fall and had a rhino rocket placed into his right naris  He had progressive worsening of his respiratory status and because of inability to begin BiPAP secondary to his facial injuries he was intubated  Imaging was later remarkable for 2 mm subdural hematoma without any midline shift  He was given Kcentra transferred to [address]  [facility]     ---------------------------------------------------------------------------------------  SUBJECTIVE  Unable to assess secondary to ETT      Review of Systems   Unable to perform ROS: Intubated     Review of systems was unable to be performed secondary to ETT  ---------------------------------------------------------------------------------------  Assessment and Plan:    Neuro:   • Diagnosis: SDH  o 11/11/2022- 2 mm subdural seen along the inferior anterior cranial fossa on the coronal exam and along the dural falx also best seen on the coronal exam  No mass effect or midline shift   o Plan:   - Neurosurgery consulted, appreciate recs  - F/u CT for stability, will discuss timing with neurosurgery, tentatively ordered for noon today  - Holding AP/AC  - Frequent neuro exams  • Diagnosis: Sedation/Analgesia  o Plan:   - Currently on fentanyl and propofol, will continue      CV:   • Diagnosis: Grossly reduced EF  o History of CABG, cardiac cath, ischemic cardiomyopathy  o Bedside echo with grossly reduced EF, treated as CHF exacerbation on presentation, since stopped  o Most recent echo from 7/20/2022 with LVEF of 40% and global hypokinesis  o Plan:   - Cardiology consulted, appreciate recs  - Consider diuresis as needed  - Formal echo ordered  - Initial trop 233, repeat 3,977, will continue to trend  - Will place a line  - ECG with (known) LBBB with PVCs, no clear acute ischemic changes  - CXR appears to have less pulmonary congestion from prior at [facility] ED  - Patient takes lasix 20mg daily, midodrine 2 5mg daily, imdur 30mg daily, coreg 6 25mg daily, holding these medications currently  - Continuous cardiac monitoring  - Goal MAP >65, was briefly on pressors post intubation, has since been weaned  • Diagnosis: Paroxysmal atrial fibrillation  o Plan:   - Takes eliquis, plavix, holding in the setting of SDH above  • Diagnosis: History of HLD  o Plan:   - Home atorvastatin ordered      Pulm:  • Diagnosis: Acute ventilatory dependent respiratory failure  o Differential includes acute CHF exacerbation versus aspiration from epistaxis, possibly multifactorial between these two etiologies  o CT 11/11/2022- Apical and basilar interlobular septal thickening with subpleural reticular changes peripherally throughout the lungs suggesting underlying interstitial process; interlobular septal thickening has developed in the interval and was not present on the prior CT from 10/21/2022 raising the possibility of pulmonary edema versus a worsening interstitial reticular process  o Plan:   - Continue to maintain ventilated while medically optimizing  - Wean settings as clinically indicated  • Diagnosis: Pretracheal/subcarinal adenopathy  o Pretracheal/subcarinal adenopathy measuring up to 1 2 cm  Recommend pulmonary consultation    o Plan:   - Possible pulmonary consult when medically optimized      GI:   • Diagnosis: No acute issues  o Plan:   - Ducolax prn  - Protonix for GI ppx      :   • Diagnosis: History of stage 3 CKD  o Plan:   - Baseline Cr 1 1-1 2, 1 36 on admission, will trend on daily BMPs  - Strict I&Os    F/E/N:   • Plan:   o F- none  o E- monitor and replete as needed, goal Mag >2, Phos >3, K >4  o N- NPO      Heme/Onc:   • Diagnosis: No acute issues  o Plan:   - Holding DVT ppx in setting of SDH      Endo:   • Diagnosis: History of T2DM  o Plan:   - SSI ordered  - Goal -180  - On insulin and trulicity at home  • Diagnosis: History of hypothyroidism  o Plan:   - Home levothyroxine ordered      ID:   • Diagnosis: No acute issues  o Plan:   - Monitor WBC and fever curve      MSK/Skin:   • Diagnosis: Bilateral nasal bone fractures  o CT 11/11/2022- Comminuted bilateral nasal bone fractures  o Plan:   - OMFS consulted, appreciate recs  • Diagnosis: Epistaxis  o Plan:   - Rhino rocket in place in right nare  - Bleeding controlled at this point  - Sweta 83 as above    Patient appropriate for transfer out of the ICU today?: No  Disposition: Admit to Critical Care   Code Status: Level 1 - Full Code  ---------------------------------------------------------------------------------------  ICU CORE MEASURES    Prophylaxis   VTE Pharmacologic Prophylaxis: Pharmacologic VTE Prophylaxis contraindicated due to SDH  VTE Mechanical Prophylaxis: sequential compression device  Stress Ulcer Prophylaxis: Pantoprazole IV     ABCDE Protocol (if indicated)  Plan to perform spontaneous awakening trial today? Yes  Plan to perform spontaneous breathing trial today? Yes  Obvious barriers to extubation?  Yes    Invasive Devices Review  Invasive Devices  Report    Central Venous Catheter Line  Duration           CVC Central Lines 11/11/22 Triple Right Femoral <1 day          Peripheral Intravenous Line  Duration           Peripheral IV 11/11/22 Left Hand <1 day    Peripheral IV 11/11/22 Right Forearm <1 day          Drain  Duration NG/OG/Enteral Tube Orogastric 18 Fr Right mouth <1 day    Urethral Catheter 14 Fr  <1 day          Airway  Duration           ETT  Oral;Hi-Lo; Cuffed 7 5 mm <1 day              Can any invasive devices be discontinued today? No  ---------------------------------------------------------------------------------------  OBJECTIVE    Vitals   Vitals:    22 0520 22 0535 22 0550   BP: 152/79 (!) 173/102 139/75   Pulse: 73 81 73   Resp: 18 18 18   SpO2: 95% 97% 96%   Weight: 87 9 kg (193 lb 12 6 oz)       Temp (24hrs), Av °F (35 6 °C), Min:95 6 °F (35 3 °C), Max:96 3 °F (35 7 °C)  Current:      Respiratory:  SpO2: SpO2: 100 %       Invasive/non-invasive ventilation settings   Respiratory  Report   Lab Data (Last 4 hours)       0404            pH, Arterial       7 381             pCO2, Arterial       40 5             pO2, Arterial       80 3             HCO3, Arterial       23 5             Base Excess, Arterial       -1 5                  O2/Vent Data     None                Physical Exam  Constitutional:       Appearance: He is ill-appearing  Interventions: He is sedated and intubated  HENT:      Head: Normocephalic  Right Ear: External ear normal       Left Ear: External ear normal       Nose: Nose normal       Comments: Rhino rocket in right nare  Dried blood in bilateral nares  Mouth/Throat:      Mouth: Mucous membranes are moist    Eyes:      General: No scleral icterus  Right eye: No discharge  Left eye: No discharge  Pupils: Pupils are equal, round, and reactive to light  Comments: Bilateral periorbital ecchymosis  Neck:      Comments: C collar in place  Cardiovascular:      Rate and Rhythm: Normal rate and regular rhythm  Heart sounds: No murmur heard  No friction rub  No gallop  Pulmonary:      Effort: No respiratory distress  He is intubated  Abdominal:      General: Abdomen is flat  Palpations: Abdomen is soft  Comments: Abdominal bruising   Genitourinary:     Comments: Deferred  Musculoskeletal:      Right lower leg: No edema  Left lower leg: No edema  Skin:     General: Skin is warm and dry  Laboratory and Diagnostics:  Results from last 7 days   Lab Units 11/11/22  0544 11/11/22  0540 11/11/22  0217   WBC Thousand/uL 12 96*  --  7 37   HEMOGLOBIN g/dL 13 7  --  14 3   I STAT HEMOGLOBIN g/dl  --  13 9  --    HEMATOCRIT % 40 7  --  42 1   HEMATOCRIT, ISTAT %  --  41  --    PLATELETS Thousands/uL 186  --  178   NEUTROS PCT % 86*  --  66   MONOS PCT % 5  --  8     Results from last 7 days   Lab Units 11/11/22  0540 11/11/22  0217   SODIUM mmol/L  --  133*   POTASSIUM mmol/L  --  4 2   CHLORIDE mmol/L  --  98   CO2 mmol/L  --  28   CO2, I-STAT mmol/L 29  --    ANION GAP mmol/L  --  7   BUN mg/dL  --  18   CREATININE mg/dL  --  1 22   CALCIUM mg/dL  --  9 4   GLUCOSE RANDOM mg/dL  --  315*   ALT U/L  --  16   AST U/L  --  21   ALK PHOS U/L  --  71   ALBUMIN g/dL  --  4 2   TOTAL BILIRUBIN mg/dL  --  1 34*     Results from last 7 days   Lab Units 11/11/22  0327   MAGNESIUM mg/dL 1 8*      Results from last 7 days   Lab Units 11/11/22  0217   INR  1 26*   PTT seconds 26              ABG:  Results from last 7 days   Lab Units 11/11/22  0404   PH ART  7 381   PCO2 ART mm Hg 40 5   PO2 ART mm Hg 80 3   HCO3 ART mmol/L 23 5   BASE EXC ART mmol/L -1 5   ABG SOURCE  Radial, Left     VBG:  Results from last 7 days   Lab Units 11/11/22  0404 11/11/22  0325   PH MATTHEW   --  7 325   PCO2 MATTHEW mm Hg  --  50 6*   PO2 MATTHEW mm Hg  --  61 9*   HCO3 MATTHEW mmol/L  --  25 8   BASE EXC MATTHEW mmol/L  --  -1 0   ABG SOURCE  Radial, Left  --            Micro        EKG: reviewed  Imaging:  I have personally reviewed pertinent reports  Intake and Output  I/O     None          Height and Weights         Body mass index is 29 46 kg/m²    Weight (last 2 days)     Date/Time Weight    11/11/22 0520 87 9 (193 78)            Nutrition Diet Orders   (From admission, onward)             Start     Ordered    11/11/22 0538  Diet NPO  Diet effective now        References:    Nutrtion Support Algorithm Enteral vs  Parenteral   Question Answer Comment   Diet Type NPO    RD to adjust diet per protocol? No        11/11/22 0540                  Active Medications  Scheduled Meds:  Current Facility-Administered Medications   Medication Dose Route Frequency Provider Last Rate   • bisacodyl  10 mg Rectal Daily PRN Rosa Weiner DO     • chlorhexidine  15 mL Mouth/Throat Q12H [address] Sandra Hidalgo DO     • fentaNYL  50 mcg/hr Intravenous Continuous Nikki Q To, DO 50 mcg/hr (11/11/22 0541)   • pantoprazole  40 mg Oral Early Morning Rick Zuniga DO     • propofol  5-50 mcg/kg/min Intravenous Titrated Johanna Crockett MD 15 mcg/kg/min (11/11/22 0540)   • tetanus-diphtheria-acellular pertussis  0 5 mL Intramuscular Once Collins Hoffman MD       Continuous Infusions:  fentaNYL, 50 mcg/hr, Last Rate: 50 mcg/hr (11/11/22 0541)  propofol, 5-50 mcg/kg/min, Last Rate: 15 mcg/kg/min (11/11/22 0540)      PRN Meds:   bisacodyl, 10 mg, Daily PRN        Allergies   No Known Allergies  ---------------------------------------------------------------------------------------  Advance Directive and Living Will:      Power of :    POLST:    ---------------------------------------------------------------------------------------  Care Time Delivered:   No Critical Care time spent     DO Artem      Portions of the record may have been created with voice recognition software  Occasional wrong word or "sound a like" substitutions may have occurred due to the inherent limitations of voice recognition software    Read the chart carefully and recognize, using context, where substitutions have occurred

## 2022-11-11 NOTE — QUICK NOTE
Spoke with family members, son Wilda Morrison) and wife (Nickie)  Updated on current patient status  Discussed code status for which they indicated they would like patient to remain full code  All questions answered

## 2022-11-11 NOTE — H&P
H&P - Trauma   Lyndy Goldmann 80 y o  male MRN: [id number]  Unit/Bed#: TR 02 Encounter: [id number]    Trauma Alert: Level A transfer from [facility] of Arrival: Helicopter    Trauma Team: Attending To, Residents hakan and Fellow yovana  Consultants:     Neurosurgery: routine consult; Epic consult order placed; Assessment/Plan   Active Problems / Assessment:   Ambulatory dysfunction  SDH  Epistaxis  Nasal bone fractures  Hypoxic respiratory failure  Elevated troponin  Poor EF on bedside POCUS     Plan:   Maintain ventilation/sedation  Admit to ICU  nsgy c/s  Cardiology c/s  OMFS c/s    History of Present Illness     Chief Complaint: fall  Mechanism:Fall     HPI:    Lyndy Goldmann is a 80 y o  male who presents as a level A transfer from [facility] 2/2 fall with [address]  He reportedly was getting out of his bed this evening when he fell and struck his face on the floor  He had immediate bleeding from his nose and pain and was taken to the ED for evaluation  In the ED he was initially GCS 15  However, during evaluation he became progressively short of breath requiring intubation and sedation  He was found to have nasal fractures and had packing placed in his nares  He was also found to have an [address] on CT scan so he was transferred to [facility] for trauma evaluation  On arrival, patient was intubated and sedated and unable to provide further history  Review of Systems   Unable to perform ROS: Intubated     12-point, complete review of systems was reviewed and negative except as stated above       Historical Information     Past Medical History:   Diagnosis Date   • Benign hypertension    • Chronic kidney disease (CKD), stage III (moderate)    • Coronary artery disease    • Hypothyroidism    • Ischemic cardiomyopathy    • Mixed hyperlipidemia    • Paroxysmal atrial fibrillation    • PVD (peripheral vascular disease)    • Type II diabetes mellitus      Past Surgical History:   Procedure Laterality Date   • CARDIAC CATHETERIZATION     • CORONARY ARTERY BYPASS GRAFT          Social History     Tobacco Use   • Smoking status: Never Smoker   • Smokeless tobacco: Never Used   Vaping Use   • Vaping Use: Never used   Substance Use Topics   • Alcohol use: Not Currently   • Drug use: Never     Immunization History   Administered Date(s) Administered   • COVID-19 MODERNA VACC 0 5 ML IM 01/22/2021, 02/19/2021   • INFLUENZA 10/01/2012, 10/02/2015, 10/06/2017, 10/20/2019   • Influenza Split High Dose Preservative Free IM 10/18/2018, 10/20/2019   • Influenza, recombinant, quadrivalent,injectable, preservative free 10/12/2020   • Pneumococcal Conjugate 13-Valent 10/06/2015   • Pneumococcal Polysaccharide PPV23 09/15/2010     Last Tetanus: ordered today  Family History: Non-contributory    1  Before the illness or injury that brought you to the Emergency, did you need someone to help you on a regular basis? ISAEL   2  Since the illness or injury that brought you to the Emergency, have you needed more help than usual to take care of yourself? ISAEL   3  Have you been hospitalized for one or more nights during the past 6 months (excluding a stay in the Emergency Department)? ISAEL   4  In general, do you see well? ISAEL   5  In general, do you have serious problems with your memory? ISAEL   6  Do you take more than three different medications everyday? ISAEL   TOTAL   ISAEL     Did you order a geriatric consult if the score was 2 or greater?: n/a     Meds/Allergies   all current active meds have been reviewed  Allergies have not been reviewed;   No Known Allergies    Objective   Initial Vitals:   Pulse: 73 (11/11/22 0520)  Respirations: 18 (11/11/22 0520)  Blood Pressure: 152/79 (11/11/22 0520)    Primary Survey:   Airway:        Status: patent;        Pre-hospital Interventions: endotracheal intubation        Hospital Interventions: intubated prior to arrival  Breathing:        Pre-hospital Interventions: assisted ventilation              Right breath sounds: normal       Left breath sounds: normal  Circulation:        Rhythm: regular       Rate: regular   Right Pulses Left Pulses    R radial: 1+    R pedal: 1+     L radial: 1+    L pedal: 1+       Disability:        GCS: Eye: 1; Verbal: 1 Motor: 1 Total: 3T;        Right Pupil: 2 mm;  round;  reactive         Left Pupil:  2 mm;  round;  reactive      R Motor Strength L Motor Strength    R : 0/5  R dorsiflex: 0/5  R plantarflex: 0/5 L : 0/5  L dorsiflex: 0/5  L plantarflex: 0/5        Sensory deficit: ISAEL  Exposure:       Completed: Yes      Secondary Survey:  Physical Exam  Vitals reviewed  Constitutional:       Comments: Intubated and sedated   HENT:      Head:        Right Ear: Tympanic membrane and external ear normal       Left Ear: Tympanic membrane and external ear normal       Mouth/Throat:      Mouth: Mucous membranes are moist    Eyes:      Comments: 2mm and minimally reactive bilaterally   Neck:      Comments: c-collar in place  Cardiovascular:      Rate and Rhythm: Normal rate and regular rhythm  Pulmonary:      Breath sounds: No wheezing, rhonchi or rales  Comments: ventilated  Abdominal:      General: There is distension  Genitourinary:     Comments: Rectal tone intact  No obvious bloody/melenic stool on ESPERANZA  Musculoskeletal:         General: No deformity  Skin:     Comments: Superficial abrasion to right shoulder, left knee  Bruising to right forearm   Neurological:      Comments: GCS 3T   Psychiatric:      Comments: ISAEL         Invasive Devices  Report    Central Venous Catheter Line  Duration           CVC Central Lines 11/11/22 Triple Right Femoral <1 day          Peripheral Intravenous Line  Duration           Peripheral IV 11/11/22 Left Hand <1 day    Peripheral IV 11/11/22 Right Forearm <1 day          Drain  Duration           NG/OG/Enteral Tube Orogastric 18 Fr Left mouth <1 day    NG/OG/Enteral Tube Orogastric 8 Fr Right mouth <1 day    Urethral Catheter 14 Fr  <1 day          Airway  Duration           ETT  Oral;Hi-Lo; Cuffed 7 5 mm <1 day              Lab Results: Results: I have personally reviewed all pertinent laboratory/tests results    Imaging Results: I have personally reviewed pertinent reports      Chest Xray(s): bilateral patchy infiltrates, ETT tube in place   FAST exam(s): negative for acute findings   CT Scan(s): positive for acute findings: nasal bone fracture, ICH   Additional Xray(s): N/A      Other Studies: n/a    Code Status: Level 1 - Full Code  Advance Directive and Living Will:      Power of :    POLST:

## 2022-11-11 NOTE — ED PROVIDER NOTES
Emergency Department Trauma Note  Gauri Franco 80 y o  male MRN: [id number]  Unit/Bed#: TR 04/TR 04 Encounter: [id number]      Trauma Alert: Trauma Acuity: Trauma Evaluation  Model of Arrival: Mode of Arrival: BLS via    Trauma Team: Current Providers  Attending Provider: Trinity Land MD  Registered Nurse: Mimi Portillo RN  Consultants:     None      History of Present Illness     Chief Complaint:   Chief Complaint   Patient presents with   • Fall   • Head Injury     Patinet fell and struck a night stand, hitting his nose patient is on a blood thinner     HPI:  Gauri Franco is a 80 y o  male who presents with Facial contusion since fall pta    Mechanism:Details of Incident: fall from standing no loc on eliquis            Mechanical fall from standing, struck mid face/nasal bridge on bedside table  Pt on Eliquis for Afib    Pt bleeding from Right naris    Pt has no complaints, though his baseline health and head injury make hx limited and challenging       History provided by:  EMS personnel and patient  Fall  Mechanism of injury: fall    Injury location:  Face  Facial injury location:  Face  Arrived directly from scene: yes    Suspicion of alcohol use: no    Suspicion of drug use: no    Tetanus status:  Up to date  Prior to arrival data:     Blood loss:  Minimal    Responsiveness at scene:  Alert    Loss of consciousness: no      Amnesic to event: no      Airway interventions:  None    Breathing interventions:  None    IV access status:  None    IO access:  None    Fluids administered:  None    Cardiac interventions:  None    Medications administered:  None    Airway condition since incident:  Stable    Breathing condition since incident:  Worsening    Circulation condition since incident:  Stable    Mental status condition since incident:  Worsening    Disability condition since incident:  Stable  Associated symptoms comment:  Poor historian    Review of Systems   Unable to perform ROS: Acuity of condition       Historical Information     Immunizations:   Immunization History   Administered Date(s) Administered   • COVID-19 MODERNA VACC 0 5 ML IM 01/22/2021, 02/19/2021   • INFLUENZA 10/01/2012, 10/02/2015, 10/06/2017, 10/20/2019   • Influenza Split High Dose Preservative Free IM 10/18/2018, 10/20/2019   • Influenza, recombinant, quadrivalent,injectable, preservative free 10/12/2020   • Pneumococcal Conjugate 13-Valent 10/06/2015   • Pneumococcal Polysaccharide PPV23 09/15/2010       Past Medical History:   Diagnosis Date   • Benign hypertension    • Chronic kidney disease (CKD), stage III (moderate)    • Coronary artery disease    • Hypothyroidism    • Ischemic cardiomyopathy    • Mixed hyperlipidemia    • Paroxysmal atrial fibrillation    • PVD (peripheral vascular disease)    • Type II diabetes mellitus        Family History   Problem Relation Age of Onset   • No Known Problems Mother    • No Known Problems Father    • No Known Problems Sister    • No Known Problems Brother    • No Known Problems Maternal Aunt    • No Known Problems Maternal Uncle    • No Known Problems Paternal Aunt    • No Known Problems Paternal Uncle    • No Known Problems Maternal Grandmother    • No Known Problems Maternal Grandfather    • No Known Problems Paternal Grandmother    • No Known Problems Paternal Grandfather    • Anemia Neg Hx    • Arrhythmia Neg Hx    • Asthma Neg Hx    • Clotting disorder Neg Hx    • Fainting Neg Hx    • Heart attack Neg Hx    • Heart disease Neg Hx    • Heart failure Neg Hx    • Hyperlipidemia Neg Hx    • Hypertension Neg Hx      Past Surgical History:   Procedure Laterality Date   • CARDIAC CATHETERIZATION     • CORONARY ARTERY BYPASS GRAFT       Social History     Tobacco Use   • Smoking status: Never Smoker   • Smokeless tobacco: Never Used   Vaping Use   • Vaping Use: Never used   Substance Use Topics   • Alcohol use: Not Currently   • Drug use: Never     E-Cigarette/Vaping   • E-Cigarette Use Never User      E-Cigarette/Vaping Substances   • Nicotine No    • THC No    • CBD No    • Flavoring No    • Other No    • Unknown No        Family History: non-contributory    Meds/Allergies   Prior to Admission Medications   Prescriptions Last Dose Informant Patient Reported? Taking? Accu-Chek Guide test strip  Self No No   Sig: USE TO TEST BLOOD SUGAR 3 TIMES A DAY   Accu-Chek Softclix Lancets lancets  Self No No   Sig: Use to test blood sugar 3x daily     Apoaequorin (PREVAGEN EXTRA STRENGTH PO)  Self Yes No   Sig: Take by mouth in the morning   BD PEN NEEDLE CARMINA U/F 32G X 4 MM MISC  Self Yes No   Sig: Check sugar 3 times a day   Dulaglutide (Trulicity) 4 5 ZD/0 1YI SOPN   No No   Sig: Inject 0 5 mL (4 5 mg total) under the skin once a week   Insulin Glargine Solostar (Lantus SoloStar) 100 UNIT/ML SOPN   No No   Sig: INJECT 30 UNITS EVERY DAY   Patient taking differently: if needed   Misc Natural Products (COLON HERBAL CLEANSER PO)  Self Yes No   Sig: Take by mouth   Omega 3 1200 MG CAPS  Self Yes No   Sig: Take by mouth Take 4 tablets daily   amoxicillin (AMOXIL) 500 mg capsule  Self Yes No   apixaban (Eliquis) 2 5 mg  Self No No   Sig: Take 1 tablet (2 5 mg total) by mouth 2 (two) times a day   atorvastatin (LIPITOR) 80 mg tablet  Self No No   Sig: Take 1 tablet (80 mg total) by mouth daily   azelastine (ASTELIN) 0 1 % nasal spray  Self No No   Si spray into each nostril 2 (two) times a day Use in each nostril as directed   azelastine (ASTELIN) 0 1 % nasal spray  Self No No   Si spray into each nostril 2 (two) times a day Use in each nostril as directed   carvedilol (COREG) 6 25 mg tablet  Self No No   Sig: TAKE 1 TABLET (6 25 MG TOTAL) BY MOUTH 2 (TWO) TIMES A DAY WITH MEALS   clopidogrel (Plavix) 75 mg tablet  Self No No   Sig: Take 1 tablet (75 mg total) by mouth daily   clopidogrel (Plavix) 75 mg tablet  Self No No   Sig: Take 1 tablet (75 mg total) by mouth daily   furosemide (LASIX) 20 mg tablet  Self No No   Sig: TAKE 1 TABLET EVERY DAY   isosorbide mononitrate (IMDUR) 30 mg 24 hr tablet  Self No No   Sig: TAKE 1 TABLET TWICE DAILY   levothyroxine 75 mcg tablet  Self No No   Sig: Take 1 tablet (75 mcg total) by mouth daily in the early morning Take 1 tablet daily except    meclizine (ANTIVERT) 25 mg tablet  Self No No   Sig: Take 1 tablet (25 mg total) by mouth every 8 (eight) hours as needed for dizziness   midodrine (PROAMATINE) 2 5 mg tablet   No No   Sig: Take 1 tablet (2 5 mg total) by mouth 3 (three) times a day   nitroglycerin (NITROSTAT) 0 4 mg SL tablet  Self No No   Sig: Place 1 tablet (0 4 mg total) under the tongue every 5 (five) minutes as needed for chest pain   potassium chloride (MICRO-K) 10 MEQ CR capsule  Self No No   Sig: TAKE 2 CAPSULES IN THE MORNING AND TAKE 1 CAPSULE IN THE EVENING   prednisoLONE acetate (PRED FORTE) 1 % ophthalmic suspension  Self Yes No   Si drop 4 (four) times a day      Facility-Administered Medications: None       No Known Allergies    PHYSICAL EXAM    PE limited by: dementia, sob     Objective   Vitals:   First set: Temperature: (!) 95 6 °F (35 3 °C) (22)  Pulse: 93 (22)  Rhythm: Atrial fibrillation - Pulse (22)  Respirations: 22 (22)  Blood Pressure: (!) 139/102 (22)  SpO2: 94 % (22)    Primary Survey:   (A) Airway: patent  (B) Breathing: bilateral lower ronchi  (C) Circulation: Pulses:   radial  4/4  (D) Disabliity:  GCS Total:  14  (E) Expose:  Completed    Secondary Survey: (Click on Physical Exam tab above)  Physical Exam  Constitutional:       General: He is not in acute distress  Appearance: He is well-developed  He is not ill-appearing, toxic-appearing or diaphoretic  Comments: Mild distress from shortness of breath   HENT:      Head: Normocephalic  Comments: Nasal bridge contusion and 2 cm laceration      Right Ear: There is impacted cerumen        Left Ear: There is impacted cerumen  Nose:      Comments: Laceration, 2 cm, nasal bridge  Non-brisk Active bleeding from right Naris     Mouth/Throat:      Pharynx: No oropharyngeal exudate or posterior oropharyngeal erythema  Comments: Mild bleeding into the oropharynx   Eyes:      General: No scleral icterus  Right eye: No discharge  Left eye: No discharge  Extraocular Movements: Extraocular movements intact  Conjunctiva/sclera: Conjunctivae normal       Pupils: Pupils are equal, round, and reactive to light  Neck:      Vascular: No JVD  Trachea: No tracheal deviation  Comments: No step-offs or crepitus or ecchymosis  Cannot clear patient's C-spine clinically due to acuity of condition   Cardiovascular:      Rate and Rhythm: Normal rate  Rhythm irregular  Pulses: Normal pulses  Heart sounds: Normal heart sounds  No murmur heard  No friction rub  No gallop  Pulmonary:      Effort: Respiratory distress (mild from SOB ) present  Breath sounds: No stridor  Rhonchi (mild ) and rales (mild ) present  No wheezing  Chest:      Chest wall: No tenderness  Abdominal:      General: Bowel sounds are normal  There is no distension  Palpations: Abdomen is soft  There is no mass  Tenderness: There is no abdominal tenderness  There is no right CVA tenderness, left CVA tenderness, guarding or rebound  Hernia: No hernia is present  Musculoskeletal:         General: Signs of injury (Laceration of the nasal bridge) present  No swelling, tenderness or deformity  Normal range of motion  Cervical back: Normal range of motion and neck supple  No rigidity or tenderness  Right lower leg: No edema  Left lower leg: No edema        Comments: Moving all extremities without any sources of injury or trauma, no swelling or deformities, full range of motion throughout, equal and symmetric strength and sensation   Lymphadenopathy:      Cervical: No cervical adenopathy  Skin:     General: Skin is warm  Capillary Refill: Capillary refill takes less than 2 seconds  Coloration: Skin is not jaundiced or pale  Findings: No bruising, erythema, lesion or rash  Neurological:      General: No focal deficit present  Mental Status: He is alert and oriented to person, place, and time  Mental status is at baseline  Cranial Nerves: No cranial nerve deficit  Sensory: No sensory deficit  Motor: No weakness or abnormal muscle tone  Coordination: Coordination normal    Psychiatric:      Comments: Slightly agitated from shortness of breath         Cervical spine cleared by clinical criteria? No (imaging required)      Invasive Devices  Report    Central Venous Catheter Line  Duration           CVC Central Lines 11/11/22 Triple Right Femoral <1 day          Peripheral Intravenous Line  Duration           Peripheral IV 11/11/22 Left Hand <1 day    Peripheral IV 11/11/22 Right Forearm <1 day          Drain  Duration           Urethral Catheter 14 Fr  <1 day          Airway  Duration           ETT  Oral;Hi-Lo; Cuffed 7 5 mm <1 day                Lab Results:   Results Reviewed     Procedure Component Value Units Date/Time    Blood gas, arterial [[id number]] Collected: 11/11/22 0404    Lab Status: Final result Specimen: Blood, Arterial from Radial, Left Updated: 11/11/22 0410     pH, Arterial 7 381     pCO2, Arterial 40 5 mm Hg      pO2, Arterial 80 3 mm Hg      HCO3, Arterial 23 5 mmol/L      Base Excess, Arterial -1 5 mmol/L      O2 Content, Arterial 20 3 mL/dL      O2 HGB,Arterial  94 5 %      SOURCE Radial, Left     JONAH TEST Yes     AC Rate 14     Tidal Volume 450 ml      Inspired Air (FIO2) 70     PEEP 6    HS Troponin I 4hr [[id number]]     Lab Status: No result Specimen: Blood     HS Troponin I 2hr [[id number]]     Lab Status: No result Specimen: Blood     HS Troponin 0hr (reflex protocol) [[id number]]  (Abnormal) Collected: 11/11/22 0327    Lab Status: Final result Specimen: Blood from Arm, Right Updated: 11/11/22 0402     hs TnI 0hr 233 ng/L     B-Type Natriuretic Peptide(BNP) AN, CA, EA [facility] Only [[id number]]  (Abnormal) Collected: 11/11/22 0327    Lab Status: Final result Specimen: Blood from Arm, Right Updated: 11/11/22 0401      pg/mL     Magnesium [[id number]]  (Abnormal) Collected: 11/11/22 0327    Lab Status: Final result Specimen: Blood from Arm, Right Updated: 11/11/22 0357     Magnesium 1 8 mg/dL     Beta Hydroxybutyrate [[id number]]  (Normal) Collected: 11/11/22 0325    Lab Status: Final result Specimen: Blood from Arm, Right Updated: 11/11/22 0337     BETA-HYDROXYBUTYRATE 0 2 mmol/L     Blood gas, venous [[id number]]  (Abnormal) Collected: 11/11/22 0325    Lab Status: Final result Specimen: Blood from Arm, Right Updated: 11/11/22 0336     pH, Shantanu 7 325     pCO2, Shantanu 50 6 mm Hg      pO2, Shantanu 61 9 mm Hg      HCO3, Shantanu 25 8 mmol/L      Base Excess, Shantanu -1 0 mmol/L      O2 Content, Shantanu 20 3 ml/dL      O2 HGB, VENOUS 88 4 %     Comprehensive metabolic panel [[id number]]  (Abnormal) Collected: 11/11/22 0217    Lab Status: Final result Specimen: Blood from Arm, Right Updated: 11/11/22 5172     Sodium 133 mmol/L      Potassium 4 2 mmol/L      Chloride 98 mmol/L      CO2 28 mmol/L      ANION GAP 7 mmol/L      BUN 18 mg/dL      Creatinine 1 22 mg/dL      Glucose 315 mg/dL      Calcium 9 4 mg/dL      AST 21 U/L      ALT 16 U/L      Alkaline Phosphatase 71 U/L      Total Protein 7 5 g/dL      Albumin 4 2 g/dL      Total Bilirubin 1 34 mg/dL      eGFR 52 ml/min/1 73sq m     Narrative:      [place] guidelines for Chronic Kidney Disease (CKD):   •  Stage 1 with normal or high GFR (GFR > 90 mL/min/1 73 square meters)  •  Stage 2 Mild CKD (GFR = 60-89 mL/min/1 73 square meters)  •  Stage 3A Moderate CKD (GFR = 45-59 mL/min/1 73 square meters)  •  Stage 3B Moderate CKD (GFR = 30-44 mL/min/1 73 square meters)  •  Stage 4 Severe CKD (GFR = 15-29 mL/min/1 73 square meters)  •  Stage 5 End Stage CKD (GFR <15 mL/min/1 73 square meters)  Note: GFR calculation is accurate only with a steady state creatinine    APTT [[id number]]  (Normal) Collected: 11/11/22 0217    Lab Status: Final result Specimen: Blood from Arm, Right Updated: 11/11/22 0311     PTT 26 seconds     Protime-INR [[id number]]  (Abnormal) Collected: 11/11/22 0217    Lab Status: Final result Specimen: Blood from Arm, Right Updated: 11/11/22 0311     Protime 15 8 seconds      INR 1 26    CBC and differential [[id number]] Collected: 11/11/22 0217    Lab Status: Final result Specimen: Blood from Arm, Right Updated: 11/11/22 0223     WBC 7 37 Thousand/uL      RBC 4 33 Million/uL      Hemoglobin 14 3 g/dL      Hematocrit 42 1 %      MCV 97 fL      MCH 33 0 pg      MCHC 34 0 g/dL      RDW 12 7 %      MPV 9 2 fL      Platelets 621 Thousands/uL      nRBC 0 /100 WBCs      Neutrophils Relative 66 %      Immat GRANS % 0 %      Lymphocytes Relative 19 %      Monocytes Relative 8 %      Eosinophils Relative 6 %      Basophils Relative 1 %      Neutrophils Absolute 4 87 Thousands/µL      Immature Grans Absolute 0 02 Thousand/uL      Lymphocytes Absolute 1 41 Thousands/µL      Monocytes Absolute 0 59 Thousand/µL      Eosinophils Absolute 0 41 Thousand/µL      Basophils Absolute 0 07 Thousands/µL                  Imaging Studies:   Direct to CT: No  TRAUMA - CT head wo contrast   Final Result by Janice Montiel MD (11/11 0400)      2 mm subdural seen along the inferior anterior cranial fossa on the coronal exam and along the dural falx also best seen on the coronal exam  No mass effect or midline shift  I personally discussed this study with Angelica Cheema on 11/11/2022 at 3:52 AM                Workstation performed: [id number]         TRAUMA - CT spine cervical wo contrast   Final Result by Janice Montiel MD (11/11 0353)      No cervical spine fracture or traumatic malalignment               I personally discussed this study with Jael Venegas on 11/11/2022 at 3:52 AM                Workstation performed: [id number]         TRAUMA - CT chest abdomen pelvis w contrast   Final Result by Karolyn Ken MD (11/11 0352)      Apical and basilar interlobular septal thickening with subpleural reticular changes peripherally throughout the lungs suggesting underlying interstitial process; interlobular septal thickening has developed in the interval and was not present on the    prior CT from 10/21/2022 raising the possibility of pulmonary edema versus a worsening interstitial reticular process  Pretracheal/subcarinal adenopathy measuring up to 1 2 cm  Recommend pulmonary consultation  I personally discussed this study with Jael Venegas on 11/11/2022 at 3:51 AM                Workstation performed: [id number]         CT facial bones without contrast   Final Result by Karolyn Ken MD (11/11 0190)      Comminuted bilateral nasal bone fractures  A tube is seen with its tip in the right nasal cavity         Please see concurrently reported CT of the brain  I personally discussed this study with Dr Karoline Ceja on 11/11/2022 at 5:10 AM                Workstation performed: [id number]         XR Trauma chest portable    (Results Pending)   XR pelvis ap only 1 or 2 vw    (Results Pending)   XR chest portable    (Results Pending)         Procedures  Procedures         ED Course  ED Course as of 11/11/22 0638   Fri Nov 11, 2022   0206 Pt seen on arrival     Mid face contusion, laceration of the nasal bridge  Bleeding from the right naris with oozing blood in the oropharynx    Per EMS:    Mechanical fall while ambulating in the dark to the bathroom, slipped and fall  0212 Rhino rocket placed, right nare   Bleeding stopped     Also no further bleeding in the oropharynx   0228 I discussed with patient's wife and son his stable clinical state  Improved after rhino rocket in the right naris    Discussed with them that patient appears to have mild CHF, improved with non-rebreather mask   0229 Patient remained stable on non-rebreather  Oxygen saturation 98    Blood pressure improving    Bleeding from bilateral nares remains abated    Trauma x-rays reviewed  CHF on chest x-ray  No traumatic findings  No traumatic findings on pelvis    Patient is stable for transport to CT   0250 Patient's respiratory status is worsening  More short of breath, tachypnea, more rhonchorous  Respiratory called for acute CHF for application of BiPAP  Lasix ordered  As well as nitro drip   0302 FAM AWARE   [id number] Patient was intubated on 1st pass  No complications   9827 CBC and differential   0318 Comprehensive metabolic panel(!)   9349 POCT INR(!): 1 26   0326 Patient stable    ET tube in good location    I updated the family on patient's stability post intubation   0348 Pt stable    Nitro drip stopped     035[address] Dw Dr Maurizio Dixon, Radiologist    Small SDH  Pulmonary edema    0354 Dw Pacs  Sissy Lopez getting me Trauma now    0355 CT CERVICAL SPINE - WITHOUT CONTRAST     INDICATION:   TRAUMA      COMPARISON:  None      TECHNIQUE:  CT examination of the cervical spine was performed without intravenous contrast   Contiguous axial images were obtained  Sagittal and coronal reconstructions were performed        Radiation dose length product (DLP) for this visit:  413 4 mGy-cm   This examination, like all CT scans performed in the [facility], was performed utilizing techniques to minimize radiation dose exposure, including the use of iterative   reconstruction and automated exposure control        IMAGE QUALITY:  Diagnostic      FINDINGS:     ALIGNMENT:  Normal alignment of the cervical spine  No subluxation      VERTEBRAL BODIES:  No fracture      DEGENERATIVE CHANGES:  Moderate multilevel cervical degenerative changes are noted   No critical central canal stenosis      PREVERTEBRAL AND PARASPINAL SOFT TISSUES:  Unremarkable      THORACIC INLET:  Please refer to the concurrent chest, abdomen, and pelvic CT report for description of the thoracic inlet findings      IMPRESSION:     No cervical spine fracture or traumatic malalignment       [address] Dw Dr Pickens, Trauma attending   Accepts pt in transfer  Pacs trying to fly the pt as a level 1    0415 Central line placed for levophed/BP management   Placed on first attempt  No complications    1114 Family at bedside  Updated    0430 Life flight at bedside  Report given    D/w Dr Pickens  BP goals: <160           MDM        Disposition  [facility] Transfer: Yes  Final diagnoses:   SDH (subdural hematoma)   Respiratory failure (HCC)   CHF (congestive heart failure) ([facility] [address] )     Time reflects when diagnosis was documented in both MDM as applicable and the Disposition within this note     Time User Action Codes Description Comment    11/11/2022  4:30 AM Dearl Decent [S06  5XAA] SDH (subdural hematoma)     11/11/2022  4:30 AM Reford Copier Add [J96 [id number]] Respiratory failure ([facility] [address] )     11/11/2022  4:30 AM Reford Copier Add [I50 9] CHF (congestive heart failure) [facility])       ED Disposition     ED Disposition   Transfer to Another Facility-In Network    Condition   --    Date/Time   Fri Nov 11, 2022  4:30 AM    Comment   Lyndy Goldmann should be transferred out to [facility]           MD Documentation    Flowsheet Row Most Recent Value   Patient Condition The patient has been stabilized such that within reasonable medical probability, no material deterioration of the patient condition or the condition of the unborn child(toni) is likely to result from the transfer   Reason for Transfer Level of Care needed not available at this facility   Benefits of Transfer Specialized equipment and/or services available at the receiving facility (Include comment)________________________   Risks of Transfer Potential for delay in receiving treatment, Potential deterioration of medical condition, Loss of IV, Increased discomfort during transfer, Possible worsening of condition or death during transfer   Accepting Physician Dr Valadez Ip To   27 Naomi Gonsalez Name, RACHAEL Curran MD      RN Documentation    72 Cindy Arias Name, Harsh [address]  [facility]      Follow-up Information    None       Discharge Medication List as of 11/11/2022  5:13 AM      CONTINUE these medications which have NOT CHANGED    Details   Accu-Chek Guide test strip USE TO TEST BLOOD SUGAR 3 TIMES A DAY, Normal      Accu-Chek Softclix Lancets lancets Use to test blood sugar 3x daily  , Normal      amoxicillin (AMOXIL) 500 mg capsule Starting Tue 8/2/2022, Historical Med      apixaban (Eliquis) 2 5 mg Take 1 tablet (2 5 mg total) by mouth 2 (two) times a day, Starting Thu 6/23/2022, Normal      Apoaequorin (PREVAGEN EXTRA STRENGTH PO) Take by mouth in the morning, Historical Med      atorvastatin (LIPITOR) 80 mg tablet Take 1 tablet (80 mg total) by mouth daily, Starting Fri 8/19/2022, Normal      azelastine (ASTELIN) 0 1 % nasal spray 1 spray into each nostril 2 (two) times a day Use in each nostril as directed, Starting Fri 8/19/2022, Until Wed 2/15/2023, Normal      BD PEN NEEDLE CARMINA U/F 32G X 4 MM MISC Check sugar 3 times a day, Historical Med      carvedilol (COREG) 6 25 mg tablet TAKE 1 TABLET (6 25 MG TOTAL) BY MOUTH 2 (TWO) TIMES A DAY WITH MEALS, Starting Mon 1/3/2022, Normal      !! clopidogrel (Plavix) 75 mg tablet Take 1 tablet (75 mg total) by mouth daily, Starting Fri 6/10/2022, Until Wed 12/7/2022, Normal      !! clopidogrel (Plavix) 75 mg tablet Take 1 tablet (75 mg total) by mouth daily, Starting Fri 6/10/2022, Until Wed 12/7/2022, Normal      Dulaglutide (Trulicity) 4 5 WO/3 1GG SOPN Inject 0 5 mL (4 5 mg total) under the skin once a week, Starting Fri 10/14/2022, Normal      furosemide (LASIX) 20 mg tablet TAKE 1 TABLET EVERY DAY, Normal      Insulin Glargine Solostar (Lantus SoloStar) 100 UNIT/ML SOPN INJECT 30 UNITS EVERY DAY, Normal      isosorbide mononitrate (IMDUR) 30 mg 24 hr tablet TAKE 1 TABLET TWICE DAILY, Normal      levothyroxine 75 mcg tablet Take 1 tablet (75 mcg total) by mouth daily in the early morning Take 1 tablet daily except Sunday, Starting Wed 12/15/2021, Normal      meclizine (ANTIVERT) 25 mg tablet Take 1 tablet (25 mg total) by mouth every 8 (eight) hours as needed for dizziness, Starting Tue 5/24/2022, Normal      midodrine (PROAMATINE) 2 5 mg tablet Take 1 tablet (2 5 mg total) by mouth 3 (three) times a day, Starting Tue 10/18/2022, Until Sun 4/16/2023, Normal      Misc Natural Products (COLON HERBAL CLEANSER PO) Take by mouth, Historical Med      nitroglycerin (NITROSTAT) 0 4 mg SL tablet Place 1 tablet (0 4 mg total) under the tongue every 5 (five) minutes as needed for chest pain, Starting Wed 3/2/2022, Normal      Omega 3 1200 MG CAPS Take by mouth Take 4 tablets daily, Historical Med      potassium chloride (MICRO-K) 10 MEQ CR capsule TAKE 2 CAPSULES IN THE MORNING AND TAKE 1 CAPSULE IN THE EVENING, Normal      prednisoLONE acetate (PRED FORTE) 1 % ophthalmic suspension 1 drop 4 (four) times a day, Historical Med       !! - Potential duplicate medications found  Please discuss with provider  No discharge procedures on file      PDMP Review       Value Time User    PDMP Reviewed  Yes 10/19/2021  3:41 PM Clara Bucio DO          ED Provider  Electronically Signed by         Magali Lopez MD  11/11/22 9967

## 2022-11-11 NOTE — ED PROCEDURE NOTE
PROCEDURE  Central Line    Date/Time: 11/11/2022 4:15 AM  Performed by: Mimi Kern MD  Authorized by: Mimi Kern MD     Patient location:  ED  Consent:     Consent obtained:  Emergent situation  Universal protocol:     Patient identity confirmed:  Arm band  Pre-procedure details:     Hand hygiene: Hand hygiene performed prior to insertion      Sterile barrier technique: All elements of maximal sterile technique followed      Skin preparation:  2% chlorhexidine    Skin preparation agent: Skin preparation agent completely dried prior to procedure    Indications:     Central line indications: medications requiring central line and hemodynamic monitoring    Procedure details:     Location:  Right femoral    Vessel type: vein      Laterality:  Right    Patient position:  Flat    Catheter type:  Triple lumen    Catheter size:  6 Fr    Landmarks identified: yes      Ultrasound guidance: no      Number of attempts:  1    Successful placement: yes    Post-procedure details:     Post-procedure:  Dressing applied    Assessment:  Blood return through all ports    Post-procedure complications: none      Patient tolerance of procedure:   Tolerated well, no immediate complications         Mimi Kern MD  11/11/22 8887

## 2022-11-11 NOTE — TREATMENT PLAN
Repeat CT head reviewed  Stable anterior cranial fossa subdural hemorrhage on the left side  No significant change compared to prior  Appreciate ongoing medical management at this time  Patient is cleared for pharmacologic DVT prophylaxis from neurosurgical standpoint  Recommend patient continue to withhold full dose AC/AP medications  Will plan to evaluate with a repeat CT head in 2 weeks time to determine clearance and timing for resuming medication  No further neurosurgical intervention required at this time  Please contact with any further questions or concerns

## 2022-11-11 NOTE — PROGRESS NOTES
Spiritual Care Progress Note    2022  Patient: Ramin Shrestha : 1933  Admission Date & Time: 2022 0515  MRN: [id number] [facility]: [id number]      [facility] visited with pt from a referral from the pt's care team  Pt is currently intubated and sedated, but family (wife, Nickie & son, Anibal Vo) were at pt's bedside   and family chatted about the pt, his life, the things they like to do together, and what brought the pt to the hospital  Pt's wife is visibly upset about pt's condition (pt hit head on nightstand while falling forward) and seems anxious about the pt's ability to recover  Pt's wife also seems scared about what might happen (pt has substantial injury to nose and face)   offered words of comfort and peace and an empathetic ear   also provided words of prayer for pt and family  Chaplains remain available                 Chaplaincy Interventions Utilized:   Empowerment: Clarified, confirmed, or reviewed information from treatment team , Normalized experience of patient/family, and Provided chaplaincy education    Exploration: Explored emotional needs & resources, Explored relational needs & resources, and Explored spiritual needs & resources    Collaboration: Facilitated respect for spiritual/cultural practice during hospitalization    Relationship Building: Cultivated a relationship of care and support and Listened empathically    Ritual: Provided prayer      Chaplaincy Outcomes Achieved:  Emotional resources utilized, Expressed intermediate hope, Relational resources utilized, Spiritual resources utilized, and Tearfully processed emotions    Spiritual Coping Strategies Utilized:   Spiritual comfort and Surrender         22 1200   Clinical Encounter Type   Visited With Patient and family together   Routine Visit Introduction   Crisis Visit Critical Care   Referral From Nurse   Referral To [address] Needs Prayer   Family Spiritual Encounters   Family Coping Accepting;Open/discussion; Sadness

## 2022-11-11 NOTE — ASSESSMENT & PLAN NOTE
• Patient presented s/p fall with head strike  • No LOC  • Eliquis for a-fib and Plavix for prior stroke  o CAD with prior cardiac stenting     Imaging:   • CT head 11/11/2020 2:  2 mm subdural seen along the inferior anterior cranial fossa and dural falx best seen on coronal imaging  Plan:   · ongoing frequent neurological checks  · Recommend STAT CT head for decline in GCS >2 points in 1 hour  · Recommend repeat CT head- scheduled for 1200     · Keppra seizure ppx per trauma team   · DVT ppx: SCD's only  Recommend stable CT head prior to initiation of pharm DVT ppx  · Hold all AC/AP medication at this time  · Reversal for eliquis with [facility] and vitamin K   · No evidence of reversal with DDAVP for plavix usage  · PT/OT evaluation when appropriate   · Ongoing medical management and pain control per primary team   · Wean to extubate as tolerated  · Cardiology following for increasing troponin   · CM following for dispo planning  · Neurosurgery will continue to follow with completion of imaging  Call with questions or concerns

## 2022-11-11 NOTE — RESPIRATORY THERAPY NOTE
Called to ed to place pt on bipap, pt has facial trauma w/ active bleeding and rhinorocket in right nare, informed dr Nancy Garcia that bipap is contraindicated for this pt, dr made decision to intubate, pt intubated w/ size 7 5 ett, secured at 23@ lips after +co2 detection and ausciltation, pt placed on vent w/ settings of ac 14/450/70%/6 peep, pt sx for copious amounts of  frothy sputum, abg to be drawn

## 2022-11-11 NOTE — PROGRESS NOTES
Advanced Heart Failure/Pulmonary Hypertension - Attending Trevor Melanie Yanez 80 y o  male MRN: [id number]      Please see complete HF note documented by the fellow/AP; this is attending attestation  Assessment:  80 y o  male Hx per chart p/w fall, SDH, respiratory distress  SDH this admit  Follows Dr Collins Aden cards as outpt  Ongoing Lightheadedness/disequilibrium as outpt  norvasc DC'ed for orthostasis, ongoing outpt padron for dizziness, on midodrine outpt  Past incidental CVAs by MRI brain  pAF on eliquis 2 5 bid  HTN  ICM, EF 40% since at least 2021  CAD s/p remote CABG LIMA to LAD, 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, ?possible 2017 PCI  Mild-mod AS, most recently 7/2022 TTE  DM a1c 7 3  CKD, BL Cr around 1 5  Nasal bone fractures  PAD, L carotid artery dz  ? ILD      Drips:  fentaNYL, 50 mcg/hr, Last Rate: 50 mcg/hr (11/11/22 0541)  propofol, 5-50 mcg/kg/min, Last Rate: 15 mcg/kg/min (11/11/22 1306)         TSH wnl 3/2022  hstrop 233>81644  bnp 419  Cr 1 3 (BL 1 4 range)  LFTs ok  CXR diffuse BL haziness      Plan:  Intubated/sedated at time of exam  Neck stabilizer in place, exam limited  No significant peripheral edema, extremities warm  Per Hx provided by family - no recent worsening SOB, swelling, or anginal type complaints at home    troponins likely reflect non-MI troponin elevation 2/2 SDH vs T2MI related to HTN  No role for cath lab now and poor candidate at present if indication arose    Lasix 40 IV qd to start, goal net neg 500cc-1L  May need diuretic escalation pending course  Strict IOs and daily weights  K>4, Mg>2    Defer BP goal to neuro but favor afterload reduction in case component acute pulm edema in setting of HTN emergency on arrival (?related to SDH)    Hold anti-plt and AC per neuro, resumption when indicated    Resume statin    GDMT below; resumption when indicated  Would restart bblocker as soon as able - switch coreg to metoprolol when ready to restart    Comparing 11/2022 TTE with 7/2022 - mild decrement in EF; unclear if AS severely worsened    Obtain further cardiac collateral    Neurohormonal Blockade/GDMT: plan above  Home cardiac meds: coreg 6 25 mg bid, lasix 20 mg daily, IMDUR 30 m g bid,   --Beta-Blocker:  --ACEi, ARB or ARNi:  --Aldosterone Receptor Blocker:  --SGLT2-I:  --Hydralazine/nitrates:    --Diuretic:    Other pharmacotherapy (if applicable):  --Digoxin:  --Vericiguat:  --Omecamtiv:    --PH meds:    ICD for SCD Reduction:  --  Interrogation:     Electrical Resynchronization (CRT):  -- iLBBB  Interrogation and % pacing:     Valvular intervention/MitraClip (if applicable):  --    Other advanced or experimental devices (if applicable):  --    Advanced Therapies (If appropriate): --Inotrope:  --LVAD/Transplant Candidacy:    Studies:    EKG - my read:  11/11/22: NSR, 1oavb, LVH, left bundalloid pattern, qrs narrow, PVCs, strain pattern in setting of LVH - no overt changes from 5/2022 ECG    11/11/22 TTE:  •  Left Ventricle: Left ventricular cavity size is normal  Wall thickness is normal  The left ventricular ejection fraction is 35%  Systolic function is moderately reduced  There is moderate global hypokinesis with regional variation  Diastolic function is severely abnormal, consistent with ungraded restrictive relaxation  •  Right Ventricle: Systolic function is low normal   •  Aortic Valve: The aortic valve is trileaflet  The leaflets are severely thickened  The leaflets are severely calcified  There is severely reduced mobility  There is severe stenosis  The aortic valve peak velocity is 2 03 m/s  The aortic valve peak gradient is 16 mmHg  The aortic valve mean gradient is 10 mmHg  The dimensionless velocity index is 0 24  The aortic valve area is 0 91 cm2  The aortic valve velocity is increased due to stenosis but lower than expected due to the presence of decreased flow  •  Mitral Valve: There is severe annular calcification      7/2022 TTE:  •  Left Ventricle: Left ventricular cavity size is normal  Wall thickness is mildly increased  The left ventricular ejection fraction is 40%  Systolic function is moderately reduced  There is mild global hypokinesis with regional variation worst in the basal to mid septal walls along with basal to mid inferior wall     •  Right Ventricle: Systolic function is low normal  Normal tricuspid annular plane systolic excursion (TAPSE) > 1 7 cm  •  Left Atrium: The atrium is mildly dilated  •  Aortic Valve: The aortic valve is probable trileaflet  The leaflets are calcified  There is reduced mobility  There is mild to moderate stenosis  •  Mitral Valve: There is annular calcification  There is mild to moderate regurgitation  •  Tricuspid Valve: There is mild regurgitation  Mean AV gradient 12; LIMA 1 2      Thank you for the opportunity to participate in the care of this patient      Faheem Romano MD  Attending Physician  Advanced Heart Failure and Transplant Cardiology  [address] [place] [address]

## 2022-11-11 NOTE — ED NOTES
Propofol decreased to [address], [facility]  11/11/22 7779

## 2022-11-11 NOTE — ED PROVIDER NOTES
Emergency Department Airway Evaluation and Management Form    History  Obtained from:  EMS      Chief Complaint:  Trauma transfer    HPI: Pt is a 80 y o  male presents s/p via [facility]  Per [facility], patient had a fall at home, on Eliquis, seen at outside facility, head CT showed intracranial hemorrhage, on Eliquis, given [facility] for reversal   Patient also had what was felt to be significant CHF exacerbation which did not respond to nitrates, and given the facial trauma, patient was intubated rather than using noninvasive ventilation  Endotracheal tube was 23 cm at the lip at outside facility    I have reviewed and agree with the history as documented  Allergies  Allergies: see nurses notes    Physical Exam    Vitals:    11/11/22 0535   BP: (!) 173/102   Pulse: 81   Resp: 18   SpO2: 97%     Supplemental Oxygen:  Via ventilator    GCS: 3T    Neuro:  Unresponsive  Psych:  Unable to assess  Neck: In collar, No JVD  Respiratory:  Equal lung expansion with ventilations  Mouth:  Endotracheal tube 23 cm at the lip    Pharynx:  Teeth somewhat clenched, unable to directly visualize the pharynx        ED Medications given  See nursing notes    Intubation  Patient already intubated    Final Diagnosis:  Acute closed head injury, acute intracerebral hemorrhage, chronic anticoagulation    ED Provider  Electronically Signed by       Jean Marie Ellison DO  11/11/22 5662

## 2022-11-11 NOTE — ED PROCEDURE NOTE
PROCEDURE  Intubation    Date/Time: 11/11/2022 3:18 AM  Performed by: Kristyn Lopez MD  Authorized by: Kristyn Lopez MD     Patient location:  ED  Consent:     Consent obtained:  Emergent situation  Universal protocol:     Patient identity confirmed:  Arm band  Pre-procedure details:     Patient status:  Altered mental status    Mallampati score:  3    Pretreatment medications:  Etomidate    Paralytics:  Succinylcholine  Procedure details:     Preoxygenation:  Bag valve mask    Intubation method:  Oral    Oral intubation technique:  Glidescope    Tube size (mm):  7 5    Tube type:  Cuffed    Number of attempts:  1    Tube visualized through cords: yes    Placement assessment:     ETT to lip:  23    Tube secured with:  ETT neol    Breath sounds:  Equal    Placement verification: chest rise, condensation, colorimetric ETCO2 device, CXR verification, direct visualization, equal breath sounds, esophageal detector, ETCO2 detector, fiberoptic scope and tube exhalation      CXR findings:  ETT in proper place  Post-procedure details:     Patient tolerance of procedure:   Tolerated well, no immediate complications         Kristyn Lopez MD  11/11/22 0339

## 2022-11-11 NOTE — RESPIRATORY THERAPY NOTE
Resp care   11/11/22 154   Respiratory Assessment   Resp Comments Order to try pt on sbt/psv wean  Propofol decreased but pt cont to have no spont resp effort  Apnea alarm increased to 50 sec  Pt cont to be apenic  Will cont to monitor ability to wean  Poss extubation in am    Vent Information   [place] C3/G5 Vent Mode (S)CMV   $ Pulse Oximetry Spot Check Charge Completed   SpO2 98 %   (S)CMV Settings   Resp Rate (BPM) 16 BPM   VT (mL) 470 mL   FIO2 (%) 40 %   PEEP (cmH2O) 6 cmH2O   I:E Ratio 1/2 7   Insp Time (%) 1 %   Flow Trigger (LPM) 5   Humidification Heater   Heater Temperature (Set) 95 °F (35 °C)   (S)CMV Actuals   Resp Rate (BPM) 16 BPM   VT (mL) 474   MV 7 6   MAP (cmH2O) 10 cmH2O   Peak Pressure (cmH2O) 25 cmH2O   I:E Ratio (Obs) 1/2 8   Insp Resistance 11   Heater Temperature (Obs) 97 7 °F (36 5 °C)   (S)CMV Alarms   High Peak Pressure (cmH2O) 45   Low Pressure (cmH2O) 5 cm H2O   High Resp Rate (BPM) 35 BPM   Low Resp Rate (BPM) 8 BPM   High MV (L/min) 20 L/min   Low MV (L/min) 4 L/min   High VT (mL) 900 mL   Low VT (mL) 350 mL   Apnea Time (s) 20 S   Maintenance   Resuscitation bag with peep valve at bedside Yes   ETT  Oral;Hi-Lo; Cuffed 7 5 mm   Placement Date/Time: 11/11/22 0310   Mask Ventilation: Unable to mask ventilate (4)  Preoxygenated: Yes  Technique: Video laryngoscopy  Type: Oral;Hi-Lo; Cuffed  Tube Size: 7 5 mm  Laryngoscope: GlideScope  Blade Size: 4  Insertion: Atraumatic  Inserti       Secured at (cm) 23   Measured from Lips   Secured Location Left   HI-LO Suction  Intermittent suction

## 2022-11-11 NOTE — ED PROCEDURE NOTE
PROCEDURE  Epistaxis management    Date/Time: 11/11/2022 2:06 AM  Performed by: Madisyn Alexander MD  Authorized by: Madisyn Alexander MD   Universal Protocol:  Risks and benefits: risks, benefits and alternatives were discussed  Consent given by: patient  Time out: Immediately prior to procedure a "time out" was called to verify the correct patient, procedure, equipment, support staff and site/side marked as required  Patient understanding: patient states understanding of the procedure being performed  Patient consent: the patient's understanding of the procedure matches consent given      Patient location:  ED  Procedure details:     Treatment site:  R anterior    Hemostasis method:  Rhino rocket    Approach:  External    Treatment complexity:  Limited    Treatment episode: initial    Post-procedure details:     Assessment:  Bleeding stopped    Patient tolerance of procedure:   Tolerated well, no immediate complications         Madisyn Alexander MD  11/11/22 4937

## 2022-11-11 NOTE — DISCHARGE INSTRUCTIONS
Neurosurgery discharge instructions following traumatic head bleed:     Do not take any blood thinning medications (ie  No Advil  No motrin  No ibuprofen  No Aleve  No Aspirin  No fishoil  No heparin  No antiplatelet / no anticoagulation medication)  Refrain from activity that increases chance of trauma to head or falls  Recommend you take fall precaution  No strenuous activity or sports  Return to hospital Emergency Room if you experience worsening / new headache, nausea/vomiting, speech/vision change, seizure, confusion / mental status change, weakness, or other neurological changes  Follow-up as scheduled with a repeat CT head without contrast to be completed 2-3 days prior to visit  Prescription has been entered electronically  Please call  to schedule

## 2022-11-11 NOTE — RESPIRATORY THERAPY NOTE
RT Ventilator Management Note  Michael Saravia 80 y o  male MRN: [id number]  Unit/Bed#: ICU 07 Encounter: [id number]      Daily Screen         11/11/2022  0955             Patient safety screen outcome[de-identified] Failed (P)     Not Ready for Weaning due to[de-identified] Recieving paralytics; Underline problem not resolved (P)               Physical Exam:   Assessment Type: Assess only  General Appearance: Sedated  Respiratory Pattern: Normal  Chest Assessment: Chest expansion symmetrical  Bilateral Breath Sounds: Diminished  R Breath Sounds: Clear  L Breath Sounds: Clear  O2 Device: vent      Resp Comments: (P) Pt breathing over vent when stimulated  Sx for scant clear secretions  Cont to have lg amts of hemop  secretions from mouth  BS clear  Will cont to monitor ability to place on SBT  02 decreased to 40%

## 2022-11-11 NOTE — TRAUMA DOCUMENTATION
Report to [facility]ICO DEL [facility] SHAMA ESQUEDA ED, RN

## 2022-11-11 NOTE — TELEPHONE ENCOUNTER
11/11/2022-PT Sweta 25  12/01/2022 APT W/CT HEAD    Sandra Gray PA-C   2 week follow up with CT head   AP appointment

## 2022-11-11 NOTE — PLAN OF CARE
Problem: MOBILITY - ADULT  Goal: Maintain or return to baseline ADL function  Description: INTERVENTIONS:  -  Assess patient's ability to carry out ADLs; assess patient's baseline for ADL function and identify physical deficits which impact ability to perform ADLs (bathing, care of mouth/teeth, toileting, grooming, dressing, etc )  - Assess/evaluate cause of self-care deficits   - Assess range of motion  - Assess patient's mobility; develop plan if impaired  - Assess patient's need for assistive devices and provide as appropriate  - Encourage maximum independence but intervene and supervise when necessary  - Involve family in performance of ADLs  - Assess for home care needs following discharge   - Consider OT consult to assist with ADL evaluation and planning for discharge  - Provide patient education as appropriate  Outcome: Progressing  Goal: Maintains/Returns to pre admission functional level  Description: INTERVENTIONS:  - Perform BMAT or MOVE assessment daily    - Set and communicate daily mobility goal to care team and patient/family/caregiver  - Collaborate with rehabilitation services on mobility goals if consulted  - Perform Range of Motion 3 times a day  - Reposition patient every 2 hours    - Record patient progress and toleration of activity level   Outcome: Progressing     Problem: PAIN - ADULT  Goal: Verbalizes/displays adequate comfort level or baseline comfort level  Description: Interventions:  - Encourage patient to monitor pain and request assistance  - Assess pain using appropriate pain scale  - Administer analgesics based on type and severity of pain and evaluate response  - Implement non-pharmacological measures as appropriate and evaluate response  - Consider cultural and social influences on pain and pain management  - Notify physician/advanced practitioner if interventions unsuccessful or patient reports new pain  Outcome: Progressing     Problem: INFECTION - ADULT  Goal: Absence or prevention of progression during hospitalization  Description: INTERVENTIONS:  - Assess and monitor for signs and symptoms of infection  - Monitor lab/diagnostic results  - Monitor all insertion sites, i e  indwelling lines, tubes, and drains  - Monitor endotracheal if appropriate and nasal secretions for changes in amount and color  - Success appropriate cooling/warming therapies per order  - Administer medications as ordered  - Instruct and encourage patient and family to use good hand hygiene technique  - Identify and instruct in appropriate isolation precautions for identified infection/condition  Outcome: Progressing  Goal: Absence of fever/infection during neutropenic period  Description: INTERVENTIONS:  - Monitor WBC    Outcome: Progressing     Problem: SAFETY ADULT  Goal: Maintain or return to baseline ADL function  Description: INTERVENTIONS:  -  Assess patient's ability to carry out ADLs; assess patient's baseline for ADL function and identify physical deficits which impact ability to perform ADLs (bathing, care of mouth/teeth, toileting, grooming, dressing, etc )  - Assess/evaluate cause of self-care deficits   - Assess range of motion  - Assess patient's mobility; develop plan if impaired  - Assess patient's need for assistive devices and provide as appropriate  - Encourage maximum independence but intervene and supervise when necessary  - Involve family in performance of ADLs  - Assess for home care needs following discharge   - Consider OT consult to assist with ADL evaluation and planning for discharge  - Provide patient education as appropriate  Outcome: Progressing  Goal: Maintains/Returns to pre admission functional level  Description: INTERVENTIONS:  - Perform BMAT or MOVE assessment daily    - Set and communicate daily mobility goal to care team and patient/family/caregiver  - Collaborate with rehabilitation services on mobility goals if consulted  - Perform Range of Motion 3 times a day    - Reposition patient every 2 hours  - Record patient progress and toleration of activity level   Outcome: Progressing  Goal: Patient will remain free of falls  Description: INTERVENTIONS:  - Educate patient/family on patient safety including physical limitations  - Instruct patient to call for assistance with activity   - Consult OT/PT to assist with strengthening/mobility   - Keep Call bell within reach  - Keep bed low and locked with side rails adjusted as appropriate  - Keep care items and personal belongings within reach  - Initiate and maintain comfort rounds  - Make Fall Risk Sign visible to staff  - Offer Toileting every 2 Hours, in advance of need  - Initiate/Maintain bed alarm  - Obtain necessary fall risk management equipment  - Apply yellow socks and bracelet for high fall risk patients  - Consider moving patient to room near nurses station  Outcome: Progressing     Problem: DISCHARGE PLANNING  Goal: Discharge to home or other facility with appropriate resources  Description: INTERVENTIONS:  - Identify barriers to discharge w/patient and caregiver  - Arrange for needed discharge resources and transportation as appropriate  - Identify discharge learning needs (meds, wound care, etc )  - Arrange for interpretive services to assist at discharge as needed  - Refer to Case Management Department for coordinating discharge planning if the patient needs post-hospital services based on physician/advanced practitioner order or complex needs related to functional status, cognitive ability, or social support system  Outcome: Progressing     Problem: Knowledge Deficit  Goal: Patient/family/caregiver demonstrates understanding of disease process, treatment plan, medications, and discharge instructions  Description: Complete learning assessment and assess knowledge base    Interventions:  - Provide teaching at level of understanding  - Provide teaching via preferred learning methods  Outcome: Progressing     Problem: Potential for Falls  Goal: Patient will remain free of falls  Description: INTERVENTIONS:  - Educate patient/family on patient safety including physical limitations  - Instruct patient to call for assistance with activity   - Consult OT/PT to assist with strengthening/mobility   - Keep Call bell within reach  - Keep bed low and locked with side rails adjusted as appropriate  - Keep care items and personal belongings within reach  - Initiate and maintain comfort rounds  - Make Fall Risk Sign visible to staff  - Offer Toileting every 2 Hours, in advance of need  - Initiate/Maintain bed alarm  - Obtain necessary fall risk management equipment  - Apply yellow socks and bracelet for high fall risk patients  - Consider moving patient to room near nurses station  Outcome: Progressing     Problem: Prexisting or High Potential for Compromised Skin Integrity  Goal: Skin integrity is maintained or improved  Description: INTERVENTIONS:  - Identify patients at risk for skin breakdown  - Assess and monitor skin integrity  - Assess and monitor nutrition and hydration status  - Monitor labs   - Assess for incontinence   - Turn and reposition patient  - Assist with mobility/ambulation  - Relieve pressure over bony prominences  - Avoid friction and shearing  - Provide appropriate hygiene as needed including keeping skin clean and dry  - Evaluate need for skin moisturizer/barrier cream  - Collaborate with interdisciplinary team   - Patient/family teaching  - Consider wound care consult   Outcome: Progressing

## 2022-11-11 NOTE — TRAUMA DOCUMENTATION
Increasing rhonchi crackles  bp elevated spo2 dropping  nrb applied  md alerted  Rt called for bipap   Pt hob elevated by md

## 2022-11-11 NOTE — PROCEDURES
POC FAST US    Date/Time: 11/11/2022 5:45 AM  Performed by: Charis Jay MD  Authorized by: Charis Jay MD     Patient location:  Trauma  Other Assisting Provider: No    Procedure details:     Exam Type:  Diagnostic    Indications: blunt abdominal trauma      Assess for:  Intra-abdominal fluid and pericardial effusion    Technique: FAST      Views obtained:  Heart - Pericardial sac, RUQ - Mcnulty's Pouch, LUQ - Splenorenal space and Suprapubic - Pouch of Delroy    Image quality: diagnostic      Image availability:  Images available in PACS  FAST Findings:     RUQ (Hepatorenal) free fluid: absent      LUQ (Splenorenal) free fluid: absent      Suprapubic free fluid: absent      Cardiac wall motion: identified (poor EF)      Pericardial effusion: absent    Interpretation:     Impressions: negative

## 2022-11-11 NOTE — RESPIRATORY THERAPY NOTE
11/11/22 0610   Respiratory Protocol   Protocol Initiated? Yes   Protocol Selection Respiratory   Language Barrier? Yes  (intubated)   Medical & Social History Reviewed? Yes   Diagnostic Studies Reviewed? Yes   Physical Assessment Performed? Yes   Respiratory Plan Vent/NIV/HFNC   Respiratory Assessment   Assessment Type Assess only   General Appearance Sedated   Respiratory Pattern Normal   Chest Assessment Chest expansion symmetrical   R Breath Sounds Clear   L Breath Sounds Clear   Resp Comments Pt recieved to Icu bed 7 from the trauma bay intubated with a 7 5 ET secured at 23 at the lip  Pt was presented as a fall from another campus to icu bed 7   Pt is currently placed on the vent in cmv mode 16/470/6/70%  Bs are clear bilaterally  Pt has no h/o pulmonary diagnosis or medication listed on file at this time  Pt is now ordered respiratory protocol for the ventilator Will cont to monitor  ETT  Oral;Hi-Lo; Cuffed 7 5 mm   Placement Date/Time: 11/11/22 0310   Mask Ventilation: Unable to mask ventilate (4)  Preoxygenated: Yes  Technique: Video laryngoscopy  Type: Oral;Hi-Lo; Cuffed  Tube Size: 7 5 mm  Laryngoscope: GlideScope  Blade Size: 4  Insertion: Atraumatic  Inserti       Secured at (cm) 23   Measured from [address] by [employer] tube noel   Site Condition Dry   Cuff Pressure (cm H2O) 26 cm H2O

## 2022-11-11 NOTE — QUICK NOTE
Attempted to call patient primary contact, appears as if the listed number is incorrect  Called both the patient's home number well as the alternate contact cell phone listed without reaching family member

## 2022-11-11 NOTE — INCIDENTAL FINDINGS
The following findings require follow up:  Radiographic finding   Finding: Pretracheal/subcarinal adenopathy measuring up to 1 2 cm  Follow up required: Primary care doctor unless otherwise specified by pulmonology team prior to discharge   Follow up should be done within 2 week(s)    Please notify the following clinician to assist with the follow up:   Dr Saba Moore    I personally discussed the imaging findings with the patient's wife and adult son as outlined below  I read these findings verbatim to the patient's family  The patient's family seems to understand these findings and is agreeable to follow up with their primary care physician within 2 weeks unless a pulmonology consultation is placed during this admission and instructs the patient/family otherwise  Findings discussed: Radiology impression: Pretracheal/subcarinal adenopathy measuring up to 1 2 cm

## 2022-11-11 NOTE — TRAUMA DOCUMENTATION
Prep intubation  Family aware  Pt nods when advised of intubation  Pt w/ bloody secretions suctioning  bipap considered but will intubate d/t aspiration risk

## 2022-11-11 NOTE — NURSING NOTE
Lifeflight to [address] [facility] for evaluation  Patient from [facility] Code  S/p fall reversed with [facility] @ home on eliquis  Arrived to ICU, aspen collar placed, temp 97 6 rectal, blood sugar was 350, ekg completed  Pupils are 2 mm sluggish bilaterally very difficult to see any movement without the full room being dark and minimal movement  Does not withdraw to pain  Propofol and fentanyl running, will hold and see  Bilateral orbital bruising and nose bruising  Left nare:  Nasal rocket with bleeding  Abrasion on left right Knee  Chlorhexadine bath completed    No family at bedside as per note unable to contact

## 2022-11-11 NOTE — EMTALA/ACUTE CARE TRANSFER
[address] [facility] [address]  Dept: [phone]      EMTALA TRANSFER CONSENT    NAME Lyndy Goldmann                                         1933                              MRN [id number]    I have been informed of my rights regarding examination, treatment, and transfer   by Dr Joel Villa MD    Benefits: Specialized equipment and/or services available at the receiving facility (Include comment)________________________    Risks: Potential for delay in receiving treatment, Potential deterioration of medical condition, Loss of IV, Increased discomfort during transfer, Possible worsening of condition or death during transfer      Consent for Transfer:  I acknowledge that my medical condition has been evaluated and explained to me by the emergency department physician or other qualified medical person and/or my attending physician, who has recommended that I be transferred to the service of  Accepting Physician: Dr Wendy Quintanilla To at [address] [place] [address] Name, [address] : [facility]  The above potential benefits of such transfer, the potential risks associated with such transfer, and the probable risks of not being transferred have been explained to me, and I fully understand them  The doctor has explained that, in my case, the benefits of transfer outweigh the risks  I agree to be transferred  I authorize the performance of emergency medical procedures and treatments upon me in both transit and upon arrival at the receiving facility  Additionally, I authorize the release of any and all medical records to the receiving facility and request they be transported with me, if possible  I understand that the safest mode of transportation during a medical emergency is an ambulance and that the Hospital advocates the use of this mode of transport   Risks of traveling to the receiving facility by car, including absence of medical control, life sustaining equipment, such as oxygen, and medical personnel has been explained to me and I fully understand them  (SIVA CORRECT BOX BELOW)  [  ]  I consent to the stated transfer and to be transported by ambulance/helicopter  [  ]  I consent to the stated transfer, but refuse transportation by ambulance and accept full responsibility for my transportation by car  I understand the risks of non-ambulance transfers and I exonerate the Hospital and its staff from any deterioration in my condition that results from this refusal     X___________________________________________    DATE  22  TIME________  Signature of patient or legally responsible individual signing on patient behalf           RELATIONSHIP TO PATIENT_________________________          Provider Certification    NAME Kwauk Oliveira                                         1933                              MRN [id number]    A medical screening exam was performed on the above named patient  Based on the examination:    Condition Necessitating Transfer The primary encounter diagnosis was SDH (subdural hematoma)  Diagnoses of Respiratory failure ([address] ) and CHF (congestive heart failure) ([facility] [address] ) were also pertinent to this visit      Patient Condition: The patient has been stabilized such that within reasonable medical probability, no material deterioration of the patient condition or the condition of the unborn child(toni) is likely to result from the transfer    Reason for Transfer: Level of Care needed not available at this facility    Transfer Requirements: Facility [facility]   · Space available and qualified personnel available for treatment as acknowledged by    · Agreed to accept transfer and to provide appropriate medical treatment as acknowledged by       Dr Xavier Schafer To  · Appropriate medical records of the examination and treatment of the patient are provided at the time of transfer   [address] _______  · Transfer will be performed by qualified personnel from    and appropriate transfer equipment as required, including the use of necessary and appropriate life support measures  Provider Certification: I have examined the patient and explained the following risks and benefits of being transferred/refusing transfer to the patient/family:         Based on these reasonable risks and benefits to the patient and/or the unborn child(toni), and based upon the information available at the time of the patient’s examination, I certify that the medical benefits reasonably to be expected from the provision of appropriate medical treatments at another medical facility outweigh the increasing risks, if any, to the individual’s medical condition, and in the case of labor to the unborn child, from effecting the transfer      X____________________________________________ DATE 11/11/22        TIME_______      ORIGINAL - SEND TO MEDICAL RECORDS   COPY - SEND WITH PATIENT DURING TRANSFER

## 2022-11-11 NOTE — CONSULTS
Consultation - Heart Failure   Hector Kennedy 80 y o  male MRN: [id number]  Unit/Bed#: ICU 07 Encounter: [id number]          Inpatient consult to Heart Failure Service     Date/Time 2022 9:01 AM     Performed by  James Robbins MD     Authorized by YARI Lucero              History of Present Illness   Physician Requesting Consult: Amor Sake To, DO  Reason for Consult / Principal Problem: heart failure with reduced EF      Assessment:  Principal Problem:    Subdural hematoma  Active Problems:    Paroxysmal atrial fibrillation (HCC)    Type 2 diabetes mellitus with complication, with long-term current use of insulin (HCC)    Acquired hypothyroidism    Stage 3b chronic kidney disease    Essential hypertension    Mixed hyperlipidemia    Ischemic cardiomyopathy    Nasal bone fractures      Assessment/ Plan:    Acute on chronic HFrEF  Prior history of ICM with a EF of 40%, repeat Echo during this admission with EF 35%  Noted to be in CHF exacerbation on admission (elevated BNP, pulm congestion, respiratory distress)  Intubated due to respiratory distress (combination of epistaxis and CHF)  Home meds: lasix 20 mg daily, coreg 6 25 mg bid, IMDUR 30 m g bid,   S/P IV lasix 40 mg 3 am, nitro drip discontinued at 3 am  ABG: pH 7 38, PCO2 40 5, PO2 80 3, O2 sat 94 5  VBG: O2 sat 88 4  BP: 152-170/, ventilated  Telemetry: rate controlled atrial fibrillation with PVCs (occasional)  Wt: 193 lbs--> 184 lbs (bed scale)  I/O: 318cc output since admission  Creatinine 1 36 increased from 1 22 (baseline 1 4-1 6)  Mg 1 8, K 4 0    Troponin elevation: type 2 vs non MI troponin elevation  Troponin trending up from [phone]  EKst degree AV block, LBBB, ST changes not meeting sgarbossa criteria   Per wife, uses nitro intermittently, no recent increased use   Possible non MI troponin elevation due to catecholaminergic surge vs demand ischemia given prior history of CAD    Ischemic cardiomyopathy with CAD  s/p CABG x 3, LIMA to LAD, 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  2017 [facility] with graft occlusion s/p PCI  Was on Clopidogrel, IMDUR, nitroglycerin at home    Severe aortic stenosis  Echo 7/2022 shows mild-mod stenosis  Repeat echo 11/11/22: severe AS with DVI 0 24, LIMA 0 91, mean gradient 10, peak velocity 2 03  Has been having dizziness    Paroxysmal atrial fibrillation  Currently rate controlled afib  Home meds: Coreg 6 25 mg bid, apixaban 2 5 mg bid    Hypertension  Amlodipine was discontinued in the past  Has midodrine as a home medication    1st degree AV block  Present since 2018    Hyperlipidemia  CKD  Diabetes mellitus  PAD  ICH    Plan  1  Appears mildly volume overloaded, will start IV lasix 40 mg daily  2  Would aim for net -500 to -1000 cc balance over the next 24 hours  3  Agree with holding anticoagulation and antiplatelets, would restart once cleared by neurosurgery  4  Continue atorvastatin 80 mg daily  5  Coreg currently on hold, would restart if patient goes in to RVR, or once blood pressure remains stable  6  Troponin currently trending up, possible non MI troponin elevation vs type 2 MI  Given his acute condition, he is not a candidate for systemic heparin or LHC  7  No acute interventions for aortic stenosis, however will likely need evaluation for TAVR, once patient recovers, given ongoing dizziness    Discussed above plan with the primary team in the ICU    HPI: Tha Kraft is a 80y o  year old male who has a history of ischemic cardiomyopathy EF in July 2022 was 40%, CAD with a prior hx of CABG with LIMA to LAD, 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, paroxysmal atrial fibrillation on eliquis, mild-mod AS, HTN, DM, PAD, and L carotid artery disease,  follows with Dr Collins Aden in the outpatient setting  He was [facility] from [facility] on [facility]/11/22 as a trauma alert, transferred from  following a fall and ICH on CT head (2mm subdural, along the inferior anterior cranial fossa, dural falx)   Labs were significant for hyperglycemia (315), tropnin 233 now up to 3977,  BNP of 419, Mg 1 8  EKG shows 1st degree AV block, LBBB pattern, with multiple PVCs  CXR shows pulmonary edema  Prior to transfer, he was being managed for CHF exacerbation and was placed on a nitroglycerin drip  He was having progressive respiratory distress (had epistaxis as well), and due to inability place patient on BiPAP due to nasal bone fracture, he was intubated  Anticoagulation has been held  Bedside FAST scan showed evidence of severely reduced EF, official echo pending  Prior cardiac history includes ischemic cardiomyopathy with EF of 40% in 11/2021 and 7/2022, rate controlled paroxysmal atrial fibrillation, CABG,, underwent cardiac cath in January 2017 which revealed occluded VG to RCA, OM1 for which no intervention was done, and February 2017 at [facility] for ?staged PCI, results unavailable  Last cardiology office visit was on 8/3/2022 when he had dizziness, however there were no identifiable cardiac cause for dizziness  He was initially on amlodipine which has been discontinued and is on midodrine 2 5 mg tid  Per patients wife, he has not had recent exertional angina or need to increase nitroglyceine, no recent shortness of breath, lower extremity swelling, no complains of palpitations, and has been compliant with his medications including lasix      Review of Systems   Unable to perform ROS: intubated     Historical Information   Past Medical History:   Diagnosis Date   • Benign hypertension    • Chronic kidney disease (CKD), stage III (moderate)    • Coronary artery disease    • Hypothyroidism    • Ischemic cardiomyopathy    • Mixed hyperlipidemia    • Paroxysmal atrial fibrillation    • PVD (peripheral vascular disease)    • Type II diabetes mellitus      Past Surgical History:   Procedure Laterality Date   • CARDIAC CATHETERIZATION     • CORONARY ARTERY BYPASS GRAFT       Social History     Substance and Sexual Activity   Alcohol Use Not Currently     Social History     Substance and Sexual Activity   Drug Use Never     Social History     Tobacco Use   Smoking Status Never Smoker   Smokeless Tobacco Never Used     Family History:   Family History   Problem Relation Age of Onset   • No Known Problems Mother    • No Known Problems Father    • No Known Problems Sister    • No Known Problems Brother    • No Known Problems Maternal Aunt    • No Known Problems Maternal Uncle    • No Known Problems Paternal Aunt    • No Known Problems Paternal Uncle    • No Known Problems Maternal Grandmother    • No Known Problems Maternal Grandfather    • No Known Problems Paternal Grandmother    • No Known Problems Paternal Grandfather    • Anemia Neg Hx    • Arrhythmia Neg Hx    • Asthma Neg Hx    • Clotting disorder Neg Hx    • Fainting Neg Hx    • Heart attack Neg Hx    • Heart disease Neg Hx    • Heart failure Neg Hx    • Hyperlipidemia Neg Hx    • Hypertension Neg Hx        Meds/Allergies   all current active meds have been reviewed, current meds:   Current Facility-Administered Medications   Medication Dose Route Frequency   • atorvastatin (LIPITOR) tablet 80 mg  80 mg Oral Daily With Dinner   • bisacodyl (DULCOLAX) rectal suppository 10 mg  10 mg Rectal Daily PRN   • chlorhexidine (PERIDEX) 0 12 % oral rinse 15 mL  15 mL Mouth/Throat Q12H SHERICE   • fentaNYL 1000 mcg in sodium chloride 0 9% 100mL infusion  50 mcg/hr Intravenous Continuous   • fentanyl citrate (PF) 100 MCG/2ML 50 mcg  50 mcg Intravenous Q1H PRN   • insulin lispro (HumaLOG) 100 units/mL subcutaneous injection 1-6 Units  1-6 Units Subcutaneous Q6H [address]   • levETIRAcetam (KEPPRA) oral solution 500 mg  500 mg Oral Q12H [address]   • levothyroxine tablet 75 mcg  75 mcg Oral Early Morning   • omeprazole (PRILOSEC) suspension 2 mg/mL  20 mg Oral Early Morning   • propofol (DIPRIVAN) 1000 mg in 100 mL infusion (premix)  5-50 mcg/kg/min Intravenous Titrated   • senna-docusate sodium (SENOKOT S) 8 6-50 mg per tablet 1 tablet  1 tablet Oral BID    and PTA meds:   Prior to Admission Medications   Prescriptions Last Dose Informant Patient Reported? Taking? Accu-Chek Guide test strip  Self No No   Sig: USE TO TEST BLOOD SUGAR 3 TIMES A DAY   Accu-Chek Softclix Lancets lancets  Self No No   Sig: Use to test blood sugar 3x daily     Apoaequorin (PREVAGEN EXTRA STRENGTH PO)  Self Yes No   Sig: Take by mouth in the morning   BD PEN NEEDLE CARMINA U/F 32G X 4 MM MISC  Self Yes No   Sig: Check sugar 3 times a day   Dulaglutide (Trulicity) 4 5 QE/9 9ZV SOPN   No No   Sig: Inject 0 5 mL (4 5 mg total) under the skin once a week   Insulin Glargine Solostar (Lantus SoloStar) 100 UNIT/ML SOPN   No No   Sig: INJECT 30 UNITS EVERY DAY   Patient taking differently: if needed   Misc Natural Products (COLON HERBAL CLEANSER PO)  Self Yes No   Sig: Take by mouth   Omega 3 1200 MG CAPS  Self Yes No   Sig: Take by mouth Take 4 tablets daily   amoxicillin (AMOXIL) 500 mg capsule  Self Yes No   apixaban (Eliquis) 2 5 mg  Self No No   Sig: Take 1 tablet (2 5 mg total) by mouth 2 (two) times a day   atorvastatin (LIPITOR) 80 mg tablet  Self No No   Sig: Take 1 tablet (80 mg total) by mouth daily   azelastine (ASTELIN) 0 1 % nasal spray  Self No No   Si spray into each nostril 2 (two) times a day Use in each nostril as directed   azelastine (ASTELIN) 0 1 % nasal spray  Self No No   Si spray into each nostril 2 (two) times a day Use in each nostril as directed   carvedilol (COREG) 6 25 mg tablet  Self No No   Sig: TAKE 1 TABLET (6 25 MG TOTAL) BY MOUTH 2 (TWO) TIMES A DAY WITH MEALS   clopidogrel (Plavix) 75 mg tablet  Self No No   Sig: Take 1 tablet (75 mg total) by mouth daily   clopidogrel (Plavix) 75 mg tablet  Self No No   Sig: Take 1 tablet (75 mg total) by mouth daily   furosemide (LASIX) 20 mg tablet  Self No No   Sig: TAKE 1 TABLET EVERY DAY   isosorbide mononitrate (IMDUR) 30 mg 24 hr tablet  Self No No   Sig: TAKE 1 TABLET TWICE DAILY levothyroxine 75 mcg tablet  Self No No   Sig: Take 1 tablet (75 mcg total) by mouth daily in the early morning Take 1 tablet daily except    meclizine (ANTIVERT) 25 mg tablet  Self No No   Sig: Take 1 tablet (25 mg total) by mouth every 8 (eight) hours as needed for dizziness   midodrine (PROAMATINE) 2 5 mg tablet   No No   Sig: Take 1 tablet (2 5 mg total) by mouth 3 (three) times a day   nitroglycerin (NITROSTAT) 0 4 mg SL tablet  Self No No   Sig: Place 1 tablet (0 4 mg total) under the tongue every 5 (five) minutes as needed for chest pain   potassium chloride (MICRO-K) 10 MEQ CR capsule  Self No No   Sig: TAKE 2 CAPSULES IN THE MORNING AND TAKE 1 CAPSULE IN THE EVENING   prednisoLONE acetate (PRED FORTE) 1 % ophthalmic suspension  Self Yes No   Si drop 4 (four) times a day      Facility-Administered Medications: None     fentaNYL, 50 mcg/hr, Last Rate: 50 mcg/hr (22)  propofol, 5-50 mcg/kg/min, Last Rate: 20 mcg/kg/min (22)        No Known Allergies    Objective   Vitals: Blood pressure 158/88, pulse 74, temperature 97 5 °F (36 4 °C), temperature source Rectal, resp  rate 18, height 5' 8" (1 727 m), weight 83 5 kg (184 lb 1 4 oz), SpO2 100 %  , Body mass index is 27 99 kg/m² ,   Orthostatic Blood Pressures    Flowsheet Row Most Recent Value   Blood Pressure 158/88 filed at 2022 0615   Patient Position - Orthostatic VS Lying filed at 2022 2257        Systolic (27IVR), JZP:566 , Min:72 , SMZ:736     Diastolic (21MZD), HMT:37, Min:46, Max:172      Intake/Output Summary (Last 24 hours) at 2022 0901  Last data filed at 2022 0800  Gross per 24 hour   Intake 31 75 ml   Output 350 ml   Net -318 25 ml       Weight (last 2 days)     Date/Time Weight    22 83 5 (184 08)    22 87 9 (193 78)          Invasive Devices  Report    Central Venous Catheter Line  Duration           CVC Central Lines 22 Triple Right Femoral <1 day Peripheral Intravenous Line  Duration           Peripheral IV 11/11/22 Left Hand <1 day    Peripheral IV 11/11/22 Right Forearm <1 day          Drain  Duration           NG/OG/Enteral Tube Orogastric 18 Fr Right mouth <1 day    Urethral Catheter 14 Fr  <1 day          Airway  Duration           ETT  Oral;Hi-Lo; Cuffed 7 5 mm <1 day                    Physical Exam:   Physical Exam  Vitals and nursing note reviewed  Constitutional:       Appearance: He is ill-appearing  HENT:      Nose:      Comments: Nasal trauma+  Neck:      Comments: Cervical collar+  Cardiovascular:      Rate and Rhythm: Normal rate  Rhythm irregular  Pulses: Normal pulses  Heart sounds: Murmur heard  Comments: Unable to assess JVD due to cervical collar  Pulmonary:      Breath sounds: Rales present  No wheezing  Comments: Intubated, on mechanical ventilator  Musculoskeletal:      Right lower leg: Edema (1+) present  Left lower leg: Edema (1+) present     Skin:     Comments: Warm peripheries   Neurological:      Comments: sedated   Psychiatric:         Mood and Affect: Mood normal            Laboratory Results:        CBC with diff:   Results from last 7 days   Lab Units 11/11/22 0544 11/11/22 0540 11/11/22 0217   WBC Thousand/uL 12 96*  --  7 37   HEMOGLOBIN g/dL 13 7  --  14 3   I STAT HEMOGLOBIN g/dl  --  13 9  --    HEMATOCRIT % 40 7  --  42 1   HEMATOCRIT, ISTAT %  --  41  --    MCV fL 95  --  97   PLATELETS Thousands/uL 186  --  178   MCH pg 31 9  --  33 0   MCHC g/dL 33 7  --  34 0   RDW % 12 9  --  12 7   MPV fL 9 5  --  9 2   NRBC AUTO /100 WBCs 0  --  0         CMP:  Results from last 7 days   Lab Units 11/11/22 0544 11/11/22 0540 11/11/22 0217   POTASSIUM mmol/L 4 0  --  4 2   CHLORIDE mmol/L 102  --  98   CO2 mmol/L 25  --  28   CO2, I-STAT mmol/L  --  29  --    BUN mg/dL 20  --  18   CREATININE mg/dL 1 36*  --  1 22   GLUCOSE, ISTAT mg/dl  --  301*  --    CALCIUM mg/dL 8 6  --  9 4   AST U/L  --   -- 21   ALT U/L  --   --  16   ALK PHOS U/L  --   --  71   EGFR ml/min/1 73sq m 45  --  52         BMP:  Results from last 7 days   Lab Units 11/11/22  0544 11/11/22  0540 11/11/22  0217   POTASSIUM mmol/L 4 0  --  4 2   CHLORIDE mmol/L 102  --  98   CO2 mmol/L 25  --  28   CO2, I-STAT mmol/L  --  29  --    BUN mg/dL 20  --  18   CREATININE mg/dL 1 36*  --  1 22   GLUCOSE, ISTAT mg/dl  --  301*  --    CALCIUM mg/dL 8 6  --  9 4       BNP:    Recent Labs     11/11/22  0327   *       Magnesium:   Results from last 7 days   Lab Units 11/11/22  0544 11/11/22  0327   MAGNESIUM mg/dL 1 8 1 8*       Coags:   Results from last 7 days   Lab Units 11/11/22 0217   PTT seconds 26   INR  1 26*       TSH:       Hemoglobin A1C       Lipid Profile:         Cardiac testing:   No results found for this or any previous visit  No results found for this or any previous visit  No results found for this or any previous visit  No results found for this or any previous visit  Imaging: I have personally reviewed pertinent reports  XR chest portable    Result Date: 11/11/2022  Narrative: CHEST INDICATION:   tube placement  COMPARISON:  CXR 11/11/2022  EXAM PERFORMED/VIEWS:  XR CHEST PORTABLE FINDINGS:  ET tube 4 cm above the desiree  Normal heart size, CABG, old sternal wire fracture  Worsening pulmonary edema  No effusion or pneumothorax  Osseous structures appear within normal limits for patient age  Impression: Worsening pulmonary edema  ET tube 4 cm above the desiree  Workstation performed: [id number]     XR Trauma chest portable    Result Date: 11/11/2022  Narrative: CHEST INDICATION:   TRAUMA  COMPARISON:  CXR 8/24/2020 and chest CT 11/11/2021  EXAM PERFORMED/VIEWS:  XR CHEST PORTABLE FINDINGS: Normal heart size, CABG  Old sternal wire fracture  Moderate pulmonary edema  Skeleton normal for age  No acute displaced fractures  Impression: No acute displaced fracture  Moderate pulmonary edema   Workstation performed: [id number]     TRAUMA - CT head wo contrast    Result Date: 11/11/2022  Narrative: CT BRAIN - WITHOUT CONTRAST INDICATION:   TRAUMA  COMPARISON:  5/22/2022  TECHNIQUE:  CT examination of the brain was performed  In addition to axial images, sagittal and coronal 2D reformatted images were created and submitted for interpretation  Radiation dose length product (DLP) for this visit:  941 32 mGy-cm   This examination, like all CT scans performed in the [facility], was performed utilizing techniques to minimize radiation dose exposure, including the use of iterative  reconstruction and automated exposure control  IMAGE QUALITY:  Diagnostic  FINDINGS: PARENCHYMA: Decreased attenuation is noted in periventricular and subcortical white matter demonstrating an appearance that is statistically most likely to represent moderate microangiopathic change; this appearance is similar when compared to most recent prior examination  No CT signs of acute infarction  No intracranial mass, mass effect or midline shift  2 mm subdural seen along the inferior anterior cranial fossa on the coronal exam and along the dural falx also best seen on the coronal exam VENTRICLES AND EXTRA-AXIAL SPACES:  Normal for the patient's age  VISUALIZED ORBITS AND PARANASAL SINUSES:  Unremarkable  CALVARIUM AND EXTRACRANIAL SOFT TISSUES:  Normal      Impression: 2 mm subdural seen along the inferior anterior cranial fossa on the coronal exam and along the dural falx also best seen on the coronal exam  No mass effect or midline shift  I personally discussed this study with Joel Wilson on 11/11/2022 at 3:52 AM  Workstation performed: [id number]     CT facial bones without contrast    Result Date: 11/11/2022  Narrative: CT FACIAL BONES WITHOUT INTRAVENOUS CONTRAST INDICATION:   fall  COMPARISON: None  TECHNIQUE:  Axial CT images were obtained through the facial bones with additional sagittal and coronal reconstructions  Radiation dose length product (DLP) for this visit:  363 51 mGy-cm   This examination, like all CT scans performed in the [facility], was performed utilizing techniques to minimize radiation dose exposure, including the use of iterative  reconstruction and automated exposure control  IMAGE QUALITY:  Diagnostic  FINDINGS: FACIAL BONES:  Comminuted bilateral nasal bone fractures  Normal alignment of the temporomandibular joints  No lytic or blastic lesion  ORBITS:  Orbital globes, optic nerves, and extraocular muscles appear symmetric and normal  There is no evidence of retrobulbar mass, abscess, or hematoma  SINUSES:  A tube is seen with its tip in the right nasal cavity  Shekhar Martins SOFT TISSUES:  Normal      Impression: Comminuted bilateral nasal bone fractures  A tube is seen with its tip in the right nasal cavity    Please see concurrently reported CT of the brain  I personally discussed this study with Dr Edis Bermudez on 11/11/2022 at 5:10 AM  Workstation performed: [id number]     TRAUMA - CT spine cervical wo contrast    Result Date: 11/11/2022  Narrative: CT CERVICAL SPINE - WITHOUT CONTRAST INDICATION:   TRAUMA  COMPARISON:  None  TECHNIQUE:  CT examination of the cervical spine was performed without intravenous contrast   Contiguous axial images were obtained  Sagittal and coronal reconstructions were performed  Radiation dose length product (DLP) for this visit:  413 4 mGy-cm   This examination, like all CT scans performed in the [facility], was performed utilizing techniques to minimize radiation dose exposure, including the use of iterative reconstruction and automated exposure control  IMAGE QUALITY:  Diagnostic  FINDINGS: ALIGNMENT:  Normal alignment of the cervical spine  No subluxation  VERTEBRAL BODIES:  No fracture  DEGENERATIVE CHANGES:  Moderate multilevel cervical degenerative changes are noted  No critical central canal stenosis   PREVERTEBRAL AND PARASPINAL SOFT TISSUES: Unremarkable  THORACIC INLET:  Please refer to the concurrent chest, abdomen, and pelvic CT report for description of the thoracic inlet findings  Impression: No cervical spine fracture or traumatic malalignment  I personally discussed this study with Cole De León on 11/11/2022 at 3:52 AM  Workstation performed: [id number]     XR pelvis ap only 1 or 2 vw    Result Date: 11/11/2022  Narrative: PELVIS INDICATION:   trauma fall  COMPARISON:  None VIEWS:  XR PELVIS AP ONLY 1 OR 2 VW FINDINGS: No acute fracture or hip dislocation  Evaluation of the sacrum is limited due to overlying bowel gas and stool  Mild bilateral hip osteoarthrosis  No lytic or blastic osseous lesion  There are atherosclerotic calcifications  Soft tissues are otherwise unremarkable  Partially imaged lumbar spinal fixation hardware  Impression: No acute osseous abnormality  Workstation performed: [id number]     TRAUMA - CT chest abdomen pelvis w contrast    Result Date: 11/11/2022  Narrative: CT CHEST, ABDOMEN AND PELVIS WITH IV CONTRAST INDICATION:   TRAUMA  COMPARISON:  None  TECHNIQUE: CT examination of the chest, abdomen and pelvis was performed  Axial, sagittal, and coronal 2D reformatted images were created from the source data and submitted for interpretation  Radiation dose length product (DLP) for this visit:  751 82 mGy-cm   This examination, like all CT scans performed in the [facility], was performed utilizing techniques to minimize radiation dose exposure, including the use of iterative  reconstruction and automated exposure control  IV Contrast:  100 mL of iohexol (OMNIPAQUE) Enteric Contrast: Enteric contrast was administered   FINDINGS: CHEST LUNGS:  Apical and basilar interlobular septal thickening with subpleural reticular changes peripherally throughout the lungs suggesting underlying interstitial process; interlobular septal thickening has developed in the interval and was not present on the prior CT from 10/21/2022 raising the possibility of pulmonary edema versus a worsening interstitial reticular process  There is no tracheal or endobronchial lesion  PLEURA:  Unremarkable  HEART/GREAT VESSELS: Post median sternotomy and CABG  No thoracic aortic aneurysm  MEDIASTINUM AND [place]:  Pretracheal/subcarinal adenopathy measuring up to 1 2 cm  CHEST WALL AND LOWER NECK:  Unremarkable  ABDOMEN LIVER/BILIARY TREE:  Liver is diffusely decreased in density consistent with fatty change  No CT evidence of suspicious hepatic mass  Normal hepatic contours  No biliary dilatation  GALLBLADDER:  No calcified gallstones  No pericholecystic inflammatory change  SPLEEN:  Unremarkable  PANCREAS:  Unremarkable  ADRENAL GLANDS:  Unremarkable  KIDNEYS/URETERS:  Unremarkable  No hydronephrosis  STOMACH AND BOWEL:  Unremarkable  APPENDIX:  No findings to suggest appendicitis  ABDOMINOPELVIC CAVITY:  No ascites  No pneumoperitoneum  No lymphadenopathy  VESSELS:  Unremarkable for patient's age  PELVIS REPRODUCTIVE ORGANS:  Unremarkable for patient's age  URINARY BLADDER:  Unremarkable  ABDOMINAL WALL/INGUINAL REGIONS:  Unremarkable  OSSEOUS STRUCTURES:  No acute fracture or destructive osseous lesion  Lumbar spine hardware is noted  Impression: Apical and basilar interlobular septal thickening with subpleural reticular changes peripherally throughout the lungs suggesting underlying interstitial process; interlobular septal thickening has developed in the interval and was not present on the prior CT from 10/21/2022 raising the possibility of pulmonary edema versus a worsening interstitial reticular process  Pretracheal/subcarinal adenopathy measuring up to 1 2 cm  Recommend pulmonary consultation  I personally discussed this study with Cole De León on 11/11/2022 at 3:51 AM  Workstation performed: [id number]     XR Trauma multiple ([facility] trauma bay ONLY)    Result Date: 11/11/2022  Narrative: TRAUMA SERIES INDICATION:  TRAUMA  COMPARISON:  Chest radiograph November 11, 2022 at 03: 21  Correlation with chest CT November 11, 2022 at 02:42 VIEWS:  XR TRAUMA MULTIPLE  FINDINGS: CHEST: Tip of endotracheal tube is 5 cm above the desiree  Tip of enteric tube projects over the stomach  Sternotomy wires indicating prior cardiac surgery  Cardiomediastinal silhouette is enlarged  Bilateral pulmonary opacities persist though have decreased since radiograph 2 hours prior  No definite pleural effusion  No pneumothorax  No displaced fracture       Impression: Pulmonary edema persists though has improved since radiograph performed 2 hours prior Workstation performed: [id number]       EKG reviewed personally: LBBB, 1st degree AV block, PVCs  Telemetry reviewed personally: atrial fibrillation, occ PVCs    Code Status: Level 1 - Full Code

## 2022-11-11 NOTE — CONSULTS
Oral and Maxillofacial Surgery Consult    Pt seen 11/11/22 2:03 PM    Assessment  80 y o  male who presents to ED s/p facial trauma sustaining fracture of bilateral nasal bones  On exam, pt is intubated and unable to participate in exa  CT facial bones non-displaced b/l nasal bone fracture  Plan:  - No OMS intervention at this time, will monitor as outpatient and re-evaluate need for surgery if a problem arrises  - Abx: As per primary  - Analgesia: As per primary   - Steroid taper: As per primary  - Reg diet once recovered  - Encourage good oral hygiene  - DVT ppx: As per primary   - HOB  - Yankaur suction at bedside  - Ice to face: 20min on, 20min off for 24-48 hours post trauma, then use heat  - Sinus precautions: no nose blowing, no heavy lifting, avoid pressure to the area, head of bed elevated, decongestants as needed   - Remove rhino rocket in 2-3d, rec abx to avoid infection  - Follow up at outpatient [facility] clinic -- Patient can call to schedule an appointment for 1 week after discharge if a problem arises - follow up is not required    - Call: ([phone])    D/w OMFS attg on call    Inpatient consult to Oral and Maxillofacial Surgery  Consult performed by: Jose Martinez DDS  Consult ordered by: Russ Ballesteros DO      HPI: 80 y o  male w/ PMH HTN, CKD, CAD, hypothyroidism, ICM, HLD, a fib, PVD, T2DM  Pt presents to ED s/p fall with ICH  Pt intubated and unable to participate in interview      PMH:   Past Medical History:   Diagnosis Date   • Benign hypertension    • Chronic kidney disease (CKD), stage III (moderate)    • Coronary artery disease    • Hypothyroidism    • Ischemic cardiomyopathy    • Mixed hyperlipidemia    • Paroxysmal atrial fibrillation    • PVD (peripheral vascular disease)    • Type II diabetes mellitus         Allergies:   No Known Allergies    Meds:     Current Facility-Administered Medications:   •  atorvastatin (LIPITOR) tablet 80 mg, 80 mg, Oral, Daily With Dinner, [address], DO  •  bisacodyl (DULCOLAX) rectal suppository 10 mg, 10 mg, Rectal, Daily PRN, [address], DO  •  chlorhexidine (PERIDEX) 0 12 % oral rinse 15 mL, 15 mL, Mouth/Throat, Q12H [address], [address], DO, 15 mL at 11/11/22 0815  •  fentaNYL 1000 mcg in sodium chloride 0 9% 100mL infusion, 50 mcg/hr, Intravenous, Continuous, Nikki Q To, DO, Last Rate: 5 mL/hr at 11/11/22 0541, 50 mcg/hr at 11/11/22 0541  •  fentanyl citrate (PF) 100 MCG/2ML 50 mcg, 50 mcg, Intravenous, Q1H PRN, YARI Cazares  •  insulin lispro (HumaLOG) 100 units/mL subcutaneous injection 1-6 Units, 1-6 Units, Subcutaneous, Q6H [address], 2 Units at 11/11/22 1139 **AND** Fingerstick Glucose (POCT), , , Q6H, [address], DO  •  levETIRAcetam (KEPPRA) oral solution 500 mg, 500 mg, Oral, Q12H [address], YARI Whitlock  •  levothyroxine tablet 75 mcg, 75 mcg, Oral, Early Morning, [address], DO, 75 mcg at 11/11/22 5672  •  omeprazole (PRILOSEC) suspension 2 mg/mL, 20 mg, Oral, Early Morning, YARI Whitlock, 20 mg at 11/11/22 4000  •  propofol (DIPRIVAN) 1000 mg in 100 mL infusion (premix), 5-50 mcg/kg/min, Intravenous, Titrated, Evelyne Jaime MD, Last Rate: 7 9 mL/hr at 11/11/22 1306, 15 mcg/kg/min at 11/11/22 1306  •  senna-docusate sodium (SENOKOT S) 8 6-50 mg per tablet 1 tablet, 1 tablet, Oral, BID, YARI Cazares, 1 tablet at 11/11/22 4727    PSH:   Past Surgical History:   Procedure Laterality Date   • CARDIAC CATHETERIZATION     • CORONARY ARTERY BYPASS GRAFT        Family History   Problem Relation Age of Onset   • No Known Problems Mother    • No Known Problems Father    • No Known Problems Sister    • No Known Problems Brother    • No Known Problems Maternal Aunt    • No Known Problems Maternal Uncle    • No Known Problems Paternal Aunt    • No Known Problems Paternal Uncle    • No Known Problems Maternal Grandmother    • No Known Problems Maternal Grandfather    • No Known Problems Paternal Grandmother    • No Known Problems Paternal Grandfather    • Anemia Neg Hx    • Arrhythmia Neg Hx    • Asthma Neg Hx    • Clotting disorder Neg Hx    • Fainting Neg Hx    • Heart attack Neg Hx    • Heart disease Neg Hx    • Heart failure Neg Hx    • Hyperlipidemia Neg Hx    • Hypertension Neg Hx         Review of Systems   Unable to perform ROS: Intubated        Temp:  [95 6 °F (35 3 °C)-97 5 °F (36 4 °C)] 97 5 °F (36 4 °C)  HR:  [] 80  Resp:  [14-24] 15  BP: ()/() 146/80  SpO2:  [87 %-100 %] 98 %       Intake/Output Summary (Last 24 hours) at 11/11/2022 1403  Last data filed at 11/11/2022 1014  Gross per 24 hour   Intake 142 75 ml   Output 700 ml   Net -557 25 ml        Physical Exam:  Gen: sedated and intubated  CVS: RRR  Resp: intubated/ventilator  Neuro: unable to assess  HEENT:   Head: moderate bilateral facial swelling/ecchymosis consistent with injury  No bony step-off palpated  Abrasion over bridge of nose  Eye: unable to fully evaluate, pinpoint pupils w/ subconj hemorrhage and periorbital edema/ecchymosis bilaterally  Nose:  No nasal dorsum deviation  No septal hematoma  Dried blood in nares, rhino rocket in right nares  Intraoral: ALEX ~40mm  Dentition difficult to fully evaluate, no obvious dental trauma, some floor of mouth ecchymosis/edema and along tongue  No active bleeding noted    Occlusion unable to be evaluated  FOM soft, non-elevated, non-tender  Uvula midline  Lab Results: I have personally reviewed pertinent lab results  Imaging: I have personally reviewed pertinent reports  EKG, Pathology, and Other Studies: I have personally reviewed pertinent reports  Counseling / Coordination of Care  Total floor / unit time spent today 30 minutes  Greater than 50% of total time was spent with the patient and / or family counseling and / or coordination of care    A description of the counseling / coordination of care         Please call or page OMFS resident on call after hours

## 2022-11-12 LAB
ATRIAL RATE: 94 BPM
QRS AXIS: 18 DEGREES
QRSD INTERVAL: 120 MS
QT INTERVAL: 374 MS
QTC INTERVAL: 474 MS
T WAVE AXIS: 164 DEGREES
VENTRICULAR RATE: 97 BPM

## 2022-11-12 NOTE — RESPIRATORY THERAPY NOTE
RT Ventilator Management Note  Sydell Delay 80 y o  male MRN: [id number]  Unit/Bed#: ICU 07 Encounter: [id number]      Daily Screen         11/11/2022  0759 11/12/2022  0833          Patient safety screen outcome[de-identified] Failed Failed      Not Ready for Weaning due to[de-identified] Recieving paralytics; Underline problem not resolved Underline problem not resolved      Spont breathing trial outcome[de-identified] -- Failed      Spont breathing trial reason failed: -- RR < 8 bpm      Previous settings resumed: -- Yes                Physical Exam:   Assessment Type: Assess only  Respiratory Pattern: Assisted  Chest Assessment: Chest expansion symmetrical  Bilateral Breath Sounds: Clear      Resp Comments: (P) Pt sleeping  Attemted to wean  Pt sedated at this time  RR <8  Placed back on previous settings

## 2022-11-12 NOTE — PLAN OF CARE
Problem: MOBILITY - ADULT  Goal: Maintain or return to baseline ADL function  Description: INTERVENTIONS:  -  Assess patient's ability to carry out ADLs; assess patient's baseline for ADL function and identify physical deficits which impact ability to perform ADLs (bathing, care of mouth/teeth, toileting, grooming, dressing, etc )  - Assess/evaluate cause of self-care deficits   - Assess range of motion  - Assess patient's mobility; develop plan if impaired  - Assess patient's need for assistive devices and provide as appropriate  - Encourage maximum independence but intervene and supervise when necessary  - Involve family in performance of ADLs  - Assess for home care needs following discharge   - Consider OT consult to assist with ADL evaluation and planning for discharge  - Provide patient education as appropriate  Outcome: Progressing  Goal: Maintains/Returns to pre admission functional level  Description: INTERVENTIONS:  - Perform BMAT or MOVE assessment daily    - Set and communicate daily mobility goal to care team and patient/family/caregiver     - Collaborate with rehabilitation services on mobility goals if consulted  - Out of bed for toileting  - Record patient progress and toleration of activity level   Outcome: Progressing     Problem: PAIN - ADULT  Goal: Verbalizes/displays adequate comfort level or baseline comfort level  Description: Interventions:  - Encourage patient to monitor pain and request assistance  - Assess pain using appropriate pain scale  - Administer analgesics based on type and severity of pain and evaluate response  - Implement non-pharmacological measures as appropriate and evaluate response  - Consider cultural and social influences on pain and pain management  - Notify physician/advanced practitioner if interventions unsuccessful or patient reports new pain  Outcome: Progressing     Problem: INFECTION - ADULT  Goal: Absence or prevention of progression during hospitalization  Description: INTERVENTIONS:  - Assess and monitor for signs and symptoms of infection  - Monitor lab/diagnostic results  - Monitor all insertion sites, i e  indwelling lines, tubes, and drains  - Monitor endotracheal if appropriate and nasal secretions for changes in amount and color  - [place] appropriate cooling/warming therapies per order  - Administer medications as ordered  - Instruct and encourage patient and family to use good hand hygiene technique  - Identify and instruct in appropriate isolation precautions for identified infection/condition  Outcome: Progressing  Goal: Absence of fever/infection during neutropenic period  Description: INTERVENTIONS:  - Monitor WBC    Outcome: Progressing     Problem: SAFETY ADULT  Goal: Maintain or return to baseline ADL function  Description: INTERVENTIONS:  -  Assess patient's ability to carry out ADLs; assess patient's baseline for ADL function and identify physical deficits which impact ability to perform ADLs (bathing, care of mouth/teeth, toileting, grooming, dressing, etc )  - Assess/evaluate cause of self-care deficits   - Assess range of motion  - Assess patient's mobility; develop plan if impaired  - Assess patient's need for assistive devices and provide as appropriate  - Encourage maximum independence but intervene and supervise when necessary  - Involve family in performance of ADLs  - Assess for home care needs following discharge   - Consider OT consult to assist with ADL evaluation and planning for discharge  - Provide patient education as appropriate  Outcome: Progressing  Goal: Maintains/Returns to pre admission functional level  Description: INTERVENTIONS:  - Perform BMAT or MOVE assessment daily    - Set and communicate daily mobility goal to care team and patient/family/caregiver     - Collaborate with rehabilitation services on mobility goals if consulted  - Out of bed for toileting  - Record patient progress and toleration of activity level   Outcome: Progressing  Goal: Patient will remain free of falls  Description: INTERVENTIONS:  - Educate patient/family on patient safety including physical limitations  - Instruct patient to call for assistance with activity   - Consult OT/PT to assist with strengthening/mobility   - Keep Call bell within reach  - Keep bed low and locked with side rails adjusted as appropriate  - Keep care items and personal belongings within reach  - Initiate and maintain comfort rounds  - Apply yellow socks and bracelet for high fall risk patients  - Consider moving patient to room near nurses station  Outcome: Progressing     Problem: DISCHARGE PLANNING  Goal: Discharge to home or other facility with appropriate resources  Description: INTERVENTIONS:  - Identify barriers to discharge w/patient and caregiver  - Arrange for needed discharge resources and transportation as appropriate  - Identify discharge learning needs (meds, wound care, etc )  - Arrange for interpretive services to assist at discharge as needed  - Refer to Case Management Department for coordinating discharge planning if the patient needs post-hospital services based on physician/advanced practitioner order or complex needs related to functional status, cognitive ability, or social support system  Outcome: Progressing     Problem: Knowledge Deficit  Goal: Patient/family/caregiver demonstrates understanding of disease process, treatment plan, medications, and discharge instructions  Description: Complete learning assessment and assess knowledge base    Interventions:  - Provide teaching at level of understanding  - Provide teaching via preferred learning methods  Outcome: Progressing     Problem: Potential for Falls  Goal: Patient will remain free of falls  Description: INTERVENTIONS:  - Educate patient/family on patient safety including physical limitations  - Instruct patient to call for assistance with activity   - Consult OT/PT to assist with strengthening/mobility   - Keep Call bell within reach  - Keep bed low and locked with side rails adjusted as appropriate  - Keep care items and personal belongings within reach  - Initiate and maintain comfort rounds  - Make Fall Risk Sign visible to staff  - Apply yellow socks and bracelet for high fall risk patients  - Consider moving patient to room near nurses station  Outcome: Progressing     Problem: Prexisting or High Potential for Compromised Skin Integrity  Goal: Skin integrity is maintained or improved  Description: INTERVENTIONS:  - Identify patients at risk for skin breakdown  - Assess and monitor skin integrity  - Assess and monitor nutrition and hydration status  - Monitor labs   - Assess for incontinence   - Turn and reposition patient  - Assist with mobility/ambulation  - Relieve pressure over bony prominences  - Avoid friction and shearing  - Provide appropriate hygiene as needed including keeping skin clean and dry  - Evaluate need for skin moisturizer/barrier cream  - Collaborate with interdisciplinary team   - Patient/family teaching  - Consider wound care consult   Outcome: Progressing     Problem: Nutrition/Hydration-ADULT  Goal: Nutrient/Hydration intake appropriate for improving, restoring or maintaining nutritional needs  Description: Monitor and assess patient's nutrition/hydration status for malnutrition  Collaborate with interdisciplinary team and initiate plan and interventions as ordered  Monitor patient's weight and dietary intake as ordered or per policy  Utilize nutrition screening tool and intervene as necessary  Determine patient's food preferences and provide high-protein, high-caloric foods as appropriate       INTERVENTIONS:  - Monitor oral intake, urinary output, labs, and treatment plans  - Assess nutrition and hydration status and recommend course of action  - Evaluate amount of meals eaten  - Assist patient with eating if necessary   - Allow adequate time for meals  - Recommend/ encourage appropriate diets, oral nutritional supplements, and vitamin/mineral supplements  - Order, calculate, and assess calorie counts as needed  - Recommend, monitor, and adjust tube feedings and TPN/PPN based on assessed needs  - Assess need for intravenous fluids  - Provide specific nutrition/hydration education as appropriate  - Include patient/family/caregiver in decisions related to nutrition  Outcome: Progressing

## 2022-11-12 NOTE — CASE MANAGEMENT
Case Management Assessment & Discharge Planning Note    Patient name Yaneli Kimble  Location ICU 07/ICU 07 MRN [id number]  : 1933 Date 2022       Current Admission Date: 2022  Current Admission Diagnosis:Subdural hematoma   Patient Active Problem List    Diagnosis Date Noted   • Nasal bone fractures 2022   • Subdural hematoma 2022   • Cerebrovascular accident (CVA) due to embolism of cerebral artery ([facility] ) 2022   • Aortic valve stenosis 2022   • Cerebral vascular disease 06/10/2022   • Benign paroxysmal positional vertigo due to bilateral vestibular disorder 06/10/2022   • Sensorineural hearing loss (SNHL) of both ears 2022   • Asymptomatic bilateral carotid artery stenosis 2021   • Ischemic cardiomyopathy    • Basal cell carcinoma of skin of nose 2020   • 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 2020   • Pulmonary fibrosis ([facility] )    • Peripheral arterial disease [facility] ) 2020   • Atherosclerosis of native arteries of extremities with intermittent claudication, bilateral legs ([facility] ) 2019   • Lightheadedness 2019   • Paroxysmal atrial fibrillation ([facility] ) 2018   • Type 2 diabetes mellitus with complication, with long-term current use of insulin ([facility] ) 2018   • Bradycardia 2018   • Rupture of tendon of biceps, long head 2017   • Stage 3b chronic kidney disease 2017   • Spinal stenosis of lumbar region with neurogenic claudication 2017   • Acquired hypothyroidism 2016   • Mixed hyperlipidemia 2015   • Stented coronary artery 2015   • Essential hypertension 10/29/2010   • Low back pain 2008      LOS (days): 1  Geometric Mean LOS (GMLOS) (days): 4 60  Days to GMLOS:3 3     OBJECTIVE:    Risk of Unplanned Readmission Score: 22 57         Current admission status: Inpatient       Preferred Pharmacy:   [address] [place] [address], [address] [place] [address] [address]  Phone: [phone] Fax: [phone]    Primary Care Provider: Shakira Richardson,     Primary Insurance: MEDICARE  Secondary Insurance: Jose Aidan DEUTSCH    ASSESSMENT:  Active Health Care Proxies    There are no active Health Care Proxies on file  Patient Information  Admitted from[de-identified] Home  Mental Status: Intubated  During Assessment patient was accompanied by: Spouse, Son  Assessment information provided by[de-identified] Spouse, Son  Primary Caregiver: Self  Support Systems: Spouse/significant other, Son  [place] of Residence: [address] do you live in?: [address] access options   Select all that apply : Stairs  Number of steps to enter home : 1  Type of Current Residence: [place]  In the last 12 months, was there a time when you were not able to pay the mortgage or rent on time?: No  In the last 12 months, how many places have you lived?: 1  In the last 12 months, was there a time when you did not have a steady place to sleep or slept in a shelter (including now)?: No  Homeless/housing insecurity resource given?: N/A  Living Arrangements: Lives w/ Spouse/significant other  Is patient a ?: No    Activities of Daily Living Prior to Admission  Functional Status: Independent  Completes ADLs independently?: Yes  Ambulates independently?: Yes  Does patient use assisted devices?: Yes  Assisted Devices (DME) used: Oscaru Shipman  Does patient currently own DME?: Yes  What DME does the patient currently own?: Ruth Shipman  Does patient have a history of Outpatient Therapy (PT/OT)?: No  Does the patient have a history of Short-Term Rehab?: No  Does patient have a history of HHC?: Yes (Unsure of the name of agency)  Does patient currently have [address]?: No         Patient Information Continued  Income Source: Pension/penitentiary  Does patient have prescription coverage?: Yes  Within the past 12 months, you worried that your food would run out before you got the money to buy more : Never true  Within the past 12 months, the food you bought just didn't last and you didn't have money to get more : Never true  Food insecurity resource given?: N/A  Does patient receive dialysis treatments?: No  Does patient have a history of substance abuse?: No  Does patient have a history of Mental Health Diagnosis?: No         Means of Transportation  Means of Transport to Appts[de-identified] Drives Self  In the past 12 months, has lack of transportation kept you from medical appointments or from getting medications?: No  In the past 12 months, has lack of transportation kept you from meetings, work, or from getting things needed for daily living?: No  Was application for public transport provided?: N/A        DISCHARGE DETAILS:    Discharge planning discussed with[de-identified] Spouse and Son  Freedom of Choice: Yes  Comments - Freedom of Choice: pending recs

## 2022-11-12 NOTE — PROGRESS NOTES
Advanced Heart Failure/Pulmonary Hypertension Service Progress Note - Hector Kennedy 80 y o  male MRN: [id number]    Unit/Bed#: ICU 07 Encounter: [id number]      Assessment:  80 y o  male Hx per chart p/w fall, SDH, respiratory distress      SDH this admit  Follows Dr Mayers Her cards as outpt  Ongoing Lightheadedness/disequilibrium as outpt  norvasc DC'ed for orthostasis, ongoing outpt padron for dizziness, on midodrine outpt  Past incidental CVAs by MRI brain  pAF on eliquis 2 5 bid  HTN  ICM, EF 40% since at least 2021  CAD s/p remote CABG LIMA to LAD, 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, ?possible 2017 PCI  Mild-mod AS, most recently 7/2022 TTE  DM a1c 7 3  CKD, BL Cr around 1 5  Nasal bone fractures  PAD, L carotid artery dz  ? ILD      Drips:  dexmedetomidine, 0 1-0 7 mcg/kg/hr  fentaNYL, 25 mcg/hr, Last Rate: 25 mcg/hr (11/11/22 2058)  insulin regular (HumuLIN R,NovoLIN R) infusion, 0 3-21 Units/hr       TSH wnl 3/2022  hstrop 233>17514  bnp 419  Cr 1 3 (BL 1 4 range)  LFTs ok  CXR diffuse BL haziness    F 100 8        Plan:  Intubated/sedated  No significant peripheral edema, extremities warm  Per Hx provided by family - no recent worsening SOB, swelling, or anginal type complaints at home     troponins likely reflect non-MI troponin elevation 2/2 SDH vs T2MI related to HTN  No role for cath lab now and poor candidate at present if indication arose     Would cw prn Lasix 40 IV, goal net neg 500cc-1L  Would recheck CXR 11/12  Strict IOs and daily weights  K>4, Mg>2     Defer BP goal to neuro but favor afterload reduction in case component acute pulm edema in setting of HTN emergency on arrival (?related to SDH)  hydral in acute setting, plan switch to gdmt based vasodilator - ACEi/ARB/ARNi - in future      Hold anti-plt and AC per neuro, resumption when indicated     cw statin     GDMT below; resumption when indicated  Would restart bblocker as soon as able - switch coreg to metoprolol when ready to restart     Comparing 11/2022 TTE with 7/2022 - mild decrement in EF; unclear if AS severely worsened     Obtain further cardiac collateral     Neurohormonal Blockade/GDMT: plan above  Home cardiac meds: coreg 6 25 mg bid, lasix 20 mg daily, IMDUR 30 m g bid,   --Beta-Blocker:  --ACEi, ARB or ARNi:  --Aldosterone Receptor Blocker:  --SGLT2-I:  --Hydralazine/nitrates:     --Diuretic:     Other pharmacotherapy (if applicable):  --Digoxin:  --Vericiguat:  --Omecamtiv:     --PH meds:     ICD for SCD Reduction:  --  Interrogation:      Electrical Resynchronization (CRT):  -- iLBBB  Interrogation and % pacing:      Valvular intervention/MitraClip (if applicable):  --     Other advanced or experimental devices (if applicable):  --     Advanced Therapies (If appropriate): --Inotrope:  --LVAD/Transplant Candidacy:     Studies:     EKG - my read:  11/11/22: NSR, 1oavb, LVH, left bundalloid pattern, qrs narrow, PVCs, strain pattern in setting of LVH - no overt changes from 5/2022 ECG     11/11/22 TTE:  •  Left Ventricle: Left ventricular cavity size is normal  Wall thickness is normal  The left ventricular ejection fraction is 35%  Systolic function is moderately reduced  There is moderate global hypokinesis with regional variation  Diastolic function is severely abnormal, consistent with ungraded restrictive relaxation  •  Right Ventricle: Systolic function is low normal   •  Aortic Valve: The aortic valve is trileaflet  The leaflets are severely thickened  The leaflets are severely calcified  There is severely reduced mobility  There is severe stenosis  The aortic valve peak velocity is 2 03 m/s  The aortic valve peak gradient is 16 mmHg  The aortic valve mean gradient is 10 mmHg  The dimensionless velocity index is 0 24  The aortic valve area is 0 91 cm2  The aortic valve velocity is increased due to stenosis but lower than expected due to the presence of decreased flow  •  Mitral Valve:  There is severe annular calcification      7/2022 TTE:  •  Left Ventricle: Left ventricular cavity size is normal  Wall thickness is mildly increased  The left ventricular ejection fraction is 40%  Systolic function is moderately reduced  There is mild global hypokinesis with regional variation worst in the basal to mid septal walls along with basal to mid inferior wall     •  Right Ventricle: Systolic function is low normal  Normal tricuspid annular plane systolic excursion (TAPSE) > 1 7 cm  •  Left Atrium: The atrium is mildly dilated  •  Aortic Valve: The aortic valve is probable trileaflet  The leaflets are calcified  There is reduced mobility  There is mild to moderate stenosis  •  Mitral Valve: There is annular calcification  There is mild to moderate regurgitation  •  Tricuspid Valve: There is mild regurgitation  Mean AV gradient 12; LIMA 1 2    Subjective and Interval Events:   Patient seen and examined  Intubated/sedated    ROS:  10 point ROS negative except as specified above      Tele: reviewed    Objective:     Physical Exam:  /62   Pulse 72   Temp 99 8 °F (37 7 °C) (Oral)   Resp 16   Ht 5' 8" (1 727 m)   Wt 83 5 kg (184 lb)   SpO2 100%   BMI 27 98 kg/m²   Ranges:  Temp:  [98 6 °F (37 °C)-100 8 °F (38 2 °C)] 99 8 °F (37 7 °C)  HR:  [66-86] 72  Resp:  [15-16] 16  BP: ()/(36-85) 112/62    Weight (last 2 days)     Date/Time Weight    11/11/22 1201 83 5 (184)    11/11/22 0615 83 5 (184 08)    11/11/22 0520 87 9 (193 78)           Intake/Output Summary (Last 24 hours) at 11/12/2022 1110  Last data filed at 11/12/2022 0605  Gross per 24 hour   Intake 309 5 ml   Output 1325 ml   Net -1015 5 ml     Constitutional: intubated/sedated  Ears/nose/mouth/throat: traumatic  CV: RRR, soft S2, no JVD  Resp: Decreased breath sounds BL  GI: Soft, NTND  MSK: no swollen joints in exposed areas  Extr: trace LE edema, WWP  Skin: facial traumas    Labs/Imaging/Data:   Results from last 7 days   Lab Units 11/12/22  0452 11/11/22  0544 11/11/22  0540 11/11/22  0217 WBC Thousand/uL 13 15* 12 96*  --  7 37   HEMOGLOBIN g/dL 13 5 13 7  --  14 3   I STAT HEMOGLOBIN g/dl  --   --  13 9  --    HEMATOCRIT % 39 1 40 7  --  42 1   HEMATOCRIT, ISTAT %  --   --  41  --    PLATELETS Thousands/uL 152 186  --  178   NEUTROS PCT % 82* 86*  --  66   MONOS PCT % 8 5  --  8      Results from last 7 days   Lab Units 11/12/22 0452 11/11/22  0544 11/11/22  0540 11/11/22  0217   SODIUM mmol/L 135 135  --  133*   POTASSIUM mmol/L 3 2* 4 0  --  4 2   CHLORIDE mmol/L 102 102  --  98   CO2 mmol/L 26 25  --  28   CO2, I-STAT mmol/L  --   --  29  --    ANION GAP mmol/L 7 8  --  7   BUN mg/dL 26* 20  --  18   CREATININE mg/dL 1 53* 1 36*  --  1 22   CALCIUM mg/dL 8 8 8 6  --  9 4   GLUCOSE RANDOM mg/dL 229* 314*  --  315*   ALT U/L  --   --   --  16   AST U/L  --   --   --  21   ALK PHOS U/L  --   --   --  71   ALBUMIN g/dL  --   --   --  4 2   TOTAL BILIRUBIN mg/dL  --   --   --  1 34*      Results from last 7 days   Lab Units 11/12/22 0452 11/11/22  0544 11/11/22  0327   MAGNESIUM mg/dL 2 1 1 8 1 8*   PHOSPHORUS mg/dL 3 7 3 5  --        Results from last 7 days   Lab Units 11/11/22  0217   INR  1 26*   PTT seconds 26                 ABG:  Results from last 7 days   Lab Units 11/11/22  0404   PH ART  7 381   PCO2 ART mm Hg 40 5   PO2 ART mm Hg 80 3   HCO3 ART mmol/L 23 5   BASE EXC ART mmol/L -1 5   ABG SOURCE  Radial, Left      VBG:  Results from last 7 days   Lab Units 11/11/22  0404 11/11/22  0325   PH MATTHEW   --  7 325   PCO2 MATTHEW mm Hg  --  50 6*   PO2 MATTHEW mm Hg  --  61 9*   HCO3 MATTHEW mmol/L  --  25 8   BASE EXC MATTHEW mmol/L  --  -1 0   ABG SOURCE  Radial, Left  --      No results found for: LDH    dexmedetomidine, 0 1-0 7 mcg/kg/hr  fentaNYL, 25 mcg/hr, Last Rate: 25 mcg/hr (11/11/22 2058)  insulin regular (HumuLIN R,NovoLIN R) infusion, 0 3-21 Units/hr      Current Facility-Administered Medications   Medication Dose Route Frequency Provider Last Rate   • atorvastatin  80 mg Oral Daily With Malhar [address], DO     • bisacodyl  10 mg Rectal Daily  [address], DO     • chlorhexidine  15 mL Mouth/Throat Q12H [address], DO     • dexmedetomidine  0 1-0 7 mcg/kg/hr Intravenous Titrated Marcellus Slaughter PA-C     • fentaNYL  25 mcg/hr Intravenous Continuous GERHARD MayorgaNP 25 mcg/hr (11/11/22 7206)   • fentanyl citrate (PF)  50 mcg Intravenous Q1H PRN YARI Alexander     • furosemide  40 mg Intravenous BID (diuretic) Marcellus Slaughter PA-C     • heparin (porcine)  5,000 Units Subcutaneous [facility] Y[facility]I [facility]     • hydrALAZINE  10 mg Oral Q8H [address] Glenn Prado PA-C     • insulin regular (HumuLIN R,NovoLIN R) infusion  0 3-21 Units/hr Intravenous Titrated Marcellus Slaughter PA-C     • isosorbide mononitrate  30 mg Oral Daily Marcellus Slaughter PA-C     • levETIRAcetam  500 mg Oral Q12H [address] YARI Alexander     • levothyroxine  75 mcg Oral Early Morning Joey Zuniga,      • magnesium sulfate  2 g Intravenous Once Marcellus Slaughter PA-C     • omeprazole (PRILOSEC) suspension 2 mg/mL  20 mg Oral Early Morning YARI Alexander     • potassium chloride  40 mEq Intravenous Once Marcellus Slaughter PA-C     • senna-docusate sodium  1 tablet Oral BID YARI Alexander         Invasive Devices  Report    Central Venous Catheter Line  Duration           CVC Central Lines 11/11/22 Triple Right Femoral 1 day          Peripheral Intravenous Line  Duration           Peripheral IV 11/11/22 Left Hand 1 day    Peripheral IV 11/11/22 Right Forearm 1 day          Drain  Duration           NG/OG/Enteral Tube Orogastric 18 Fr Right mouth 1 day    Urethral Catheter 14 Fr  1 day          Airway  Duration           ETT  Oral;Hi-Lo; Cuffed 7 5 mm 1 day                Current Outpatient Medications   Medication Instructions   • Accu-Chek Guide test strip USE TO TEST BLOOD SUGAR 3 TIMES A DAY   • Accu-Chek Softclix Lancets lancets Use to test blood sugar 3x daily  • amoxicillin (AMOXIL) 500 mg capsule No dose, route, or frequency recorded  • apixaban (ELIQUIS) 2 5 mg, Oral, 2 times daily   • Apoaequorin (PREVAGEN EXTRA STRENGTH PO) Oral, Daily   • atorvastatin (LIPITOR) 80 mg, Oral, Daily   • azelastine (ASTELIN) 0 1 % nasal spray 1 spray, Nasal, 2 times daily, Use in each nostril as directed   • azelastine (ASTELIN) 0 1 % nasal spray 1 spray, Nasal, 2 times daily, Use in each nostril as directed   • BD PEN NEEDLE CARMINA U/F 32G X 4 MM MISC Check sugar 3 times a day   • carvedilol (COREG) 6 25 mg, Oral, 2 times daily with meals   • clopidogrel (PLAVIX) 75 mg, Oral, Daily   • clopidogrel (PLAVIX) 75 mg, Oral, Daily   • furosemide (LASIX) 20 mg tablet TAKE 1 TABLET EVERY DAY   • Insulin Glargine Solostar (Lantus SoloStar) 100 UNIT/ML SOPN INJECT 30 UNITS EVERY DAY   • isosorbide mononitrate (IMDUR) 30 mg 24 hr tablet TAKE 1 TABLET TWICE DAILY   • levothyroxine 75 mcg, Oral, Daily (early morning), Take 1 tablet daily except Sunday   • meclizine (ANTIVERT) 25 mg, Oral, Every 8 hours PRN   • midodrine (PROAMATINE) 2 5 mg, Oral, 3 times daily   • Misc Natural Products (COLON HERBAL CLEANSER PO) Oral   • nitroglycerin (NITROSTAT) 0 4 mg, Sublingual, Every 5 minutes PRN   • [place] 3 1200 MG CAPS Oral, Take 4 tablets daily    • potassium chloride (MICRO-K) 10 MEQ CR capsule TAKE 2 CAPSULES IN THE MORNING AND TAKE 1 CAPSULE IN THE EVENING   • prednisoLONE acetate (PRED FORTE) 1 % ophthalmic suspension 1 drop, 4 times daily   • Trulicity 4 5 mg, Subcutaneous, Weekly        Counseling / Coordination of Care: Total floor / unit time spent today 31 minutes  Greater than 50% of total time was spent with the patient and / or family counseling and / or coordination of care  A description of the counseling / coordination of care: we discussed diagnoses, recent as well as older studies, labs, and all changes in cardiac treatment plan   All patient questions were answered  Thank you for the opportunity to participate in the care of this patient      Toan Allred MD  Attending Physician  Advanced Heart Failure and Transplant Cardiology  [facility]

## 2022-11-12 NOTE — RESPIRATORY THERAPY NOTE
RT Ventilator Management Note  Abby Fernandez 80 y o  male MRN: [id number]  Unit/Bed#: ICU 07 Encounter: [id number]      Daily Screen         11/11/2022  2853             Patient safety screen outcome[de-identified] Failed    Not Ready for Weaning due to[de-identified] Recieving paralytics; Underline problem not resolved              Physical Exam:   Assessment Type: Assess only  General Appearance: Sleeping  Respiratory Pattern: (P) Assisted  Chest Assessment: (P) Chest expansion symmetrical  Bilateral Breath Sounds: (P) Diminished, Clear  Suction: ET Tube      Resp Comments: (P) Pt has been stable on current vent settings at this time, No changes made to vent at this time

## 2022-11-12 NOTE — PROGRESS NOTES
Daily Progress Note - Critical Care   [facility] 80 y o  male MRN: [id number]  Unit/Bed#: ICU 07 Encounter: [id number]        ----------------------------------------------------------------------------------------  HPI/24hr events:  70-year-old male with past medical history of hypertension, CKD 3, hypothyroidism, CAD, cardiomyopathy with EF 35%, paroxysmal AFib, type 2 diabetes who now presents with subdural hematoma, epistaxis, and heart failure exacerbation status post fall  Received diuresis with IV Lasix yesterday net -1 5 L this morning  Oliguric this morning given 5% 12 5 g albumin and 20 of IV Lasix    Troponin peaked at 9000, most recently 6900    ---------------------------------------------------------------------------------------  SUBJECTIVE    Review of Systems   Unable to perform ROS: Intubated     Review of systems was reviewed and negative unless stated above in HPI/24-hour events   ---------------------------------------------------------------------------------------  Assessment and Plan:    Neuro:   • Analgesia:  Fentanyl drip  o Start oral adjuncts  • Sedation:  Fentanyl/propofol  • Delirium PPX: CAM-ICU, Sleep Hygiene   • Trace left subdural hematoma:  Stable okay for DVT prophylaxis per Neurosurgery  o Q 2 neuro checks can consider broadening to q 4 today  o CT in 2 weeks for follow-up  o Seven days Keppra for seizure prophylaxis  o Hold all ACE/AP  • Epistaxis:  In setting of nasal bone fractures  o Non operative OMFS  o Can consider DC a rhino rocket today versus tomorrow      CV:   • Decompensated heart failure:  As evidence by hypoxemia, pulmonary edema, pleural effusions  o Has been diuresed with in total 60 mg of IV Lasix for net -1 5 L  o Continue diuresis as tolerated for goal-500-1 1 L every 24 hours  • Not troponin elevated MI:  No EKG changes troponin peaked and is returning to baseline   o Appreciate cardiology recommendations no current role for cardiac catheterization  • Ischemic cardiomyopathy EF 35%:  Continue statin hold prior to admission Plavix secondary to subdural hematoma as above  o Can consider adding afterload reduction medications start with Imdur and Lopressor today  o Do see in home med list the patient takes midodrine for presumably orthostatic hypotension will need to be cautious with initiation of afterload reducing medications  • History of paroxysmal AFib:  Start beta-blocker as above  o Hold prior to admission Eliquis secondary to subdural hematoma  • History of hypertension:  Start Imdur 10q8 and Lopressor 25 b i d   As above  • History of hyperlipidemia:  Statin      Pulm:  • Acute hypoxemic respiratory failure secondary to volume overload  o Intubated for airway protection at outside hospital  o Vent day 2  o AC/VC 16/470/6/40  o Consider wean to extubate today  • Pulmonary edema:  Diuresis as above  • Pleural effusion:  Diuresis as above      GI:   • If unable to extubate will start tube feeding today  • Bowel regimen:  Senna/Colace  • GI prophylaxis:  Omeprazole  • GERD:  Omeprazole  • Last BM:  Prior to admission      :   • CKD 3 baseline creatinine is 1 3-1 7  o Creatinine at baseline trend daily  • Kerline for accurate I/O with diuresis      F/E/N:   • Electrolytes - Monitor/trend - replete based on deficiencies       Heme/Onc:   • Hemoglobin stable  • DVT PPX:  Subcutaneous heparin, SCDs      Endo:   • Type 2 diabetes:  Q 6 sliding scale insulin  o Patient hyperglycemic escalate sliding scale algorithm  5      ID:   • No active issues      MSK/Skin:   • PT/OT when extubated  • Out of bed to chair as tolerated when extubated  • Turn Q 2      Disposition: Continue Critical Care   Code Status: Level 1 - Full Code  ---------------------------------------------------------------------------------------  ICU CORE MEASURES    Prophylaxis   VTE Pharmacologic Prophylaxis: Heparin  VTE Mechanical Prophylaxis: sequential compression device  Stress Ulcer Prophylaxis: Prophylaxis Not Indicated     ABCDE Protocol (if indicated)  Plan to perform spontaneous awakening trial today? Yes  Plan to perform spontaneous breathing trial today? Yes  Obvious barriers to extubation? Yes  CAM-ICU: Positive    Invasive Devices Review  Invasive Devices  Report    Central Venous Catheter Line  Duration           CVC Central Lines 22 Triple Right Femoral 1 day          Peripheral Intravenous Line  Duration           Peripheral IV 22 Left Hand 1 day    Peripheral IV 22 Right Forearm 1 day          Drain  Duration           NG/OG/Enteral Tube Orogastric 18 Fr Right mouth 1 day    Urethral Catheter 14 Fr  1 day          Airway  Duration           ETT  Oral;Hi-Lo; Cuffed 7 5 mm 1 day              Can any invasive devices be discontinued today? Yes  ---------------------------------------------------------------------------------------  OBJECTIVE    Physical Exam  HENT:      Nose:      Comments: R rhino rocket  L epistaxis    Eyes:      Comments: B/L raccoons eyes   Cardiovascular:      Rate and Rhythm: Normal rate  Rhythm irregular  Pulmonary:      Effort: Pulmonary effort is normal       Breath sounds: Normal breath sounds  Abdominal:      General: There is distension  Palpations: Abdomen is soft  Tenderness: There is no abdominal tenderness  Musculoskeletal:      Cervical back: Normal range of motion  Right lower leg: Edema present  Left lower leg: Edema present  Comments: R femoral CVC   Skin:     General: Skin is warm  Capillary Refill: Capillary refill takes less than 2 seconds     Neurological:      Comments: GCS 11T         Vitals   Vitals:    22 0345 22 0400 22 0500 22 0600   BP:  114/66 135/70 151/78   Pulse:  72 72 70   Resp:  15 16 16   Temp:       TempSrc:       SpO2: 99% 100% 100% 100%   Weight:       Height:         Temp (24hrs), Av 3 °F (37 4 °C), Min:98 4 °F (36 9 °C), Max:100 8 °F (38 2 °C)  Current: Temperature: 99 8 °F (37 7 °C)      Invasive/non-invasive ventilation settings   Respiratory  Report   Lab Data (Last 4 hours)    None         O2/Vent Data (Last 4 hours)    None                Height and Weights   Height: 5' 8" (172 7 cm)  IBW (Ideal Body Weight): 68 4 kg  Body mass index is 27 98 kg/m²  Weight (last 2 days)     Date/Time Weight    11/11/22 1201 83 5 (184)    11/11/22 0615 83 5 (184 08)    11/11/22 0520 87 9 (193 78)            Intake and Output  I/O       11/10 0701  11/11 0700 11/11 0701 11/12 0700    I V  (mL/kg)  302 3 (3 6)    IV Piggyback  150    Total Intake(mL/kg)  452 3 (5 4)    Urine (mL/kg/hr)  1825 (0 9)    Emesis/NG output  200    Total Output  2025    Net  -1572 8                  Nutrition       Diet Orders   (From admission, onward)             Start     Ordered    11/11/22 0906  Diet NPO  Diet effective now        Comments: Enteral meds via OGT   References:    Nutrtion Support Algorithm Enteral vs  Parenteral   Question Answer Comment   Diet Type NPO    RD to adjust diet per protocol?  No        11/11/22 0905                Laboratory and Diagnostics:  Results from last 7 days   Lab Units 11/12/22 0452 11/11/22 0544 11/11/22 0540 11/11/22 0217   WBC Thousand/uL 13 15* 12 96*  --  7 37   HEMOGLOBIN g/dL 13 5 13 7  --  14 3   I STAT HEMOGLOBIN g/dl  --   --  13 9  --    HEMATOCRIT % 39 1 40 7  --  42 1   HEMATOCRIT, ISTAT %  --   --  41  --    PLATELETS Thousands/uL 152 186  --  178   NEUTROS PCT % 82* 86*  --  66   MONOS PCT % 8 5  --  8     Results from last 7 days   Lab Units 11/12/22 0452 11/11/22 0544 11/11/22 0540 11/11/22 0217   SODIUM mmol/L 135 135  --  133*   POTASSIUM mmol/L 3 2* 4 0  --  4 2   CHLORIDE mmol/L 102 102  --  98   CO2 mmol/L 26 25  --  28   CO2, I-STAT mmol/L  --   --  29  --    ANION GAP mmol/L 7 8  --  7   BUN mg/dL 26* 20  --  18   CREATININE mg/dL 1 53* 1 36*  --  1 22   CALCIUM mg/dL 8 8 8 6  --  9 4   GLUCOSE RANDOM mg/dL 229* 314*  --  315*   ALT U/L  --   --   --  16   AST U/L  --   --   --  21   ALK PHOS U/L  --   --   --  71   ALBUMIN g/dL  --   --   --  4 2   TOTAL BILIRUBIN mg/dL  --   --   --  1 34*     Results from last 7 days   Lab Units 11/12/22  0452 11/11/22  0544 11/11/22  0327   MAGNESIUM mg/dL 2 1 1 8 1 8*   PHOSPHORUS mg/dL 3 7 3 5  --       Results from last 7 days   Lab Units 11/11/22  0217   INR  1 26*   PTT seconds 26              ABG:  Results from last 7 days   Lab Units 11/11/22  0404   PH ART  7 381   PCO2 ART mm Hg 40 5   PO2 ART mm Hg 80 3   HCO3 ART mmol/L 23 5   BASE EXC ART mmol/L -1 5   ABG SOURCE  Radial, Left     VBG:  Results from last 7 days   Lab Units 11/11/22  0404 11/11/22  0325   PH MATTHEW   --  7 325   PCO2 MATTHEW mm Hg  --  50 6*   PO2 MATTHEW mm Hg  --  61 9*   HCO3 MATTHEW mmol/L  --  25 8   BASE EXC MATTHEW mmol/L  --  -1 0   ABG SOURCE  Radial, Left  --            Micro            Active Medications  Scheduled Meds:  Current Facility-Administered Medications   Medication Dose Route Frequency Provider Last Rate   • atorvastatin  80 mg Oral Daily With [address] [facility], DO     • bisacodyl  10 mg Rectal Daily PRN Claudette Espinoza DO     • calcium gluconate  2 g Intravenous Once Addis Pryor PA-C     • chlorhexidine  15 mL Mouth/Throat Q12H [address] [facility]     • fentaNYL  25 mcg/hr Intravenous Continuous Lauren Merlin Stai Bekesy, CRNP 25 mcg/hr (11/11/22 4473)   • fentanyl citrate (PF)  50 mcg Intravenous Q1H PRN YARI Almonte     • heparin (porcine)  5,000 Units Subcutaneous Q8H [address] YARI Almonte     • insulin lispro  1-6 Units Subcutaneous Q6H [address] [facility]     • levETIRAcetam  500 mg Oral Q12H [address] YARI Almonte     • levothyroxine  75 mcg Oral Early Morning Claudette Espinoza DO     • magnesium sulfate  2 g Intravenous Once Dave Prado PA-C     • omeprazole (PRILOSEC) suspension 2 mg/mL  20 mg Oral Early Morning YARI George     • potassium chloride  40 mEq Intravenous Once Danielle Guzman PA-C     • potassium chloride  40 mEq Oral Once Danielle Guzman PA-C     • propofol  5-50 mcg/kg/min Intravenous Titrated Veronica Boothe MD 15 mcg/kg/min (11/12/22 3929)   • senna-docusate sodium  1 tablet Oral BID YARI Mcdaniel       Continuous Infusions:  fentaNYL, 25 mcg/hr, Last Rate: 25 mcg/hr (11/11/22 2058)  propofol, 5-50 mcg/kg/min, Last Rate: 15 mcg/kg/min (11/12/22 0605)      PRN Meds:   bisacodyl, 10 mg, Daily PRN  fentanyl citrate (PF), 50 mcg, Q1H PRN        Allergies   No Known Allergies    Advance Directive and Living Will:      Power of :    POLST:        Counseling / Coordination of Care  Total Critical Care time spent 30 minutes excluding procedures, teaching and family updates  Danielle Guzman PA-C        Portions of the record may have been created with voice recognition software  Occasional wrong word or "sound a like" substitutions may have occurred due to the inherent limitations of voice recognition software    Read the chart carefully and recognize, using context, where substitutions have occurred 16-Mar-2021 12:33

## 2022-11-13 PROBLEM — R04.0 EPISTAXIS: Status: ACTIVE | Noted: 2022-01-01

## 2022-11-13 PROBLEM — J96.01 ACUTE RESPIRATORY FAILURE WITH HYPOXIA (HCC): Status: ACTIVE | Noted: 2022-01-01

## 2022-11-13 NOTE — RESPIRATORY THERAPY NOTE
RT Ventilator Management Note  Sydell Delay 80 y o  male MRN: [id number]  Unit/Bed#: ICU 07 Encounter: [id number]      Daily Screen         11/12/2022  0833 11/13/2022  0820          Patient safety screen outcome[de-identified] Failed Passed      Not Ready for Weaning due to[de-identified] Underline problem not resolved --      Spont breathing trial % for 30 min: -- Yes      Spont breathing trial outcome[de-identified] Failed Passed      Spont breathing trial reason failed: RR < 8 bpm --      Previous settings resumed: Yes --                Physical Exam:   Assessment Type: Assess only  General Appearance: Awake  Respiratory Pattern: Assisted  Chest Assessment: Chest expansion symmetrical  Bilateral Breath Sounds: Diminished, Clear  Suction: ET Tube      Resp Comments: (P) Pt awake this am  Placed on Spont  8/6 40%  BS clear  Will continue to monitor pt

## 2022-11-13 NOTE — PLAN OF CARE
Problem: PAIN - ADULT  Goal: Verbalizes/displays adequate comfort level or baseline comfort level  Description: Interventions:  - Encourage patient to monitor pain and request assistance  - Assess pain using appropriate pain scale  - Administer analgesics based on type and severity of pain and evaluate response  - Implement non-pharmacological measures as appropriate and evaluate response  - Consider cultural and social influences on pain and pain management  - Notify physician/advanced practitioner if interventions unsuccessful or patient reports new pain  Outcome: Progressing     Problem: INFECTION - ADULT  Goal: Absence or prevention of progression during hospitalization  Description: INTERVENTIONS:  - Assess and monitor for signs and symptoms of infection  - Monitor lab/diagnostic results  - Monitor all insertion sites, i e  indwelling lines, tubes, and drains  - Monitor endotracheal if appropriate and nasal secretions for changes in amount and color  - [place] appropriate cooling/warming therapies per order  - Administer medications as ordered  - Instruct and encourage patient and family to use good hand hygiene technique  - Identify and instruct in appropriate isolation precautions for identified infection/condition  Outcome: Progressing  Goal: Absence of fever/infection during neutropenic period  Description: INTERVENTIONS:  - Monitor WBC    Outcome: Progressing     Problem: SAFETY ADULT  Goal: Patient will remain free of falls  Description: INTERVENTIONS:  - Educate patient/family on patient safety including physical limitations  - Instruct patient to call for assistance with activity   - Consult OT/PT to assist with strengthening/mobility   - Keep Call bell within reach  - Keep bed low and locked with side rails adjusted as appropriate  - Keep care items and personal belongings within reach  - Initiate and maintain comfort rounds  - Make Fall Risk Sign visible to staff  - Offer Toileting every 2 Hours, in advance of need  - Initiate/Maintain bed alarm  - Apply yellow socks and bracelet for high fall risk patients  - Consider moving patient to room near nurses station  Outcome: Progressing     Problem: Potential for Falls  Goal: Patient will remain free of falls  Description: INTERVENTIONS:  - Educate patient/family on patient safety including physical limitations  - Instruct patient to call for assistance with activity   - Consult OT/PT to assist with strengthening/mobility   - Keep Call bell within reach  - Keep bed low and locked with side rails adjusted as appropriate  - Keep care items and personal belongings within reach  - Initiate and maintain comfort rounds  - Make Fall Risk Sign visible to staff  - Offer Toileting every 2 Hours, in advance of need  - Initiate/Maintain bed alarm  - Apply yellow socks and bracelet for high fall risk patients  - Consider moving patient to room near nurses station  Outcome: Progressing     Problem: Prexisting or High Potential for Compromised Skin Integrity  Goal: Skin integrity is maintained or improved  Description: INTERVENTIONS:  - Identify patients at risk for skin breakdown  - Assess and monitor skin integrity  - Assess and monitor nutrition and hydration status  - Monitor labs   - Assess for incontinence   - Turn and reposition patient  - Assist with mobility/ambulation  - Relieve pressure over bony prominences  - Avoid friction and shearing  - Provide appropriate hygiene as needed including keeping skin clean and dry  - Evaluate need for skin moisturizer/barrier cream  - Collaborate with interdisciplinary team   - Patient/family teaching  - Consider wound care consult   Outcome: Progressing     Problem: Nutrition/Hydration-ADULT  Goal: Nutrient/Hydration intake appropriate for improving, restoring or maintaining nutritional needs  Description: Monitor and assess patient's nutrition/hydration status for malnutrition   Collaborate with interdisciplinary team and initiate plan and interventions as ordered  Monitor patient's weight and dietary intake as ordered or per policy  Utilize nutrition screening tool and intervene as necessary  Determine patient's food preferences and provide high-protein, high-caloric foods as appropriate       INTERVENTIONS:  - Monitor oral intake, urinary output, labs, and treatment plans  - Assess nutrition and hydration status and recommend course of action  - Evaluate amount of meals eaten  - Assist patient with eating if necessary   - Allow adequate time for meals  - Recommend/ encourage appropriate diets, oral nutritional supplements, and vitamin/mineral supplements  - Order, calculate, and assess calorie counts as needed  - Recommend, monitor, and adjust tube feedings and TPN/PPN based on assessed needs  - Assess need for intravenous fluids  - Provide specific nutrition/hydration education as appropriate  - Include patient/family/caregiver in decisions related to nutrition  Outcome: Progressing

## 2022-11-13 NOTE — PLAN OF CARE
Problem: MOBILITY - ADULT  Goal: Maintain or return to baseline ADL function  Description: INTERVENTIONS:  -  Assess patient's ability to carry out ADLs; assess patient's baseline for ADL function and identify physical deficits which impact ability to perform ADLs (bathing, care of mouth/teeth, toileting, grooming, dressing, etc )  - Assess/evaluate cause of self-care deficits   - Assess range of motion  - Assess patient's mobility; develop plan if impaired  - Assess patient's need for assistive devices and provide as appropriate  - Encourage maximum independence but intervene and supervise when necessary  - Involve family in performance of ADLs  - Assess for home care needs following discharge   - Consider OT consult to assist with ADL evaluation and planning for discharge  - Provide patient education as appropriate  Outcome: Progressing  Goal: Maintains/Returns to pre admission functional level  Description: INTERVENTIONS:  - Perform BMAT or MOVE assessment daily    - Set and communicate daily mobility goal to care team and patient/family/caregiver  - Collaborate with rehabilitation services on mobility goals if consulted  - Perform Range of Motion 3 times a day  - Reposition patient every 2 hours    - Dangle patient 3 times a day  - Stand patient 2 times a day  - Record patient progress and toleration of activity level   Outcome: Progressing     Problem: PAIN - ADULT  Goal: Verbalizes/displays adequate comfort level or baseline comfort level  Description: Interventions:  - Encourage patient to monitor pain and request assistance  - Assess pain using appropriate pain scale  - Administer analgesics based on type and severity of pain and evaluate response  - Implement non-pharmacological measures as appropriate and evaluate response  - Consider cultural and social influences on pain and pain management  - Notify physician/advanced practitioner if interventions unsuccessful or patient reports new pain  Outcome: Progressing     Problem: INFECTION - ADULT  Goal: Absence or prevention of progression during hospitalization  Description: INTERVENTIONS:  - Assess and monitor for signs and symptoms of infection  - Monitor lab/diagnostic results  - Monitor all insertion sites, i e  indwelling lines, tubes, and drains  - Monitor endotracheal if appropriate and nasal secretions for changes in amount and color  - [place] appropriate cooling/warming therapies per order  - Administer medications as ordered  - Instruct and encourage patient and family to use good hand hygiene technique  - Identify and instruct in appropriate isolation precautions for identified infection/condition  Outcome: Progressing  Goal: Absence of fever/infection during neutropenic period  Description: INTERVENTIONS:  - Monitor WBC    Outcome: Progressing     Problem: SAFETY ADULT  Goal: Maintain or return to baseline ADL function  Description: INTERVENTIONS:  -  Assess patient's ability to carry out ADLs; assess patient's baseline for ADL function and identify physical deficits which impact ability to perform ADLs (bathing, care of mouth/teeth, toileting, grooming, dressing, etc )  - Assess/evaluate cause of self-care deficits   - Assess range of motion  - Assess patient's mobility; develop plan if impaired  - Assess patient's need for assistive devices and provide as appropriate  - Encourage maximum independence but intervene and supervise when necessary  - Involve family in performance of ADLs  - Assess for home care needs following discharge   - Consider OT consult to assist with ADL evaluation and planning for discharge  - Provide patient education as appropriate  Outcome: Progressing  Goal: Maintains/Returns to pre admission functional level  Description: INTERVENTIONS:  - Perform BMAT or MOVE assessment daily    - Set and communicate daily mobility goal to care team and patient/family/caregiver     - Collaborate with rehabilitation services on mobility goals if consulted  - Perform Range of Motion 3 times a day  - Reposition patient every 2 hours  - Record patient progress and toleration of activity level   Outcome: Progressing  Goal: Patient will remain free of falls  Description: INTERVENTIONS:  - Educate patient/family on patient safety including physical limitations  - Instruct patient to call for assistance with activity   - Consult OT/PT to assist with strengthening/mobility   - Keep Call bell within reach  - Keep bed low and locked with side rails adjusted as appropriate  - Keep care items and personal belongings within reach  - Initiate and maintain comfort rounds  - Make Fall Risk Sign visible to staff  - Offer Toileting every 2 Hours, in advance of need  - Initiate/Maintain bed alarm  - Obtain necessary fall risk management equipment  - Apply yellow socks and bracelet for high fall risk patients  - Consider moving patient to room near nurses station  Outcome: Progressing     Problem: DISCHARGE PLANNING  Goal: Discharge to home or other facility with appropriate resources  Description: INTERVENTIONS:  - Identify barriers to discharge w/patient and caregiver  - Arrange for needed discharge resources and transportation as appropriate  - Identify discharge learning needs (meds, wound care, etc )  - Arrange for interpretive services to assist at discharge as needed  - Refer to Case Management Department for coordinating discharge planning if the patient needs post-hospital services based on physician/advanced practitioner order or complex needs related to functional status, cognitive ability, or social support system  Outcome: Progressing     Problem: Knowledge Deficit  Goal: Patient/family/caregiver demonstrates understanding of disease process, treatment plan, medications, and discharge instructions  Description: Complete learning assessment and assess knowledge base    Interventions:  - Provide teaching at level of understanding  - Provide teaching via preferred learning methods  Outcome: Progressing     Problem: Potential for Falls  Goal: Patient will remain free of falls  Description: INTERVENTIONS:  - Educate patient/family on patient safety including physical limitations  - Instruct patient to call for assistance with activity   - Consult OT/PT to assist with strengthening/mobility   - Keep Call bell within reach  - Keep bed low and locked with side rails adjusted as appropriate  - Keep care items and personal belongings within reach  - Initiate and maintain comfort rounds  - Make Fall Risk Sign visible to staff  - Offer Toileting every 2 Hours, in advance of need  - Initiate/Maintain bed alarm  - Obtain necessary fall risk management equipment  - Apply yellow socks and bracelet for high fall risk patients  - Consider moving patient to room near nurses station  Outcome: Progressing     Problem: Prexisting or High Potential for Compromised Skin Integrity  Goal: Skin integrity is maintained or improved  Description: INTERVENTIONS:  - Identify patients at risk for skin breakdown  - Assess and monitor skin integrity  - Assess and monitor nutrition and hydration status  - Monitor labs   - Assess for incontinence   - Turn and reposition patient  - Assist with mobility/ambulation  - Relieve pressure over bony prominences  - Avoid friction and shearing  - Provide appropriate hygiene as needed including keeping skin clean and dry  - Evaluate need for skin moisturizer/barrier cream  - Collaborate with interdisciplinary team   - Patient/family teaching  - Consider wound care consult   Outcome: Progressing     Problem: Nutrition/Hydration-ADULT  Goal: Nutrient/Hydration intake appropriate for improving, restoring or maintaining nutritional needs  Description: Monitor and assess patient's nutrition/hydration status for malnutrition  Collaborate with interdisciplinary team and initiate plan and interventions as ordered    Monitor patient's weight and dietary intake as ordered or per policy  Utilize nutrition screening tool and intervene as necessary  Determine patient's food preferences and provide high-protein, high-caloric foods as appropriate       INTERVENTIONS:  - Monitor oral intake, urinary output, labs, and treatment plans  - Assess nutrition and hydration status and recommend course of action  - Evaluate amount of meals eaten  - Assist patient with eating if necessary   - Allow adequate time for meals  - Recommend/ encourage appropriate diets, oral nutritional supplements, and vitamin/mineral supplements  - Order, calculate, and assess calorie counts as needed  - Recommend, monitor, and adjust tube feedings and TPN/PPN based on assessed needs  - Assess need for intravenous fluids  - Provide specific nutrition/hydration education as appropriate  - Include patient/family/caregiver in decisions related to nutrition  Outcome: Progressing

## 2022-11-13 NOTE — NUTRITION
11/13/22 0837   Recommendations/Interventions   Summary Consult received for tube feeding recommendations  Full assessment completed 11/11  Pt remains intubated   Labs and meds reviewed  Pt is off propofol  Current EN order meets 56% of calorie needs and 42% of estimated protein needs  Current EN recommendations as follows to optimize nutrition  Interventions/Recommendations Monitor I & O's;Request RD protocol   Recommendations to Provider 1  Adjust EN regimen to Pivot 1 5 at 55 mL/hr  Provides: 1320 mL TV, 1980 kcal, 124g protein, 990 mL free water  2  Minimum FWF 30 mL q 4 hrs for tube patency  Defer additioanl FWF to provider in setting of ICH

## 2022-11-13 NOTE — PROGRESS NOTES
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 ILD      Drips:  fentaNYL, 100 mcg/hr, Last Rate: Stopped (11/13/22 0900)       TSH wnl 3/2022  hstrop 233>[id number]  bnp 419  Cr 1 3 (BL 1 4 range)  LFTs ok  CXR diffuse BL haziness     Tele: AF, many PVCs, frequent 3-4 beat runs NSVT     Plan:  Intubated 11/13  No significant peripheral edema, extremities warm  Per Hx provided by family - no recent worsening SOB, swelling, or anginal type complaints at home     troponins likely reflect non-MI troponin elevation 2/2 SDH vs T2MI related to HTN  No role for cath lab now and poor candidate at present if indication arose     Would cw prn Lasix 40 IV, goal net neg 500cc daily 11/13  Would recheck CXR  Strict IOs and daily weights  K>4, Mg>2    CPAP trial 11/13 underway     Defer BP goal to neuro but favor afterload reduction in case component acute pulm edema in setting of HTN emergency on arrival (?related to SDH)  hydral in acute setting, plan switch to gdmt based vasodilator - ACEi/ARB/ARNi - in future    Hold anti-plt and AC per neuro, resumption as soon as possible per neuro     cw statin     GDMT below; initiation in near term  Would restart bblocker 11/13 - start lopressor 25 bid, switch to toprol in future     Comparing 11/2022 TTE with 7/2022 - mild decrement in EF; unclear if AS severely worsened; Unclear LFLG true AS vs pseudosevere AS; no clear benefit for BAV now     Neurohormonal Blockade/GDMT: plan above  Home cardiac meds: coreg 6 25 mg bid, lasix 20 mg daily, IMDUR 30 m g bid,   --Beta-Blocker:  --ACEi, ARB or ARNi:  --Aldosterone Receptor Blocker:  --SGLT2-I:  --Hydralazine/nitrates:     --Diuretic:     Other pharmacotherapy (if applicable):  --Digoxin:  --Vericiguat:  --Omecamtiv:     --PH meds:     ICD for SCD Reduction:  --  Interrogation:      Electrical Resynchronization (CRT):  -- iLBBB  Interrogation and % pacing:      Valvular intervention/MitraClip (if applicable):  --     Other advanced or experimental devices (if applicable):  --     Advanced Therapies (If appropriate): --Inotrope:  --LVAD/Transplant Candidacy:     Studies:     EKG - my read:  11/11/22: NSR, 1oavb, LVH, left bundalloid pattern, qrs narrow, PVCs, strain pattern in setting of LVH - no overt changes from 5/2022 ECG     11/11/22 TTE:  •  Left Ventricle: Left ventricular cavity size is normal  Wall thickness is normal  The left ventricular ejection fraction is 35%  Systolic function is moderately reduced  There is moderate global hypokinesis with regional variation  Diastolic function is severely abnormal, consistent with ungraded restrictive relaxation  •  Right Ventricle: Systolic function is low normal   •  Aortic Valve: The aortic valve is trileaflet  The leaflets are severely thickened  The leaflets are severely calcified  There is severely reduced mobility  There is severe stenosis  The aortic valve peak velocity is 2 03 m/s   The aortic valve peak gradient is 16 mmHg  The aortic valve mean gradient is 10 mmHg  The dimensionless velocity index is 0 24  The aortic valve area is 0 91 cm2  The aortic valve velocity is increased due to stenosis but lower than expected due to the presence of decreased flow  •  Mitral Valve: There is severe annular calcification      7/2022 TTE:  •  Left Ventricle: Left ventricular cavity size is normal  Wall thickness is mildly increased  The left ventricular ejection fraction is 40%  Systolic function is moderately reduced  There is mild global hypokinesis with regional variation worst in the basal to mid septal walls along with basal to mid inferior wall     •  Right Ventricle: Systolic function is low normal  Normal tricuspid annular plane systolic excursion (TAPSE) > 1 7 cm  •  Left Atrium: The atrium is mildly dilated  •  Aortic Valve: The aortic valve is probable trileaflet  The leaflets are calcified  There is reduced mobility  There is mild to moderate stenosis  •  Mitral Valve: There is annular calcification  There is mild to moderate regurgitation  •  Tricuspid Valve: There is mild regurgitation  Mean AV gradient 12; LIMA 1 2    Subjective and Interval Events:   Patient seen and examined  Intubated/sedated    ROS:  10 point ROS negative except as specified above      Tele: reviewed    Objective:     Physical Exam:  /56   Pulse 102   Temp 99 °F (37 2 °C) (Oral)   Resp 19   Ht 5' 8" (1 727 m)   Wt 83 5 kg (184 lb)   SpO2 98%   BMI 27 98 kg/m²   Ranges:  Temp:  [98 6 °F (37 °C)-99 °F (37 2 °C)] 99 °F (37 2 °C)  HR:  [] 102  Resp:  [15-22] 19  BP: ()/(44-94) 120/56    Weight (last 2 days)     Date/Time Weight    11/11/22 1201 83 5 (184)    11/11/22 0615 83 5 (184 08)    11/11/22 0520 87 9 (193 78)           Intake/Output Summary (Last 24 hours) at 11/13/2022 1140  Last data filed at 11/13/2022 1000  Gross per 24 hour   Intake 289 12 ml   Output 1420 ml   Net -1130 88 ml     Constitutional: intubated/sedated  Ears/nose/mouth/throat: traumatic  CV: RRR, soft S2, no JVD  Resp: Decreased breath sounds BL  GI: Soft, NTND  MSK: no swollen joints in exposed areas  Extr: trace LE edema, WWP  Skin: facial traumas    Labs/Imaging/Data:   Results from last 7 days   Lab Units 11/13/22 0612 11/12/22 0452 11/11/22  0544 11/11/22  0540 11/11/22  0217   WBC Thousand/uL 13 13* 13 15* 12 96*  --  7 37   HEMOGLOBIN g/dL 13 3 13 5 13 7  --  14 3   I STAT HEMOGLOBIN g/dl  --   --   --  13 9  --    HEMATOCRIT % 39 5 39 1 40 7  --  42 1   HEMATOCRIT, ISTAT %  --   --   --  41  --    PLATELETS Thousands/uL 146* 152 186  --  178   NEUTROS PCT %  --  82* 86*  --  66   MONOS PCT %  --  8 5  --  8      Results from last 7 days   Lab Units 11/13/22 0612 11/12/22 0452 11/11/22  0544 11/11/22  0540 11/11/22  0217   SODIUM mmol/L 137 135 135  --  133*   POTASSIUM mmol/L 3 3* 3 2* 4 0  --  4 2   CHLORIDE mmol/L 105 102 102  --  98   CO2 mmol/L 24 26 25  --  28   CO2, I-STAT mmol/L  --   --   --  29  --    ANION GAP mmol/L 8 7 8  --  7   BUN mg/dL 26* 26* 20  --  18   CREATININE mg/dL 1 37* 1 53* 1 36*  --  1 22   CALCIUM mg/dL 9 1 8 8 8 6  --  9 4   GLUCOSE RANDOM mg/dL 125 229* 314*  --  315*   ALT U/L  --   --   --   --  16   AST U/L  --   --   --   --  21   ALK PHOS U/L  --   --   --   --  71   ALBUMIN g/dL  --   --   --   --  4 2   TOTAL BILIRUBIN mg/dL  --   --   --   --  1 34*      Results from last 7 days   Lab Units 11/13/22 0612 11/12/22 0452 11/11/22  0544 11/11/22  0327   MAGNESIUM mg/dL 2 3 2 1 1 8 1 8*   PHOSPHORUS mg/dL 3 2 3 7 3 5  --        Results from last 7 days   Lab Units 11/11/22  0217   INR  1 26*   PTT seconds 26                 ABG:  Results from last 7 days   Lab Units 11/11/22  0404   PH ART  7 381   PCO2 ART mm Hg 40 5   PO2 ART mm Hg 80 3   HCO3 ART mmol/L 23 5   BASE EXC ART mmol/L -1 5   ABG SOURCE  Radial, Left      VBG:  Results from last 7 days   Lab Units 11/11/22  0404 11/11/22  0325   PH MATTHEW --  7 325   PCO2 MATTHEW mm Hg  --  50 6*   PO2 MATTHEW mm Hg  --  61 9*   HCO3 MATTHEW mmol/L  --  25 8   BASE EXC MATTHEW mmol/L  --  -1 0   ABG SOURCE  Radial, Left  --      No results found for: LDH    fentaNYL, 100 mcg/hr, Last Rate: Stopped (11/13/22 0900)      Current Facility-Administered Medications   Medication Dose Route Frequency Provider Last Rate   • acetaminophen  650 mg Oral Q6H PRN Addis Pryor PA-C     • atorvastatin  80 mg Oral Daily With [facility], DO     • bisacodyl  10 mg Rectal Daily PRN Claudette Espinoza DO     • chlorhexidine  15 mL Mouth/Throat Q12H [address] Sandra Hidalgo, DO     • fentaNYL  100 mcg/hr Intravenous Continuous Kimberly Zuniga, DO Stopped (11/13/22 0900)   • fentanyl citrate (PF)  50 mcg Intravenous Q1H PRN YARI Almonte     • furosemide  40 mg Intravenous BID (diuretic) Addis Pryor PA-C     • heparin (porcine)  5,000 Units Subcutaneous [facility] YARI Almonte     • hydrALAZINE  10 mg Oral [facility] Dave Prado PA-C     • insulin lispro  1-6 Units Subcutaneous Q6H [address] Catarina Prado PA-C     • isosorbide mononitrate  30 mg Oral Daily Addis Pryor PA-C     • levETIRAcetam  500 mg Oral Q12H [address] YARI Almonte     • levothyroxine  75 mcg Oral Early Morning Kimberly Zuniga, DO     • magnesium sulfate  2 g Intravenous Once Addis Pryor PA-C     • omeprazole (PRILOSEC) suspension 2 mg/mL  20 mg Oral Early Morning YARI Almonte     • oxymetazoline  2 spray Each Nare Q12H [address] Catarina Prado PA-C     • polyethylene glycol  17 g Oral Daily Dave Prado PA-C     • potassium chloride  40 mEq Intravenous Once Claudette Espinoza DO 40 mEq (11/13/22 1979)   • senna-docusate sodium  1 tablet Oral BID YARI Almonte         Invasive Devices  Report    Central Venous Catheter Line  Duration           CVC Central Lines 11/11/22 Triple Right Femoral 2 days Peripheral Intravenous Line  Duration           Peripheral IV 11/11/22 Left Hand 2 days    Peripheral IV 11/11/22 Right Forearm 2 days          Drain  Duration           NG/OG/Enteral Tube Orogastric 18 Fr Right mouth 2 days    Urethral Catheter 14 Fr  2 days                Current Outpatient Medications   Medication Instructions   • Accu-Chek Guide test strip USE TO TEST BLOOD SUGAR 3 TIMES A DAY   • Accu-Chek Softclix Lancets lancets Use to test blood sugar 3x daily  • amoxicillin (AMOXIL) 500 mg capsule No dose, route, or frequency recorded     • apixaban (ELIQUIS) 2 5 mg, Oral, 2 times daily   • Apoaequorin (PREVAGEN EXTRA STRENGTH PO) Oral, Daily   • atorvastatin (LIPITOR) 80 mg, Oral, Daily   • azelastine (ASTELIN) 0 1 % nasal spray 1 spray, Nasal, 2 times daily, Use in each nostril as directed   • azelastine (ASTELIN) 0 1 % nasal spray 1 spray, Nasal, 2 times daily, Use in each nostril as directed   • BD PEN NEEDLE CARMINA U/F 32G X 4 MM MISC Check sugar 3 times a day   • carvedilol (COREG) 6 25 mg, Oral, 2 times daily with meals   • clopidogrel (PLAVIX) 75 mg, Oral, Daily   • clopidogrel (PLAVIX) 75 mg, Oral, Daily   • furosemide (LASIX) 20 mg tablet TAKE 1 TABLET EVERY DAY   • Insulin Glargine Solostar (Lantus SoloStar) 100 UNIT/ML SOPN INJECT 30 UNITS EVERY DAY   • isosorbide mononitrate (IMDUR) 30 mg 24 hr tablet TAKE 1 TABLET TWICE DAILY   • levothyroxine 75 mcg, Oral, Daily (early morning), Take 1 tablet daily except Sunday   • meclizine (ANTIVERT) 25 mg, Oral, Every 8 hours PRN   • midodrine (PROAMATINE) 2 5 mg, Oral, 3 times daily   • Misc Natural Products (COLON HERBAL CLEANSER PO) Oral   • nitroglycerin (NITROSTAT) 0 4 mg, Sublingual, Every 5 minutes PRN   • [place] 3 1200 MG CAPS Oral, Take 4 tablets daily    • potassium chloride (MICRO-K) 10 MEQ CR capsule TAKE 2 CAPSULES IN THE MORNING AND TAKE 1 CAPSULE IN THE EVENING   • prednisoLONE acetate (PRED FORTE) 1 % ophthalmic suspension 1 drop, 4 times daily   • Trulicity 4 5 mg, Subcutaneous, Weekly        Counseling / Coordination of Care: Total floor / unit time spent today 31 minutes  Greater than 50% of total time was spent with the patient and / or family counseling and / or coordination of care  A description of the counseling / coordination of care: we discussed diagnoses, recent as well as older studies, labs, and all changes in cardiac treatment plan  All patient questions were answered  Thank you for the opportunity to participate in the care of this patient      Elisabeth Cote MD  Attending Physician  Advanced Heart Failure and Transplant Cardiology  [address] [place] [address]

## 2022-11-13 NOTE — RESPIRATORY THERAPY NOTE
RT Ventilator Management Note  Selin Gamingu 80 y o  male MRN: [id number]  Unit/Bed#: ICU 07 Encounter: [id number]      Daily Screen         11/13/2022  0820 11/13/2022  0920          Patient safety screen outcome[de-identified] Passed --      Spont breathing trial % for 30 min: Yes --      Spont breathing trial outcome[de-identified] Passed --      Preparing to extubate/ Notify Nurse: -- Yes      Extubation order obtained: -- Yes      Consider Cuff Test: -- Yes      Patient extubated: -- Yes                Physical Exam:   Assessment Type: Assess only  General Appearance: Awake  Respiratory Pattern: Assisted  Chest Assessment: Chest expansion symmetrical  Bilateral Breath Sounds: Diminished, Clear  Suction: ET Tube      Resp Comments: (P) Pt extubated to HFNC due to humidification  Pt has nosebleed  No resp distress or stridor noted at this time

## 2022-11-13 NOTE — PROGRESS NOTES
Daily Progress Note - Critical Care   oDnell Aldridge 80 y o  male MRN: [id number]  Unit/Bed#: ICU 07 Encounter: [id number]        ----------------------------------------------------------------------------------------  HPI: "Patient 80year old male with a significant past medical history of ischemic cardiomyopathy, atrial fibrillation on eliquis, currently presenting as a trauma transfer from [facility] secondary to a SDH  He initially presented following a mechanical fall and headstrike, and arrived to the ED awake, alert, and oriented  He was short of breath and initially treated as a CHF exacerbation and placed on a nitroglycerin drip  He is having epistaxis secondary to his fall and had a rhino rocket placed into his right naris  He had progressive worsening of his respiratory status and because of inability to begin BiPAP secondary to his facial injuries he was intubated  Imaging was later remarkable for 2 mm subdural hematoma without any midline shift  He was given Kcentra transferred to [address] [place] [address] "    24hr events: Patient had some intermittent agitation overnight for which he received increasing fentanyl with resolution  No other issues overnight     ---------------------------------------------------------------------------------------  SUBJECTIVE  Unable to assess secondary to ETT      Review of Systems   Unable to perform ROS: Intubated     Review of systems was unable to be performed secondary to ETT  ---------------------------------------------------------------------------------------  Assessment and Plan:    Neuro:   • Diagnosis: Sedation/analgesia  o Plan:   - Fentanyl, will wean today if working towards extubation as below  • Diagnosis: Traumatic SDH  o Plan:   - Stable on repeat CTH  - q1 hr neuro checks, consider spacing  - 500mg keppra BID for 7 days  - CTH in 2 weeks as per neurosurgery  - Clear for DVT ppx as per neurosurgery      CV:   • Diagnosis: Decompensated HF  o Plan:  - Heart failure team following, appreciate ongoing recs  - 40mg lasix BID for goal -500 to -1L daily  • Diagnosis: Non MI troponin elevation  o Plan:   - Nonischemic ECG (LBBB, old)  - Troponin downtrending, no longer trending  - Cardiology not recommending cath at this time  • Diagnosis: Ischemic cardiomyopathy  o EF 35% on echo this admission  o Plan:   - Imdur 30mg daily  - As per cardiology, start beta blocker (metoprolol) when able  • Diagnosis: History of paroxysmal afib  o Plan:   - Holding home eliquis  • Diagnosis: History of HTN  o Plan:   - Hydralizine,10mg q8hrs  • Diagnosis: History of HLD  o Plan:   - Atorvastatin ordered      Pulm:  • Diagnosis: Acute hypoxemic ventilatory dependent respiratory failure  o Plan:   - SAT/SBT today  - Will attempt extubation today if clinically appropriate  • Diagnosis: Pulmonary edema/effusion  o Plan:   - Diuresis as above      GI:   • Diagnosis: No acute issues  o Plan:   - Senna/colace bowel regimen  - Omeprazole GI ppx, GERD      :   • Diagnosis: Stage 3 CKD  o Plan:   - Baseline 1 3-1 7  - Has been at baseline, continue to trend on routine labs      F/E/N:   • Plan:   o F- none  o E- hypokalemia repleted this morning, monitor and replete as needed, goal Mag >2, Phos >3, K >4  o N- TF goal 40/hr, if able to extubate today will have speech eval for diet      Heme/Onc:   • Diagnosis: No acute issues  o Plan:   - SQH      Endo:   • Diagnosis: History of T2DM  o Plan:   - Currently on insulin infusion  - Goal -180      ID:   • Diagnosis: No acute issues  o Plan:   - Monitor WBC, fever curve      MSK/Skin:   • Diagnosis: Bilateral nasal bone fractures, epistaxis  o Plan:   - Non op as per OMFS, will follow outpatient  - Rhinorocket in right nare, will remove today  - Pain control  • Diagnosis: ICU care  o Plan:   - PT/OT when extubated  - Frequent reposition  - Routine skin care    Patient appropriate for transfer out of the ICU today?: No  Disposition: Continue Critical Care   Code Status: Level 1 - Full Code  ---------------------------------------------------------------------------------------  ICU CORE MEASURES    Prophylaxis   VTE Pharmacologic Prophylaxis: Heparin  VTE Mechanical Prophylaxis: sequential compression device  Stress Ulcer Prophylaxis: Omeprazole PO    ABCDE Protocol (if indicated)  Plan to perform spontaneous awakening trial today? Yes  Plan to perform spontaneous breathing trial today? Yes  Obvious barriers to extubation? No    Invasive Devices Review  Invasive Devices  Report    Central Venous Catheter Line  Duration           CVC Central Lines 22 Triple Right Femoral 2 days          Peripheral Intravenous Line  Duration           Peripheral IV 22 Left Hand 2 days    Peripheral IV 22 Right Forearm 2 days          Drain  Duration           NG/OG/Enteral Tube Orogastric 18 Fr Right mouth 2 days    Urethral Catheter 14 Fr  2 days          Airway  Duration           ETT  Oral;Hi-Lo; Cuffed 7 5 mm 2 days              Can any invasive devices be discontinued today? Yes  ---------------------------------------------------------------------------------------  OBJECTIVE    Vitals   Vitals:    22 0400 22 0500 22 0600 22 0605   BP: 155/86 151/80 145/73 145/73   Pulse: 78 78 74    Resp: 16 16 16    Temp: 99 °F (37 2 °C)      TempSrc: Oral      SpO2: 100% 99% 99%    Weight:       Height:         Temp (24hrs), Av 8 °F (37 1 °C), Min:98 6 °F (37 °C), Max:99 °F (37 2 °C)  Current: Temperature: 99 °F (37 2 °C)    Respiratory:  SpO2: SpO2: 99 %       Invasive/non-invasive ventilation settings   Respiratory  Report   Lab Data (Last 4 hours)    None         O2/Vent Data (Last 4 hours)    None                Physical Exam  Constitutional:       Interventions: He is sedated and intubated     HENT:      Right Ear: External ear normal       Left Ear: External ear normal       Nose: Nose normal  Comments: Rhino rocket right nare, no active bleeding     Mouth/Throat:      Mouth: Mucous membranes are moist    Eyes:      General: No scleral icterus  Right eye: No discharge  Left eye: No discharge  Extraocular Movements: Extraocular movements intact  Conjunctiva/sclera: Conjunctivae normal       Comments: Bilateral periorbital ecchymosis   Cardiovascular:      Rate and Rhythm: Normal rate  Rhythm irregular  Heart sounds: Normal heart sounds  No murmur heard  No friction rub  No gallop  Pulmonary:      Effort: Pulmonary effort is normal  He is intubated  Breath sounds: Normal breath sounds  Abdominal:      General: Abdomen is flat  Palpations: Abdomen is soft  Tenderness: There is no abdominal tenderness  Musculoskeletal:      Cervical back: Normal range of motion  Right lower leg: Edema present  Left lower leg: Edema present  Comments: Right femoral CVC   Skin:     General: Skin is warm and dry  Neurological:      General: No focal deficit present  Mental Status: He is alert               Laboratory and Diagnostics:  Results from last 7 days   Lab Units 11/13/22  0612 11/12/22 0452 11/11/22  0544 11/11/22  0540 11/11/22  0217   WBC Thousand/uL 13 13* 13 15* 12 96*  --  7 37   HEMOGLOBIN g/dL 13 3 13 5 13 7  --  14 3   I STAT HEMOGLOBIN g/dl  --   --   --  13 9  --    HEMATOCRIT % 39 5 39 1 40 7  --  42 1   HEMATOCRIT, ISTAT %  --   --   --  41  --    PLATELETS Thousands/uL 146* 152 186  --  178   NEUTROS PCT %  --  82* 86*  --  66   MONOS PCT %  --  8 5  --  8     Results from last 7 days   Lab Units 11/12/22 0452 11/11/22  0544 11/11/22  0540 11/11/22  0217   SODIUM mmol/L 135 135  --  133*   POTASSIUM mmol/L 3 2* 4 0  --  4 2   CHLORIDE mmol/L 102 102  --  98   CO2 mmol/L 26 25  --  28   CO2, I-STAT mmol/L  --   --  29  --    ANION GAP mmol/L 7 8  --  7   BUN mg/dL 26* 20  --  18   CREATININE mg/dL 1 53* 1 36*  --  1 22   CALCIUM mg/dL 8 8 8 6  --  9 4   GLUCOSE RANDOM mg/dL 229* 314*  --  315*   ALT U/L  --   --   --  16   AST U/L  --   --   --  21   ALK PHOS U/L  --   --   --  71   ALBUMIN g/dL  --   --   --  4 2   TOTAL BILIRUBIN mg/dL  --   --   --  1 34*     Results from last 7 days   Lab Units 11/12/22  0452 11/11/22  0544 11/11/22  0327   MAGNESIUM mg/dL 2 1 1 8 1 8*   PHOSPHORUS mg/dL 3 7 3 5  --       Results from last 7 days   Lab Units 11/11/22  0217   INR  1 26*   PTT seconds 26              ABG:  Results from last 7 days   Lab Units 11/11/22  0404   PH ART  7 381   PCO2 ART mm Hg 40 5   PO2 ART mm Hg 80 3   HCO3 ART mmol/L 23 5   BASE EXC ART mmol/L -1 5   ABG SOURCE  Radial, Left     VBG:  Results from last 7 days   Lab Units 11/11/22  0404 11/11/22  0325   PH MATTHEW   --  7 325   PCO2 MATTHEW mm Hg  --  50 6*   PO2 MATTHEW mm Hg  --  61 9*   HCO3 MATTHEW mmol/L  --  25 8   BASE EXC MATTHEW mmol/L  --  -1 0   ABG SOURCE  Radial, Left  --            Micro        Imaging:  I have personally reviewed pertinent reports  Intake and Output  I/O       11/11 0701 11/12 0700 11/12 0701 11/13 0700    P  O   0    I V  (mL/kg) 302 3 (3 6) 198 3 (2 4)    IV Piggyback 150 250    Total Intake(mL/kg) 452 3 (5 4) 448 3 (5 4)    Urine (mL/kg/hr) 1825 (0 9) 1410 (0 7)    Emesis/NG output 200 30    Total Output 2025 1440    Net -1572 8 -991 7                Height and Weights   Height: 5' 8" (172 7 cm)  IBW (Ideal Body Weight): 68 4 kg  Body mass index is 27 98 kg/m²    Weight (last 2 days)     Date/Time Weight    11/11/22 1201 83 5 (184)    11/11/22 0615 83 5 (184 08)    11/11/22 0520 87 9 (193 78)            Nutrition       Diet Orders   (From admission, onward)             Start     Ordered    11/12/22 1036  Diet Enteral/Parenteral; Tube Feeding No Oral Diet; Jevity 1 2 Pedro; Continuous; 40  Diet effective now        Comments: Enteral meds via OGT   References:    Nutrtion Support Algorithm Enteral vs  Parenteral   Question Answer Comment   Diet Type Enteral/Parenteral    Enteral/Parenteral Tube Feeding No Oral Diet    Tube Feeding Formula: Jevity 1 2 Pedro    Bolus/Cyclic/Continuous Continuous    Tube Feeding Goal Rate (mL/hr): 40    RD to adjust diet per protocol?  No        11/12/22 1040                  Active Medications  Scheduled Meds:  Current Facility-Administered Medications   Medication Dose Route Frequency Provider Last Rate   • acetaminophen  650 mg Oral Q6H PRN Marcia Monte PA-C     • atorvastatin  80 mg Oral Daily With [facility], DO     • bisacodyl  10 mg Rectal Daily PRN Colette Phelps, DO     • chlorhexidine  15 mL Mouth/Throat Q12H [address] Hidalgo, DO     • fentaNYL  100 mcg/hr Intravenous Continuous Radha [place],  mcg/hr (11/13/22 0047)   • fentanyl citrate (PF)  50 mcg Intravenous Q1H PRN Claudean Acosta, CRNP     • furosemide  40 mg Intravenous BID (diuretic) Marcia Monte PA-C     • heparin (porcine)  5,000 Units Subcutaneous [facility] Claudean Acosta, CRNP     • hydrALAZINE  10 mg Oral [facility] Jayla Prado PA-C     • insulin regular (HumuLIN R,NovoLIN R) infusion  0 3-21 Units/hr Intravenous Titrated Marcia Monte PA-C 0 5 Units/hr (11/13/22 7188)   • isosorbide mononitrate  30 mg Oral Daily Jayla Prado PA-C     • levETIRAcetam  500 mg Oral Q12H [facility] Claudean Acosta, CRNP     • levothyroxine  75 mcg Oral Early Morning Radha [place], DO     • omeprazole (PRILOSEC) suspension 2 mg/mL  20 mg Oral Early Morning Claudean Acosta, CRNP     • polyethylene glycol  17 g Oral Daily Marcia Monte PA-C     • senna-docusate sodium  1 tablet Oral BID Claudean Acosta, CRNP       Continuous Infusions:  fentaNYL, 100 mcg/hr, Last Rate: 100 mcg/hr (11/13/22 0047)  insulin regular (HumuLIN R,NovoLIN R) infusion, 0 3-21 Units/hr, Last Rate: 0 5 Units/hr (11/13/22 5293)      PRN Meds:   acetaminophen, 650 mg, Q6H PRN  bisacodyl, 10 mg, Daily PRN  fentanyl citrate (PF), 50 mcg, Q1H PRN        Allergies   No Known Allergies  ---------------------------------------------------------------------------------------  Advance Directive and Living Will:      Power of :    POLST:    ---------------------------------------------------------------------------------------  Care Time Delivered:   No Critical Care time spent     Artem, DO      Portions of the record may have been created with voice recognition software  Occasional wrong word or "sound a like" substitutions may have occurred due to the inherent limitations of voice recognition software    Read the chart carefully and recognize, using context, where substitutions have occurred

## 2022-11-14 NOTE — CONSULTS
Consultation - Saint Battiest Meckes 80 y o  male MRN: [id number]  Unit/Bed#: ICU 07 Encounter: [id number]      Assessment/Plan    Delirium precautions  Baseline: alert and oriented x 1  Provide frequent redirection, reorientation, distraction techniques  Avoid deliriogenic medications such as tramadol, benzodiazepines, anticholinergics,  Benadryl  Treat pain, See geriatric pain protocol  Last BM 11/14/22  Monitor for urinary retention  Encourage early and frequent moblization, OOB  Encourage Hydration/ Nutrition  Implement sleep hygiene, limit night time interuptions, group activities  Received IM zyprexa overnight, now on scheduled seroquel  Monitor   (11/11/21)       Ambulatory dysfunction  At risk for falls secondary to age, hx of fall, gait instability, polypharmacy, visual impairment, BPPV  Fall precautions  PT/OT  Increased physical exercise, recommend balance and strengthening exercises  Rehab post hospitalization     Acute pain due to trauma  Geriatric pain protocol  Scheduled acetaminophen 975 mg po Q8  Oxycodone 2 5 mg po Q 4 prn moderate pain  Oxycodone 5 mg po Q 4 prn severe pain   Monitor for constipation  Lidoderm patch      Frailty  Clinical Frail Scale: 6- Moderately Frail  Albumin 4 2, maintain protein in diet  PT/OT  Continue supportive care     Cognitive screening  At risk secondary to hx of CVA, age  Recommend check TSH and vitamin B12  CT head 11/11/22: severe microangiopathic changes, chronic bilateral frontal lobe infarcts   Keep physically, mentally and socially active     Insomnia  Related to hospitalization  First line is behavioral therapy  Avoid sedative hypnotics such as benzodiazepines and benadryl  Encourage staying awake during the day  Encourage daytime activity, morning exercise  Decrease or eliminate day time naps  Avoid caffeine especially during late afternoon and evening hours  Establish a night time routine    Subdural hemorrhage  S/p fall   AC on hold  Neurosurgery on consult  keppra for seizure ppx x 1 days  Trauma following    Nasal bone fractures with epistaxis  Rhino rocket discontinued  OMFS on consult  Trauma following      Advanced Care Planning  Full code    Home medication review    Mail order  eliquis 2 5 mg po BID  atorvastatin 80 mg po Q evening  azelastine 137 mcg nasal spray  Coreg 6 25 mg po BID  Clopidogrel 75 mg po daily  dulaglitide 4 5 mg SC weekly  lantus 30 units SC daily  Furosemide 20 mg po daily  KCL ER 20meq po Qam and 10 meq po q evening  Midodrine 2 5 mg po TID  NTG 0 4 mg po prn    Levothyroxine 75 mcg po daily, last refill 5/3/22 # 90 days  Imdur 30 mg po daily prn per wife     Per endocrine note 9/19/22, pt using lantus prn for correction, was instructed to not do this    History of Present Illness   Physician Requesting Consult: Raghavendra Ford To, DO  Reason for Consult / Principal Problem: fall  Hx and PE limited by: NA  HPI: Trinidad Babcock is a 80y o  year old male who presents to [address] [place] [address] with pain and bleeding from his nose  He fell when he was getting out of bed, landed on his face  He was brought to ED for evaluation  His initial GCS was 15  During eval he became progressively short of breath, needing to be intubated  CT head showed ICH and nasal fractures  He had nasal packing and was sent to [address] [place] [address] for trauma evaluation  He has HTN, HF, CKD, hypothyroidism, hyperlipidemia, Pafib, PVD, DM2, hx of stroke    Prior to arrival pt lives at home with his wife  He needs assistance with IADLs  He is indpendent with ADLs  He denies prior falls  He ambulates with a roller walker  He wears glasses  He is hard of hearing  Upon exam pt is oob in recliner chair  He is alert and oriented x 1  Pt had increased agitation overnight, given IM zyprexa  Wife at bedside to review HPI and medications  She reports he just rolled over in bed and hit his face  He did not fall       Inpatient consult to Gerontology  Consult performed by: YARI Rust  Consult ordered by: Michael Jones PA-C          Review of Systems   Constitutional: Negative for unexpected weight change  HENT: Positive for hearing loss  Eyes: Negative for visual disturbance  Cardiovascular: Negative for chest pain  Gastrointestinal: Negative for constipation  Genitourinary: Negative for difficulty urinating  Musculoskeletal: Positive for gait problem  Neurological: Negative for weakness  Psychiatric/Behavioral: Negative for sleep disturbance         Historical Information   Past Medical History:   Diagnosis Date   • Benign hypertension    • CHF (congestive heart failure) (HCC)    • Chronic kidney disease (CKD), stage III (moderate)    • Coronary artery disease    • Hypothyroidism    • Ischemic cardiomyopathy    • Mixed hyperlipidemia    • Paroxysmal atrial fibrillation    • PVD (peripheral vascular disease)    • Type II diabetes mellitus      Past Surgical History:   Procedure Laterality Date   • CARDIAC CATHETERIZATION     • CORONARY ARTERY BYPASS GRAFT       Social History   Social History     Substance and Sexual Activity   Alcohol Use Not Currently     Social History     Substance and Sexual Activity   Drug Use Never     Social History     Tobacco Use   Smoking Status Never Smoker   Smokeless Tobacco Never Used         Family History:   Family History   Problem Relation Age of Onset   • No Known Problems Mother    • No Known Problems Father    • No Known Problems Sister    • No Known Problems Brother    • No Known Problems Maternal Aunt    • No Known Problems Maternal Uncle    • No Known Problems Paternal Aunt    • No Known Problems Paternal Uncle    • No Known Problems Maternal Grandmother    • No Known Problems Maternal Grandfather    • No Known Problems Paternal Grandmother    • No Known Problems Paternal Grandfather    • Anemia Neg Hx    • Arrhythmia Neg Hx    • Asthma Neg Hx    • Clotting disorder Neg Hx • Fainting Neg Hx    • Heart attack Neg Hx    • Heart disease Neg Hx    • Heart failure Neg Hx    • Hyperlipidemia Neg Hx    • Hypertension Neg Hx        Meds/Allergies   No Known Allergies    Objective   Vitals: Blood pressure (!) 83/50, pulse 80, temperature 99 °F (37 2 °C), temperature source Oral, resp  rate (!) 27, height 5' 8" (1 727 m), weight 76 1 kg (167 lb 12 3 oz), SpO2 92 %  ,Body mass index is 25 51 kg/m²  Physical Exam  Vitals and nursing note reviewed  HENT:      Head: Normocephalic  Nose: No congestion  Mouth/Throat:      Mouth: Mucous membranes are moist    Eyes:      General:         Right eye: No discharge  Left eye: No discharge  Cardiovascular:      Rate and Rhythm: Normal rate and regular rhythm  Pulses: Normal pulses  Pulmonary:      Effort: Pulmonary effort is normal       Breath sounds: Normal breath sounds  Abdominal:      General: Bowel sounds are normal       Palpations: Abdomen is soft  Musculoskeletal:         General: Normal range of motion  Cervical back: Normal range of motion  Skin:     General: Skin is warm and dry  Findings: Bruising present  Neurological:      Mental Status: He is alert  Mental status is at baseline     Psychiatric:         Mood and Affect: Mood normal          Lab Results:   Results from last 7 days   Lab Units 11/14/22  0554   WBC Thousand/uL 12 18*   HEMOGLOBIN g/dL 11 8*   HEMATOCRIT % 35 4*   PLATELETS Thousands/uL 147*        Results from last 7 days   Lab Units 11/14/22  0554 11/11/22  0544 11/11/22  0540 11/11/22  0217   POTASSIUM mmol/L 3 9   < >  --  4 2   CHLORIDE mmol/L 107   < >  --  98   CO2 mmol/L 24   < >  --  28   CO2, I-STAT mmol/L  --   --  29  --    BUN mg/dL 40*   < >  --  18   CREATININE mg/dL 1 50*   < >  --  1 22   CALCIUM mg/dL 8 9   < >  --  9 4   ALK PHOS U/L  --   --   --  71   ALT U/L  --   --   --  16   AST U/L  --   --   --  21   GLUCOSE, ISTAT mg/dl  --   --  301*  --     < > = values in this interval not displayed  Imaging Studies: I have personally reviewed pertinent reports  EKG, Pathology, and Other Studies: I have personally reviewed pertinent reports      VTE Prophylaxis: Sequential compression device (Venodyne)     Code Status: Level 1 - Full Code

## 2022-11-14 NOTE — PROGRESS NOTES
Progress Note - Trauma ICU Transfer   and [address] 80 y o  male [id number]   Unit/Bed#: ICU 07 Encounter: [id number]     Assessment & Plan   Summary of Diagnosed Injuries:   Trace subdural hemorrhage  Bilateral nasal bone fractures  Epistaxis    PLAN:  Subdural hemorrhage non operative okay for DVT prophylaxis to hold systemic anticoagulation until 2 week follow-up  To complete 7 days of p o  Keppra this week  Nasal bone fractures non operative, epistaxis resolved, rhino rocket has been discontinued    VTE Prophylaxis:Heparin     Disposition:  Step-down level 2    Code status:  Level 1 - Full Code    Consultants: IP CONSULT TO CASE MANAGEMENT  IP CONSULT TO ORAL AND MAXILLOFACIAL SURGERY  IP CONSULT TO NEUROSURGERY  IP CONSULT TO HEART FAILURE SERVICE  IP CONSULT TO NUTRITION SERVICES  IP CONSULT TO GERONTOLOGY     Subjective   Mechanism of Injury:Fall     HPI/Last 24 hour events:  Extubated weaned off of high-flow nasal cannula to room air  Delirious overnight requiring p r n  Zyprexa and restraints    Reason for ICU admission:  Intubation, Congestive Heart Failure exacerbation    Summary of ICU clinical course:  Patient was aggressively diuresed and had addition of afterload reduction medications with improvement in heart failure  Facial swelling improved and patient was able to be extubated yesterday  Rhino rocket discontinue, no more epistaxis  Subdural hemorrhage is not per Neurosurgery, heart failure consult appreciated for management of patient's known ischemic cardiomyopathy    Failed bedside swallow yesterday, passed speech therapy evaluation for puree and thin this morning    Recent or scheduled procedures:  Na    Outstanding/pending diagnostics:  Na       Objective   Vitals:   Temp:  [98 4 °F (36 9 °C)-99 °F (37 2 °C)] 99 °F (37 2 °C)  HR:  [68-96] 80  Resp:  [20-37] 27  BP: ()/(42-92) 83/50    I/O       11/12 0701 11/13 0700 11/13 0701 11/14 0700 11/14 0701  11/15 0700    P  O  0      I V  (mL/kg) 198 3 (2 4) 91 5 (1 2)     IV Piggyback 250 250     Total Intake(mL/kg) 448 3 (5 4) 341 5 (4 5)     Urine (mL/kg/hr) 1410 (0 7) 1445 (0 8) 25 (0 1)    Emesis/NG output 30      Stool  0     Total Output 1440 1445 25    Net -991 7 -1103 5 -25           Unmeasured Stool Occurrence  1 x            Physical Exam:   GENERAL APPEARANCE:  Frail-appearing  NEURO:  GCS 14 (confusion)  HEENT:  Bilateral periorbital ecchymosis  CV:  Irregularly irregular, no murmurs rubs or gallops  LUNGS:  Clear to auscultation bilaterally      Invasive Devices  Report    Central Venous Catheter Line  Duration           CVC Central Lines 11/11/22 Triple Right Femoral 3 days          Peripheral Intravenous Line  Duration           Peripheral IV 11/11/22 Left Hand 3 days          Drain  Duration           Urethral Catheter 14 Fr  3 days               Rationale for remaining devices:  Central line and [place] to be discontinued prior to transfer    1  Before the illness or injury that brought you to the Emergency, did you need someone to help you on a regular basis? 1=Yes   2  Since the illness or injury that brought you to the Emergency, have you needed more help than usual to take care of yourself? 1=Yes   3  Have you been hospitalized for one or more nights during the past 6 months (excluding a stay in the Emergency Department)? 1=Yes   4  In general, do you see well? 1=No   5  In general, do you have serious problems with your memory? 1=Yes   6  Do you take more than three different medications everyday?  1=Yes   TOTAL   6     Did you order a geriatric consult if the score was 2 or greater?: yes       Lab Results:   Results: I have personally reviewed all pertinent laboratory/tests results, BMP/CMP:   Lab Results   Component Value Date    SODIUM 139 11/14/2022    K 3 9 11/14/2022     11/14/2022    CO2 24 11/14/2022    BUN 40 (H) 11/14/2022    CREATININE 1 50 (H) 11/14/2022    CALCIUM 8 9 11/14/2022    EGFR 40 11/14/2022    and CBC:   Lab Results   Component Value Date    WBC 12 18 (H) 11/14/2022    HGB 11 8 (L) 11/14/2022    HCT 35 4 (L) 11/14/2022    MCV 98 11/14/2022     (L) 11/14/2022    MCH 32 7 11/14/2022    MCHC 33 3 11/14/2022    RDW 13 1 11/14/2022    MPV 9 8 11/14/2022       Imaging Results: I have personally reviewed pertinent reports  Chest Xray(s): positive for acute findings: Pulmonary edema   FAST exam(s): N/A   CT Scan(s): positive for acute findings: Subdural hematoma nasal bone fractures   Additional Xray(s): N/A     Other Studies:  Echo EF 35%    Code Status: Level 1 - Full Code       Patient seen and evaluated by Critical Care today and deemed to be appropriate for transfer to Stepdown Level 2  Spoke to [employer] from Trauma service regarding transfer  Critical Care can be contacted via [email or web address] with any questions or concerns

## 2022-11-14 NOTE — OCCUPATIONAL THERAPY NOTE
Occupational Therapy Evaluation     Patient Name: Shirley Osorio  UZWMJ'D Date: 11/14/2022  Problem List  Principal Problem:    Subdural hematoma  Active Problems:    Paroxysmal atrial fibrillation (HCC)    Type 2 diabetes mellitus with complication, with long-term current use of insulin (HCC)    Acquired hypothyroidism    Stage 3b chronic kidney disease    Essential hypertension    Mixed hyperlipidemia    Ischemic cardiomyopathy    Nasal bone fractures    Epistaxis    Acute respiratory failure with hypoxia (HCC)    Past Medical History  Past Medical History:   Diagnosis Date    Benign hypertension     CHF (congestive heart failure) (HCC)     Chronic kidney disease (CKD), stage III (moderate)     Coronary artery disease     Hypothyroidism     Ischemic cardiomyopathy     Mixed hyperlipidemia     Paroxysmal atrial fibrillation     PVD (peripheral vascular disease)     Type II diabetes mellitus      Past Surgical History  Past Surgical History:   Procedure Laterality Date    CARDIAC CATHETERIZATION      CORONARY ARTERY BYPASS GRAFT             11/14/22 1024   OT Last Visit   OT Visit Date 11/14/22   Note Type   Note type Evaluation   Pain Assessment   Pain Assessment Tool FLACC   Pain Rating: FLACC (Rest) - Face 0   Pain Rating: FLACC (Rest) - Legs 0   Pain Rating: FLACC (Rest) - Activity 0   Pain Rating: FLACC (Rest) - Cry 0   Pain Rating: FLACC (Rest) - Consolability 0   Score: FLACC (Rest) 0   Pain Rating: FLACC (Activity) - Face 0   Pain Rating: FLACC (Activity) - Legs 0   Pain Rating: FLACC (Activity) - Activity 0   Pain Rating: FLACC (Activity) - Cry 0   Pain Rating: FLACC (Activity) - Consolability 0   Score: FLACC (Activity) 0   Restrictions/Precautions   Weight Bearing Precautions Per Order No   Other Precautions Cognitive; Impulsive; Chair Alarm; Bed Alarm; Restraints;Multiple lines;Telemetry; Fall Risk;Pain;Visual impairment  (B/L hand mitts; posey belt)   Home Living   Type of [address] [place] [address] level  (1 EDI)   Home Equipment Walker   Additional Comments Pt is confused and a poor historian; only oriented to self  Hx obtained from EMR  Per CM, pt resides in a ranch home w/ his spouse w/ 1 EDI   Prior Function   Level of [place] Independent with ADLs; Independent with functional mobility   Lives With Spouse   Receives Help From Family   IADLs Independent with driving   Falls in the last 6 months 1 to 4   Vocational Retired   Comments Hx obtained from EMR as pt is a poor historian @ this time   Lifestyle   Autonomy Per CM note, pt was I w/ ADLS, Mod I w/ transfers and functional mobility w/ use of RW   Reciprocal Relationships Lives w/ spouse   Service to Others Retired   [address] to report; will continue to assess   Subjective   Subjective " I went to the gas station last week"   ADL   Eating Assistance 5  Supervision/Setup   Grooming Assistance 5  Supervision/Setup   UB Bathing Assistance 4  Minimal Assistance   LB [address] 3  Moderate [address] 4  [address] 2  Maximal [address]  3  Moderate Assistance   Functional Assistance 3  Moderate Assistance   Functional Deficit Steadying;Verbal cueing;Supervision/safety; Increased time to complete  (Ax2)   Bed Mobility   Supine to Sit 3  Moderate assistance   Additional items Assist x 2;HOB elevated; Increased time required;Verbal cues;LE management   Sit to Supine Unable to assess   Additional Comments Pt sat EOB w/ Min A x1 for sitting balance/trunk control   Transfers   Sit to Stand 3  Moderate assistance   Additional items Assist x 2; Increased time required;Verbal cues   Stand to Sit 3  Moderate assistance   Additional items Assist x 2; Increased time required;Verbal cues   Additional Comments HHA used B/L; VC for safety   Functional Mobility   Functional Mobility 3  Moderate assistance   Additional Comments Pt took few small steps from EOB to chair w/ Mod A x2; B/L HHA used   Additional items Hand hold assistance   Balance   Static Sitting Poor +   Dynamic Sitting Poor +   Static Standing Poor   Dynamic Standing Poor -   Ambulatory Poor -   Activity Tolerance   Activity Tolerance Patient limited by fatigue;Patient limited by pain;Treatment limited secondary to medical complications (Comment)  (impaired cognition)   Medical Staff Made Aware Rochelle PEACOCK   Nurse Made Aware yes, Leda   RUE Assessment   RUE Assessment WFL   LUE Assessment   LUE Assessment WFL   Hand Function   Gross Motor Coordination Functional   Fine Motor Coordination Functional   Sensation   Additional Comments pt unable to report   Vision - Complex Assessment   Additional Comments Pt unable to report; has bruised B/L eyes   Cognition   Overall Cognitive Status Impaired   Arousal/Participation Responsive; Cooperative   Attention Attends with cues to redirect   Orientation Level Oriented to person;Disoriented to place; Disoriented to time;Disoriented to situation   Memory Decreased recall of precautions;Decreased recall of recent events;Decreased short term memory   Following Commands Follows one step commands with increased time or repetition   Comments Pt is cooperative; overall is a poor historian; has decreased understanding of deficits/safety awareness  Does not believe or know he is at the hospital    Assessment   Limitation Decreased ADL status; Decreased Safe judgement during ADL;Decreased cognition;Decreased endurance;Decreased self-care trans;Decreased high-level ADLs; Visual deficit   Prognosis Fair   Assessment Pt is a 79 y/o male seen for OT eval s/p adm to [facility] as [facility] 2/2 fall while getting OOB  Pt is dx'd w/ ICH and B/L nasal bone fractures   Pt  has a past medical history of Benign hypertension, CHF (congestive heart failure) ([facility] [address] ), Chronic kidney disease (CKD), stage III (moderate), Coronary artery disease, Hypothyroidism, Ischemic cardiomyopathy, Mixed hyperlipidemia, Paroxysmal atrial fibrillation, PVD (peripheral vascular disease), and Type II diabetes mellitus  Pt with active OT orders and up with assistance  orders  Pt lives with his spouse in a ranch home w/ 1 EDI  Pt was I w/  ADLS, drove, & required use of DME PTA including a RW  Pt is currently demonstrating the following occupational deficits:Min A UB ADLS, Max A LB ADLS, Mod A x2 bed mobility, Min A EOB sitting, Mod A x2 transfers and functional mobility w/ B/L HHA  These deficits that are impacting pt's baseline areas of occupation are a result of the following impairments: pain, endurance, activity tolerance, functional mobility, forward functional reach, balance, trunk control, functional standing tolerance, decreased I w/ ADLS/IADLS, visual deficits, cognitive impairments, decreased safety awareness and decreased insight into deficits  The following Occupational Performance Areas to address include: eating, grooming, bathing/shower, toilet hygiene, dressing, medication management, socialization, health maintenance, functional mobility, community mobility, clothing management and household maintenance  Recommend inpatient rehab  upon D/C  Pt to continue to benefit from acute immediate OT services to address the following goals 2-3x/week to  w/in 10-14 days:   Goals   Patient Goals unable to report 2/2 confusion   LTG Time Frame 10-   Long Term Goal #1 see below listed goals   Plan   Treatment Interventions ADL retraining;Visual perceptual retraining;Functional transfer training; Endurance training;Cognitive reorientation;UE strengthening/ROM; Patient/family training;Equipment evaluation/education; Compensatory technique education;Continued evaluation; Energy conservation; Activityengagement   Goal Expiration Date 22   OT Frequency 2-3x/wk   Recommendation   OT Discharge Recommendation Post acute rehabilitation services   Additional Comments  The patient's raw score on the AM-PAC Daily Activity inpatient short form is 16, standardized score is 35 96, less than 39 4  Patients at this level are likely to benefit from discharge to post-acute rehabilitation services  Please refer to the recommendation of the Occupational Therapist for safe discharge planning  Additional Comments 2 Pt seen as a co-evaluation due to the patient's co-morbidities, clinically unstable presentation, and present impairments which are a regression from the patient's baseline   AM-PAC Daily Activity Inpatient   Lower Body Dressing 2   Bathing 2   Toileting 2   Upper Body Dressing 3   Grooming 3   Eating 4   Daily Activity Raw Score 16   Daily Activity Standardized Score (Calc for Raw Score >=11) 35 96   AM-PAC Applied Cognition Inpatient   Following a Speech/Presentation 1   Understanding Ordinary Conversation 3   Taking Medications 2   Remembering Where Things Are Placed or Put Away 2   Remembering List of 4-5 Errands 2   Taking Care of Complicated Tasks 1   Applied Cognition Raw Score 11   Applied Cognition Standardized Score 27 03   End of Consult   Education Provided Yes;Family or social support of family present for education by provider   Patient Position at End of Consult Bedside chair;Bed/Chair alarm activated; All needs within reach   Nurse Communication Nurse aware of consult      GOALS    1) Pt will improve activity tolerance to G for 30 min txment sessions for increase engagement in functional tasks  2) Pt will complete UB/LB dressing/self care w/ S using adaptive device and DME as needed  3) Pt will complete bathing w/ S w/ use of AE and DME as needed  4) Pt will complete toileting w/ Min A w/ G hygiene/thoroughness using DME as needed  5) Pt will improve functional transfers to Min A on/off all surfaces using DME as needed w/ G balance/safety   6) Pt will improve functional mobility during ADL/IADL/leisure tasks to Min A using DME as needed w/ G balance/safety   7) Pt will be attentive 100% of the time during ongoing cognitive assessment w/ G participation to assist w/ safe d/c planning/recommendations  8) Pt will demonstrate G carryover of pt/caregiver education and training as appropriate w/o cues w/ good tolerance to increase safety during functional tasks  9) Pt will demonstrate 100% carryover of energy conservation techniques t/o functional I/ADL/leisure tasks w/o cues s/p skilled education to increase endurance during functional tasks  10) Pt will complete supine to sit transfer with S using B/L UEs to initiate bed mobility   11) Pt will engage in ongoing  assessments, screens, and activities t/o fx'l I/ADL/leisure tasks w/ G participation to A w/ adaptation and accomodations or rule out visual perceptual impairments    Elijah Briones MS, OTR/L

## 2022-11-14 NOTE — CASE MANAGEMENT
Case Management Discharge Planning Note    Patient name Jerry Ramírez  Location [facility] 691-10 MRN [id number]  : 1933 Date 2022       Current Admission Date: 2022  Current Admission Diagnosis:Subdural hematoma   Patient Active Problem List    Diagnosis Date Noted   • Epistaxis 2022   • Acute respiratory failure with hypoxia ([address] ) 2022   • Nasal bone fractures 2022   • Subdural hematoma 2022   • Cerebrovascular accident (CVA) due to embolism of cerebral artery ([address] ) 2022   • Aortic valve stenosis 2022   • Cerebral vascular disease 06/10/2022   • Benign paroxysmal positional vertigo due to bilateral vestibular disorder 06/10/2022   • Sensorineural hearing loss (SNHL) of both ears 2022   • Asymptomatic bilateral carotid artery stenosis 2021   • Ischemic cardiomyopathy    • Basal cell carcinoma of skin of nose 2020   • 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 2020   • Pulmonary fibrosis ([address] )    • Peripheral arterial disease ([address] ) 2020   • Atherosclerosis of native arteries of extremities with intermittent claudication, bilateral legs ([address] ) 2019   • Lightheadedness 2019   • Paroxysmal atrial fibrillation ([address] ) 2018   • Type 2 diabetes mellitus with complication, with long-term current use of insulin ([address] ) 2018   • Bradycardia 2018   • Rupture of tendon of biceps, long head 2017   • Stage 3b chronic kidney disease 2017   • Spinal stenosis of lumbar region with neurogenic claudication 2017   • Acquired hypothyroidism 2016   • Mixed hyperlipidemia 2015   • Stented coronary artery 2015   • Essential hypertension 10/29/2010   • Low back pain 2008      LOS (days): 3  Geometric Mean LOS (GMLOS) (days): 4 60  Days to GMLOS:1 2     OBJECTIVE:  Risk of Unplanned Readmission Score: 26 65         Current admission status: Inpatient   Preferred Pharmacy: [facility]  [address], [address] [place] [address]  Phone: [phone] Fax: [phone]    Primary Care Provider: Bhavya Shabazz DO    Primary Insurance: MEDICARE  Secondary Insurance: [address] DETAILS:    Pt recommended for IP rehab  CM contacted pt's son to discuss d/c options   He would like the evening to speak about it with the pt's wife, and contact CM with a decision

## 2022-11-14 NOTE — PLAN OF CARE
Problem: MOBILITY - ADULT  Goal: Maintain or return to baseline ADL function  Description: INTERVENTIONS:  -  Assess patient's ability to carry out ADLs; assess patient's baseline for ADL function and identify physical deficits which impact ability to perform ADLs (bathing, care of mouth/teeth, toileting, grooming, dressing, etc )  - Assess/evaluate cause of self-care deficits   - Assess range of motion  - Assess patient's mobility; develop plan if impaired  - Assess patient's need for assistive devices and provide as appropriate  - Encourage maximum independence but intervene and supervise when necessary  - Involve family in performance of ADLs  - Assess for home care needs following discharge   - Consider OT consult to assist with ADL evaluation and planning for discharge  - Provide patient education as appropriate  Outcome: Progressing  Goal: Maintains/Returns to pre admission functional level  Description: INTERVENTIONS:  - Perform BMAT or MOVE assessment daily    - Set and communicate daily mobility goal to care team and patient/family/caregiver  - Collaborate with rehabilitation services on mobility goals if consulted  - Perform Range of Motion 3 times a day  - Reposition patient every 2 hours    - Dangle patient 3 times a day  - Stand patient 3 times a day  - Ambulate patient 3 times a day  - Out of bed to chair 3 times a day   - Out of bed for meals 3 times a day  - Out of bed for toileting  - Record patient progress and toleration of activity level   Outcome: Progressing     Problem: PAIN - ADULT  Goal: Verbalizes/displays adequate comfort level or baseline comfort level  Description: Interventions:  - Encourage patient to monitor pain and request assistance  - Assess pain using appropriate pain scale  - Administer analgesics based on type and severity of pain and evaluate response  - Implement non-pharmacological measures as appropriate and evaluate response  - Consider cultural and social influences on pain and pain management  - Notify physician/advanced practitioner if interventions unsuccessful or patient reports new pain  Outcome: Progressing     Problem: INFECTION - ADULT  Goal: Absence or prevention of progression during hospitalization  Description: INTERVENTIONS:  - Assess and monitor for signs and symptoms of infection  - Monitor lab/diagnostic results  - Monitor all insertion sites, i e  indwelling lines, tubes, and drains  - Monitor endotracheal if appropriate and nasal secretions for changes in amount and color  - [place] appropriate cooling/warming therapies per order  - Administer medications as ordered  - Instruct and encourage patient and family to use good hand hygiene technique  - Identify and instruct in appropriate isolation precautions for identified infection/condition  Outcome: Progressing  Goal: Absence of fever/infection during neutropenic period  Description: INTERVENTIONS:  - Monitor WBC    Outcome: Progressing     Problem: SAFETY ADULT  Goal: Maintain or return to baseline ADL function  Description: INTERVENTIONS:  -  Assess patient's ability to carry out ADLs; assess patient's baseline for ADL function and identify physical deficits which impact ability to perform ADLs (bathing, care of mouth/teeth, toileting, grooming, dressing, etc )  - Assess/evaluate cause of self-care deficits   - Assess range of motion  - Assess patient's mobility; develop plan if impaired  - Assess patient's need for assistive devices and provide as appropriate  - Encourage maximum independence but intervene and supervise when necessary  - Involve family in performance of ADLs  - Assess for home care needs following discharge   - Consider OT consult to assist with ADL evaluation and planning for discharge  - Provide patient education as appropriate  Outcome: Progressing  Goal: Maintains/Returns to pre admission functional level  Description: INTERVENTIONS:  - Perform BMAT or MOVE assessment daily    - Set and communicate daily mobility goal to care team and patient/family/caregiver  - Collaborate with rehabilitation services on mobility goals if consulted  - Perform Range of Motion 3 times a day  - Reposition patient every 2 hours    - Dangle patient 3 times a day  - Stand patient 3 times a day  - Ambulate patient 3 times a day  - Out of bed to chair 3 times a day   - Out of bed for meals 3 times a day  - Out of bed for toileting  - Record patient progress and toleration of activity level   Outcome: Progressing  Goal: Patient will remain free of falls  Description: INTERVENTIONS:  - Educate patient/family on patient safety including physical limitations  - Instruct patient to call for assistance with activity   - Consult OT/PT to assist with strengthening/mobility   - Keep Call bell within reach  - Keep bed low and locked with side rails adjusted as appropriate  - Keep care items and personal belongings within reach  - Initiate and maintain comfort rounds  - Make Fall Risk Sign visible to staff  - Offer Toileting every 2 Hours, in advance of need  - Initiate/Maintain bed alarm  - Apply yellow socks and bracelet for high fall risk patients  - Consider moving patient to room near nurses station  Outcome: Progressing     Problem: DISCHARGE PLANNING  Goal: Discharge to home or other facility with appropriate resources  Description: INTERVENTIONS:  - Identify barriers to discharge w/patient and caregiver  - Arrange for needed discharge resources and transportation as appropriate  - Identify discharge learning needs (meds, wound care, etc )  - Arrange for interpretive services to assist at discharge as needed  - Refer to Case Management Department for coordinating discharge planning if the patient needs post-hospital services based on physician/advanced practitioner order or complex needs related to functional status, cognitive ability, or social support system  Outcome: Progressing     Problem: Knowledge Deficit  Goal: Patient/family/caregiver demonstrates understanding of disease process, treatment plan, medications, and discharge instructions  Description: Complete learning assessment and assess knowledge base    Interventions:  - Provide teaching at level of understanding  - Provide teaching via preferred learning methods  Outcome: Progressing     Problem: Potential for Falls  Goal: Patient will remain free of falls  Description: INTERVENTIONS:  - Educate patient/family on patient safety including physical limitations  - Instruct patient to call for assistance with activity   - Consult OT/PT to assist with strengthening/mobility   - Keep Call bell within reach  - Keep bed low and locked with side rails adjusted as appropriate  - Keep care items and personal belongings within reach  - Initiate and maintain comfort rounds  - Make Fall Risk Sign visible to staff  - Offer Toileting every 2 Hours, in advance of need  - Initiate/Maintain bed alarm  - Apply yellow socks and bracelet for high fall risk patients  - Consider moving patient to room near nurses station  Outcome: Progressing     Problem: Prexisting or High Potential for Compromised Skin Integrity  Goal: Skin integrity is maintained or improved  Description: INTERVENTIONS:  - Identify patients at risk for skin breakdown  - Assess and monitor skin integrity  - Assess and monitor nutrition and hydration status  - Monitor labs   - Assess for incontinence   - Turn and reposition patient  - Assist with mobility/ambulation  - Relieve pressure over bony prominences  - Avoid friction and shearing  - Provide appropriate hygiene as needed including keeping skin clean and dry  - Evaluate need for skin moisturizer/barrier cream  - Collaborate with interdisciplinary team   - Patient/family teaching  - Consider wound care consult   Outcome: Progressing     Problem: Nutrition/Hydration-ADULT  Goal: Nutrient/Hydration intake appropriate for improving, restoring or maintaining nutritional needs  Description: Monitor and assess patient's nutrition/hydration status for malnutrition  Collaborate with interdisciplinary team and initiate plan and interventions as ordered  Monitor patient's weight and dietary intake as ordered or per policy  Utilize nutrition screening tool and intervene as necessary  Determine patient's food preferences and provide high-protein, high-caloric foods as appropriate       INTERVENTIONS:  - Monitor oral intake, urinary output, labs, and treatment plans  - Assess nutrition and hydration status and recommend course of action  - Evaluate amount of meals eaten  - Assist patient with eating if necessary   - Allow adequate time for meals  - Recommend/ encourage appropriate diets, oral nutritional supplements, and vitamin/mineral supplements  - Order, calculate, and assess calorie counts as needed  - Recommend, monitor, and adjust tube feedings and TPN/PPN based on assessed needs  - Assess need for intravenous fluids  - Provide specific nutrition/hydration education as appropriate  - Include patient/family/caregiver in decisions related to nutrition  Outcome: Progressing

## 2022-11-14 NOTE — PROGRESS NOTES
Heart Failure Progress Note - Ute Aly 80 y o  male MRN: [id number]    Unit/Bed#: ICU 07 Encounter: [id number]      Assessment:  Principal Problem:    Subdural hematoma  Active Problems:    Paroxysmal atrial fibrillation (HCC)    Type 2 diabetes mellitus with complication, with long-term current use of insulin (HCC)    Acquired hypothyroidism    Stage 3b chronic kidney disease    Essential hypertension    Mixed hyperlipidemia    Ischemic cardiomyopathy    Nasal bone fractures    Epistaxis    Acute respiratory failure with hypoxia (HCC)    Acute on chronic HFrEF  Prior history of ICM with a EF of 40%, repeat Echo during this admission with EF 35%  Noted to be in CHF exacerbation on admission (elevated BNP, pulm congestion, respiratory distress)  Intubated due to respiratory distress (combination of epistaxis and CHF)   Extubated on 2022  Home meds: lasix 20 mg daily, coreg 6 25 mg bid, IMDUR 30 m g bid,   Currently on IV lasix 40 bid  BP: 100-130/50-80, this morning had a low BP of 82/45     Troponin elevation: type 2 vs non MI troponin elevation  Troponin trending up from [phone]  EKst degree AV block, LBBB, ST changes not meeting sgarbossa criteria   Per wife, uses nitro intermittently, no recent increased use   Possible non MI troponin elevation due to catecholaminergic surge vs demand ischemia given prior history of CAD     Ischemic cardiomyopathy with CAD  s/p CABG x 3, 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, 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  2017 [facility] with graft occlusion s/p PCI  Was on Clopidogrel, IMDUR, nitroglycerin at home     Severe aortic stenosis  Echo 2022 shows mild-mod stenosis  Repeat echo 22: severe AS with DVI 0 24, LIMA 0 91, mean gradient 10, peak velocity 2 03  Has been having dizziness     Paroxysmal atrial fibrillation  Currently rate controlled afib  Home meds: Coreg 6 25 mg bid, apixaban 2 5 mg bid     Hypertension  Amlodipine was discontinued in the past  Has midodrine as a home medication     1st degree AV block  Present since 2018     Hyperlipidemia  CKD  Diabetes mellitus  PAD  ICH     Plan  1  Still with mild volume overload, continue IV lasix 40 mg bid today, likely transition to po tomorrow  2  Po lopressor 12 5 mg bid, continue to uptitrate as BP tolerates given frequent PVCs, would transition to toprol xl prior to discharge (home coreg 6 25 mg bid)  3  Isordil 10 mg tid + hydralazine 10 mg tid  4  Maintain MAP >65  5  Agree with holding anticoagulation and antiplatelets, would restart once cleared by neurosurgery  6  Continue atorvastatin 80 mg daily  7  No acute interventions for aortic stenosis, however will likely need evaluation for low flow low gradient state vs AS requiring TAVR, once patient recovers, given ongoing dizziness  8  Continue telemetry        Subjective:   Patient seen and examined  Successfully extubated    Objective:     Vitals: Blood pressure (!) 82/45, pulse 76, temperature 99 °F (37 2 °C), temperature source Oral, resp  rate (!) 34, height 5' 8" (1 727 m), weight 76 1 kg (167 lb 12 3 oz), SpO2 93 %  , Body mass index is 25 51 kg/m² ,   Orthostatic Blood Pressures    Flowsheet Row Most Recent Value   Blood Pressure 82/45 filed at 11/14/2022 0800   Patient Position - Orthostatic VS Lying filed at 11/14/2022 0800            Intake/Output Summary (Last 24 hours) at 11/14/2022 1009  Last data filed at 11/14/2022 0800  Gross per 24 hour   Intake 310 ml   Output 1195 ml   Net -885 ml       Telemetry: frequent PVCs, rate controlled afib      Physical Exam:  Physical Exam  Vitals and nursing note reviewed  Constitutional:       Appearance: He is ill-appearing  Neck:      Comments: Cervical collar+  Cardiovascular:      Rate and Rhythm: Normal rate  Rhythm irregular  Pulses: Normal pulses  Heart sounds: No murmur heard  Comments: JVD+  Pulmonary:      Breath sounds: Rales present  No wheezing  Comments:  On room air  Musculoskeletal:      Right lower leg: Edema (1+) present  Left lower leg: Edema (1+) present  Skin:     Comments: Warm peripheries   Neurological:      Mental Status: He is disoriented        Comments: sedated   Psychiatric:         Mood and Affect: Mood normal            Medications:      Current Facility-Administered Medications:   •  acetaminophen (TYLENOL) tablet 650 mg, 650 mg, Oral, Q6H PRN, Cherry Prado PA-C  •  atorvastatin (LIPITOR) tablet 80 mg, 80 mg, Oral, Daily With Tonidavid Rios DO, 80 mg at 11/12/22 1742  •  bisacodyl (DULCOLAX) rectal suppository 10 mg, 10 mg, Rectal, Daily PRN, Brianna Zamora DO  •  furosemide (LASIX) injection 40 mg, 40 mg, Intravenous, BID (diuretic), hCerry Prado PA-C, 40 mg at 11/14/22 3470  •  heparin (porcine) subcutaneous injection 5,000 Units, 5,000 Units, Subcutaneous, Q8H [address], YARI Jones, 5,000 Units at 11/14/22 7142  •  insulin lispro (HumaLOG) 100 units/mL subcutaneous injection 1-6 Units, 1-6 Units, Subcutaneous, Q6H [address], 2 Units at 11/14/22 0558 **AND** Fingerstick Glucose (POCT), , , Q6H, Catarina Prado PA-C  •  levETIRAcetam (KEPPRA) 500 mg in sodium chloride 0 9 % 100 mL IVPB, 500 mg, Intravenous, Q12H [address], Cherry Prado PA-C, Last Rate: 400 mL/hr at 11/14/22 0824, 500 mg at 11/14/22 9479  •  levothyroxine tablet 75 mcg, 75 mcg, Oral, Early Morning, Brianna Zamora DO, 75 mcg at 11/13/22 0605  •  metoprolol (LOPRESSOR) injection 5 mg, 5 mg, Intravenous, Q6H, Catarina Prado PA-C, 5 mg at 11/14/22 1735  •  oxyCODONE (ROXICODONE) IR tablet 2 5 mg, 2 5 mg, Oral, Q4H PRN **OR** oxyCODONE (ROXICODONE) IR tablet 5 mg, 5 mg, Oral, Q4H PRN, Cherry Prado PA-C  •  oxymetazoline (AFRIN) 0 05 % nasal spray 2 spray, 2 spray, Each Nare, Q12H [address], Catarina Prado PA-C, 2 spray at 11/14/22 0823  •  pantoprazole (PROTONIX) injection 40 mg, 40 mg, Intravenous, Q24H [address], Missy Hobbs DO, 40 mg at 11/14/22 6363  •  polyethylene glycol (MIRALAX) packet 17 g, 17 g, Oral, Daily, Tierrasandra Prado PA-C, 17 g at 11/13/22 7983  •  potassium chloride 20 mEq IVPB (premix), 20 mEq, Intravenous, Once, Tabitha Mccann PA-C, Last Rate: 50 mL/hr at 11/14/22 0822, 20 mEq at 11/14/22 5351  •  QUEtiapine (SEROquel) tablet 25 mg, 25 mg, Oral, HS, Fara Vegas DO  •  senna-docusate sodium (SENOKOT S) 8 6-50 mg per tablet 1 tablet, 1 tablet, Oral, BID, YARI Yusuf, 1 tablet at 11/13/22 0828     Labs & Results:        Results from last 7 days   Lab Units 11/14/22  0554 11/13/22  0612 11/12/22  0452   WBC Thousand/uL 12 18* 13 13* 13 15*   HEMOGLOBIN g/dL 11 8* 13 3 13 5   HEMATOCRIT % 35 4* 39 5 39 1   PLATELETS Thousands/uL 147* 146* 152         Results from last 7 days   Lab Units 11/14/22  0554 11/14/22  0106 11/13/22  0612 11/11/22  0544 11/11/22  0540 11/11/22  0217   POTASSIUM mmol/L 3 9 3 8 3 3*   < >  --  4 2   CHLORIDE mmol/L 107 107 105   < >  --  98   CO2 mmol/L 24 25 24   < >  --  28   CO2, I-STAT mmol/L  --   --   --   --  29  --    BUN mg/dL 40* 34* 26*   < >  --  18   CREATININE mg/dL 1 50* 1 55* 1 37*   < >  --  1 22   CALCIUM mg/dL 8 9 8 9 9 1   < >  --  9 4   ALK PHOS U/L  --   --   --   --   --  71   ALT U/L  --   --   --   --   --  16   AST U/L  --   --   --   --   --  21   GLUCOSE, ISTAT mg/dl  --   --   --   --  301*  --     < > = values in this interval not displayed       Results from last 7 days   Lab Units 11/11/22  0217   INR  1 26*   PTT seconds 26     Results from last 7 days   Lab Units 11/14/22  0554 11/13/22  0612 11/12/22  0452   MAGNESIUM mg/dL 2 7* 2 3 2 1         EKG personally reviewed by Nicko Warner MD

## 2022-11-14 NOTE — PROGRESS NOTES
Daily Progress Note - Critical Care   Ramin Shrestha 80 y o  male MRN: [id number]  Unit/Bed#: ICU 07 Encounter: [id number]        ----------------------------------------------------------------------------------------  HPI: "Patient 80year old male with a significant past medical history of ischemic cardiomyopathy, atrial fibrillation on eliquis, currently presenting as a trauma transfer from [facility] secondary to a SDH  He initially presented following a mechanical fall and headstrike, and arrived to the ED awake, alert, and oriented  He was short of breath and initially treated as a CHF exacerbation and placed on a nitroglycerin drip  He is having epistaxis secondary to his fall and had a rhino rocket placed into his right naris  He had progressive worsening of his respiratory status and because of inability to begin BiPAP secondary to his facial injuries he was intubated  Imaging was later remarkable for 2 mm subdural hematoma without any midline shift  He was given Kcentra transferred to [address] [place] [address] "    24hr events:   Patient had some intermittent agitation overnight for which he received zyprexa 5mg IM, continued so gave 0 3mg dilaudid  Patient did not sleep overnight  Has been removing HiFlo NC and O2 sats appropriate, therefore removed     ---------------------------------------------------------------------------------------  SUBJECTIVE  Unable to perform secondary to mental status        Review of Systems   Unable to perform ROS: Mental status change     Review of systems was unable to be performed secondary to ETT  ---------------------------------------------------------------------------------------  Assessment and Plan:    Neuro:   • Diagnosis: Analgesia/Delerium precautions  o Plan:  - PRN: Acetaminophen, Oxycodone 2 5/5 0   - Sleep Hygiene: Seroquel 25mg qhs ordered  • Diagnosis: Traumatic SDH  o Plan:   - Stable on repeat CTH  - q2 hr neuro checks  - 500mg keppra BID for 7 days  - CTH in 2 weeks as per neurosurgery  - Clear for DVT ppx as per neurosurgery      CV:   • Diagnosis: Decompensated HF  o Plan:  - Heart failure team following, appreciate ongoing recs  - 40mg lasix BID  • Diagnosis: Non MI troponin elevation  o Plan:   - Nonischemic ECG (LBBB, old)  - Troponin downtrending, no longer trending  - Cardiology not recommending cath at this time  • Diagnosis: Ischemic cardiomyopathy  o EF 35% on echo this admission  o Plan:   - Imdur 30mg daily  - As per cardiology, start beta blocker (metoprolol) when able  • Diagnosis: History of paroxysmal afib  o Plan:   - Holding home eliquis  - Lopressor 5mg IV q 6 hrs  • Diagnosis: History of HTN  o Plan:   - Hydralizine,10mg q8hrs  • Diagnosis: History of HLD  o Plan:   - Atorvastatin ordered      Pulm:  • Diagnosis: Acute hypoxemic ventilatory dependent respiratory failure  o Plan:   - SAT/SBT today  - Will attempt extubation today if clinically appropriate  • Diagnosis: Pulmonary edema/effusion  o Plan:   - Diuresis as above      GI:   • Diagnosis: No acute issues  o Plan:   - Senna/colace bowel regimen  - Protonix GI ppx, GERD      :   • Diagnosis: Stage 3 CKD  o Plan:   - Baseline 1 3-1 7  - Has been at baseline, continue to trend on routine labs      F/E/N:   • Plan:   o F- none  o E- hypokalemia repleted this morning, monitor and replete as needed, goal Mag >2, Phos >3, K >4  o N- NPO, speech to eval to advance diet      Heme/Onc:   • Diagnosis: No acute issues  o Plan:   - SQH      Endo:   • Diagnosis: History of T2DM  o Plan:   - SSI, Goal -180  • Diagnosis: History of Hypothyroidism  o Plan  - Continue Levothyroxine 75mcg      ID:   • Diagnosis: No acute issues  o Plan:   - Monitor WBC, fever curve      MSK/Skin:   • Diagnosis: Bilateral nasal bone fractures, epistaxis  o Plan:   - Non op as per OMFS, will follow outpatient  - Rhinorocket in right nare, will remove today  - Afrin 0 05% 2 sprays q 12 hrs  - Pain control  • Diagnosis: ICU care  o Plan:   - PT/OT when extubated  - Frequent reposition  - Routine skin care    Patient appropriate for transfer out of the ICU today?: No  Disposition: Continue Critical Care   Code Status: Level 1 - Full Code  ---------------------------------------------------------------------------------------  ICU CORE MEASURES    Prophylaxis   VTE Pharmacologic Prophylaxis: Heparin  VTE Mechanical Prophylaxis: sequential compression device  Stress Ulcer Prophylaxis: Pantoprazole PO    ABCDE Protocol (if indicated)  Plan to perform spontaneous awakening trial today? Yes  Plan to perform spontaneous breathing trial today? Yes  Obvious barriers to extubation? No    Invasive Devices Review  Invasive Devices  Report    Central Venous Catheter Line  Duration           CVC Central Lines 22 Triple Right Femoral 3 days          Peripheral Intravenous Line  Duration           Peripheral IV 22 Left Hand 3 days          Drain  Duration           Urethral Catheter 14 Fr  3 days              Can any invasive devices be discontinued today?    Consider removing Right Femoral CVC and Churchill Catheter  ---------------------------------------------------------------------------------------  OBJECTIVE    Vitals   Vitals:    22 0400 22 0500 22 0600 22 0700   BP: 110/58 123/61 115/52 105/56   BP Location: Left arm      Pulse: 82 86 86 94   Resp: (!)  22 22   Temp: 98 5 °F (36 9 °C)      TempSrc: Oral      SpO2: 98% 93% 96% 93%   Weight:   76 1 kg (167 lb 12 3 oz)    Height:         Temp (24hrs), Av 6 °F (37 °C), Min:98 4 °F (36 9 °C), Max:99 °F (37 2 °C)  Current: Temperature: 98 5 °F (36 9 °C)    Respiratory:  SpO2: SpO2: 93 %       Invasive/non-invasive ventilation settings   Respiratory  Report   Lab Data (Last 4 hours)    None         O2/Vent Data (Last 4 hours)       0330          Non-Invasive Ventilation Mode HFNC (High flow)                   Physical Exam  Constitutional: General: He is not in acute distress  Interventions: He is sedated and intubated  HENT:      Head:      Comments: Ecchymosis over face bilaterally     Right Ear: External ear normal       Left Ear: External ear normal       Nose:      Comments: Rhino rocket removed, no bleeding     Mouth/Throat:      Mouth: Mucous membranes are moist       Pharynx: Oropharynx is clear  Eyes:      General: No scleral icterus  Right eye: No discharge  Left eye: No discharge  Extraocular Movements: Extraocular movements intact  Conjunctiva/sclera: Conjunctivae normal       Comments: Bilateral periorbital ecchymosis   Cardiovascular:      Rate and Rhythm: Normal rate  Rhythm irregular  Heart sounds: No murmur heard  No friction rub  No gallop  Pulmonary:      Effort: Pulmonary effort is normal  He is intubated  Breath sounds: Normal breath sounds  Abdominal:      General: Abdomen is flat  Bowel sounds are normal       Palpations: Abdomen is soft  Tenderness: There is no abdominal tenderness  Musculoskeletal:         General: Normal range of motion  Cervical back: Normal range of motion and neck supple  Right lower leg: Edema present  Left lower leg: Edema present  Comments: Right femoral CVC   Skin:     General: Skin is warm and dry  Neurological:      Mental Status: He is alert  He is disoriented        Comments: Did not sleep well overnight             Laboratory and Diagnostics:  Results from last 7 days   Lab Units 11/14/22  0554 11/13/22  0612 11/12/22  0452 11/11/22  0544 11/11/22  0540 11/11/22  0217   WBC Thousand/uL 12 18* 13 13* 13 15* 12 96*  --  7 37   HEMOGLOBIN g/dL 11 8* 13 3 13 5 13 7  --  14 3   I STAT HEMOGLOBIN g/dl  --   --   --   --  13 9  --    HEMATOCRIT % 35 4* 39 5 39 1 40 7  --  42 1   HEMATOCRIT, ISTAT %  --   --   --   --  41  --    PLATELETS Thousands/uL 147* 146* 152 186  --  178   NEUTROS PCT %  --   --  82* 86*  --  66   MONOS PCT %  --   --  8 5  --  8     Results from last 7 days   Lab Units 11/14/22  0554 11/14/22  0106 11/13/22  0612 11/12/22  0452 11/11/22  0544 11/11/22  0540 11/11/22  0217   SODIUM mmol/L 139 139 137 135 135  --  133*   POTASSIUM mmol/L 3 9 3 8 3 3* 3 2* 4 0  --  4 2   CHLORIDE mmol/L 107 107 105 102 102  --  98   CO2 mmol/L 24 25 24 26 25  --  28   CO2, I-STAT mmol/L  --   --   --   --   --  29  --    ANION GAP mmol/L 8 7 8 7 8  --  7   BUN mg/dL 40* 34* 26* 26* 20  --  18   CREATININE mg/dL 1 50* 1 55* 1 37* 1 53* 1 36*  --  1 22   CALCIUM mg/dL 8 9 8 9 9 1 8 8 8 6  --  9 4   GLUCOSE RANDOM mg/dL 204* 202* 125 229* 314*  --  315*   ALT U/L  --   --   --   --   --   --  16   AST U/L  --   --   --   --   --   --  21   ALK PHOS U/L  --   --   --   --   --   --  71   ALBUMIN g/dL  --   --   --   --   --   --  4 2   TOTAL BILIRUBIN mg/dL  --   --   --   --   --   --  1 34*     Results from last 7 days   Lab Units 11/14/22  0554 11/13/22  0612 11/12/22 0452 11/11/22  0544 11/11/22  0327   MAGNESIUM mg/dL 2 7* 2 3 2 1 1 8 1 8*   PHOSPHORUS mg/dL 4 0 3 2 3 7 3 5  --       Results from last 7 days   Lab Units 11/11/22 0217   INR  1 26*   PTT seconds 26              ABG:  Results from last 7 days   Lab Units 11/11/22 0404   PH ART  7 381   PCO2 ART mm Hg 40 5   PO2 ART mm Hg 80 3   HCO3 ART mmol/L 23 5   BASE EXC ART mmol/L -1 5   ABG SOURCE  Radial, Left     VBG:  Results from last 7 days   Lab Units 11/11/22 0404 11/11/22  0325   PH MATTHEW   --  7 325   PCO2 MATTHEW mm Hg  --  50 6*   PO2 MATTHEW mm Hg  --  61 9*   HCO3 MATTHEW mmol/L  --  25 8   BASE EXC MATTHEW mmol/L  --  -1 0   ABG SOURCE  Radial, Left  --            Micro        Imaging:  I have personally reviewed pertinent reports  Intake and Output  I/O       11/11 0701 11/12 0700 11/12 0701 11/13 0700    P  O   0    I V  (mL/kg) 302 3 (3 6) 198 3 (2 4)    IV Piggyback 150 250    Total Intake(mL/kg) 452 3 (5 4) 448 3 (5 4)    Urine (mL/kg/hr) 1825 (0 9) 1410 (0 7) Emesis/NG output 200 30    Total Output 2025 1440    Net -1572 8 -991 7                Height and Weights   Height: 5' 8" (172 7 cm)  IBW (Ideal Body Weight): 68 4 kg  Body mass index is 25 51 kg/m²  Weight (last 2 days)     Date/Time Weight    11/14/22 0600 76 1 (167 77)            Nutrition       Diet Orders   (From admission, onward)             Start     Ordered    11/13/22 1217  Diet NPO  Diet effective now        References:    Nutrtion Support Algorithm Enteral vs  Parenteral   Question Answer Comment   Diet Type NPO    RD to adjust diet per protocol?  Yes        11/13/22 1217                  Active Medications  Scheduled Meds:  Current Facility-Administered Medications   Medication Dose Route Frequency Provider Last Rate   • acetaminophen  650 mg Oral Q6H PRN Jovanni Blandon PA-C     • atorvastatin  80 mg Oral Daily With [facility] DO     • bisacodyl  10 mg Rectal Daily PRN Betsy Braun DO     • furosemide  40 mg Intravenous BID (diuretic) Jovanni Blandon PA-C     • heparin (porcine)  5,000 Units Subcutaneous [facility] YARI Ahmadi     • insulin lispro  1-6 Units Subcutaneous Q6H [facility] Jovanni Blandon PA-C     • levETIRAcetam  500 mg Intravenous Q12H [facility] Jovanni Blandon PA-C Stopped (11/13/22 2200)   • levothyroxine  75 mcg Oral Early Morning Marcela Zuniga DO     • metoprolol  5 mg Intravenous Q6H Jovanni Blandon PA-C     • oxyCODONE  2 5 mg Oral Q4H PRN Jovanni Blandon PA-C      Or   • oxyCODONE  5 mg Oral Q4H PRN Jovanni Blandon PA-C     • oxymetazoline  2 spray Each Nare Q12H [facility] Nora Shivam Prado PA-C     • pantoprazole  40 mg Intravenous Q24H [facility] Missy Hobbs DO     • polyethylene glycol  17 g Oral Daily Noracharly Prado PA-C     • potassium chloride  20 mEq Intravenous Once Jovanni Blandon PA-C     • QUEtiapine  25 mg Oral HS Fara Vegas DO     • senna-docusate sodium  1 tablet Oral BID Scottie Higgins YARI Díaz       Continuous Infusions:     PRN Meds:   acetaminophen, 650 mg, Q6H PRN  bisacodyl, 10 mg, Daily PRN  oxyCODONE, 2 5 mg, Q4H PRN   Or  oxyCODONE, 5 mg, Q4H PRN        Allergies   No Known Allergies  ---------------------------------------------------------------------------------------  Advance Directive and Living Will:      Power of :    POLST:    ---------------------------------------------------------------------------------------  Care Time Delivered:   No Critical Care time spent     [facility], DO      Portions of the record may have been created with voice recognition software  Occasional wrong word or "sound a like" substitutions may have occurred due to the inherent limitations of voice recognition software    Read the chart carefully and recognize, using context, where substitutions have occurred

## 2022-11-14 NOTE — PLAN OF CARE
Problem: OCCUPATIONAL THERAPY ADULT  Goal: Performs self-care activities at highest level of function for planned discharge setting  See evaluation for individualized goals  Description: Treatment Interventions: ADL retraining, Visual perceptual retraining, Functional transfer training, Endurance training, Cognitive reorientation, UE strengthening/ROM, Patient/family training, Equipment evaluation/education, Compensatory technique education, Continued evaluation, Energy conservation, Activityengagement          See flowsheet documentation for full assessment, interventions and recommendations  Note: Limitation: Decreased ADL status, Decreased Safe judgement during ADL, Decreased cognition, Decreased endurance, Decreased self-care trans, Decreased high-level ADLs, Visual deficit  Prognosis: Fair  Assessment: Pt is a 81 y/o male seen for OT eval s/p adm to B as a transfer from [facility] 2/2 fall while getting OOB  Pt is dx'd w/ ICH and B/L nasal bone fractures  Pt  has a past medical history of Benign hypertension, CHF (congestive heart failure) ([facility] [address] ), Chronic kidney disease (CKD), stage III (moderate), Coronary artery disease, Hypothyroidism, Ischemic cardiomyopathy, Mixed hyperlipidemia, Paroxysmal atrial fibrillation, PVD (peripheral vascular disease), and Type II diabetes mellitus  Pt with active OT orders and up with assistance  orders  Pt lives with his spouse in a ranch home w/ 1 EDI  Pt was I w/  ADLS, drove, & required use of DME PTA including a RW  Pt is currently demonstrating the following occupational deficits:Min A UB ADLS, Max A LB ADLS, Mod A x2 bed mobility, Min A EOB sitting, Mod A x2 transfers and functional mobility w/ B/L HHA   These deficits that are impacting pt's baseline areas of occupation are a result of the following impairments: pain, endurance, activity tolerance, functional mobility, forward functional reach, balance, trunk control, functional standing tolerance, decreased I w/ ADLS/IADLS, visual deficits, cognitive impairments, decreased safety awareness and decreased insight into deficits  The following Occupational Performance Areas to address include: eating, grooming, bathing/shower, toilet hygiene, dressing, medication management, socialization, health maintenance, functional mobility, community mobility, clothing management and household maintenance  Recommend inpatient rehab  upon D/C   Pt to continue to benefit from acute immediate OT services to address the following goals 2-3x/week to  w/in 10-14 days:     OT Discharge Recommendation: Post acute rehabilitation services     Keri Pope MS, OTR/L

## 2022-11-14 NOTE — PROGRESS NOTES
Pt transferred to [address] [place] [address] [address] via w/c and assisted back to bed   Wife aware

## 2022-11-14 NOTE — SPEECH THERAPY NOTE
Speech-Language Pathology Bedside Swallow Evaluation      Patient Name: Yaneli Kimble    [id number] Date: 11/14/2022     Problem List  Principal Problem:    Subdural hematoma  Active Problems:    Paroxysmal atrial fibrillation (HCC)    Type 2 diabetes mellitus with complication, with long-term current use of insulin (HCC)    Acquired hypothyroidism    Stage 3b chronic kidney disease    Essential hypertension    Mixed hyperlipidemia    Ischemic cardiomyopathy    Nasal bone fractures    Epistaxis    Acute respiratory failure with hypoxia (HCC)      Past Medical History  Past Medical History:   Diagnosis Date   • Benign hypertension    • CHF (congestive heart failure) (HCC)    • Chronic kidney disease (CKD), stage III (moderate)    • Coronary artery disease    • Hypothyroidism    • Ischemic cardiomyopathy    • Mixed hyperlipidemia    • Paroxysmal atrial fibrillation    • PVD (peripheral vascular disease)    • Type II diabetes mellitus        Past Surgical History  Past Surgical History:   Procedure Laterality Date   • CARDIAC CATHETERIZATION     • CORONARY ARTERY BYPASS GRAFT         Summary   Pt presented with s/s suggestive of mild oral and suspected mild pharyngeal dysphagia  The patient is assessed with puree solids with nectar thick and thin liquids  Oral transfer time is min prolonged, with suspected delayed swallow initiation time  The patient is observed with multiple swallows to clear small amounts  Throat clear x1 observed with thin liquids  No additional s/s aspiration       Risk/s for Aspiration: mod      Recommended Diet: puree/level 1 diet and thin liquids   Recommended Form of Meds: crushed with puree   Aspiration precautions and swallowing strategies: upright posture, only feed when fully alert and small bites/sips  Other Recommendations: Continue frequent oral care    Current Medical Status  Chief Complaint: fall  Mechanism:Fall    HPI:    Yaneli Kimble is a 80 y o  male who presents as a level A transfer from [facility] 2/2 fall with ICH  He reportedly was getting out of his bed this evening when he fell and struck his face on the floor  He had immediate bleeding from his nose and pain and was taken to the ED for evaluation  In the ED he was initially GCS 15  However, during evaluation he became progressively short of breath requiring intubation and sedation  He was found to have nasal fractures and had packing placed in his nares  He was also found to have an [address] on CT scan so he was [facility] for trauma evaluation  On arrival, patient was intubated and sedated and unable to provide further history  Current Precautions:  Fall  Aspiration    Allergies:  No known food allergies  Past medical history:  Please see H&P for details    Special Studies:  Chest xray 11/11/2022:   Pulmonary edema persists though has improved since radiograph performed 2 hours prior    Social/Education/Vocational Hx:  Pt lives with family    Swallow Information   Current Risks for Dysphagia & Aspiration: recent intubation, decreased alertness and bruised mouth and oral cavity   Current Symptoms/Concerns: throat clear x1  Current Diet: NPO   Baseline Diet: assume regular with thin liquids     Baseline Assessment   Behavior/Cognition: alert  Speech/Language Status: able to participate in basic conversation and able to follow commands  Patient Positioning: upright in chair  Pain Status/Interventions/Response to Interventions: No report of or nonverbal indications of pain       Swallow Mechanism Exam  Facial: symmetrical  Labial: WFL  Lingual: WFL  Velum: unable to visualize  Mandible: adequate ROM  Dentition: limited dentition  Vocal quality:weak   Respiratory Status: on RA       Consistencies Assessed and Performance   Consistencies Administered: thin liquids, nectar thick and puree  Materials administered included applesauce with nectar thick and thin liquids     Oral Stage: mild  Retrieval of all items via tsp and straw is adequate  Bolus formation was adequate  Transfer time appears min prolonged with all  No oral residue observed  Pharyngeal Stage: mild  Swallow Mechanics:  Swallowing initiation time is suspected to be delayed  Laryngeal rise was palpated and judged to be within functional limits  Throat clear observed x1 with thin liquids  Esophageal Concerns: none reported    Summary and Recommendations (see above)    Results Reviewed with: RN and MD     Treatment Recommended: dysphagia therapy      Frequency of treatment: 3-5 x week, as able     Patient Stated Goal: none stated     Dysphagia LTG  -Patient will demonstrate safe and effective oral intake (without overt s/s significant oral/pharyngeal dysphagia including s/s penetration or aspiration) for the highest appropriate diet level       Short Term Goals:  -Pt will tolerate Dysphagia 1/pureed diet and thin liquid with no significant s/s oral or pharyngeal dysphagia across 1-3 diagnostic session/s    -Patient will tolerate trials of upgraded food and/or liquid texture with no significant s/s of oral or pharyngeal dysphagia including aspiration across 1-3 diagnostic sessions     Speech Therapy Prognosis   Prognosis: fair    Prognosis Considerations: age and medical status

## 2022-11-14 NOTE — PHYSICAL THERAPY NOTE
Physical Therapy evaluation note     Patient Name: Mansi Dou    CXOTL'N Date: 11/14/2022     Problem List  Principal Problem:    Subdural hematoma  Active Problems:    Paroxysmal atrial fibrillation (HCC)    Type 2 diabetes mellitus with complication, with long-term current use of insulin (HCC)    Acquired hypothyroidism    Stage 3b chronic kidney disease    Essential hypertension    Mixed hyperlipidemia    Ischemic cardiomyopathy    Nasal bone fractures    Epistaxis    Acute respiratory failure with hypoxia (HCC)       Past Medical History  Past Medical History:   Diagnosis Date    Benign hypertension     CHF (congestive heart failure) (HCC)     Chronic kidney disease (CKD), stage III (moderate)     Coronary artery disease     Hypothyroidism     Ischemic cardiomyopathy     Mixed hyperlipidemia     Paroxysmal atrial fibrillation     PVD (peripheral vascular disease)     Type II diabetes mellitus         Past Surgical History  Past Surgical History:   Procedure Laterality Date    CARDIAC CATHETERIZATION      CORONARY ARTERY BYPASS GRAFT        11/14/22 1035   PT Last Visit   PT Visit Date 11/14/22   Note Type   Note type Evaluation   Pain Assessment   Pain Assessment Tool FLACC   Pain Rating: FLACC (Rest) - Face 0   Pain Rating: FLACC (Rest) - Legs 0   Pain Rating: FLACC (Rest) - Activity 0   Pain Rating: FLACC (Rest) - Cry 0   Pain Rating: FLACC (Rest) - Consolability 0   Score: FLACC (Rest) 0   Pain Rating: FLACC (Activity) - Face 0   Pain Rating: FLACC (Activity) - Legs 0   Pain Rating: FLACC (Activity) - Activity 0   Pain Rating: FLACC (Activity) - Cry 0   Pain Rating: FLACC (Activity) - Consolability 0   Score: FLACC (Activity) 0   Restrictions/Precautions   Weight Bearing Precautions Per Order No   Other Precautions Cognitive; Fall Risk; Chair Alarm; Bed Alarm; Restraints;Pain;Visual impairment  (posey and B mitts)   Home Living   Type of Home House Home Layout Two level; One level   Home Equipment Walker   Additional Comments Pt lives in a one story home with 1 EDI  Pt used a RW prior  Pt lives with his spouse  Per CM note, pt is unable to state at this time   Prior Function   Level of [place] Independent with ADLs; Independent with functional mobility   Lives With Spouse   Receives Help From Family   IADLs Independent with driving   Falls in the last 6 months 1 to 4   Vocational Retired   General   Family/Caregiver Present No   Cognition   Overall Cognitive Status Impaired   Attention Attends with cues to redirect   Orientation Level Oriented to person;Disoriented to place; Disoriented to time;Disoriented to situation   RLE Assessment   RLE Assessment X  (grossly 3-/5, unable to perform full MMT due to confusion)   LLE Assessment   LLE Assessment   (grossly 3-/5, unable to perform full MMT due to confusion)   Bed Mobility   Supine to Sit 3  Moderate assistance   Additional items Assist x 2   Additional Comments sitting EOB min A level   Transfers   Sit to Stand 3  Moderate assistance   Additional items Assist x 2   Stand to Sit 3  Moderate assistance   Additional items Assist x 2   Ambulation/Elevation   Gait pattern Excessively slow; Step to; Foward flexed; Shuffling; Forward Flexion   Gait Assistance 3  Moderate assist   Additional items Assist x 2   Assistive Device   (BUE HHA)   Distance 3ft to chair   Balance   Static Sitting Poor +   Dynamic Sitting Poor +   Static Standing Poor   Dynamic Standing Poor   Ambulatory Poor -   Endurance Deficit   Endurance Deficit Yes   Activity Tolerance   Activity Tolerance Patient limited by fatigue   Medical Staff Made Aware OT   Nurse Made Aware any approved therapy session   Assessment   Prognosis Good   Problem List Decreased strength;Decreased endurance; Impaired balance;Decreased mobility; Decreased cognition;Decreased safety awareness   Assessment Pt is a 79 yo male admitted to [place] [address] on 11/11/2022 s/p fall  DX: traumatic SDH, decompensated HF, non MI troponin elevation, ischemic cardiomyopathy, hx of A fib, HTN, acute hypoxemic ventilatory dependent respiratory failure, CKD, DM,  nasal bone fractures  Pt was intubated on 11/11 and extubated on 11/13  Two patient identifiers were used to confirm  Pt lives in a [facility] with 1 EDI  Pt lives with his wife  Pt was I for ADL's and required A with IADL's prior  Pt used a RW prior  Per notes, pt was unable to state due to being a poor historian  Pt's impairments include reduced mobility, poor endurance, gait abnormalities, pain, confusion, poor activity tolerance  These impairments limit the ability of the patient to perform mobility without increased assistance, return to PLOF and participate in everyday life activities  Pt would benefit from continued skilled therapy while in the hospital to improve overall mobility and work towards a safe d/c  Recommend discharge to rehab  At the end of the session the patient was left in seated position with call bell and phone within reach  The patient's AM-PAC Basic Mobility Inpatient Short Form Raw Score is 7  A Raw score of less than or equal to 16 suggests the patient may benefit from discharge to post-acute rehabilitation services  Please also refer to the recommendation of the Physical Therapist for safe discharge planning  Barriers to Discharge Inaccessible home environment;Decreased caregiver support   Goals   STG Expiration Date 11/28/22   Short Term Goal #1 STG 1: Pt will perform transfers at a S level to return to baseline of function  STG 2: Pt will ambulate 300ft with RW at a S level to reduce the level of assistance needed upon d/c home  STG 3: Pt will perform bed mobility at a min A  to safety return to PLOF  PT Treatment Day 0   Plan   Treatment/Interventions Functional transfer training;LE strengthening/ROM; Therapeutic exercise; Endurance training;Bed mobility;Gait training;Equipment eval/education;OT   PT Frequency 2-3x/wk Recommendation   PT Discharge Recommendation Post acute rehabilitation services   AM-PAC Basic Mobility Inpatient   Turning in Bed Without Bedrails 2   Lying on Back to Sitting on Edge of Flat Bed 1   Moving Bed to Chair 1   Standing Up From Chair 1   Walk in Room 1   Climb 3-5 Stairs 1   Basic Mobility Inpatient Raw Score 7   Turning Head Towards Sound 3   Follow Simple Instructions 3   Low Function Basic Mobility Raw Score 13   Low Function Basic Mobility Standardized Score 20 14   Highest Level Of Mobility   JH-HLM Goal 2: Bed activities/Dependent transfer   JH-HLM Achieved 4: Move to chair/commode   Cecilia Reza, PT, DPT

## 2022-11-14 NOTE — PLAN OF CARE
Problem: PHYSICAL THERAPY ADULT  Goal: Performs mobility at highest level of function for planned discharge setting  See evaluation for individualized goals  Description: Treatment/Interventions: Functional transfer training, LE strengthening/ROM, Therapeutic exercise, Endurance training, Bed mobility, Gait training, Equipment eval/education, OT          See flowsheet documentation for full assessment, interventions and recommendations  Note: Prognosis: Good  Problem List: Decreased strength, Decreased endurance, Impaired balance, Decreased mobility, Decreased cognition, Decreased safety awareness  Assessment: Pt is a 81 yo male admitted to [place] [address] on 11/11/2022 s/p fall  DX: traumatic SDH, decompensated HF, non MI troponin elevation, ischemic cardiomyopathy, hx of A fib, HTN, acute hypoxemic ventilatory dependent respiratory failure, CKD, DM,  nasal bone fractures  Pt was intubated on 11/11 and extubated on 11/13  Two patient identifiers were used to confirm  Pt lives in a [facility] with 1 EDI  Pt lives with his wife  Pt was I for ADL's and required A with IADL's prior  Pt used a RW prior  Per notes, pt was unable to state due to being a poor historian  Pt's impairments include reduced mobility, poor endurance, gait abnormalities, pain, confusion, poor activity tolerance  These impairments limit the ability of the patient to perform mobility without increased assistance, return to PLOF and participate in everyday life activities  Pt would benefit from continued skilled therapy while in the hospital to improve overall mobility and work towards a safe d/c  Recommend discharge to rehab  At the end of the session the patient was left in seated position with call bell and phone within reach  The patient's AM-PAC Basic Mobility Inpatient Short Form Raw Score is 7  A Raw score of less than or equal to 16 suggests the patient may benefit from discharge to post-acute rehabilitation services   Please also refer to the recommendation of the Physical Therapist for safe discharge planning  Barriers to Discharge: Inaccessible home environment, Decreased caregiver support     PT Discharge Recommendation: Post acute rehabilitation services    See flowsheet documentation for full assessment

## 2022-11-15 PROBLEM — N17.9 ACUTE KIDNEY INJURY (HCC): Status: ACTIVE | Noted: 2022-01-01

## 2022-11-15 NOTE — PROGRESS NOTES
Heart Failure/ Pulmonary Hypertension Progress Note - Selin Gamingu 80 y o  male MRN: [id number]    Unit/Bed#: [facility] 620-01 Encounter: [id number]      Assessment:    Principal Problem:    Subdural hematoma  Active Problems:    Ischemic cardiomyopathy    Paroxysmal atrial fibrillation (HCC)    Type 2 diabetes mellitus with complication, with long-term current use of insulin (HCC)    Acquired hypothyroidism    Stage 3b chronic kidney disease    Essential hypertension    Mixed hyperlipidemia    Nasal bone fractures    Epistaxis    Acute respiratory failure with hypoxia (HCC)      Objective:   Intake/ Output: not kept  Weight: 171 lbs- bed scale  Tele:      MAPs: 61-94 mmHg  Oxygen: Room air  Cr 2 07  K 3 4     # Chronic HFrEF, Stage C     Weight:  NT proBNP     Studies- personally reviewed by me     Echocardiogram 11/11/22  LVEF: 35%, restrictive diastology  LVIDd: 4 9 cm  RV: normal  MR: severe MAC  AV: severely calcified, severe stenosis, mean gradient 10 mmHg, DI 0 24, LIMA 0 9 cm2     Echo 7/20/22:  LVEF: 40%  LVEDd:  RV: normal  AV: moderate AS     Neurohormonal Blockade:  --Beta-Blocker: metoprolol tartrate 12 5 mg BID  --ACEi, ARB or ARNi:    (or SVR reduction): --Aldosterone Receptor Blocker:  --SGLT2-I:   --Diuretic: furosemide 40 mg IV BID     Sudden Cardiac Death Risk Reduction:  --ICD:      Electrical Resynchronization:  Narrow QRS     # ongoing lightheadedness/ disequilibrium as output  - on midodrine as oupt     # frequent PVCs  Restarting metoprolol today  # SDH this admit  # nasal bone fracture  # dyslipidemia- atorvastatin 80 mg daily  # VEE- cr up to 2, diuresis vs low MAPs     Plan:  Replace K  Hold diuretic  Stop isordil, hydral  Restarting low dose lopressor  Hold parameters on GDMT  Restarting metoprolol for frequent PVCs       Review of Systems   Constitutional: Negative for activity change, appetite change, fatigue and unexpected weight change     HENT: Negative for congestion and nosebleeds  Eyes: Negative  Respiratory: Negative for cough, chest tightness and shortness of breath  Cardiovascular: Negative for chest pain, palpitations and leg swelling  Gastrointestinal: Negative for abdominal distention  Endocrine: Negative  Genitourinary: Negative  Musculoskeletal: Negative  Skin: Negative  Neurological: Negative for dizziness, syncope and weakness  Hematological: Negative  Psychiatric/Behavioral: Negative  [facility] (day, reason): [place] catheter (day, reason):    Vitals: Blood pressure 129/77, pulse 66, temperature (!) 96 9 °F (36 1 °C), resp  rate 19, height 5' 8" (1 727 m), weight 78 kg (171 lb 15 3 oz), SpO2 95 %  , Body mass index is 26 15 kg/m² , I/O last 3 completed shifts: In: 981 [P O :698; I V :50; IV Piggyback:200]  Out: 970 [Urine:970]  I/O this shift:  In: -   Out: 661 [Urine:661]  Wt Readings from Last 3 Encounters:   11/15/22 78 kg (171 lb 15 3 oz)   11/11/22 87 9 kg (193 lb 12 6 oz)   09/19/22 78 5 kg (173 lb)       Intake/Output Summary (Last 24 hours) at 11/15/2022 0942  Last data filed at 11/15/2022 0831  Gross per 24 hour   Intake 848 ml   Output 811 ml   Net 37 ml     I/O last 3 completed shifts: In: 378 [P O :698; I V :50; IV Piggyback:200]  Out: 970 [Urine:970]    No significant arrhythmias seen on telemetry review         Physical Exam:  Vitals:    11/15/22 0100 11/15/22 0250 11/15/22 0600 11/15/22 0749   BP:  108/66  129/77   BP Location:       Pulse:  88  66   Resp:  16  19   Temp:    (!) 96 9 °F (36 1 °C)   TempSrc:       SpO2: 90% 94%  95%   Weight:   78 kg (171 lb 15 3 oz)    Height:           GEN: Tashi Yanez appears well, alert and oriented x 3, pleasant and cooperative   HEENT: pupils equal, round, and reactive to light; extraocular muscles intact  NECK: supple, no carotid bruits   HEART: regular rhythm, normal S1 and S2, no murmurs, clicks, gallops or rubs, JVP is    LUNGS: clear to auscultation bilaterally; no wheezes, rales, or rhonchi   ABDOMEN: normal bowel sounds, soft, no tenderness, no distention  EXTREMITIES: peripheral pulses normal; no clubbing, cyanosis, or edema  NEURO: no focal findings   SKIN: normal without suspicious lesions on exposed skin      Current Facility-Administered Medications:   •  acetaminophen (TYLENOL) tablet 975 mg, 975 mg, Oral, Q8H [facility], [facility] Ottoniel Prado PA-C, 975 mg at 11/14/22 2203  •  atorvastatin (LIPITOR) tablet 80 mg, 80 mg, Oral, Daily With Monica Bruno PA-C, 80 mg at 11/14/22 1733  •  bisacodyl (DULCOLAX) rectal suppository 10 mg, 10 mg, Rectal, Daily PRN, Neymar Ottonielmauricio Prado PA-C  •  furosemide (LASIX) injection 40 mg, 40 mg, Intravenous, BID (diuretic), [facility] INNA Prado, 40 mg at 11/14/22 1733  •  heparin (porcine) subcutaneous injection 5,000 Units, 5,000 Units, Subcutaneous, Q8H [facility], [facility] Ottoniel Prado PA-C, 5,000 Units at 11/15/22 9202  •  insulin lispro (HumaLOG) 100 units/mL subcutaneous injection 1-6 Units, 1-6 Units, Subcutaneous, 4x Daily (AC & HS), 4 Units at 11/15/22 0840 **AND** Fingerstick Glucose (POCT), , , 4x Daily AC and at bedtime, mauricio Prado PA-C  •  levETIRAcetam (KEPPRA) tablet 500 mg, 500 mg, Oral, Q12H [facility], Catarina Prado PA-C, 500 mg at 11/15/22 4213  •  levothyroxine tablet 75 mcg, 75 mcg, Oral, Early Morning, mauricio Prado PA-C, 75 mcg at 11/13/22 0605  •  metoprolol tartrate (LOPRESSOR) partial tablet 12 5 mg, 12 5 mg, Oral, Q12H [facility], Catarina Prado PA-C, 12 5 mg at 11/15/22 1951  •  omeprazole (PRILOSEC) suspension 2 mg/mL, 20 mg, Oral, Early Morning, Catarina Prado PA-C  •  oxyCODONE (ROXICODONE) IR tablet 2 5 mg, 2 5 mg, Oral, Q4H PRN **OR** oxyCODONE (ROXICODONE) IR tablet 5 mg, 5 mg, Oral, Q4H PRN, [facility] INNA Prado  •  polyethylene glycol (MIRALAX) packet 17 g, 17 g, Oral, Daily, Catarina Prado PA-C, 17 g at 11/15/22 9905  •  QUEtiapine (SEROquel) tablet 25 mg, 25 mg, Oral, HS, Evon Prado PA-C, 25 mg at 11/14/22 2203  •  senna-docusate sodium (SENOKOT S) 8 6-50 mg per tablet 1 tablet, 1 tablet, Oral, BID, Demian Yanes PA-C, 1 tablet at 11/15/22 3803      Labs & Results:        Results from last 7 days   Lab Units 11/15/22  0521 11/14/22  0554 11/13/22  0612   WBC Thousand/uL 11 48* 12 18* 13 13*   HEMOGLOBIN g/dL 12 0 11 8* 13 3   HEMATOCRIT % 37 1 35 4* 39 5   PLATELETS Thousands/uL 174 147* 146*         Results from last 7 days   Lab Units 11/15/22  0521 11/14/22  0554 11/14/22  0106 11/11/22  0544 11/11/22  0540 11/11/22  0217   POTASSIUM mmol/L 3 4* 3 9 3 8   < >  --  4 2   CHLORIDE mmol/L 106 107 107   < >  --  98   CO2 mmol/L 23 24 25   < >  --  28   CO2, I-STAT mmol/L  --   --   --   --  29  --    BUN mg/dL 57* 40* 34*   < >  --  18   CREATININE mg/dL 2 07* 1 50* 1 55*   < >  --  1 22   CALCIUM mg/dL 8 6 8 9 8 9   < >  --  9 4   ALK PHOS U/L  --   --   --   --   --  71   ALT U/L  --   --   --   --   --  16   AST U/L  --   --   --   --   --  21   GLUCOSE, ISTAT mg/dl  --   --   --   --  301*  --     < > = values in this interval not displayed  Results from last 7 days   Lab Units 11/11/22  0217   INR  1 26*         Counseling / Coordination of Care  Total floor / unit time spent today 25 minutes  Greater than 50% of total time was spent with the patient and / or family counseling and / or coordination of care  A description of the counseling / coordination of care: 25      Thank you for the opportunity to participate in the care of this patient    [address] [facility] PULMONARY HYPERTENSION  MEDICAL DIRECTOR OF [facility]

## 2022-11-15 NOTE — CASE MANAGEMENT
Case Management Discharge Planning Note    Patient name Lucy Espino  Location [facility] 437-06 MRN [id number]  : 1933 Date 11/15/2022       Current Admission Date: 2022  Current Admission Diagnosis:Subdural hematoma   Patient Active Problem List    Diagnosis Date Noted   • Epistaxis 2022   • Acute respiratory failure with hypoxia ([address] ) 2022   • Nasal bone fractures 2022   • Subdural hematoma 2022   • Cerebrovascular accident (CVA) due to embolism of cerebral artery ([address] ) 2022   • Aortic valve stenosis 2022   • Cerebral vascular disease 06/10/2022   • Benign paroxysmal positional vertigo due to bilateral vestibular disorder 06/10/2022   • Sensorineural hearing loss (SNHL) of both ears 2022   • Asymptomatic bilateral carotid artery stenosis 2021   • Ischemic cardiomyopathy    • Basal cell carcinoma of skin of nose 2020   • 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 2020   • Pulmonary fibrosis ([address] )    • Peripheral arterial disease ([address] ) 2020   • Atherosclerosis of native arteries of extremities with intermittent claudication, bilateral legs ([address] ) 2019   • Lightheadedness 2019   • Paroxysmal atrial fibrillation ([address] ) 2018   • Type 2 diabetes mellitus with complication, with long-term current use of insulin ([address] ) 2018   • Bradycardia 2018   • Rupture of tendon of biceps, long head 2017   • Stage 3b chronic kidney disease 2017   • Spinal stenosis of lumbar region with neurogenic claudication 2017   • Acquired hypothyroidism 2016   • Mixed hyperlipidemia 2015   • Stented coronary artery 2015   • Essential hypertension 10/29/2010   • Low back pain 2008      LOS (days): 4  Geometric Mean LOS (GMLOS) (days): 4 60  Days to GMLOS:0 3     OBJECTIVE:  Risk of Unplanned Readmission Score: 23 98         Current admission status: Inpatient   Preferred Pharmacy: [facility]  [address], [address] [place] [address]  Phone: [phone] Fax: [phone]    Primary Care Provider: Marivel Williamson DO    Primary Insurance: MEDICARE  Secondary Insurance: Kori DEUTSCH    DISCHARGE DETAILS:    Discharge planning discussed with[de-identified] June (spouse)  Freedom of Choice: Yes     CM contacted family/caregiver?: Yes  Were Treatment Team discharge recommendations reviewed with patient/caregiver?: Yes  Did patient/caregiver verbalize understanding of patient care needs?: Yes  Were patient/caregiver advised of the risks associated with not following Treatment Team discharge recommendations?: Yes    Contacts  Patient Contacts: Nickie Renata  Relationship to Patient[de-identified] Family  Contact Method: In Person  Reason/Outcome: Continuity of Care, Emergency Contact, Discharge Planning              Other Referral/Resources/Interventions Provided:  Interventions: Short Term Rehab  Referral Comments: CM spoke with wife, June, at bedside to discuss therapy recommendations for rehab  June agreeable to blanket referrals near home  CM placed blanket STR referrals via [address]  Patient currently with posey which will need to be discontinued for discharge to rehab  Patient is vaccinated for covid-19 x1 booster

## 2022-11-15 NOTE — PLAN OF CARE
Problem: PHYSICAL THERAPY ADULT  Goal: Performs mobility at highest level of function for planned discharge setting  See evaluation for individualized goals  Description: Treatment/Interventions: Functional transfer training, LE strengthening/ROM, Therapeutic exercise, Endurance training, Bed mobility, Gait training, Equipment eval/education, OT          See flowsheet documentation for full assessment, interventions and recommendations  Outcome: Progressing  Note: Prognosis: Good  Problem List: Decreased strength, Decreased endurance, Impaired balance, Decreased mobility, Decreased cognition, Decreased safety awareness  Assessment: Pt continues to require Ax2 for all transfers today due to impaired balance, strength & impaired motor planning  Initially attempted sit to stand with RW, but pt unable to safely utilize device to compensate for balance deficits, requiring increased assist to maintain balance next to bed  Trialed BUE HHA with improved ability to provide trunk support & facilitate transfer to recliner  One additional gait trial performed with pt demonstrating ability to advance LEs without assist for weight shfiting or LE advancement  Will continue to trial transfers with LRAD in upcoming sessions, and anticipate pt will make progress pending improved mental status & command following  PT POC & d/c recommendations remain appropriate at this time  Barriers to Discharge: Inaccessible home environment, Decreased caregiver support     PT Discharge Recommendation: Post acute rehabilitation services    See flowsheet documentation for full assessment

## 2022-11-15 NOTE — PROGRESS NOTES
Trauma Notified of drop to GCS  Patient responding to stimuli and speaking clearly, but refusing to open eyes  States he is tired  Will continue to monitor     11/15/22 1700   [place] Coma Scale   Eye Opening 1   Best Verbal Response 4   Best Motor Response 6   [place] Coma Scale Score 11

## 2022-11-15 NOTE — SPEECH THERAPY NOTE
Speech Language/Pathology    Speech/Language Pathology Progress Note    Patient Name: Timi CRANE Date: 11/15/2022     Problem List  Principal Problem:    Subdural hematoma  Active Problems:    Paroxysmal atrial fibrillation (HCC)    Type 2 diabetes mellitus with complication, with long-term current use of insulin (HCC)    Acquired hypothyroidism    Stage 3b chronic kidney disease    Essential hypertension    Mixed hyperlipidemia    Ischemic cardiomyopathy    Nasal bone fractures    Epistaxis    Acute respiratory failure with hypoxia (HCC)    Acute kidney injury ([address] )       Past Medical History  Past Medical History:   Diagnosis Date   • Benign hypertension    • CHF (congestive heart failure) (HCC)    • Chronic kidney disease (CKD), stage III (moderate)    • Coronary artery disease    • Hypothyroidism    • Ischemic cardiomyopathy    • Mixed hyperlipidemia    • Paroxysmal atrial fibrillation    • PVD (peripheral vascular disease)    • Type II diabetes mellitus         Past Surgical History  Past Surgical History:   Procedure Laterality Date   • CARDIAC CATHETERIZATION     • CORONARY ARTERY BYPASS GRAFT           Subjective:  Patient is lethargic today  He is restless in bed, picking at the air  Objective: Attempted to see patient for dysphagia therapy  Oral care provided with oral swabs and suction kit  Patient is very resistive, but eventually allows are  Oral cavity is dry, but clear  Attempt to give patient thin liquids via straw and ice cream via tsp, but patient clamps oral cavity and pushes SLP hand away  RN reports adequate intake at lunch meal      Assessment:  Patient allowed oral care, but refused any PO intake  Plan/Recommendations:  Continue puree diet with thin liquids  Continue ST to further assess tolerance and trial upgrades as able

## 2022-11-15 NOTE — PROGRESS NOTES
[address] [facility]  Progress Note - Elizabeth Shrestha 4/25/1933, 80 y o  male MRN: [id number]  Unit/Bed#: [facility] 620-01 Encounter: [id number]  Primary Care Provider: Ame Hutchison DO   Date and time admitted to hospital: 11/11/2022  5:15 AM    Acute kidney injury [facility])  Assessment & Plan  Cr 2 07 from 1 55  Discontinue lasix  Continue to trend BMPs    Acute respiratory failure with hypoxia [facility])  Assessment & Plan  Patient required intubation for pulm edema, could not tolerate bipap due to facial injuries  Aggressively diuresed and extubated  Heart failure team consulted, appreciate recs  Lasix, isordil, hydral discontinued in setting of VEE  Restart metoprolol  Epistaxis  Assessment & Plan  Resolved  Rhinorocket removed  Nasal bone fractures  Assessment & Plan  Non-op    Ischemic cardiomyopathy  Assessment & Plan  HF following  Appreciate recs, see below under acute resp failure    Mixed hyperlipidemia  Assessment & Plan  Continue statin    Acquired hypothyroidism  Assessment & Plan  Continue levothyroxine     Type 2 diabetes mellitus with complication, with long-term current use of insulin [facility])  Assessment & Plan  Lab Results   Component Value Date    HGBA1C 7 3 (H) 09/17/2022       Recent Labs     11/14/22  1650 11/14/22  2049 11/15/22  0839 11/15/22  1145   POCGLU 295* 277* 277* 264*     -SSI    Blood Sugar Average: Last 72 hrs:  (P) 192    Paroxysmal atrial fibrillation (HCC)  Assessment & Plan  Continue metoprolol  Hold AC/AP in setting of SDH       * Subdural hematoma  Assessment & Plan  - Neuro exam: GCS 14 (4, 4, 6), due to confusion  - Continue neuro checks  - Reversal agent administered: K-Centra  - F/u CT head: Unchanged  - Appreciate neurosurgery recommendations  - Complete 7 day course of Keppra for seizure prophylaxis  - Chemical DVT prophylaxis: SQH  - Hold all anticoagulants and anti platelet medications until cleared by neurosurgery to resume  - PT/OT and cognitive evaluation          Bowel Regimen: miralax, senokot  VTE Prophylaxis:Heparin     Disposition: Continue med surg    Subjective   Chief Complaint: "I want these mitts off"    Subjective: Patient does not offer complaints  Patient wants mitts off  Per nursing, no overnight events other than confusion  Objective   Vitals:   Temp:  [96 9 °F (36 1 °C)-98 4 °F (36 9 °C)] 97 °F (36 1 °C)  HR:  [] 83  Resp:  [16-20] 19  BP: ()/(40-77) 114/75    I/O       11/13 0701  11/14 0700 11/14 0701  11/15 0700 11/15 0701 11/16 0700    P  O   698     I V  (mL/kg) 91 5 (1 2) 50 (0 6)     IV Piggyback 250 100     Total Intake(mL/kg) 341 5 (4 5) 848 (10 9)     Urine (mL/kg/hr) 1445 (0 8) 175 (0 1) 661 (1 3)    Emesis/NG output       Stool 0      Total Output 1445 175 661    Net -1103 5 +673 -661           Unmeasured Stool Occurrence 1 x             Physical Exam:   GENERAL APPEARANCE: NAD, restrained with mitts and posey  NEURO: GCS 14, confusion  HEENT: Ecchymosis around BL eyes, nose  CV: RRR  LUNGS: No resp distress, CTA BL  GI: soft, nontender, nondistended  : Deferred  MSK: Moving all four extremities spontaneously  SKIN: Warm, well perfused     Invasive Devices  Report    Peripheral Intravenous Line  Duration           Peripheral IV Left;Upper Arm -- days                      Lab Results:   Results: I have personally reviewed all pertinent laboratory/tests results, BMP/CMP:   Lab Results   Component Value Date    SODIUM 140 11/15/2022    K 3 4 (L) 11/15/2022     11/15/2022    CO2 23 11/15/2022    BUN 57 (H) 11/15/2022    CREATININE 2 07 (H) 11/15/2022    CALCIUM 8 6 11/15/2022    EGFR 27 11/15/2022    and CBC:   Lab Results   Component Value Date    WBC 11 48 (H) 11/15/2022    HGB 12 0 11/15/2022    HCT 37 1 11/15/2022     (H) 11/15/2022     11/15/2022    MCH 32 3 11/15/2022    MCHC 32 3 11/15/2022    RDW 13 2 11/15/2022    MPV 9 8 11/15/2022    NRBC 0 11/15/2022     Imaging: I have personally reviewed pertinent reports     and I have personally reviewed pertinent films in PACS   Other Studies:

## 2022-11-15 NOTE — PLAN OF CARE
Problem: Prexisting or High Potential for Compromised Skin Integrity  Goal: Skin integrity is maintained or improved  Description: INTERVENTIONS:  - Identify patients at risk for skin breakdown  - Assess and monitor skin integrity  - Assess and monitor nutrition and hydration status  - Monitor labs   - Assess for incontinence   - Turn and reposition patient  - Assist with mobility/ambulation  - Relieve pressure over bony prominences  - Avoid friction and shearing  - Provide appropriate hygiene as needed including keeping skin clean and dry  - Evaluate need for skin moisturizer/barrier cream  - Collaborate with interdisciplinary team   - Patient/family teaching  - Consider wound care consult   Outcome: Progressing     Problem: Nutrition/Hydration-ADULT  Goal: Nutrient/Hydration intake appropriate for improving, restoring or maintaining nutritional needs  Description: Monitor and assess patient's nutrition/hydration status for malnutrition  Collaborate with interdisciplinary team and initiate plan and interventions as ordered  Monitor patient's weight and dietary intake as ordered or per policy  Utilize nutrition screening tool and intervene as necessary  Determine patient's food preferences and provide high-protein, high-caloric foods as appropriate       INTERVENTIONS:  - Monitor oral intake, urinary output, labs, and treatment plans  - Assess nutrition and hydration status and recommend course of action  - Evaluate amount of meals eaten  - Assist patient with eating if necessary   - Allow adequate time for meals  - Recommend/ encourage appropriate diets, oral nutritional supplements, and vitamin/mineral supplements  - Order, calculate, and assess calorie counts as needed  - Recommend, monitor, and adjust tube feedings and TPN/PPN based on assessed needs  - Assess need for intravenous fluids  - Provide specific nutrition/hydration education as appropriate  - Include patient/family/caregiver in decisions related to nutrition  Outcome: Progressing     Problem: SAFETY,RESTRAINT: NV/NON-SELF DESTRUCTIVE BEHAVIOR  Goal: Remains free of harm/injury (restraint for non violent/non self-detsructive behavior)  Description: INTERVENTIONS:  - Instruct patient/family regarding restraint use   - Assess and monitor physiologic and psychological status   - Provide interventions and comfort measures to meet assessed patient needs   - Identify and implement measures to help patient regain control  - Assess readiness for release of restraint   Outcome: Not Progressing  Goal: Returns to optimal restraint-free functioning  Description: INTERVENTIONS:  - Assess the patient's behavior and symptoms that indicate continued need for restraint  - Identify and implement measures to help patient regain control  - Assess readiness for release of restraint   Outcome: Not Progressing

## 2022-11-15 NOTE — PLAN OF CARE
Problem: MOBILITY - ADULT  Goal: Maintain or return to baseline ADL function  Description: INTERVENTIONS:  -  Assess patient's ability to carry out ADLs; assess patient's baseline for ADL function and identify physical deficits which impact ability to perform ADLs (bathing, care of mouth/teeth, toileting, grooming, dressing, etc )  - Assess/evaluate cause of self-care deficits   - Assess range of motion  - Assess patient's mobility; develop plan if impaired  - Assess patient's need for assistive devices and provide as appropriate  - Encourage maximum independence but intervene and supervise when necessary  - Involve family in performance of ADLs  - Assess for home care needs following discharge   - Consider OT consult to assist with ADL evaluation and planning for discharge  - Provide patient education as appropriate  Outcome: Progressing  Goal: Maintains/Returns to pre admission functional level  Description: INTERVENTIONS:  - Perform BMAT or MOVE assessment daily    - Set and communicate daily mobility goal to care team and patient/family/caregiver     - Collaborate with rehabilitation services on mobility goals if consulted  - Record patient progress and toleration of activity level   Outcome: Progressing     Problem: PAIN - ADULT  Goal: Verbalizes/displays adequate comfort level or baseline comfort level  Description: Interventions:  - Encourage patient to monitor pain and request assistance  - Assess pain using appropriate pain scale  - Administer analgesics based on type and severity of pain and evaluate response  - Implement non-pharmacological measures as appropriate and evaluate response  - Consider cultural and social influences on pain and pain management  - Notify physician/advanced practitioner if interventions unsuccessful or patient reports new pain  Outcome: Progressing     Problem: INFECTION - ADULT  Goal: Absence or prevention of progression during hospitalization  Description: INTERVENTIONS:  - Assess and monitor for signs and symptoms of infection  - Monitor lab/diagnostic results  - Monitor all insertion sites, i e  indwelling lines, tubes, and drains  - Monitor endotracheal if appropriate and nasal secretions for changes in amount and color  - [place] appropriate cooling/warming therapies per order  - Administer medications as ordered  - Instruct and encourage patient and family to use good hand hygiene technique  - Identify and instruct in appropriate isolation precautions for identified infection/condition  Outcome: Progressing     Problem: SAFETY ADULT  Goal: Maintain or return to baseline ADL function  Description: INTERVENTIONS:  -  Assess patient's ability to carry out ADLs; assess patient's baseline for ADL function and identify physical deficits which impact ability to perform ADLs (bathing, care of mouth/teeth, toileting, grooming, dressing, etc )  - Assess/evaluate cause of self-care deficits   - Assess range of motion  - Assess patient's mobility; develop plan if impaired  - Assess patient's need for assistive devices and provide as appropriate  - Encourage maximum independence but intervene and supervise when necessary  - Involve family in performance of ADLs  - Assess for home care needs following discharge   - Consider OT consult to assist with ADL evaluation and planning for discharge  - Provide patient education as appropriate  Outcome: Progressing  Goal: Maintains/Returns to pre admission functional level  Description: INTERVENTIONS:  - Perform BMAT or MOVE assessment daily    - Set and communicate daily mobility goal to care team and patient/family/caregiver     - Collaborate with rehabilitation services on mobility goals if consulted  - Record patient progress and toleration of activity level   Outcome: Progressing  Goal: Patient will remain free of falls  Description: INTERVENTIONS:  - Educate patient/family on patient safety including physical limitations  - Instruct patient to call for assistance with activity   - Consult OT/PT to assist with strengthening/mobility   - Keep Call bell within reach  - Keep bed low and locked with side rails adjusted as appropriate  - Keep care items and personal belongings within reach  - Initiate and maintain comfort rounds  - Make Fall Risk Sign visible to staff  - Apply yellow socks and bracelet for high fall risk patients  - Consider moving patient to room near nurses station  Outcome: Progressing

## 2022-11-15 NOTE — ASSESSMENT & PLAN NOTE
Patient required intubation for pulm edema, could not tolerate bipap due to facial injuries  Aggressively diuresed and extubated  Heart failure team consulted, appreciate recs  Lasix, isordil, hydral discontinued in setting of VEE  Restart metoprolol

## 2022-11-15 NOTE — DISCHARGE INSTR - OTHER ORDERS
Skin Care Plan:  1-Preventative Hydraguard to B/L Sacro-Buttocks and B/L Heels BID and PRN   2-Turn/reposition q2h or when medically stable for pressure re-distribution on skin   3-Elevate heels to offload pressure  4-Moisturize skin daily with skin nourishing cream  5-Ehob cushion in chair when out of bed

## 2022-11-15 NOTE — PROGRESS NOTES
Progress Note - May Henrietta 80 y o  male MRN: [id number]    Unit/Bed#: [facility] 620-01 Encounter: [id number]      Assessment/Plan:  Delirium precautions  Baseline: alert and oriented x 1  Provide frequent redirection, reorientation, distraction techniques  Avoid deliriogenic medications such as tramadol, benzodiazepines, anticholinergics,  Benadryl  Treat pain, See geriatric pain protocol  Last BM 11/14/22  Monitor for urinary retention  Encourage early and frequent moblization, OOB  Encourage Hydration/ Nutrition  Implement sleep hygiene, limit night time interuptions, group activities  Continue  scheduled Seroquel 25 mg op QHS  Monitor   (11/11/21)     Ambulatory dysfunction  At risk for falls secondary to age, hx of fall, gait instability, polypharmacy, visual impairment, BPPV  Fall precautions  PT/OT  Increased physical exercise, recommend balance and strengthening exercises  Rehab post hospitalization     Acute pain due to trauma  Geriatric pain protocol  Scheduled acetaminophen 975 mg po Q8  Oxycodone 2 5 mg po Q 4 prn moderate pain  Oxycodone 5 mg po Q 4 prn severe pain   Monitor for constipation  Lidoderm patch      Frailty  Clinical Frail Scale: 6- Moderately Frail  Albumin 4 2, maintain protein in diet  PT/OT  Continue supportive care     Cognitive screening  At risk secondary to hx of CVA, age  Recommend check TSH and vitamin B12  CT head 11/11/22: severe microangiopathic changes, chronic bilateral frontal lobe infarcts   Keep physically, mentally and socially active     Insomnia  Related to hospitalization  First line is behavioral therapy  Avoid sedative hypnotics such as benzodiazepines and benadryl  Encourage staying awake during the day  Encourage daytime activity, morning exercise  Decrease or eliminate day time naps  Avoid caffeine especially during late afternoon and evening hours  Establish a night time routine     Subdural hemorrhage  S/p fall   AC on hold  Neurosurgery on consult  keppra for seizure ppx x 1 days  Trauma following     Nasal bone fractures with epistaxis  Rhino rocket discontinued  OMFS on consult  Trauma following          Subjective:   Upon exam pt is lying in bed  He is hard of hearing, oriented x 1  Objective:     Vitals: Blood pressure 114/75, pulse 83, temperature (!) 97 °F (36 1 °C), resp  rate 19, height 5' 8" (1 727 m), weight 78 kg (171 lb 15 3 oz), SpO2 95 %  ,Body mass index is 26 15 kg/m²  Intake/Output Summary (Last 24 hours) at 11/15/2022 1504  Last data filed at 11/15/2022 0831  Gross per 24 hour   Intake 118 ml   Output 661 ml   Net -543 ml       Physical Exam:   General : NAD, thin and frail  HEENT : MMM   Heart : Normal rate, no murmur rub or gallop  Lungs : CTA no wheezes, rales or rhonchi  Abdomen : Soft, NT/ND, BS auscultated in all 4 quads  Ext :  no edema  Skin : ecchymosis to bilateral orbital region  Neuro : Nonfocal  Psych : Alert and O x 1      Invasive Devices  Report    Peripheral Intravenous Line  Duration           Peripheral IV Left;Upper Arm -- days                Lab, Imaging and other studies: I have personally reviewed pertinent reports      VTE Pharmacologic Prophylaxis: Sequential compression device (Venodyne)   VTE Mechanical Prophylaxis: sequential compression device

## 2022-11-15 NOTE — OCCUPATIONAL THERAPY NOTE
Occupational Therapy Treatment Note:      11/15/22 1520   OT Last Visit   OT Visit Date 11/15/22   Note Type   Note Type Treatment   Pain Assessment   Pain Assessment Tool FLACC   Pain Rating: FLACC (Rest) - Face 0   Pain Rating: FLACC (Rest) - Legs 0   Pain Rating: FLACC (Rest) - Activity 0   Pain Rating: FLACC (Rest) - Cry 0   Pain Rating: FLACC (Rest) - Consolability 0   Score: FLACC (Rest) 0   Pain Rating: FLACC (Activity) - Face 0   Pain Rating: FLACC (Activity) - Legs 0   Pain Rating: FLACC (Activity) - Activity 0   Pain Rating: FLACC (Activity) - Cry 0   Pain Rating: FLACC (Activity) - Consolability 0   Score: FLACC (Activity) 0   Restrictions/Precautions   Other Precautions Fall Risk;Bed Alarm; Chair Alarm;Cognitive; Restraints   ADL   Grooming Assistance 5  Supervision/Setup   Grooming Deficit   (pt washed and dried face with mod cues)   LB Dressing Assistance 1  Total Assistance   LB Dressing Comments to eliceo socks   Functional Standing Tolerance   Time poor plus  balance   Bed Mobility   Rolling R 2  Maximal assistance   Rolling L 2  Maximal assistance   Supine to Sit 2  Maximal assistance   Transfers   Sit to Stand 2  Maximal assistance   Additional items Assist x 2   Stand to Sit 2  Maximal assistance   Additional items Assist x 2   Stand pivot 2  Maximal assistance   Additional items Assist x 2   Functional Mobility   Functional Mobility 2  Maximal assistance   Additional items Hand hold assistance   Subjective   Subjective "i hear running water"   Cognition   Overall Cognitive Status Impaired   Arousal/Participation Responsive   Attention Attends with cues to redirect   Orientation Level Disoriented to place; Disoriented to time;Disoriented to situation   Memory Decreased recall of precautions;Decreased recall of recent events;Decreased short term memory;Decreased recall of biographical information   Following Commands Follows one step commands inconsistently   Activity Tolerance   Activity Tolerance Patient tolerated treatment well   Assessment   Assessment pt participated in pm ot session and was seen focusing on bed mobility, grooming task trials, bed mobility and sit to from stand, spt and functional mobility trials with  follow  pt initially was difficult to arrouse  in sitting pt with more arm movements and eventual eyes open  pt was then able to follow simple commands more easily aproximately 60% with time  Plan   Treatment Interventions ADL retraining;Functional transfer training; Endurance training;Patient/family training;Equipment evaluation/education; Activityengagement   Goal Expiration Date 11/28/22   OT Treatment Day 1   OT Frequency 2-3x/wk   Recommendation   OT Discharge Recommendation Post acute rehabilitation services   AM-PAC Daily Activity Inpatient   Lower Body Dressing 2   Bathing 2   Toileting 2   Upper Body Dressing 2   Grooming 2   Eating 2   Daily Activity Raw Score 12   Daily Activity Standardized Score (Calc for Raw Score >=11) 30 6   AM-PAC Applied Cognition Inpatient   Following a Speech/Presentation 1   Understanding Ordinary Conversation 2   Taking Medications 1   Remembering Where Things Are Placed or Put Away 1   Remembering List of 4-5 Errands 1   Taking Care of Complicated Tasks 1   Applied Cognition Raw Score 7   Applied Cognition Standardized Score 15 17   April A Storm

## 2022-11-15 NOTE — PLAN OF CARE
Problem: CONFUSION/THOUGHT DISTURBANCE  Goal: Thought disturbances (confusion, delirium, depression, dementia or psychosis) are managed to maintain or return to baseline mental status and functional level  Description: INTERVENTIONS:  - Assess for possible contributors to  thought disturbance, including but not limited to medications, infection, impaired vision or hearing, underlying metabolic abnormalities, dehydration, respiratory compromise,  psychiatric diagnoses and notify attending PHYSICAN/AP  - Monitor and intervene to maintain adequate nutrition, hydration, elimination, sleep and activity  - Decrease environmental stimuli, including noise as appropriate  - Provide frequent contacts to provide refocusing, direction and reassurance as needed  Approach patient calmly with eye contact and at their level    - [place] high risk fall precautions, aspiration precautions and other safety measures, as indicated  - If delirium suspected, notify physician/AP of change in condition and request immediate in-person evaluation  - Pursue consults as appropriate including Geriatric (campus dependent), OT for cognitive evaluation/activity planning, psychiatric, pastoral care, etc   Outcome: Progressing

## 2022-11-15 NOTE — ASSESSMENT & PLAN NOTE
- Neuro exam: GCS 14 (4, 4, 6), due to confusion  - Continue neuro checks  - Reversal agent administered: K-Centra  - F/u CT head: Unchanged  - Appreciate neurosurgery recommendations  - Complete 7 day course of Keppra for seizure prophylaxis  - Chemical DVT prophylaxis: SQH  - Hold all anticoagulants and anti platelet medications until cleared by neurosurgery to resume  - PT/OT and cognitive evaluation

## 2022-11-15 NOTE — PLAN OF CARE
Problem: OCCUPATIONAL THERAPY ADULT  Goal: Performs self-care activities at highest level of function for planned discharge setting  See evaluation for individualized goals  Description: Treatment Interventions: ADL retraining, Visual perceptual retraining, Functional transfer training, Endurance training, Cognitive reorientation, UE strengthening/ROM, Patient/family training, Equipment evaluation/education, Compensatory technique education, Continued evaluation, Energy conservation, Activityengagement          See flowsheet documentation for full assessment, interventions and recommendations  Outcome: Progressing  Note: Limitation: Decreased ADL status, Decreased Safe judgement during ADL, Decreased cognition, Decreased endurance, Decreased self-care trans, Decreased high-level ADLs, Visual deficit  Prognosis: Fair  Assessment: pt participated in pm ot session and was seen focusing on bed mobility, grooming task trials, bed mobility and sit to from stand, spt and functional mobility trials with  follow  pt initially was difficult to arrouse  in sitting pt with more arm movements and eventual eyes open  pt was then able to follow simple commands more easily aproximately 60% with time       OT Discharge Recommendation: Post acute rehabilitation services   April A Storm

## 2022-11-15 NOTE — ASSESSMENT & PLAN NOTE
Department of Internal Medicine  Nephrology Progress Note    Events reviewed. Awaiting planning for palliative radiation therapy. SUBJECTIVE:  We are following Dr. Thuan Esteves for ISIDRO and volume management. He remains intubated and sedated. Opens eyes to voice and follows commands. Physical Exam:    VITALS:  BP (!) 151/100   Pulse 90   Temp 99.3 °F (37.4 °C)   Resp 20   Ht 5' 11\" (1.803 m)   Wt (!) 341 lb 9.6 oz (154.9 kg)   SpO2 (!) 89%   BMI 47.64 kg/m²   24HR INTAKE/OUTPUT:      Intake/Output Summary (Last 24 hours) at 12/5/2020 1148  Last data filed at 12/5/2020 0900  Gross per 24 hour   Intake 1755.58 ml   Output 1440 ml   Net 315.58 ml       Constitutional:  Morbidly obese  male. Sedated, intubated.   Cardiovascular/Edema:  RRR,S1/S2  Respiratory:  ETT to vent, 55% PEEP 5, diminished bases bilaterally  Gastrointestinal:  Soft, obese, nondistended, bowel sounds hypoactive; + abdominal wall edema  Skin: Warm, dry, no lesions  3+ pitting edema BLE into hips, 1+ pitting edema abdominal wall, 3+ pitting bilateral forearms and hands      DATA:    CBC with Differential:    Lab Results   Component Value Date    WBC 6.1 12/05/2020    RBC 2.69 12/05/2020    HGB 7.4 12/05/2020    HCT 24.9 12/05/2020    PLT 20 12/05/2020    MCV 92.6 12/05/2020    MCH 27.5 12/05/2020    MCHC 29.7 12/05/2020    RDW 16.9 12/05/2020    NRBC 3.5 12/05/2020    SEGSPCT 62 12/29/2011    BLASTSPCT 0.9 11/21/2020    METASPCT 1.8 12/04/2020    LYMPHOPCT 18.4 12/05/2020    PROMYELOPCT 0.9 11/21/2020    MONOPCT 4.4 12/05/2020    MYELOPCT 0.9 12/04/2020    BASOPCT 0.2 12/05/2020    MONOSABS 0.24 12/05/2020    LYMPHSABS 1.10 12/05/2020    EOSABS 0.00 12/05/2020    BASOSABS 0.00 12/05/2020     CMP:    Lab Results   Component Value Date     12/05/2020    K 3.8 12/05/2020    K 4.4 12/04/2020     12/05/2020    CO2 31 12/05/2020    BUN 64 12/05/2020    CREATININE 1.2 12/05/2020    GFRAA >60 12/05/2020    LABGLOM >60 Lab Results   Component Value Date    HGBA1C 7 3 (H) 09/17/2022       Recent Labs     11/14/22  1650 11/14/22  2049 11/15/22  0839 11/15/22  1145   POCGLU 295* 277* 277* 264*     -SSI    Blood Sugar Average: Last 72 hrs:  (P) 192 12/05/2020    GLUCOSE 116 12/05/2020    GLUCOSE 120 12/31/2011    PROT 5.0 12/05/2020    LABALBU 2.6 12/05/2020    LABALBU 4.5 12/28/2011    CALCIUM 8.9 12/05/2020    BILITOT 0.8 12/05/2020    ALKPHOS 146 12/05/2020     12/05/2020    ALT 79 12/05/2020     Magnesium:    Lab Results   Component Value Date    MG 2.1 12/05/2020     Phosphorus:    Lab Results   Component Value Date    PHOS 3.3 12/05/2020     Radiology Review:       CXR 12/5/2020   No change.  Complete opacification left mid and upper lung.  Correlate    bronchoscopy as warranted to exclude endobronchial obstruction. BRIEF SUMMARY OF INITIAL CONSULT & ADMISSION PRIOR TO TRANSFER:    Briefly, Mr. Thuan Esteves is a 61year old male with a PMH of Type II DM, HTN, HLD, CAD s/p stents x 5, AAA s/p endovascular repair and chronic low back pain who presented to [facility] on 11/17/2020 with complaints of SOB. He was hypoxic on room air. A CTA of the chest demonstrated PNA and pleural effusions. Labs were significant for thrombocytopenia. The following day an RRT was called for worsening SOB. He was placed on bipap but later intubated for worsening respiratory acidosis. A bronchoscopy was performed the same day (11/18) and it was noted that the OMER was significantly narrowed. A transbronchial needle aspiration was completed and three biopsies were obtained. On 11/24 biopsies + small cell lung CA. His wife had requested transfer to [facility] however he was not accepted/insurance did not cover. Nephrology was consulted for volume management and lasix and albumin were started. ID was consulted on 11/25 for persistent fevers. A second bronch was performed and Bumex gtt were started on 11/26. Bumex gtt was discontinued on 11/27 for worsening ISIDRO and hypernatremia. Free water was increased NGT to 100 mL/hr. On 11/28 D5W was also started. D5W increased to 100 mL/hr on 11/29, free water continued.  D5W was discontinued and HCTZ with albumin were started BID on 1  · Start Bumex gtt @ 0.5 mg/hr  · Repeat Albumin 25 g IV Q8 hours x 3   · Continue to monitor sodium levels  · CMP Q 12 hours   · Maintain strict I&Os    Electronically signed by MATY Canchola CNP on 12/5/2020 at 11:48 AM

## 2022-11-15 NOTE — PHYSICAL THERAPY NOTE
Physical Therapy Progress Note     11/15/22 1510   Note Type   Note Type Treatment   Pain Assessment   Pain Score No Pain   Restrictions/Precautions   Other Precautions Cognitive; Chair Alarm; Bed Alarm; Restraints; Fall Risk   Subjective   Subjective Pt encountered seated EOB with CARIAS present  Pt miming use of sink to wash face & head, with CARIAS reporting he felt like he heard rushing water  Pt occasionally alert to his name when called, as well as commands for participation in activity, but he responds better to tactile & visual cues overall  Transfers   Sit to Stand 2  Maximal assistance   Additional items Assist x 2   Stand to Sit 2  Maximal assistance   Additional items Assist x 2   Stand pivot 2  Maximal assistance   Additional items Assist x 2   Ambulation/Elevation   Gait pattern Step to;Short stride; Foward flexed; Inconsistent morales; Shuffling;Decreased foot clearance;Narrow CARLOS; Improper Weight shift; Poor UE support   Gait Assistance 2  Maximal assist   Additional items Assist x 2   Assistive Device Other (Comment)  (BUE HHA)   Distance 3 ft to chair, then 5 ft forwards   Balance   Static Sitting Poor   Static Standing Poor -   Ambulatory Poor -   Activity Tolerance   Activity Tolerance Patient tolerated treatment well;Patient limited by fatigue   Nurse 445 Adamaris Zaragoza RN   Exercises   Knee AROM Long Arc Quad Sitting;15 reps;AAROM; Bilateral   Marching Sitting;15 reps;AAROM; Bilateral   Assessment   Prognosis Good   Problem List Decreased strength;Decreased endurance; Impaired balance;Decreased mobility; Decreased cognition;Decreased safety awareness   Assessment Pt continues to require Ax2 for all transfers today due to impaired balance, strength & impaired motor planning  Initially attempted sit to stand with RW, but pt unable to safely utilize device to compensate for balance deficits, requiring increased assist to maintain balance next to bed    Trialed BUE HHA with improved ability to provide trunk support & facilitate transfer to recliner  One additional gait trial performed with pt demonstrating ability to advance LEs without assist for weight shfiting or LE advancement  Will continue to trial transfers with LRAD in upcoming sessions, and anticipate pt will make progress pending improved mental status & command following  PT POC & d/c recommendations remain appropriate at this time  Goals   Patient Goals none stated due to pt's cognitive impairments   STG Expiration Date 11/28/22   PT Treatment Day 1   Plan   Treatment/Interventions Functional transfer training;LE strengthening/ROM; Endurance training; Therapeutic exercise;Patient/family training;Equipment eval/education; Bed mobility;Gait training;Cognitive reorientation   Progress Progressing toward goals   PT Frequency 2-3x/wk   Recommendation   PT Discharge Recommendation Post acute rehabilitation services   AM-PAC Basic Mobility Inpatient   Turning in Bed Without Bedrails 2   Lying on Back to Sitting on Edge of Flat Bed 1   Moving Bed to Chair 1   Standing Up From Chair 1   Walk in Room 1   Climb 3-5 Stairs 1   Basic Mobility Inpatient Raw Score 7   Turning Head Towards Sound 3   Follow Simple Instructions 2   Low Function Basic Mobility Raw Score 12   Low Function Basic Mobility Standardized Score 18 33   Highest Level Of Mobility   JH-HLM Goal 2: Bed activities/Dependent transfer   JH-HLM Achieved 6: Walk 10 steps or more     Maria C Blue PTA    An [facility] Basic Mobility Standardized Score of 40 78 suggests pt would benefit from post acute rehab

## 2022-11-15 NOTE — WOUND OSTOMY CARE
Consult Note - Wound   Donell Aldridge 80 y o  male MRN: [id number]  Unit/Bed#: [facility] 620-01 Encounter: [id number]        History and Present Illness:  Patient is a 81 yo male that was admitted to the hospital for treatment of subdural hematoma sustained post fall  Patient has a PMH of CKD stage 3, CAD, PVD, and DM 2  Patient is a max assist for turning and repositioning  [place] waist belt and b/l mitt restraints in place- skin underneath is clean, dry, and intact  Patient is incontinent of bowel and bladder  On assessment, patient is lying supine on regular mattress  Spouse at bedside during assessment- she stated that she called EMS directly post fall and that patient was not lying on the ground for extended period of time  Wound Care was consulted for various wounds  Assessment Findings:   B/L heels, ankles, and elbows, and buttocks are dry, intact, and bert with no skin loss or wounds present  Recommend preventative hydragaurd to areas  Patient with generalized well adhered abrasions and bruising to B/L knees, face, penis and arms  No drainage noted  Keep ALAN  Large hematoma present on arm  See pictures below  No induration, fluctuance, odor, warmth/temperature differences, redness, or purulence noted to the above noted wounds and skin areas assessed  New dressings applied per orders listed below  Patient tolerated well- no s/s of non-verbal pain or discomfort observed during the encounter  Bedside nurse aware of plan of care  See flow sheets for more detailed assessment findings  Orders listed below and wound care will sign off, call or tiger text with questions  Skin Care Plan:  1-Preventative Hydraguard to B/L Sacro-Buttocks and B/L Heels BID and PRN   2-Turn/reposition q2h or when medically stable for pressure re-distribution on skin   3-Elevate heels to offload pressure    4-Moisturize skin daily with skin nourishing cream  5-[place] cushion in chair when out of bed  Wounds:  Wound 11/15/22 Abrasion(s) Penis (Active)   Wound Image   11/15/22 1111   Wound Description [place] 11/15/22 1111   Yael-wound Assessment Intact 11/15/22 1111   Wound Length (cm) 4 cm 11/15/22 1111   Wound Width (cm) 0 6 cm 11/15/22 1111   Wound Depth (cm) 0 cm 11/15/22 1111   Wound Surface Area (cm^2) 2 4 cm^2 11/15/22 1111   Wound Volume (cm^3) 0 cm^3 11/15/22 1111   Calculated Wound Volume (cm^3) 0 cm^3 11/15/22 1111   Drainage Amount None 11/15/22 1111   Dressing Open to air 11/15/22 1111       Wound 11/15/22 Knee Anterior; Left (Active)   Wound Image   11/15/22 1113   Wound Description Intact 11/15/22 1113   Yael-wound Assessment Intact 11/15/22 1113   Wound Length (cm) 0 5 cm 11/15/22 1113   Wound Width (cm) 4 5 cm 11/15/22 1113   Wound Depth (cm) 0 cm 11/15/22 1113   Wound Surface Area (cm^2) 2 25 cm^2 11/15/22 1113   Wound Volume (cm^3) 0 cm^3 11/15/22 1113   Calculated Wound Volume (cm^3) 0 cm^3 11/15/22 1113   Drainage Amount None 11/15/22 1113   Dressing Open to air 11/15/22 1113       Wound 11/15/22 Knee Anterior;Right (Active)   Wound Image   11/15/22 1114   Wound Description Intact 11/15/22 1114   Yael-wound Assessment Intact 11/15/22 1114   Wound Length (cm) 0 cm 11/15/22 1114   Wound Width (cm) 0 cm 11/15/22 1114   Wound Depth (cm) 0 cm 11/15/22 1114   Wound Surface Area (cm^2) 0 cm^2 11/15/22 1114   Wound Volume (cm^3) 0 cm^3 11/15/22 1114   Calculated Wound Volume (cm^3) 0 cm^3 11/15/22 1114               Mckinley Farrell RN, BSN

## 2022-11-16 NOTE — PROGRESS NOTES
[facility]  Progress Note - Julien Jimenez 4/25/1933, 80 y o  male MRN: [id number]  Unit/Bed#: [facility] 620-01 Encounter: [id number]  Primary Care Provider: Shakira Richardson DO   Date and time admitted to hospital: 11/11/2022  5:15 AM    Acute kidney injury [facility])  Assessment & Plan  Cr 1 81 from 2 08  Discontinue lasix  Continue to trend BMPs    Acute respiratory failure with hypoxia [facility])  Assessment & Plan  Patient required intubation for pulm edema, could not tolerate bipap due to facial injuries  Aggressively diuresed and extubated  Heart failure team consulted, appreciate recs  Lasix, isordil, hydral discontinued in setting of VEE  Continue metoprolol  Epistaxis  Assessment & Plan  Resolved  Rhinorocket removed  Nasal bone fractures  Assessment & Plan  Non-op    Ischemic cardiomyopathy  Assessment & Plan  HF following  Appreciate recs, see below under acute resp failure    Mixed hyperlipidemia  Assessment & Plan  Continue statin    Essential hypertension  Assessment & Plan  Continue metoprolol     Stage 3b chronic kidney disease  Assessment & Plan  Lab Results   Component Value Date    EGFR 32 11/16/2022    EGFR 27 11/16/2022    EGFR 27 11/15/2022    CREATININE 1 81 (H) 11/16/2022    CREATININE 2 08 (H) 11/16/2022    CREATININE 2 07 (H) 11/15/2022     Creatinine improving, 1 81 today from 2 07 yesterday  Continue to trend BMPs  Acquired hypothyroidism  Assessment & Plan  Continue levothyroxine     Type 2 diabetes mellitus with complication, with long-term current use of insulin [facility])  Assessment & Plan  Lab Results   Component Value Date    HGBA1C 7 3 (H) 09/17/2022       Recent Labs     11/15/22  1145 11/15/22  1634 11/15/22  2051 11/16/22  0814   POCGLU 264* 275* 278* 411*     -SSI, adjust as needed  Blood Sugar Average: Last 72 hrs:  (P) 257 [id number]    Paroxysmal atrial fibrillation (HCC)  Assessment & Plan  Continue metoprolol     Hold AC/AP in setting of SDH  * Subdural hematoma  Assessment & Plan  - Neuro exam: GCS 14 (4, 4, 6), due to confusion  - Continue neuro checks  - Reversal agent administered: K-Centra  - F/u CT head: Unchanged  - Appreciate neurosurgery recommendations  - Complete 7 day course of Keppra for seizure prophylaxis  - Chemical DVT prophylaxis: SQH  - Hold all anticoagulants and anti platelet medications until cleared by neurosurgery to resume  - PT/OT and cognitive evaluation          Bowel Regimen: senokot   VTE Prophylaxis:Heparin     Disposition: Continue med surg    Subjective   Chief Complaint: "I'm alright"    Subjective: Patient provides no complaints  Per nursing, patient has had some overnight delirium, occasionally requiring restraints and medications  Objective   Vitals:   Temp:  [97 °F (36 1 °C)-97 5 °F (36 4 °C)] 97 5 °F (36 4 °C)  HR:  [] 101  Resp:  [18-20] 18  BP: (114-155)/(65-87) 155/87    I/O       11/14 0701  11/15 0700 11/15 0701  11/16 0700 11/16 0701  11/17 0700    P  O  698 360 120    I V  (mL/kg) 50 (0 6)      IV Piggyback 100      Total Intake(mL/kg) 848 (10 9) 360 (4 6) 120 (1 5)    Urine (mL/kg/hr) 175 (0 1) 891 (0 5)     Stool       Total Output 175 891     Net +673 -531 +120           Unmeasured Urine Occurrence   1 x    Unmeasured Stool Occurrence  1 x 1 x           Physical Exam:   Gen: NAD, restrained with waist belt and mitts  Neuro: No focal deficit, GCS 14 for mild confusion  HEENT: NC, ecchymosis around BL eyes, ecchymosis over nose     CV: RRR  Lungs: No respiratory distress  GI: Soft, nontender, nondistended  : Deferred  MSK: Full ROM  Skin: Warm      Invasive Devices     Peripheral Intravenous Line  Duration           Peripheral IV Left;Upper Arm -- days                      Lab Results:   Results: I have personally reviewed all pertinent laboratory/tests results, BMP/CMP:   Lab Results   Component Value Date    SODIUM 139 11/16/2022    K 4 3 11/16/2022     (H) 11/16/2022    CO2 26 11/16/2022    BUN 66 (H) 11/16/2022    CREATININE 1 81 (H) 11/16/2022    CALCIUM 9 1 11/16/2022    EGFR 32 11/16/2022    and CBC:   Lab Results   Component Value Date    WBC 9 33 11/16/2022    HGB 12 2 11/16/2022    HCT 37 9 11/16/2022     (H) 11/16/2022     11/16/2022    MCH 32 4 11/16/2022    MCHC 32 2 11/16/2022    RDW 13 3 11/16/2022    MPV 10 5 11/16/2022    NRBC 0 11/16/2022     Imaging: I have personally reviewed pertinent reports     and I have personally reviewed pertinent films in PACS   Other Studies:

## 2022-11-16 NOTE — ASSESSMENT & PLAN NOTE
Lab Results   Component Value Date    HGBA1C 7 3 (H) 09/17/2022       Recent Labs     11/15/22  1145 11/15/22  1634 11/15/22  2051 11/16/22  0814   POCGLU 264* 275* 278* 411*     -SSI, adjust as needed       Blood Sugar Average: Last 72 hrs:  (P) 984 [id number]

## 2022-11-16 NOTE — SPEECH THERAPY NOTE
Speech Language/Pathology    Speech/Language Pathology Progress Note    Patient Name: Abby Fernandez  TFLUW'W Date: 11/16/2022     Problem List  Principal Problem:    Subdural hematoma  Active Problems:    Paroxysmal atrial fibrillation (HCC)    Type 2 diabetes mellitus with complication, with long-term current use of insulin (HCC)    Acquired hypothyroidism    Stage 3b chronic kidney disease    Essential hypertension    Mixed hyperlipidemia    Ischemic cardiomyopathy    Nasal bone fractures    Epistaxis    Acute respiratory failure with hypoxia (HCC)    Acute kidney injury ([address] )       Past Medical History  Past Medical History:   Diagnosis Date   • Benign hypertension    • CHF (congestive heart failure) (HCC)    • Chronic kidney disease (CKD), stage III (moderate)    • Coronary artery disease    • Hypothyroidism    • Ischemic cardiomyopathy    • Mixed hyperlipidemia    • Paroxysmal atrial fibrillation    • PVD (peripheral vascular disease)    • Type II diabetes mellitus         Past Surgical History  Past Surgical History:   Procedure Laterality Date   • CARDIAC CATHETERIZATION     • CORONARY ARTERY BYPASS GRAFT           Subjective:  Patient is awake and alert today  Family is at bedside  Objective: The patient is seen at lunch meal for dysphagia therapy  Family is present and feed patient  She reports patient has dentures, but did not attempt to enter due to swelling and bruising of oral cavity  Family feeds patient puree solids and thin liquids  Oral closure for tsp and straw is adequate  Transfer appears timely  The patient takes small, consecutive sips of thin liquids via straw  Intermittent wet cough observed across trials, but not consistently with every bite/sip  Wife reports patient has coughing outside of PO intake and requested oral suction to help with secretion management  Suction already set up and family educated on use  Family also report patient is impulsive when eating at home  Assessment:  The patient has intermittent wet cough across meals  O2 remains stable  Plan/Recommendations:  Continue puree diet with thin liquids  Continue ST to further assess tolerance  If coughing persists, may consider VBS to instrumentally rule out aspiration

## 2022-11-16 NOTE — ASSESSMENT & PLAN NOTE
Lab Results   Component Value Date    EGFR 32 11/16/2022    EGFR 27 11/16/2022    EGFR 27 11/15/2022    CREATININE 1 81 (H) 11/16/2022    CREATININE 2 08 (H) 11/16/2022    CREATININE 2 07 (H) 11/15/2022     Creatinine improving, 1 81 today from 2 07 yesterday  Continue to trend BMPs

## 2022-11-16 NOTE — ASSESSMENT & PLAN NOTE
Patient required intubation for pulm edema, could not tolerate bipap due to facial injuries  Aggressively diuresed and extubated  Heart failure team consulted, appreciate recs  Lasix, isordil, hydral discontinued in setting of VEE  Continue metoprolol

## 2022-11-16 NOTE — PLAN OF CARE
Problem: PAIN - ADULT  Goal: Verbalizes/displays adequate comfort level or baseline comfort level  Description: Interventions:  - Encourage patient to monitor pain and request assistance  - Assess pain using appropriate pain scale  - Administer analgesics based on type and severity of pain and evaluate response  - Implement non-pharmacological measures as appropriate and evaluate response  - Consider cultural and social influences on pain and pain management  - Notify physician/advanced practitioner if interventions unsuccessful or patient reports new pain  Outcome: Progressing     Problem: INFECTION - ADULT  Goal: Absence or prevention of progression during hospitalization  Description: INTERVENTIONS:  - Assess and monitor for signs and symptoms of infection  - Monitor lab/diagnostic results  - Monitor all insertion sites, i e  indwelling lines, tubes, and drains  - Monitor endotracheal if appropriate and nasal secretions for changes in amount and color  - [place] appropriate cooling/warming therapies per order  - Administer medications as ordered  - Instruct and encourage patient and family to use good hand hygiene technique  - Identify and instruct in appropriate isolation precautions for identified infection/condition  Outcome: Progressing

## 2022-11-16 NOTE — PROGRESS NOTES
Progress Note - Saige Delgado 80 y o  male MRN: [id number]    Unit/Bed#: [facility] 620-01 Encounter: [id number]      Assessment/Plan:  Encephalopathy  Increased confusion and agitation  Provide frequent redirection, reorientation, distraction techniques  Avoid deliriogenic medications such as tramadol, benzodiazepines, anticholinergics,  Benadryl  Treat pain, See geriatric pain protocol  Last BM 11/14/22  Monitor for urinary retention  Encourage early and frequent moblization, OOB  Encourage Hydration/ Nutrition  Implement sleep hygiene, limit night time interuptions, group activities  Continue  scheduled Seroquel 25 mg op QHS  Monitor   (11/11/21)     Ambulatory dysfunction  At risk for falls secondary to age, hx of fall, gait instability, polypharmacy, visual impairment, BPPV  Fall precautions  PT/OT  Increased physical exercise, recommend balance and strengthening exercises  Rehab post hospitalization     Acute pain due to trauma  Geriatric pain protocol  Scheduled acetaminophen 975 mg po Q8  Oxycodone 2 5 mg po Q 4 prn moderate pain  Oxycodone 5 mg po Q 4 prn severe pain   Monitor for constipation  Lidoderm patch      Frailty  Clinical Frail Scale: 6- Moderately Frail  Albumin 4 2, maintain protein in diet  PT/OT  Continue supportive care     Cognitive screening  At risk secondary to hx of CVA, age  Recommend check TSH and vitamin B12  CT head 11/11/22: severe microangiopathic changes, chronic bilateral frontal lobe infarcts   Keep physically, mentally and socially active     Insomnia  Related to hospitalization  First line is behavioral therapy  Avoid sedative hypnotics such as benzodiazepines and benadryl  Encourage staying awake during the day  Encourage daytime activity, morning exercise  Decrease or eliminate day time naps  Avoid caffeine especially during late afternoon and evening hours  Establish a night time routine     Subdural hemorrhage  S/p fall   AC on hold  Neurosurgery on consult  keppra for seizure ppx x 1 days  Trauma following     Nasal bone fractures with epistaxis  Rhino rocket discontinued  OMFS on consult  Trauma following          Subjective:   Upon exam pt is lying in bed  He is  oriented x 1, restless, trying to climb out of bed  Last BM 11/16/22    Objective:     Vitals: Blood pressure 155/87, pulse 101, temperature 97 5 °F (36 4 °C), resp  rate 18, height 5' 8" (1 727 m), weight 78 kg (171 lb 15 3 oz), SpO2 92 %  ,Body mass index is 26 15 kg/m²  Intake/Output Summary (Last 24 hours) at 11/16/2022 1054  Last data filed at 11/16/2022 0805  Gross per 24 hour   Intake 480 ml   Output 230 ml   Net 250 ml       Physical Exam:   General : NAD, thin and frail  HEENT : MMM   Heart : Normal rate, no murmur rub or gallop  Lungs : CTA no wheezes, rales or rhonchi  Abdomen : Soft, NT/ND, BS auscultated in all 4 quads  Ext :  no edema  Skin : ecchymosis to bilateral orbital region  Neuro : Nonfocal  Psych : Alert and O x 1      Invasive Devices     Peripheral Intravenous Line  Duration           Peripheral IV Left;Upper Arm -- days                Lab, Imaging and other studies: I have personally reviewed pertinent reports      VTE Pharmacologic Prophylaxis: Sequential compression device (Venodyne)   VTE Mechanical Prophylaxis: sequential compression device

## 2022-11-16 NOTE — PLAN OF CARE
Problem: MOBILITY - ADULT  Goal: Maintain or return to baseline ADL function  Description: INTERVENTIONS:  -  Assess patient's ability to carry out ADLs; assess patient's baseline for ADL function and identify physical deficits which impact ability to perform ADLs (bathing, care of mouth/teeth, toileting, grooming, dressing, etc )  - Assess/evaluate cause of self-care deficits   - Assess range of motion  - Assess patient's mobility; develop plan if impaired  - Assess patient's need for assistive devices and provide as appropriate  - Encourage maximum independence but intervene and supervise when necessary  - Involve family in performance of ADLs  - Assess for home care needs following discharge   - Consider OT consult to assist with ADL evaluation and planning for discharge  - Provide patient education as appropriate  Outcome: Progressing  Goal: Maintains/Returns to pre admission functional level  Description: INTERVENTIONS:  - Perform BMAT or MOVE assessment daily    - Set and communicate daily mobility goal to care team and patient/family/caregiver     - Collaborate with rehabilitation services on mobility goals if consulted  - Record patient progress and toleration of activity level   Outcome: Progressing     Problem: PAIN - ADULT  Goal: Verbalizes/displays adequate comfort level or baseline comfort level  Description: Interventions:  - Encourage patient to monitor pain and request assistance  - Assess pain using appropriate pain scale  - Administer analgesics based on type and severity of pain and evaluate response  - Implement non-pharmacological measures as appropriate and evaluate response  - Consider cultural and social influences on pain and pain management  - Notify physician/advanced practitioner if interventions unsuccessful or patient reports new pain  Outcome: Progressing     Problem: INFECTION - ADULT  Goal: Absence or prevention of progression during hospitalization  Description: INTERVENTIONS:  - Assess and monitor for signs and symptoms of infection  - Monitor lab/diagnostic results  - Monitor all insertion sites, i e  indwelling lines, tubes, and drains  - Monitor endotracheal if appropriate and nasal secretions for changes in amount and color  - [place] appropriate cooling/warming therapies per order  - Administer medications as ordered  - Instruct and encourage patient and family to use good hand hygiene technique  - Identify and instruct in appropriate isolation precautions for identified infection/condition  Outcome: Progressing     Problem: SAFETY ADULT  Goal: Maintain or return to baseline ADL function  Description: INTERVENTIONS:  -  Assess patient's ability to carry out ADLs; assess patient's baseline for ADL function and identify physical deficits which impact ability to perform ADLs (bathing, care of mouth/teeth, toileting, grooming, dressing, etc )  - Assess/evaluate cause of self-care deficits   - Assess range of motion  - Assess patient's mobility; develop plan if impaired  - Assess patient's need for assistive devices and provide as appropriate  - Encourage maximum independence but intervene and supervise when necessary  - Involve family in performance of ADLs  - Assess for home care needs following discharge   - Consider OT consult to assist with ADL evaluation and planning for discharge  - Provide patient education as appropriate  Outcome: Progressing  Goal: Maintains/Returns to pre admission functional level  Description: INTERVENTIONS:  - Perform BMAT or MOVE assessment daily    - Set and communicate daily mobility goal to care team and patient/family/caregiver     - Collaborate with rehabilitation services on mobility goals if consulted  - Record patient progress and toleration of activity level   Outcome: Progressing  Goal: Patient will remain free of falls  Description: INTERVENTIONS:  - Educate patient/family on patient safety including physical limitations  - Instruct patient to call for assistance with activity   - Consult OT/PT to assist with strengthening/mobility   - Keep Call bell within reach  - Keep bed low and locked with side rails adjusted as appropriate  - Keep care items and personal belongings within reach  - Initiate and maintain comfort rounds  - Make Fall Risk Sign visible to staff  - Offer Toileting every 2 Hours, in advance of need  - Initiate/Maintain bed alarm  - Obtain necessary fall risk management equipment: posey  - Apply yellow socks and bracelet for high fall risk patients  - Consider moving patient to room near nurses station  Outcome: Progressing     Problem: DISCHARGE PLANNING  Goal: Discharge to home or other facility with appropriate resources  Description: INTERVENTIONS:  - Identify barriers to discharge w/patient and caregiver  - Arrange for needed discharge resources and transportation as appropriate  - Identify discharge learning needs (meds, wound care, etc )  - Arrange for interpretive services to assist at discharge as needed  - Refer to Case Management Department for coordinating discharge planning if the patient needs post-hospital services based on physician/advanced practitioner order or complex needs related to functional status, cognitive ability, or social support system  Outcome: Progressing     Problem: Knowledge Deficit  Goal: Patient/family/caregiver demonstrates understanding of disease process, treatment plan, medications, and discharge instructions  Description: Complete learning assessment and assess knowledge base    Interventions:  - Provide teaching at level of understanding  - Provide teaching via preferred learning methods  Outcome: Progressing     Problem: Potential for Falls  Goal: Patient will remain free of falls  Description: INTERVENTIONS:  - Educate patient/family on patient safety including physical limitations  - Instruct patient to call for assistance with activity   - Consult OT/PT to assist with strengthening/mobility   - Keep Call bell within reach  - Keep bed low and locked with side rails adjusted as appropriate  - Keep care items and personal belongings within reach  - Initiate and maintain comfort rounds  - Make Fall Risk Sign visible to staff  - Offer Toileting every 2 Hours, in advance of need  - Initiate/Maintain bed alarm  - Obtain necessary fall risk management equipment: posey  - Apply yellow socks and bracelet for high fall risk patients  - Consider moving patient to room near nurses station  Outcome: Progressing     Problem: Prexisting or High Potential for Compromised Skin Integrity  Goal: Skin integrity is maintained or improved  Description: INTERVENTIONS:  - Identify patients at risk for skin breakdown  - Assess and monitor skin integrity  - Assess and monitor nutrition and hydration status  - Monitor labs   - Assess for incontinence   - Turn and reposition patient  - Assist with mobility/ambulation  - Relieve pressure over bony prominences  - Avoid friction and shearing  - Provide appropriate hygiene as needed including keeping skin clean and dry  - Evaluate need for skin moisturizer/barrier cream  - Collaborate with interdisciplinary team   - Patient/family teaching  - Consider wound care consult   Outcome: Progressing     Problem: Nutrition/Hydration-ADULT  Goal: Nutrient/Hydration intake appropriate for improving, restoring or maintaining nutritional needs  Description: Monitor and assess patient's nutrition/hydration status for malnutrition  Collaborate with interdisciplinary team and initiate plan and interventions as ordered  Monitor patient's weight and dietary intake as ordered or per policy  Utilize nutrition screening tool and intervene as necessary  Determine patient's food preferences and provide high-protein, high-caloric foods as appropriate       INTERVENTIONS:  - Monitor oral intake, urinary output, labs, and treatment plans  - Assess nutrition and hydration status and recommend course of action  - Evaluate amount of meals eaten  - Assist patient with eating if necessary   - Allow adequate time for meals  - Recommend/ encourage appropriate diets, oral nutritional supplements, and vitamin/mineral supplements  - Order, calculate, and assess calorie counts as needed  - Recommend, monitor, and adjust tube feedings and TPN/PPN based on assessed needs  - Assess need for intravenous fluids  - Provide specific nutrition/hydration education as appropriate  - Include patient/family/caregiver in decisions related to nutrition  Outcome: Progressing     Problem: SAFETY,RESTRAINT: NV/NON-SELF DESTRUCTIVE BEHAVIOR  Goal: Remains free of harm/injury (restraint for non violent/non self-detsructive behavior)  Description: INTERVENTIONS:  - Instruct patient/family regarding restraint use   - Assess and monitor physiologic and psychological status   - Provide interventions and comfort measures to meet assessed patient needs   - Identify and implement measures to help patient regain control  - Assess readiness for release of restraint   Outcome: Progressing  Goal: Returns to optimal restraint-free functioning  Description: INTERVENTIONS:  - Assess the patient's behavior and symptoms that indicate continued need for restraint  - Identify and implement measures to help patient regain control  - Assess readiness for release of restraint   Outcome: Progressing     Problem: CONFUSION/THOUGHT DISTURBANCE  Goal: Thought disturbances (confusion, delirium, depression, dementia or psychosis) are managed to maintain or return to baseline mental status and functional level  Description: INTERVENTIONS:  - Assess for possible contributors to  thought disturbance, including but not limited to medications, infection, impaired vision or hearing, underlying metabolic abnormalities, dehydration, respiratory compromise,  psychiatric diagnoses and notify attending PHYSICAN/AP  - Monitor and intervene to maintain adequate nutrition, hydration, elimination, sleep and activity  - Decrease environmental stimuli, including noise as appropriate  - Provide frequent contacts to provide refocusing, direction and reassurance as needed  Approach patient calmly with eye contact and at their level    - [place] high risk fall precautions, aspiration precautions and other safety measures, as indicated  - If delirium suspected, notify physician/AP of change in condition and request immediate in-person evaluation  - Pursue consults as appropriate including Geriatric (campus dependent), OT for cognitive evaluation/activity planning, psychiatric, pastoral care, etc   Outcome: Progressing

## 2022-11-16 NOTE — PROGRESS NOTES
Heart Failure Progress Note - Sydell Delay 80 y o  male MRN: [id number]    Unit/Bed#: [facility] 620-01 Encounter: [id number]      Assessment:  Principal Problem:    Subdural hematoma  Active Problems:    Paroxysmal atrial fibrillation (HCC)    Type 2 diabetes mellitus with complication, with long-term current use of insulin (HCC)    Acquired hypothyroidism    Stage 3b chronic kidney disease    Essential hypertension    Mixed hyperlipidemia    Ischemic cardiomyopathy    Nasal bone fractures    Epistaxis    Acute respiratory failure with hypoxia (HCC)    Acute kidney injury (HCC)    Acute on chronic HFrEF  Prior history of ICM with a EF of 40%, repeat Echo during this admission with EF 35%  Noted to be in CHF exacerbation on admission (elevated BNP, pulm congestion, respiratory distress)  Intubated due to respiratory distress (combination of epistaxis and CHF)   Extubated on 2022  Home meds: lasix 20 mg daily, coreg 6 25 mg bid, IMDUR 30 m g bid  Lasix held yesterday  BP: 150/87     Troponin elevation: type 2 vs non MI troponin elevation  Troponin trending up from [phone]  EKst degree AV block, LBBB, ST changes not meeting sgarbossa criteria   Per wife, uses nitro intermittently, no recent increased use   Possible non MI troponin elevation due to catecholaminergic surge vs demand ischemia given prior history of CAD     Ischemic cardiomyopathy with CAD  s/p CABG x 3, LIMA to LAD, 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  2017 [facility] with graft occlusion s/p PCI  Was on Clopidogrel, IMDUR, nitroglycerin at home     Severe aortic stenosis  Echo 2022 shows mild-mod stenosis  Repeat echo 22: severe AS with DVI 0 24, LIMA 0 91, mean gradient 10, peak velocity 2 03  Has been having dizziness     Paroxysmal atrial fibrillation  Currently rate controlled afib  Home meds: Coreg 6 25 mg bid, apixaban 2 5 mg bid     Hypertension  Amlodipine was discontinued in the past  Has midodrine as a home medication     1st degree AV block  Present since 2018     Hyperlipidemia  CKD BL creatinine 1 3-1 6  Diabetes mellitus  PAD  ICH     Plan  1  Continue to hold diuretic  Will re assess tomorrow and decide on maintenance dosea  2  Transition to toprol xl 25 mg bid (home coreg 6 25 mg bid)  3  Will add ACEI once creatinine is at baseline  4  Maintain MAP >65  5  Restart antiplatelets and anticoagulation once cleared by NS  6  Continue atorvastatin 80 mg daily  7  No acute interventions for aortic stenosis, however will likely need evaluation for low flow low gradient state vs AS requiring TAVR, once patient recovers, given ongoing dizziness  8  Continue telemetry      Subjective:   Patient seen and examined  No significant events overnight  Objective:     Vitals: Blood pressure 155/87, pulse 101, temperature 97 5 °F (36 4 °C), resp  rate 18, height 5' 8" (1 727 m), weight 78 kg (171 lb 15 3 oz), SpO2 92 %  , Body mass index is 26 15 kg/m² ,   Orthostatic Blood Pressures    Flowsheet Row Most Recent Value   Blood Pressure 155/87 filed at 11/16/2022 0805   Patient Position - Orthostatic VS Sitting filed at 11/14/2022 1200            Intake/Output Summary (Last 24 hours) at 11/16/2022 0905  Last data filed at 11/16/2022 0805  Gross per 24 hour   Intake 480 ml   Output 230 ml   Net 250 ml       Telemetry: atrial fibrillation, PVCs      Physical Exam:  Physical Exam  Vitals and nursing note reviewed  Constitutional:       General: He is not in acute distress  HENT:      Head: Normocephalic  Cardiovascular:      Rate and Rhythm: Tachycardia present  Rhythm irregular  Pulses: Normal pulses  Heart sounds: No murmur heard  Comments: Mild JVD  Pulmonary:      Breath sounds: Rales present  No wheezing  Musculoskeletal:      Right lower leg: Edema (trace) present  Left lower leg: Edema (trace) present  Skin:     Comments: Warm peripheries   Neurological:      Mental Status: He is disoriented        Comments: sedated Psychiatric:         Mood and Affect: Mood normal            Medications:      Current Facility-Administered Medications:   •  acetaminophen (TYLENOL) tablet 975 mg, 975 mg, Oral, Q8H [facility], Maria C Prado PA-C, 975 mg at 11/16/22 0863  •  atorvastatin (LIPITOR) tablet 80 mg, 80 mg, Oral, Daily With Kerwin Muñiz PA-C, 80 mg at 11/15/22 1728  •  bisacodyl (DULCOLAX) rectal suppository 10 mg, 10 mg, Rectal, Daily PRN, Maria C Prado PA-C  •  heparin (porcine) subcutaneous injection 5,000 Units, 5,000 Units, Subcutaneous, Q8H [facility], Maria C Prado PA-C, 5,000 Units at 11/16/22 3044  •  insulin lispro (HumaLOG) 100 units/mL subcutaneous injection 1-6 Units, 1-6 Units, Subcutaneous, 4x Daily (AC & HS), 6 Units at 11/16/22 0830 **AND** Fingerstick Glucose (POCT), , , 4x Daily AC and at bedtime, Maria C Prado PA-C  •  levETIRAcetam (KEPPRA) tablet 500 mg, 500 mg, Oral, Q12H [facility], Catarina Prado PA-C, 500 mg at 11/16/22 0825  •  levothyroxine tablet 75 mcg, 75 mcg, Oral, Early Morning, Toni Rodrigez PA-C, 75 mcg at 11/16/22 6928  •  metoprolol tartrate (LOPRESSOR) tablet 25 mg, 25 mg, Oral, Q12H [facility], Pam Guess, DO, 25 mg at 11/16/22 0825  •  omeprazole (PRILOSEC) suspension 2 mg/mL, 20 mg, Oral, Early Morning, Catarina Prado PA-C, 20 mg at 11/16/22 2106  •  oxyCODONE (ROXICODONE) IR tablet 2 5 mg, 2 5 mg, Oral, Q4H PRN **OR** oxyCODONE (ROXICODONE) IR tablet 5 mg, 5 mg, Oral, Q4H PRN, Maria C Prado PA-C  •  polyethylene glycol (MIRALAX) packet 17 g, 17 g, Oral, Daily, Maria C Prado PA-C, 17 g at 11/15/22 7576  •  QUEtiapine (SEROquel) tablet 25 mg, 25 mg, Oral, HS, Catarina PAPI Prado PA-C, 25 mg at 11/15/22 6686  •  senna-docusate sodium (SENOKOT S) 8 6-50 mg per tablet 1 tablet, 1 tablet, Oral, BID, Toni Rodrigez PA-C, 1 tablet at 11/15/22 1728     Labs & Results:        Results from last 7 days   Lab Units 11/16/22  0513 11/15/22  0521 11/14/22  0525 WBC Thousand/uL 9 33 11 48* 12 18*   HEMOGLOBIN g/dL 12 2 12 0 11 8*   HEMATOCRIT % 37 9 37 1 35 4*   PLATELETS Thousands/uL 181 174 147*         Results from last 7 days   Lab Units 11/16/22  0513 11/16/22  0020 11/15/22  0521 11/11/22  0544 11/11/22  0540 11/11/22 0217   POTASSIUM mmol/L 4 3 4 1 3 4*   < >  --  4 2   CHLORIDE mmol/L 109* 107 106   < >  --  98   CO2 mmol/L 26 25 23   < >  --  28   CO2, I-STAT mmol/L  --   --   --   --  29  --    BUN mg/dL 66* 66* 57*   < >  --  18   CREATININE mg/dL 1 81* 2 08* 2 07*   < >  --  1 22   CALCIUM mg/dL 9 1 8 8 8 6   < >  --  9 4   ALK PHOS U/L  --   --   --   --   --  71   ALT U/L  --   --   --   --   --  16   AST U/L  --   --   --   --   --  21   GLUCOSE, ISTAT mg/dl  --   --   --   --  301*  --     < > = values in this interval not displayed       Results from last 7 days   Lab Units 11/11/22 0217   INR  1 26*   PTT seconds 26     Results from last 7 days   Lab Units 11/16/22  0513 11/16/22  0020 11/15/22  0521   MAGNESIUM mg/dL 3 0* 2 7* 2 6           EKG personally reviewed by Shirley Vargas MD

## 2022-11-17 PROBLEM — R41.0 DELIRIUM: Status: ACTIVE | Noted: 2022-01-01

## 2022-11-17 NOTE — ASSESSMENT & PLAN NOTE
Lab Results   Component Value Date    EGFR 43 11/17/2022    EGFR 32 11/16/2022    EGFR 27 11/16/2022    CREATININE 1 41 (H) 11/17/2022    CREATININE 1 81 (H) 11/16/2022    CREATININE 2 08 (H) 11/16/2022     Creatinine improving, 1 4 from 1 8 yesterday  Continue to trend BMPs

## 2022-11-17 NOTE — SPEECH THERAPY NOTE
Speech Language/Pathology    Speech/Language Pathology Progress Note    Patient Name: Tha Kraft  ZQLBI'N Date: 11/17/2022     Problem List  Principal Problem:    Subdural hematoma  Active Problems:    Paroxysmal atrial fibrillation (HCC)    Type 2 diabetes mellitus with complication, with long-term current use of insulin (HCC)    Acquired hypothyroidism    Stage 3b chronic kidney disease    Essential hypertension    Mixed hyperlipidemia    Ischemic cardiomyopathy    Nasal bone fractures    Epistaxis    Acute respiratory failure with hypoxia (HCC)    Acute kidney injury ([address] )    Delirium       Past Medical History  Past Medical History:   Diagnosis Date   • Benign hypertension    • CHF (congestive heart failure) (HCC)    • Chronic kidney disease (CKD), stage III (moderate)    • Coronary artery disease    • Hypothyroidism    • Ischemic cardiomyopathy    • Mixed hyperlipidemia    • Paroxysmal atrial fibrillation    • PVD (peripheral vascular disease)    • Type II diabetes mellitus         Past Surgical History  Past Surgical History:   Procedure Laterality Date   • CARDIAC CATHETERIZATION     • CORONARY ARTERY BYPASS GRAFT           Subjective:  "I would like ice cream" Patient awake and alert today  Objective:  Patient just finished with PT/OT and is seen for dysphagia therapy  Wife is present  The patient is more awake and alert  Upper and lower partials are cleaned and patient is able to enter  He is assessed with ice cream  The patient can feed himself  He takes large bites  Transfer time is min prolonged  The patient is observed with coughing throughout intake  Oral suction is provided for the removal of ice cream and thick secretions  The patient is trialed with bettie cracker  He needs max cues to regulate bite size  Mastication time is prolonged with oral residue  The patient is trialed with nectar thick liquids via straw  Cough x1 observed, but no additional s/s aspiration observed   He takes large, consecutive sips of thin liquids via straw with immediate cough response  Wife and family educated on risk of aspiration and reason for cough response with thin vs nectar thick liquids  Assessment:  The patient has significant coughing with ice cream and thin liquids today  Plan/Recommendations:  Recommend diet change to puree with nectar thick liquids  Request VBS to instrumentally assess swallow function  ST will continue to further assess tolerance

## 2022-11-17 NOTE — PROGRESS NOTES
[facility]  Progress Note - Kristal Trevino 4/25/1933, 80 y o  male MRN: [id number]  Unit/Bed#: [facility] 620-01 Encounter: [id number]  Primary Care Provider: Jumana Stokes DO   Date and time admitted to hospital: 11/11/2022  5:15 AM    Delirium  Assessment & Plan  Seroquel 25 mg qHS  Consider increasing pharmacologic sedation today  Has intermittently required waist and wrist restraints  Will need to be out of restraints for rehab placement  Acute kidney injury ([address] )  Assessment & Plan  Cr 1 4 to 1 8   Discontinue lasix  Continue to trend BMPs    Acute respiratory failure with hypoxia [facility])  Assessment & Plan  Patient required intubation for pulm edema, could not tolerate bipap due to facial injuries  Aggressively diuresed and extubated  Heart failure team consulted, appreciate recs  Lasix, isordil, hydral discontinued in setting of VEE  Continue metoprolol  Epistaxis  Assessment & Plan  Resolved  Rhinorocket removed  Nasal bone fractures  Assessment & Plan  Non-op    Ischemic cardiomyopathy  Assessment & Plan  HF following  Appreciate recs, see below under acute resp failure    Mixed hyperlipidemia  Assessment & Plan  Continue statin    Essential hypertension  Assessment & Plan  Continue metoprolol succinate 25 mg BID    Stage 3b chronic kidney disease  Assessment & Plan  Lab Results   Component Value Date    EGFR 43 11/17/2022    EGFR 32 11/16/2022    EGFR 27 11/16/2022    CREATININE 1 41 (H) 11/17/2022    CREATININE 1 81 (H) 11/16/2022    CREATININE 2 08 (H) 11/16/2022     Creatinine improving, 1 4 from 1 8 yesterday  Continue to trend BMPs       Acquired hypothyroidism  Assessment & Plan  Continue levothyroxine     Type 2 diabetes mellitus with complication, with long-term current use of insulin [facility])  Assessment & Plan  Lab Results   Component Value Date    HGBA1C 7 3 (H) 09/17/2022       Recent Labs     11/16/22  0814 11/16/22  1135 11/16/22  1720 11/16/22 2040   POCGLU 411* 311* 295* 225*     -SSI, adjust as needed  - Trulicity added  Blood Sugar Average: Last 72 hrs:  (P) 889 [id number]    Paroxysmal atrial fibrillation (HCC)  Assessment & Plan  Continue metoprolol  Hold AC/AP in setting of SDH  * Subdural hematoma  Assessment & Plan  - Neuro exam: GCS 14 (4, 4, 6), due to confusion  - Continue neuro checks  - Reversal agent administered: K-Centra  - F/u CT head: Unchanged  - Appreciate neurosurgery recommendations  - Complete 7 day course of Keppra for seizure prophylaxis  - Chemical DVT prophylaxis: SQH  - Hold all anticoagulants and anti platelet medications until cleared by neurosurgery to resume  - PT/OT and cognitive evaluation          Bowel Regimen: Senokot  VTE Prophylaxis:Heparin     Disposition: Continue med surg, pending rehab placement    Subjective   Chief Complaint: "I'm okay"    Subjective: Patient offers minimal complaints  Per nursing, patient attempts to roll out of bed overnight, required waist restraints  Objective   Vitals:   Temp:  [96 9 °F (36 1 °C)-98 5 °F (36 9 °C)] 98 5 °F (36 9 °C)  HR:  [71-95] 91  Resp:  [17-18] 18  BP: (138-164)/(80-97) 162/96    I/O       11/15 0701  11/16 0700 11/16 0701  11/17 0700 11/17 0701  11/18 0700    P  O  360 358     I V  (mL/kg)       IV Piggyback       Total Intake(mL/kg) 360 (4 6) 358 (4 6)     Urine (mL/kg/hr) 891 (0 5)      Total Output 891      Net -531 +358            Unmeasured Urine Occurrence  4 x     Unmeasured Stool Occurrence 1 x 1 x            Physical Exam:   GENERAL APPEARANCE: NAD, restrained with waist belt  NEURO: No focal deficit  HEENT: NC, ecchymosis around BL eyes, ecchymosis over nose  CV: RRR  LUNGS: No resp distress  GI: Soft, nontender, nondistended  : Deferred  MSK: Full ROM  SKIN: Warm     Invasive Devices     Peripheral Intravenous Line  Duration           Peripheral IV Left;Upper Arm -- days                      Lab Results:   Results: I have personally reviewed all pertinent laboratory/tests results, BMP/CMP:   Lab Results   Component Value Date    SODIUM 143 11/17/2022    K 4 0 11/17/2022     (H) 11/17/2022    CO2 27 11/17/2022    BUN 59 (H) 11/17/2022    CREATININE 1 41 (H) 11/17/2022    CALCIUM 9 1 11/17/2022    EGFR 43 11/17/2022    and CBC:   Lab Results   Component Value Date    WBC 11 23 (H) 11/17/2022    HGB 13 0 11/17/2022    HCT 41 7 11/17/2022     (H) 11/17/2022     11/17/2022    MCH 31 8 11/17/2022    MCHC 31 2 (L) 11/17/2022    RDW 13 2 11/17/2022    MPV 10 1 11/17/2022    NRBC 0 11/17/2022     Imaging: I have personally reviewed pertinent reports     and I have personally reviewed pertinent films in PACS   Other Studies:

## 2022-11-17 NOTE — ASSESSMENT & PLAN NOTE
Lab Results   Component Value Date    HGBA1C 7 3 (H) 09/17/2022       Recent Labs     11/16/22  0814 11/16/22  1135 11/16/22  1720 11/16/22 2040   POCGLU 411* 311* 295* 225*     -SSI, adjust as needed     - Trulicity added  Blood Sugar Average: Last 72 hrs:  (P) 80 [id number]

## 2022-11-17 NOTE — PROGRESS NOTES
Cardiology Progress Note - Ute Aly 80 y o  male MRN: [id number]    Unit/Bed#: [facility] 620-01 Encounter: [id number]      Assessment:  Principal Problem:    Subdural hematoma  Active Problems:    Paroxysmal atrial fibrillation (HCC)    Type 2 diabetes mellitus with complication, with long-term current use of insulin (HCC)    Acquired hypothyroidism    Stage 3b chronic kidney disease    Essential hypertension    Mixed hyperlipidemia    Ischemic cardiomyopathy    Nasal bone fractures    Epistaxis    Acute respiratory failure with hypoxia (HCC)    Acute kidney injury ([address] )    Delirium    Acute on chronic HFrEF  Prior history of ICM with a EF of 40%, repeat Echo during this admission with EF 35%  Noted to be in CHF exacerbation on admission (elevated BNP, pulm congestion, respiratory distress)  Intubated due to respiratory distress (combination of epistaxis and CHF)   Extubated on 2022  Home meds: lasix 20 mg daily, coreg 6 25 mg bid, IMDUR 30 m g bid  Diuretic held since 11/15  BP: 150/87     Troponin elevation: type 2 vs non MI troponin elevation  Troponin trending up from [phone]  EKst degree AV block, LBBB, ST changes not meeting sgarbossa criteria   Per wife, uses nitro intermittently, no recent increased use   Possible non MI troponin elevation due to catecholaminergic surge vs demand ischemia given prior history of CAD     Ischemic cardiomyopathy with CAD  s/p CABG x 3, LIMA to LAD, 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  2017 [facility] with graft occlusion s/p PCI  Was on Clopidogrel, IMDUR, nitroglycerin at home     Severe aortic stenosis  Echo 2022 shows mild-mod stenosis  Repeat echo 22: severe AS with DVI 0 24, LIMA 0 91, mean gradient 10, peak velocity 2 03  Has been having dizziness     Paroxysmal atrial fibrillation  Currently rate controlled afib  Restarted BB: toprol xl 25 mg bid  Home meds: Coreg 6 25 mg bid, apixaban 2 5 mg bid     Hypertension  Amlodipine was discontinued in the past  Has midodrine as a home medication     1st degree AV block  Present since 2018     Hyperlipidemia  CKD BL creatinine 1 3-1 6  Diabetes mellitus  PAD  ICH     Plan  1  Continue to hold diuretic  Will re assess daily to decide on maintenance diuretic, likely restart tomorrow  2  Increase toprol xl to 50 mg bid (home coreg 6 25 mg bid)  3  Hold off on initiating ACEi  4  Restart antiplatelets and anticoagulation once cleared by NS  5  Continue atorvastatin 80 mg daily  6  No acute interventions for aortic stenosis, however will likely need evaluation for low flow low gradient state vs AS requiring TAVR, once patient recovers, given ongoing dizziness  7  Continue telemetry        Subjective:   Patient seen and examined  No significant events overnight    Objective:     Vitals: Blood pressure 162/96, pulse 91, temperature 98 5 °F (36 9 °C), resp  rate 18, height 5' 8" (1 727 m), weight 78 kg (171 lb 15 3 oz), SpO2 95 %  , Body mass index is 26 15 kg/m² ,   Orthostatic Blood Pressures    Flowsheet Row Most Recent Value   Blood Pressure 162/96 filed at 11/17/2022 8039   Patient Position - Orthostatic VS Sitting filed at 11/14/2022 1200            Intake/Output Summary (Last 24 hours) at 11/17/2022 0921  Last data filed at 11/17/2022 7523  Gross per 24 hour   Intake 475 ml   Output 30 ml   Net 445 ml       Telemetry: atrial fibrillation, rates 90's, frequent PVCs      Physical Exam:  Physical Exam  Vitals and nursing note reviewed  Constitutional:       General: He is not in acute distress  HENT:      Head: Normocephalic  Cardiovascular:      Rate and Rhythm: Rhythm irregular  Pulses: Normal pulses  Heart sounds: No murmur heard  Comments: JVD+  Pulmonary:      Breath sounds: Rales present  No wheezing  Musculoskeletal:      Right lower leg: No edema  Left lower leg: No edema  Skin:     Comments: Warm peripheries   Neurological:      Mental Status: He is disoriented     Psychiatric:         Mood and Affect: Mood normal            Medications:      Current Facility-Administered Medications:   •  acetaminophen (TYLENOL) tablet 975 mg, 975 mg, Oral, Q8H [address], Catarina Prado PA-C, 975 mg at 11/16/22 1305  •  atorvastatin (LIPITOR) tablet 80 mg, 80 mg, Oral, Daily With Sena Leary PA-C, 80 mg at 11/16/22 1723  •  bisacodyl (DULCOLAX) rectal suppository 10 mg, 10 mg, Rectal, Daily PRN, Cherry Prado PA-C  •  heparin (porcine) subcutaneous injection 5,000 Units, 5,000 Units, Subcutaneous, Q8H [address], Toshia Rangel PA-C, 5,000 Units at 11/17/22 0953  •  insulin detemir (LEVEMIR) subcutaneous injection 11 Units, 11 Units, Subcutaneous, HS, Anushka Luna PA-C, 11 Units at 11/16/22 2124  •  insulin lispro (HumaLOG) 100 units/mL subcutaneous injection 2-12 Units, 2-12 Units, Subcutaneous, HS, Anushka Luna PA-C, 4 Units at 11/16/22 2129  •  insulin lispro (HumaLOG) 100 units/mL subcutaneous injection 4 Units, 4 Units, Subcutaneous, Daily With Breakfast, Rocio Luna PA-C, 4 Units at 11/17/22 0849  •  insulin lispro (HumaLOG) 100 units/mL subcutaneous injection 4 Units, 4 Units, Subcutaneous, Daily With Lunch, Anushka Luna PA-C, 4 Units at 11/16/22 1210  •  insulin lispro (HumaLOG) 100 units/mL subcutaneous injection 4 Units, 4 Units, Subcutaneous, Daily With Dinner, Rocio Luna PA-C, 4 Units at 11/16/22 1723  •  insulin lispro (HumaLOG) 100 units/mL subcutaneous injection 4-20 Units, 4-20 Units, Subcutaneous, TID AC, 4 Units at 11/17/22 0850 **AND** Fingerstick Glucose (POCT), , , TID AC, Anushka Conner PA-C  •  levothyroxine tablet 75 mcg, 75 mcg, Oral, Early Morning, Cherry Prado PA-C, 75 mcg at 11/17/22 3380  •  melatonin tablet 3 mg, 3 mg, Oral, HS, Marino Hale MD, 3 mg at 11/16/22 2123  •  metoprolol succinate (TOPROL-XL) 24 hr tablet 25 mg, 25 mg, Oral, BID, Pancho Gill MD, 25 mg at 11/17/22 0842  • NON FORMULARY, 4 5 mg, Subcutaneous, Weekly, Augustine Talbert DO  •  omeprazole (PRILOSEC) suspension 2 mg/mL, 20 mg, Oral, Early Morning, Catarina R Rasmuson, PA-C, 20 mg at 11/17/22 3354  •  oxyCODONE (ROXICODONE) IR tablet 2 5 mg, 2 5 mg, Oral, Q4H PRN **OR** oxyCODONE (ROXICODONE) IR tablet 5 mg, 5 mg, Oral, Q4H PRN, Kenton Peek Rasmuson, PA-C  •  polyethylene glycol (MIRALAX) packet 17 g, 17 g, Oral, Daily, Kenton Peek Rasmuson, PA-C, 17 g at 11/17/22 0848  •  QUEtiapine (SEROquel) tablet 25 mg, 25 mg, Oral, HS, Catarina R Rasmuson, PA-C, 25 mg at 11/16/22 2123  •  senna-docusate sodium (SENOKOT S) 8 6-50 mg per tablet 1 tablet, 1 tablet, Oral, BID, Kenton Peek Rasmuson, PA-C, 1 tablet at 11/17/22 0842     Labs & Results:        Results from last 7 days   Lab Units 11/17/22  0448 11/16/22  0513 11/15/22  0521   WBC Thousand/uL 11 23* 9 33 11 48*   HEMOGLOBIN g/dL 13 0 12 2 12 0   HEMATOCRIT % 41 7 37 9 37 1   PLATELETS Thousands/uL 220 181 174         Results from last 7 days   Lab Units 11/17/22  0448 11/16/22  0513 11/16/22  0020 11/11/22  0544 11/11/22  0540 11/11/22  0217   POTASSIUM mmol/L 4 0 4 3 4 1   < >  --  4 2   CHLORIDE mmol/L 113* 109* 107   < >  --  98   CO2 mmol/L 27 26 25   < >  --  28   CO2, I-STAT mmol/L  --   --   --   --  29  --    BUN mg/dL 59* 66* 66*   < >  --  18   CREATININE mg/dL 1 41* 1 81* 2 08*   < >  --  1 22   CALCIUM mg/dL 9 1 9 1 8 8   < >  --  9 4   ALK PHOS U/L  --   --   --   --   --  71   ALT U/L  --   --   --   --   --  16   AST U/L  --   --   --   --   --  21   GLUCOSE, ISTAT mg/dl  --   --   --   --  301*  --     < > = values in this interval not displayed       Results from last 7 days   Lab Units 11/11/22  0217   INR  1 26*   PTT seconds 26     Results from last 7 days   Lab Units 11/16/22  0513 11/16/22  0020 11/15/22  0521   MAGNESIUM mg/dL 3 0* 2 7* 2 6       EKG personally reviewed by Ollie Hairston MD

## 2022-11-17 NOTE — PHYSICAL THERAPY NOTE
Physical Therapy Progress Note     11/17/22 1040   PT Last Visit   PT Visit Date 11/17/22   Note Type   Note Type Treatment   Pain Assessment   Pain Assessment Tool FLACC   Pain Rating: FLACC (Rest) - Face 0   Pain Rating: FLACC (Rest) - Legs 0   Pain Rating: FLACC (Rest) - Activity 0   Pain Rating: FLACC (Rest) - Cry 0   Pain Rating: FLACC (Rest) - Consolability 0   Score: FLACC (Rest) 0   Pain Rating: FLACC (Activity) - Face 1   Pain Rating: FLACC (Activity) - Legs 1   Pain Rating: FLACC (Activity) - Activity 0   Pain Rating: FLACC (Activity) - Cry 0   Pain Rating: FLACC (Activity) - Consolability 0   Score: FLACC (Activity) 2   Restrictions/Precautions   Other Precautions Cognitive; Chair Alarm; Bed Alarm; Fall Risk;Pain;Aspiration  (Alarm active post session )   Subjective   Subjective The patient was lethargic initially, but this greatly improved once he was seated at the edge of the bed with his wife present  Bed Mobility   Supine to Sit 2  Maximal assistance   Additional items Assist x 2; Increased time required;Verbal cues;LE management;HOB elevated   Transfers   Sit to Stand 2  Maximal assistance   Additional items (S)  Assist x 2; Increased time required;Verbal cues  (Progressed to Mod Ax1-2 )   Stand to Sit 2  Maximal assistance   Additional items Assist x 1; Increased time required;Verbal cues   Ambulation/Elevation   Gait pattern Excessively slow; Step to;Short stride; Inconsistent morales; Shuffling;Decreased foot clearance;Retropulsion;Narrow CARLOS; Improper Weight shift;L Foot drag;R Foot drag   Gait Assistance 3  Moderate assist   Additional items Assist x 2;Verbal cues; Tactile cues   Assistive Device Rolling walker   Distance 4 feet, 10 feet, 7 feet     Balance   Static Sitting Poor   Dynamic Sitting Poor   Static Standing Poor   Dynamic Standing Poor -   Ambulatory Poor -   Activity Tolerance   Activity Tolerance Patient tolerated treatment well   Medical Staff Made Aware Co-treat with occupational therapy due to patient's limited activity tolerance, cognitive reserves, and significant assistance necessary for progressive functional mobility  Nurse Made Aware Yes  Assessment   Prognosis Good   Problem List Decreased strength;Decreased endurance; Impaired balance;Decreased mobility; Decreased cognition;Decreased safety awareness   Assessment The addition of occupational therapy during this session was key as it helped facilitate the patient's ability to follow and adhere to instructions  He did demonstrate marked improvement during the session today, which was further enhanced with the presence of his wife  He was able to ambulate more trials as well as farther, but he continues to require significant assistance in order to do so  While two persons remains necessary, this assistance overall was less than prior sessions  He does fatigue with activities, and he requires instruction to take rests appropriately  The patient progressively improved with his mental status and cognition throughout the session  Inpatient rehab remains recommended in order to safely progress the patient back to his ambulatory status  Barriers to Discharge Inaccessible home environment;Decreased caregiver support   Goals   Patient Goals To have ice cream    STG Expiration Date 11/28/22   PT Treatment Day 2   Plan   Treatment/Interventions Functional transfer training;LE strengthening/ROM; Therapeutic exercise; Endurance training;Cognitive reorientation;Patient/family training;Bed mobility;Gait training   Progress Progressing toward goals   PT Frequency 2-3x/wk   Recommendation   PT Discharge Recommendation Post acute rehabilitation services   Equipment Recommended [facility] Recommended Wheeled walker   AM-PAC Basic Mobility Inpatient   Turning in Bed Without Bedrails 2   Lying on Back to Sitting on Edge of Flat Bed 1   Moving Bed to Chair 1   Standing Up From Chair 2   Walk in Room 1   Climb 3-5 Stairs 1   Basic Mobility Inpatient Raw Score 8   Turning Head Towards Sound 3   Follow Simple Instructions 2   Low Function Basic Mobility Raw Score 13   Low Function Basic Mobility Standardized Score 20 14   Highest Level Of Mobility   -[facility] Goal 3: Sit at edge of bed   -HLM Achieved 6: Walk 10 steps or more       An AM-PAC Basic Mobility standardized score less than 40 78 suggests the patient may benefit from discharge to post-acute rehab services      Kong Redding, PTA

## 2022-11-17 NOTE — PROGRESS NOTES
Progress Note - Julien Jimenez 80 y o  male MRN: [id number]    Unit/Bed#: [facility] 620-01 Encounter: [id number]      Assessment/Plan:  Encephalopathy  Increased confusion and agitation, requiring restraints  Multifactorial risk factors, hospitalization, pain, SDH, cannot exclude keppra as contributing  Provide frequent redirection, reorientation, distraction techniques  Avoid deliriogenic medications such as tramadol, benzodiazepines, anticholinergics,  Benadryl  Treat pain, See geriatric pain protocol  Last BM 11/14/22  Monitor for urinary retention  Encourage early and frequent moblization, OOB  Encourage Hydration/ Nutrition  Implement sleep hygiene, limit night time interuptions, group activities  Continue  scheduled Seroquel 25 mg op QHS  Monitor   (11/11/21)     Ambulatory dysfunction  At risk for falls secondary to age, hx of fall, gait instability, polypharmacy, visual impairment, BPPV  Fall precautions  PT/OT  Increased physical exercise, recommend balance and strengthening exercises  Rehab post hospitalization     Acute pain due to trauma  Geriatric pain protocol  Scheduled acetaminophen 975 mg po Q8  Oxycodone 2 5 mg po Q 4 prn moderate pain  Oxycodone 5 mg po Q 4 prn severe pain   Monitor for constipation  Lidoderm patch      Frailty  Clinical Frail Scale: 6- Moderately Frail  Albumin 4 2, maintain protein in diet  PT/OT  Continue supportive care     Cognitive screening  At risk secondary to hx of CVA, age  Recommend check TSH and vitamin B12  CT head 11/11/22: severe microangiopathic changes, chronic bilateral frontal lobe infarcts   Keep physically, mentally and socially active     Insomnia  Related to hospitalization  First line is behavioral therapy  Avoid sedative hypnotics such as benzodiazepines and benadryl  Encourage staying awake during the day  Encourage daytime activity, morning exercise  Decrease or eliminate day time naps  Avoid caffeine especially during late afternoon and evening hours  Establish a night time routine     Subdural hemorrhage  S/p fall   AC on hold  Neurosurgery on consult  Keppra for seizure ppx x 7 days  Trauma following     Nasal bone fractures with epistaxis  Rhino rocket discontinued  OMFS on consult  Trauma following          Subjective:   Upon exam pt is lying in bed  He is  oriented x 1, restless, trying to climb out of bed  Last BM 11/16/22    Objective:     Vitals: Blood pressure 162/96, pulse 91, temperature 98 5 °F (36 9 °C), resp  rate 18, height 5' 8" (1 727 m), weight 78 kg (171 lb 15 3 oz), SpO2 95 %  ,Body mass index is 26 15 kg/m²  Intake/Output Summary (Last 24 hours) at 11/17/2022 1008  Last data filed at 11/17/2022 0852  Gross per 24 hour   Intake 475 ml   Output 30 ml   Net 445 ml       Physical Exam:   General : NAD, thin and frail  HEENT : MMM   Heart : Normal rate, no murmur rub or gallop  Lungs : CTA no wheezes, rales or rhonchi  Abdomen : Soft, NT/ND, BS auscultated in all 4 quads  Ext :  no edema  Skin : ecchymosis to bilateral orbital region  Neuro : Nonfocal  Psych : Alert and O x 1      Invasive Devices     Peripheral Intravenous Line  Duration           Peripheral IV Left;Upper Arm -- days                Lab, Imaging and other studies: I have personally reviewed pertinent reports      VTE Pharmacologic Prophylaxis: Sequential compression device (Venodyne)   VTE Mechanical Prophylaxis: sequential compression device

## 2022-11-17 NOTE — ASSESSMENT & PLAN NOTE
Seroquel 25 mg qHS  Consider increasing pharmacologic sedation today  Has intermittently required waist and wrist restraints  Will need to be out of restraints for rehab placement

## 2022-11-17 NOTE — OCCUPATIONAL THERAPY NOTE
Occupational Therapy Treatment Note:      11/17/22 1038   OT Last Visit   OT Visit Date 11/17/22   Note Type   Note Type Treatment   Pain Assessment   Pain Assessment Tool FLACC   Pain Rating: FLACC (Rest) - Face 0   Pain Rating: FLACC (Rest) - Legs 0   Pain Rating: FLACC (Rest) - Activity 0   Pain Rating: FLACC (Rest) - Cry 0   Pain Rating: FLACC (Rest) - Consolability 0   Score: FLACC (Rest) 0   Pain Rating: FLACC (Activity) - Face 1   Pain Rating: FLACC (Activity) - Legs 1   Pain Rating: FLACC (Activity) - Activity 0   Pain Rating: FLACC (Activity) - Cry 0   Pain Rating: FLACC (Activity) - Consolability 0   Score: FLACC (Activity) 2   ADL   Grooming Assistance 4  Minimal Assistance   Grooming Comments asst for thoroughness  (wife noted to shave pt c electric razor  she states pt never had a mustache or beard)   LB Dressing Assistance 2  Maximal Assistance   Toileting Assistance  2  Maximal Assistance   Functional Standing Tolerance   Comments poor + balance c transfer   Bed Mobility   Supine to Sit 2  Maximal assistance   Transfers   Stand to Sit 3  Moderate assistance   Additional items Assist x 2  (much improved initiation and ability to follow verbal commands)   Stand pivot 3  Moderate assistance  (ax2)   Functional Mobility   Functional Mobility 2  Maximal assistance   Additional Comments ax2   Cognition   Overall Cognitive Status Impaired  (remains impaired however mnuch imrpvoed from tuesdays session   pt is able to follow simple verbal commands within a more appropriate amt of time)   Arousal/Participation Alert   Attention Attends with cues to redirect   Memory Decreased recall of precautions;Decreased recall of recent events;Decreased short term memory   Following Commands Follows one step commands with increased time or repetition   Comments opened eyes when wife called his name, he was able to remain alert entire session   Activity Tolerance   Activity Tolerance Patient tolerated treatment well Assessment   Assessment pt participated in am ot session and was seen focusing on grooming, bed mobility functional transfers and mobility trials as well as direction following and family trainning / conversation about pts lifestyle pta  pt with improvement  noted in direction following and initiaiton of tasks  pt was slightly more verbal but was still difficult to understand (soft spoken)   Plan   Treatment Interventions ADL retraining;Functional transfer training; Endurance training;Patient/family training; Activityengagement   Goal Expiration Date 11/28/22   OT Treatment Day 2   OT Frequency 2-3x/wk   Recommendation   OT Discharge Recommendation Post acute rehabilitation services   AM-PAC Daily Activity Inpatient   Lower Body Dressing 2   Bathing 2   Toileting 2   Upper Body Dressing 2   Grooming 2   Eating 2   Daily Activity Raw Score 12   Daily Activity Standardized Score (Calc for Raw Score >=11) 30 6   AM-PAC Applied Cognition Inpatient   Following a Speech/Presentation 1   Understanding Ordinary Conversation 2   Taking Medications 1   Remembering Where Things Are Placed or Put Away 1   Remembering List of 4-5 Errands 1   Taking Care of Complicated Tasks 1   Applied Cognition Raw Score 7   Applied Cognition Standardized Score 15 17   April A Storm

## 2022-11-17 NOTE — PLAN OF CARE
Problem: OCCUPATIONAL THERAPY ADULT  Goal: Performs self-care activities at highest level of function for planned discharge setting  See evaluation for individualized goals  Description: Treatment Interventions: ADL retraining, Visual perceptual retraining, Functional transfer training, Endurance training, Cognitive reorientation, UE strengthening/ROM, Patient/family training, Equipment evaluation/education, Compensatory technique education, Continued evaluation, Energy conservation, Activityengagement          See flowsheet documentation for full assessment, interventions and recommendations  Outcome: Progressing  Note: Limitation: Decreased ADL status, Decreased Safe judgement during ADL, Decreased cognition, Decreased endurance, Decreased self-care trans, Decreased high-level ADLs, Visual deficit  Prognosis: Fair  Assessment: pt participated in am ot session and was seen focusing on grooming, bed mobility functional transfers and mobility trials as well as direction following and family trainning / conversation about pts lifestyle pta  pt with improvement  noted in direction following and initiaiton of tasks   pt was slightly more verbal but was still difficult to understand (soft spoken)     OT Discharge Recommendation: Post acute rehabilitation services     April A Storm

## 2022-11-17 NOTE — PLAN OF CARE
Problem: PHYSICAL THERAPY ADULT  Goal: Performs mobility at highest level of function for planned discharge setting  See evaluation for individualized goals  Description: Treatment/Interventions: Functional transfer training, LE strengthening/ROM, Therapeutic exercise, Endurance training, Bed mobility, Gait training, Equipment eval/education, OT          See flowsheet documentation for full assessment, interventions and recommendations  Outcome: Progressing  Note: Prognosis: Good  Problem List: Decreased strength, Decreased endurance, Impaired balance, Decreased mobility, Decreased cognition, Decreased safety awareness  Assessment: The addition of occupational therapy during this session was key as it helped facilitate the patient's ability to follow and adhere to instructions  He did demonstrate marked improvement during the session today, which was further enhanced with the presence of his wife  He was able to ambulate more trials as well as farther, but he continues to require significant assistance in order to do so  While two persons remains necessary, this assistance overall was less than prior sessions  He does fatigue with activities, and he requires instruction to take rests appropriately  The patient progressively improved with his mental status and cognition throughout the session  Inpatient rehab remains recommended in order to safely progress the patient back to his ambulatory status  Barriers to Discharge: Inaccessible home environment, Decreased caregiver support     PT Discharge Recommendation: Post acute rehabilitation services    See flowsheet documentation for full assessment

## 2022-11-18 NOTE — ASSESSMENT & PLAN NOTE
Patient required intubation for pulm edema, could not tolerate bipap due to facial injuries  Aggressively diuresed and extubated  Heart failure team consulted, appreciate recs  Lasix, isordil, hydral discontinued in setting of VEE  Continue metoprolol  Patient getting IV lasix 20 mg today

## 2022-11-18 NOTE — NUTRITION
11/18/22 1631   Recommendations/Interventions   Interventions/Recommendations Supplement discontinue; Other (Specify)   Intervention Comments Discontinued nutrition supplements now that pt NPO and failed swallow study   Recommendations to Provider RD will monitor plan of care and clinical course for appropriate nutrition interventions  Consult RD for recs if nutrition support is pursued

## 2022-11-18 NOTE — PROGRESS NOTES
[facility]  Progress Note - Ute Aly 4/25/1933, 80 y o  male MRN: [id number]  Unit/Bed#: [facility] 620-01 Encounter: [id number]  Primary Care Provider: Violet Reyes DO   Date and time admitted to hospital: 11/11/2022  5:15 AM    Delirium  Assessment & Plan  Seroquel 25 mg qHS  Consider increasing pharmacologic sedation today  Has intermittently required waist and wrist restraints  Will need to be out of restraints for rehab placement  Acute kidney injury ([address] )  Assessment & Plan  Cr 1 48 from 1 41  Discontinue lasix  Continue to trend BMPs    Acute respiratory failure with hypoxia [facility])  Assessment & Plan  Patient required intubation for pulm edema, could not tolerate bipap due to facial injuries  Aggressively diuresed and extubated  Heart failure team consulted, appreciate recs  Lasix, isordil, hydral discontinued in setting of VEE  Continue metoprolol  Patient getting IV lasix 20 mg today  Epistaxis  Assessment & Plan  Resolved  Rhinorocket removed  Nasal bone fractures  Assessment & Plan  Non-op    Ischemic cardiomyopathy  Assessment & Plan  HF following  Appreciate recs, see below under acute resp failure    Mixed hyperlipidemia  Assessment & Plan  Continue statin    Essential hypertension  Assessment & Plan  Patient now NPO  IV metoprolol 25 mg q6hr    Stage 3b chronic kidney disease  Assessment & Plan  Lab Results   Component Value Date    EGFR 41 11/18/2022    EGFR 43 11/17/2022    EGFR 32 11/16/2022    CREATININE 1 48 (H) 11/18/2022    CREATININE 1 41 (H) 11/17/2022    CREATININE 1 81 (H) 11/16/2022     Creatinine 1 48 from 1 41  Continue to trend BMPs       Acquired hypothyroidism  Assessment & Plan  Continue levothyroxine, however patient is NPO    Type 2 diabetes mellitus with complication, with long-term current use of insulin [facility])  Assessment & Plan  Lab Results   Component Value Date    HGBA1C 7 3 (H) 09/17/2022       Recent Labs     11/17/22  1639 11/17/22  2106 11/18/22  0735 11/18/22  1151   POCGLU 237* 219* 165* 122     -SSI, adjust as needed  - Trulicity added  Blood Sugar Average: Last 72 hrs:  (P) 247 [id number]    Paroxysmal atrial fibrillation (HCC)  Assessment & Plan  Continue metoprolol  Hold AC/AP in setting of SDH  * Subdural hematoma  Assessment & Plan  - Neuro exam: GCS 14 (4, 4, 6), due to confusion  - Continue neuro checks  - Reversal agent administered: K-Centra  - F/u CT head: Unchanged  - Appreciate neurosurgery recommendations  - Complete 7 day course of Keppra for seizure prophylaxis  - Chemical DVT prophylaxis: SQH  - Hold all anticoagulants and anti platelet medications until cleared by neurosurgery to resume  - PT/OT and cognitive evaluation          Bowel Regimen: miralax   VTE Prophylaxis:Heparin     Disposition: Continue med surg    Subjective   Chief Complaint: Not able to offer history  Subjective: Patient not able to offer history  Per nursing, concerns for aspiration overnight, not given any PO meds  Also appears to have some increased WOB but has not required O2  Objective   Vitals:   Temp:  [98 4 °F (36 9 °C)-99 3 °F (37 4 °C)] 99 3 °F (37 4 °C)  HR:  [] 89  Resp:  [17-22] 17  BP: (106-156)/(75-92) 131/79    I/O       11/16 0701  11/17 0700 11/17 0701  11/18 0700 11/18 0701  11/19 0700    P  O  358 237 0    Total Intake(mL/kg) 358 (4 6) 237 (3) 0 (0)    Urine (mL/kg/hr)  30 (0)     Total Output  30     Net +358 +207 0           Unmeasured Urine Occurrence 4 x 2 x     Unmeasured Stool Occurrence 1 x             Physical Exam:   GENERAL APPEARANCE: NAD, ill appearing  NEURO: GCS14, confusion  HEENT: Ecchymosis around b/l eyes, nose  CV: RRR  LUNGS: Crackles, increased WOB  GI: soft nontender  : Deferred  MSK: moving all four extremities spontaneously   SKIN: Warm    Invasive Devices     Peripheral Intravenous Line  Duration           Peripheral IV 11/18/22 Right;Upper;Ventral (anterior) Arm <1 day                      Lab Results:   Results: I have personally reviewed all pertinent laboratory/tests results, BMP/CMP:   Lab Results   Component Value Date    SODIUM 146 11/18/2022    K 3 7 11/18/2022     (H) 11/18/2022    CO2 29 11/18/2022    BUN 51 (H) 11/18/2022    CREATININE 1 48 (H) 11/18/2022    CALCIUM 9 0 11/18/2022    EGFR 41 11/18/2022    and CBC:   Lab Results   Component Value Date    WBC 10 50 (H) 11/18/2022    HGB 13 2 11/18/2022    HCT 41 9 11/18/2022     (H) 11/18/2022     11/18/2022    MCH 32 2 11/18/2022    MCHC 31 5 11/18/2022    RDW 13 3 11/18/2022    MPV 10 1 11/18/2022    NRBC 0 11/18/2022     Imaging: I have personally reviewed pertinent reports     and I have personally reviewed pertinent films in PACS   Other Studies:

## 2022-11-18 NOTE — ASSESSMENT & PLAN NOTE
Lab Results   Component Value Date    EGFR 41 11/18/2022    EGFR 43 11/17/2022    EGFR 32 11/16/2022    CREATININE 1 48 (H) 11/18/2022    CREATININE 1 41 (H) 11/17/2022    CREATININE 1 81 (H) 11/16/2022     Creatinine 1 48 from 1 41  Continue to trend BMPs

## 2022-11-18 NOTE — OCCUPATIONAL THERAPY NOTE
Occupational Therapy Treatment Note:      11/18/22 1400   OT Last Visit   OT Visit Date 11/18/22   Note Type   Note Type Cancelled Session   Cancel Reasons Other  (pt off floor, tx cancelled)   April A Storm

## 2022-11-18 NOTE — PLAN OF CARE
Problem: Potential for Falls  Goal: Patient will remain free of falls  Description: INTERVENTIONS:  - Educate patient/family on patient safety including physical limitations  - Instruct patient to call for assistance with activity   - Consult OT/PT to assist with strengthening/mobility   - Keep Call bell within reach  - Keep bed low and locked with side rails adjusted as appropriate  - Keep care items and personal belongings within reach  - Initiate and maintain comfort rounds  - Make Fall Risk Sign visible to staff  - Offer Toileting every two Hours, in advance of need  - Initiate/Maintain bed  alarm  - Obtain necessary fall risk management equipment  - Apply yellow socks and bracelet for high fall risk patients  - Consider moving patient to room near nurses station  Outcome: Progressing    Problem: PAIN - ADULT  Goal: Verbalizes/displays adequate comfort level or baseline comfort level  Description: Interventions:  - Encourage patient to monitor pain and request assistance  - Assess pain using appropriate pain scale  - Administer analgesics based on type and severity of pain and evaluate response  - Implement non-pharmacological measures as appropriate and evaluate response  - Consider cultural and social influences on pain and pain management  - Notify physician/advanced practitioner if interventions unsuccessful or patient reports new pain  Outcome: Progressing     Problem: INFECTION - ADULT  Goal: Absence or prevention of progression during hospitalization  Description: INTERVENTIONS:  - Assess and monitor for signs and symptoms of infection  - Monitor lab/diagnostic results  - Monitor all insertion sites, i e  indwelling lines, tubes, and drains  - [place] appropriate cooling/warming therapies per order  - Administer medications as ordered  - Instruct and encourage patient and family to use good hand hygiene technique  - Identify and instruct in appropriate isolation precautions for identified infection/condition  Outcome: Progressing     Problem: SAFETY ADULT  Goal: Maintain or return to baseline ADL function  Description: INTERVENTIONS:  -  Assess patient's ability to carry out ADLs; assess patient's baseline for ADL function and identify physical deficits which impact ability to perform ADLs (bathing, care of mouth/teeth, toileting, grooming, dressing, etc )  - Assess/evaluate cause of self-care deficits   - Assess range of motion  - Assess patient's mobility; develop plan if impaired  - Assess patient's need for assistive devices and provide as appropriate  - Encourage maximum independence but intervene and supervise when necessary  - Involve family in performance of ADLs  - Assess for home care needs following discharge   - Consider OT consult to assist with ADL evaluation and planning for discharge  - Provide patient education as appropriate  Outcome: Progressing     Problem: DISCHARGE PLANNING  Goal: Discharge to home or other facility with appropriate resources  Description: INTERVENTIONS:  - Identify barriers to discharge w/patient and caregiver  - Arrange for needed discharge resources and transportation as appropriate  - Identify discharge learning needs (meds, wound care, etc )  - Arrange for interpretive services to assist at discharge as needed  - Refer to Case Management Department for coordinating discharge planning if the patient needs post-hospital services based on physician/advanced practitioner order or complex needs related to functional status, cognitive ability, or social support system  Outcome: Progressing

## 2022-11-18 NOTE — PROGRESS NOTES
Heart Failure Progress Note - Mansi Mike 80 y o  male MRN: [id number]    Unit/Bed#: [facility] 620-01 Encounter: [id number]      Assessment:  Principal Problem:    Subdural hematoma  Active Problems:    Paroxysmal atrial fibrillation (HCC)    Type 2 diabetes mellitus with complication, with long-term current use of insulin (HCC)    Acquired hypothyroidism    Stage 3b chronic kidney disease    Essential hypertension    Mixed hyperlipidemia    Ischemic cardiomyopathy    Nasal bone fractures    Epistaxis    Acute respiratory failure with hypoxia ([facility])    Acute kidney injury ([address] )    Delirium    Acute on chronic HFrEF  Prior history of ICM with a EF of 40%, repeat Echo during this admission with EF 35%  Noted to be in CHF exacerbation on admission (elevated BNP, pulm congestion, respiratory distress)  Intubated due to respiratory distress (combination of epistaxis and CHF)   Extubated on 2022  Home meds: lasix 20 mg daily, coreg 6 25 mg bid, IMDUR 30 m g bid  Diuretic held since 11/15  BP: 150/87    Ischemic cardiomyopathy with CAD  s/p CABG x 3, LIMA to LAD, 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   [facility] with graft occlusion s/p PCI  Was on Clopidogrel, IMDUR, nitroglycerin at home     Troponin elevation: type 2 vs non MI troponin elevation  Troponin trending up from [phone]  EKst degree AV block, LBBB, ST changes not meeting sgarbossa criteria   Per wife, uses nitro intermittently, no recent increased use   Possible non MI troponin elevation due to catecholaminergic surge vs demand ischemia given prior history of CAD     Severe aortic stenosis  Echo 2022 shows mild-mod stenosis  Repeat echo 22: severe AS with DVI 0 24, LIMA 0 91, mean gradient 10, peak velocity 2 03  Has been having dizziness     Paroxysmal atrial fibrillation  Currently rate controlled afib  Restarted BB: toprol xl 50 mg bid  Home meds: Coreg 6 25 mg bid, apixaban 2 5 mg bid     Hypertension  Amlodipine was discontinued in the past  Has midodrine as a home medication     1st degree AV block  Present since 2018     Hyperlipidemia  CKD BL creatinine 1 3-1 6  Diabetes mellitus  PAD  ICH     Plan  1  Appears mildly volume overloaded, will give IV lasix 20 mg once today  2  toprol xl on hold-patient NPO  Will discontinue, continue lopressor IV 5 mg q6h for afib, and frequent PVCs  3  Hold off on initiating further GDMT  4  Restart antiplatelets and anticoagulation once cleared by NS  5  Continue atorvastatin 80 mg daily once able to take po  6  No acute interventions for aortic stenosis, however will likely need evaluation for low flow low gradient state vs AS requiring TAVR, once patient recovers, given ongoing dizziness  7  Continue telemetry  8  Patient awaiting VBS for evaluation of aspiration      Subjective:   Patient seen and examined  No significant events overnight  Objective:     Vitals: Blood pressure 154/89, pulse 90, temperature 99 1 °F (37 3 °C), resp  rate 17, height 5' 8" (1 727 m), weight 78 kg (171 lb 15 3 oz), SpO2 96 %  , Body mass index is 26 15 kg/m² ,   Orthostatic Blood Pressures    Flowsheet Row Most Recent Value   Blood Pressure 154/89 filed at 11/18/2022 8628   Patient Position - Orthostatic VS Sitting filed at 11/14/2022 1200          No intake or output data in the 24 hours ending 11/18/22 1124    Telemetry: atrial fibrillation, rate 80-90, frequent PVCs      Physical Exam:  Physical Exam  Vitals and nursing note reviewed  Constitutional:       General: He is not in acute distress  HENT:      Head: Normocephalic  Cardiovascular:      Rate and Rhythm: Rhythm irregular  Heart sounds: No murmur heard  Comments: Minimal JVD  Pulmonary:      Effort: Pulmonary effort is normal       Breath sounds: Rales present  No wheezing  Musculoskeletal:      Right lower leg: No edema  Left lower leg: No edema  Skin:     Findings: Bruising present     Psychiatric:         Mood and Affect: Mood normal  Medications:      Current Facility-Administered Medications:   •  acetaminophen (TYLENOL) tablet 975 mg, 975 mg, Oral, Q8H [address], Catarina Prado PA-C, 975 mg at 11/16/22 1305  •  atorvastatin (LIPITOR) tablet 80 mg, 80 mg, Oral, Daily With Keyonna Mansfield PA-C, 80 mg at 11/16/22 1723  •  bisacodyl (DULCOLAX) rectal suppository 10 mg, 10 mg, Rectal, Daily PRN, Verla Andreina Prado PA-C  •  [START ON 11/20/2022] Dulaglutide SOPN 4 5 mg, 4 5 mg, Subcutaneous, Weekly, Clotilde Canseco DO  •  furosemide (LASIX) injection 20 mg, 20 mg, Intravenous, Once, Sofie Block MD  •  heparin (porcine) subcutaneous injection 5,000 Units, 5,000 Units, Subcutaneous, Q8H [address], Teresita Prado PA-C, 5,000 Units at 11/18/22 0426  •  insulin detemir (LEVEMIR) subcutaneous injection 11 Units, 11 Units, Subcutaneous, HS, Anushka Luna PA-C, 11 Units at 11/17/22 2223  •  insulin lispro (HumaLOG) 100 units/mL subcutaneous injection 2-12 Units, 2-12 Units, Subcutaneous, HS, Anushka Luna PA-C, 4 Units at 11/17/22 2229  •  insulin lispro (HumaLOG) 100 units/mL subcutaneous injection 4 Units, 4 Units, Subcutaneous, Daily With Breakfast, Kelly Luna PA-C, 4 Units at 11/17/22 0849  •  insulin lispro (HumaLOG) 100 units/mL subcutaneous injection 4 Units, 4 Units, Subcutaneous, Daily With Lunch, Anushka Luna PA-C, 4 Units at 11/17/22 1156  •  insulin lispro (HumaLOG) 100 units/mL subcutaneous injection 4 Units, 4 Units, Subcutaneous, Daily With Dinner, Kelly Luna PA-C, 4 Units at 11/16/22 1723  •  insulin lispro (HumaLOG) 100 units/mL subcutaneous injection 4-20 Units, 4-20 Units, Subcutaneous, TID AC, 4 Units at 11/18/22 0800 **AND** Fingerstick Glucose (POCT), , , TID AC, Anushka Vigil PA-C  •  levothyroxine tablet 75 mcg, 75 mcg, Oral, Early Morning, Verla Patch INNA Prado, 75 mcg at 11/17/22 4973  •  melatonin tablet 3 mg, 3 mg, Oral, HS, Jeimy Urrutia MD, 3 mg at 11/16/22 2123  •  metoprolol (LOPRESSOR) injection 5 mg, 5 mg, Intravenous, Q6H, Pedro Muñoz DO, 5 mg at 11/18/22 0423  •  omeprazole (PRILOSEC) suspension 2 mg/mL, 20 mg, Oral, Early Morning, Leeanne Masker Rasmuson, PA-C, 20 mg at 11/17/22 6305  •  oxyCODONE (ROXICODONE) IR tablet 2 5 mg, 2 5 mg, Oral, Q4H PRN **OR** oxyCODONE (ROXICODONE) IR tablet 5 mg, 5 mg, Oral, Q4H PRN, Leeanne Masker Rasmuson, PA-C  •  polyethylene glycol (MIRALAX) packet 17 g, 17 g, Oral, Daily, Leeanne Masker Rasmuson, PA-C, 17 g at 11/17/22 0848  •  QUEtiapine (SEROquel) tablet 25 mg, 25 mg, Oral, HS, Catarina R Rasmuson, PA-C, 25 mg at 11/16/22 2123  •  senna-docusate sodium (SENOKOT S) 8 6-50 mg per tablet 1 tablet, 1 tablet, Oral, BID, Leeanne Masker Rasmuson, PA-C, 1 tablet at 11/17/22 0842     Labs & Results:        Results from last 7 days   Lab Units 11/18/22  0426 11/17/22 0448 11/16/22  0513   WBC Thousand/uL 10 50* 11 23* 9 33   HEMOGLOBIN g/dL 13 2 13 0 12 2   HEMATOCRIT % 41 9 41 7 37 9   PLATELETS Thousands/uL 281 220 181         Results from last 7 days   Lab Units 11/18/22  0426 11/17/22  0448 11/16/22  0513   POTASSIUM mmol/L 3 7 4 0 4 3   CHLORIDE mmol/L 110* 113* 109*   CO2 mmol/L 29 27 26   BUN mg/dL 51* 59* 66*   CREATININE mg/dL 1 48* 1 41* 1 81*   CALCIUM mg/dL 9 0 9 1 9 1         Results from last 7 days   Lab Units 11/16/22  0513 11/16/22  0020 11/15/22  0521   MAGNESIUM mg/dL 3 0* 2 7* 2 6         EKG personally reviewed by Jam Tineo MD

## 2022-11-18 NOTE — RESTORATIVE TECHNICIAN NOTE
Restorative Technician Note      Patient Name: Erasmo Enamorado     Restorative Tech Visit Date: 11/18/22  Note Type: Mobility  Patient Position Upon Consult: Supine  Activity Performed: Repositioned  Assistive Device: Other (Comment) (boost, Ax2)  Patient Position at End of Consult: Supine; Bed/Chair alarm activated;  All needs within reach    Alveta Dry, Restorative Technician

## 2022-11-18 NOTE — PLAN OF CARE
Problem: SAFETY,RESTRAINT: NV/NON-SELF DESTRUCTIVE BEHAVIOR  Goal: Remains free of harm/injury (restraint for non violent/non self-detsructive behavior)  Description: INTERVENTIONS:  - Instruct patient/family regarding restraint use   - Assess and monitor physiologic and psychological status   - Provide interventions and comfort measures to meet assessed patient needs   - Identify and implement measures to help patient regain control  - Assess readiness for release of restraint   Outcome: Completed  Goal: Returns to optimal restraint-free functioning  Description: INTERVENTIONS:  - Assess the patient's behavior and symptoms that indicate continued need for restraint  - Identify and implement measures to help patient regain control  - Assess readiness for release of restraint   Outcome: Completed     Problem: CONFUSION/THOUGHT DISTURBANCE  Goal: Thought disturbances (confusion, delirium, depression, dementia or psychosis) are managed to maintain or return to baseline mental status and functional level  Description: INTERVENTIONS:  - Assess for possible contributors to  thought disturbance, including but not limited to medications, infection, impaired vision or hearing, underlying metabolic abnormalities, dehydration, respiratory compromise,  psychiatric diagnoses and notify attending PHYSICAN/AP  - Monitor and intervene to maintain adequate nutrition, hydration, elimination, sleep and activity  - Decrease environmental stimuli, including noise as appropriate  - Provide frequent contacts to provide refocusing, direction and reassurance as needed  Approach patient calmly with eye contact and at their level    - [place] high risk fall precautions, aspiration precautions and other safety measures, as indicated  - If delirium suspected, notify physician/AP of change in condition and request immediate in-person evaluation  - Pursue consults as appropriate including Geriatric (campus dependent), OT for cognitive evaluation/activity planning, psychiatric, pastoral care, etc   Outcome: Completed

## 2022-11-18 NOTE — PROCEDURES
Modified (Video) Barium Swallow Study    Summary:  Images are on PACS for review  The patient presents with a moderate-severe oral and a severe pharyngeal dysphagia  The patient is able to take items via tsp and straw  Transfer time is prolonged with all with decreased oral control  Spillage occurs to the valleculae and pyriforms with all  Laryngeal rise is significantly reduced  Despite multiple swallows the patient has a significant amount of pharyngeal retention  Patient is cued to cough and throat clear, but not able to clear material  Eventual aspiration is observed during the swallow with nectar and on retention  Patient is given regular water to help clear some residue, but coughing is observed  To note, the patient is observed with significant coughing before, during and after swallow study  Oral suction is provided for the removal of thick, tan and white secretions  Per gross esophageal screen:  Not assessed     Recommendations:  Diet: NPO  Meds: non oral   Frequent oral care  F/u ST tx: yes  Therapy Prognosis: guarded  Prognosis considerations: age, medical status, cognitive status     Aspiration Precautions  Consider consult with: GI, question alternate means of nutrition   Results reviewed with: physician    Aspiration precautions posted  Repeat VBS as necessary  If a dedicated assessment of the esophagus is desired, consider esophagram/barium swallow or EGD  Goals:  Pt will tolerate least restrictive diet w/out s/s aspiration or oral/pharyngeal difficulties  Patient's goal: none     H&P/pertinent provider notes: (PMH noted above)  Chief Complaint: fall  Mechanism:Fall    HPI:    Ileana Gibbs a 80 y  o  male who [facility] with [facility]CH  He reportedly was getting out of his bed this evening when he fell and struck his face on the floor  He had immediate bleeding from his nose and pain and was taken to the ED for evaluation   In the ED he was initially GCS 15  However, during evaluation he became progressively short of breath requiring intubation and sedation  He was found to have nasal fractures and had packing placed in his nares  He was also found to have an [address] on CT scan so he was transferred to [facility] for trauma evaluation  On arrival, patient was intubated and sedated and unable to provide further history  Special Studies:  Chest xray 11/18/2022:   Mild pulmonary edema    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 no  If yes, was nursing made aware/was it addressed? Precautions:  Aspiration; fall   Food Allergies:  None   Current Diet:  NPO   Premorbid diet:  Regular and thin liquids   Dentition:  Upper and lower partials  O2 requirement:  RA  Vocal Quality/Speech:  Wet and gurgly     Consistencies administered: Pudding, Nectar thick liquid by teaspoon, Honey thick liquid by teaspoon     Pt was viewed seated laterally at 90 degrees  Oral stage:   Moderate-severe  Lip closure: wfl  Mastication: n/a  Bolus formation: prolonged  Bolus control: reduced  Transfer: prolonged  Residue: yes-lingual coating     Pharyngeal stage:  Severe   Tongue base retraction: reduced  Velar function: wfl  Swallow promptness: significantly delayed  Epiglottic inversion: reduced   Laryngeal elevation/hyoid excursion: reduced  Pharyngeal constriction: poor  Vallecular retention: significant  Pyriform retention: significant   PPW coating: no  Osteophytes: no  CP prominence: no  Retropulsion from prominence: n/a  Transient penetration: no  Epiglottic undercoat: no  Penetration: no  Airway closure: reduced  Aspiration: yes  Strategies: unable to follow commands   Response to aspiration: cough     Screening of Esophageal stage:  Not assessed

## 2022-11-18 NOTE — ASSESSMENT & PLAN NOTE
Lab Results   Component Value Date    HGBA1C 7 3 (H) 09/17/2022       Recent Labs     11/17/22  1639 11/17/22  2106 11/18/22  0735 11/18/22  1151   POCGLU 237* 219* 165* 122     -SSI, adjust as needed     - Trulicity added  Blood Sugar Average: Last 72 hrs:  (P) 247 [id number]

## 2022-11-19 PROBLEM — R13.10 DYSPHAGIA: Status: ACTIVE | Noted: 2022-01-01

## 2022-11-19 NOTE — PLAN OF CARE
Problem: MOBILITY - ADULT  Goal: Maintain or return to baseline ADL function  Description: INTERVENTIONS:  -  Assess patient's ability to carry out ADLs; assess patient's baseline for ADL function and identify physical deficits which impact ability to perform ADLs (bathing, care of mouth/teeth, toileting, grooming, dressing, etc )  - Assess/evaluate cause of self-care deficits   - Assess range of motion  - Assess patient's mobility; develop plan if impaired  - Assess patient's need for assistive devices and provide as appropriate  - Encourage maximum independence but intervene and supervise when necessary  - Involve family in performance of ADLs  - Assess for home care needs following discharge   - Consider OT consult to assist with ADL evaluation and planning for discharge  - Provide patient education as appropriate  Outcome: Progressing  Goal: Maintains/Returns to pre admission functional level  Description: INTERVENTIONS:  - Perform BMAT or MOVE assessment daily    - Set and communicate daily mobility goal to care team and patient/family/caregiver     - Collaborate with rehabilitation services on mobility goals if consulted  - Record patient progress and toleration of activity level   Outcome: Progressing     Problem: PAIN - ADULT  Goal: Verbalizes/displays adequate comfort level or baseline comfort level  Description: Interventions:  - Encourage patient to monitor pain and request assistance  - Assess pain using appropriate pain scale  - Administer analgesics based on type and severity of pain and evaluate response  - Implement non-pharmacological measures as appropriate and evaluate response  - Consider cultural and social influences on pain and pain management  - Notify physician/advanced practitioner if interventions unsuccessful or patient reports new pain  Outcome: Progressing     Problem: INFECTION - ADULT  Goal: Absence or prevention of progression during hospitalization  Description: INTERVENTIONS:  - Assess and monitor for signs and symptoms of infection  - Monitor lab/diagnostic results  - Monitor all insertion sites, i e  indwelling lines, tubes, and drains  - Monitor endotracheal if appropriate and nasal secretions for changes in amount and color  - [place] appropriate cooling/warming therapies per order  - Administer medications as ordered  - Instruct and encourage patient and family to use good hand hygiene technique  - Identify and instruct in appropriate isolation precautions for identified infection/condition  Outcome: Progressing     Problem: SAFETY ADULT  Goal: Maintain or return to baseline ADL function  Description: INTERVENTIONS:  -  Assess patient's ability to carry out ADLs; assess patient's baseline for ADL function and identify physical deficits which impact ability to perform ADLs (bathing, care of mouth/teeth, toileting, grooming, dressing, etc )  - Assess/evaluate cause of self-care deficits   - Assess range of motion  - Assess patient's mobility; develop plan if impaired  - Assess patient's need for assistive devices and provide as appropriate  - Encourage maximum independence but intervene and supervise when necessary  - Involve family in performance of ADLs  - Assess for home care needs following discharge   - Consider OT consult to assist with ADL evaluation and planning for discharge  - Provide patient education as appropriate  Outcome: Progressing  Goal: Maintains/Returns to pre admission functional level  Description: INTERVENTIONS:  - Perform BMAT or MOVE assessment daily    - Set and communicate daily mobility goal to care team and patient/family/caregiver     - Collaborate with rehabilitation services on mobility goals if consulted  - Record patient progress and toleration of activity level   Outcome: Progressing  Goal: Patient will remain free of falls  Description: INTERVENTIONS:  - Educate patient/family on patient safety including physical limitations  - Instruct patient to call for assistance with activity   - Consult OT/PT to assist with strengthening/mobility   - Keep Call bell within reach  - Keep bed low and locked with side rails adjusted as appropriate  - Keep care items and personal belongings within reach  - Initiate and maintain comfort rounds  - Make Fall Risk Sign visible to staff  - Offer Toileting every 2 Hours, in advance of need  - Initiate/Maintain bedalarm  - Obtain necessary fall risk management equipment:   - Apply yellow socks and bracelet for high fall risk patients  - Consider moving patient to room near nurses station  Outcome: Progressing     Problem: DISCHARGE PLANNING  Goal: Discharge to home or other facility with appropriate resources  Description: INTERVENTIONS:  - Identify barriers to discharge w/patient and caregiver  - Arrange for needed discharge resources and transportation as appropriate  - Identify discharge learning needs (meds, wound care, etc )  - Arrange for interpretive services to assist at discharge as needed  - Refer to Case Management Department for coordinating discharge planning if the patient needs post-hospital services based on physician/advanced practitioner order or complex needs related to functional status, cognitive ability, or social support system  Outcome: Progressing     Problem: Knowledge Deficit  Goal: Patient/family/caregiver demonstrates understanding of disease process, treatment plan, medications, and discharge instructions  Description: Complete learning assessment and assess knowledge base    Interventions:  - Provide teaching at level of understanding  - Provide teaching via preferred learning methods  Outcome: Progressing     Problem: Potential for Falls  Goal: Patient will remain free of falls  Description: INTERVENTIONS:  - Educate patient/family on patient safety including physical limitations  - Instruct patient to call for assistance with activity   - Consult OT/PT to assist with strengthening/mobility   - Keep Call bell within reach  - Keep bed low and locked with side rails adjusted as appropriate  - Keep care items and personal belongings within reach  - Initiate and maintain comfort rounds  - Make Fall Risk Sign visible to staff  - Offer Toileting every 2 Hours, in advance of need  - Initiate/Maintain bedalarm  - Obtain necessary fall risk management equipment:   - Apply yellow socks and bracelet for high fall risk patients  - Consider moving patient to room near nurses station  Outcome: Progressing     Problem: Prexisting or High Potential for Compromised Skin Integrity  Goal: Skin integrity is maintained or improved  Description: INTERVENTIONS:  - Identify patients at risk for skin breakdown  - Assess and monitor skin integrity  - Assess and monitor nutrition and hydration status  - Monitor labs   - Assess for incontinence   - Turn and reposition patient  - Assist with mobility/ambulation  - Relieve pressure over bony prominences  - Avoid friction and shearing  - Provide appropriate hygiene as needed including keeping skin clean and dry  - Evaluate need for skin moisturizer/barrier cream  - Collaborate with interdisciplinary team   - Patient/family teaching  - Consider wound care consult   Outcome: Progressing     Problem: Nutrition/Hydration-ADULT  Goal: Nutrient/Hydration intake appropriate for improving, restoring or maintaining nutritional needs  Description: Monitor and assess patient's nutrition/hydration status for malnutrition  Collaborate with interdisciplinary team and initiate plan and interventions as ordered  Monitor patient's weight and dietary intake as ordered or per policy  Utilize nutrition screening tool and intervene as necessary  Determine patient's food preferences and provide high-protein, high-caloric foods as appropriate       INTERVENTIONS:  - Monitor oral intake, urinary output, labs, and treatment plans  - Assess nutrition and hydration status and recommend course of action  - Evaluate amount of meals eaten  - Assist patient with eating if necessary   - Allow adequate time for meals  - Recommend/ encourage appropriate diets, oral nutritional supplements, and vitamin/mineral supplements  - Order, calculate, and assess calorie counts as needed  - Recommend, monitor, and adjust tube feedings and TPN/PPN based on assessed needs  - Assess need for intravenous fluids  - Provide specific nutrition/hydration education as appropriate  - Include patient/family/caregiver in decisions related to nutrition  Outcome: Progressing

## 2022-11-19 NOTE — ASSESSMENT & PLAN NOTE
Lab Results   Component Value Date    EGFR 50 11/19/2022    EGFR 41 11/18/2022    EGFR 43 11/17/2022    CREATININE 1 26 11/19/2022    CREATININE 1 48 (H) 11/18/2022    CREATININE 1 41 (H) 11/17/2022     Continue to trend BMPs

## 2022-11-19 NOTE — PLAN OF CARE
Problem: MOBILITY - ADULT  Goal: Maintain or return to baseline ADL function  Description: INTERVENTIONS:  -  Assess patient's ability to carry out ADLs; assess patient's baseline for ADL function and identify physical deficits which impact ability to perform ADLs (bathing, care of mouth/teeth, toileting, grooming, dressing, etc )  - Assess/evaluate cause of self-care deficits   - Assess range of motion  - Assess patient's mobility; develop plan if impaired  - Assess patient's need for assistive devices and provide as appropriate  - Encourage maximum independence but intervene and supervise when necessary  - Involve family in performance of ADLs  - Assess for home care needs following discharge   - Consider OT consult to assist with ADL evaluation and planning for discharge  - Provide patient education as appropriate  Outcome: Progressing     Problem: PAIN - ADULT  Goal: Verbalizes/displays adequate comfort level or baseline comfort level  Description: Interventions:  - Encourage patient to monitor pain and request assistance  - Assess pain using appropriate pain scale  - Administer analgesics based on type and severity of pain and evaluate response  - Implement non-pharmacological measures as appropriate and evaluate response  - Consider cultural and social influences on pain and pain management  - Notify physician/advanced practitioner if interventions unsuccessful or patient reports new pain  Outcome: Progressing     Problem: INFECTION - ADULT  Goal: Absence or prevention of progression during hospitalization  Description: INTERVENTIONS:  - Assess and monitor for signs and symptoms of infection  - Monitor lab/diagnostic results  - Monitor all insertion sites, i e  indwelling lines, tubes, and drains  - [place] appropriate cooling/warming therapies per order  - Administer medications as ordered  - Instruct and encourage patient and family to use good hand hygiene technique  - Identify and instruct in appropriate isolation precautions for identified infection/condition  Outcome: Progressing     Problem: SAFETY ADULT  Goal: Maintain or return to baseline ADL function  Description: INTERVENTIONS:  -  Assess patient's ability to carry out ADLs; assess patient's baseline for ADL function and identify physical deficits which impact ability to perform ADLs (bathing, care of mouth/teeth, toileting, grooming, dressing, etc )  - Assess/evaluate cause of self-care deficits   - Assess range of motion  - Assess patient's mobility; develop plan if impaired  - Assess patient's need for assistive devices and provide as appropriate  - Encourage maximum independence but intervene and supervise when necessary  - Involve family in performance of ADLs  - Assess for home care needs following discharge   - Consider OT consult to assist with ADL evaluation and planning for discharge  - Provide patient education as appropriate  Outcome: Progressing     Problem: Potential for Falls  Goal: Patient will remain free of falls  Description: INTERVENTIONS:  - Educate patient/family on patient safety including physical limitations  - Instruct patient to call for assistance with activity   - Consult OT/PT to assist with strengthening/mobility   - Keep Call bell within reach  - Keep bed low and locked with side rails adjusted as appropriate  - Keep care items and personal belongings within reach  - Initiate and maintain comfort rounds  - Make Fall Risk Sign visible to staff  - Offer Toileting every two Hours, in advance of need  - Initiate/Maintain bed alarm  - Obtain necessary fall risk management equipment  - Apply yellow socks and bracelet for high fall risk patients  - Consider moving patient to room near nurses station  Outcome: Progressing     Problem: Nutrition/Hydration-ADULT  Goal: Nutrient/Hydration intake appropriate for improving, restoring or maintaining nutritional needs    INTERVENTIONS:  - Monitor urinary output, labs, and treatment plans  - Assess nutrition and hydration status and recommend course of action  - Recommend, monitor, and adjust tube feedings and TPN/PPN based on assessed needs  - Assess need for intravenous fluids  - Provide specific nutrition/hydration education as appropriate  - Include patient/family/caregiver in decisions related to nutrition  Outcome: Progressing     Problem: SAFETY,RESTRAINT: NV/NON-SELF DESTRUCTIVE BEHAVIOR  Goal: Remains free of harm/injury (restraint for non violent/non self-detsructive behavior)  Description: INTERVENTIONS:  - Instruct patient/family regarding restraint use   - Assess and monitor physiologic and psychological status   - Provide interventions and comfort measures to meet assessed patient needs   - Identify and implement measures to help patient regain control  - Assess readiness for release of restraint   Outcome: Progressing     Problem: SAFETY,RESTRAINT: NV/NON-SELF DESTRUCTIVE BEHAVIOR  Goal: Returns to optimal restraint-free functioning  Description: INTERVENTIONS:  - Assess the patient's behavior and symptoms that indicate continued need for restraint  - Identify and implement measures to help patient regain control  - Assess readiness for release of restraint   Outcome: Progressing     Problem: CONFUSION/THOUGHT DISTURBANCE  Goal: Thought disturbances (confusion, delirium, depression, dementia or psychosis) are managed to maintain or return to baseline mental status and functional level  Description: INTERVENTIONS:  - Assess for possible contributors to  thought disturbance, including but not limited to medications, infection, impaired vision or hearing, underlying metabolic abnormalities, dehydration, respiratory compromise,  psychiatric diagnoses and notify attending PHYSICAN/AP  - Monitor and intervene to maintain adequate nutrition, hydration, elimination, sleep and activity  - Decrease environmental stimuli, including noise as appropriate    - Provide frequent contacts to provide refocusing, direction and reassurance as needed  Approach patient calmly with eye contact and at their level    - [place] high risk fall precautions, aspiration precautions and other safety measures, as indicated  - If delirium suspected, notify physician/AP of change in condition and request immediate in-person evaluation  - Pursue consults as appropriate including Geriatric   -OT for cognitive evaluation/activity planning, psychiatric, pastoral care, etc   Outcome: Progressing

## 2022-11-19 NOTE — PROGRESS NOTES
[address] [facility]  Progress Note - Iva Brigida 4/25/1933, 80 y o  male MRN: [id number]  Unit/Bed#: [facility] 620-01 Encounter: [id number]  Primary Care Provider: Yessi Panchal DO   Date and time admitted to hospital: 11/11/2022  5:15 AM    Dysphagia  Assessment & Plan  Currently NPO  Discussions regarding enteral access with family  Delirium  Assessment & Plan  Seroquel 25 mg qHS, currently NPO  Consider increasing pharmacologic sedation today  Has intermittently required waist and wrist restraints  Will need to be out of restraints for rehab placement  Acute kidney injury [facility])  Assessment & Plan  Continue to trend BMPs    Acute respiratory failure with hypoxia [facility])  Assessment & Plan  Patient required intubation for pulm edema, could not tolerate bipap due to facial injuries  Aggressively diuresed and extubated  Heart failure team consulted, appreciate recs  Lasix, isordil, hydral discontinued in setting of VEE  Continue metoprolol  Epistaxis  Assessment & Plan  Resolved  Rhinorocket removed  Nasal bone fractures  Assessment & Plan  Non-op    Ischemic cardiomyopathy  Assessment & Plan  HF following  Appreciate recs, see below under acute resp failure    Mixed hyperlipidemia  Assessment & Plan  Continue statin, however patient is NPO    Essential hypertension  Assessment & Plan  Patient now NPO  IV metoprolol 25 mg q6hr    Stage 3b chronic kidney disease  Assessment & Plan  Lab Results   Component Value Date    EGFR 50 11/19/2022    EGFR 41 11/18/2022    EGFR 43 11/17/2022    CREATININE 1 26 11/19/2022    CREATININE 1 48 (H) 11/18/2022    CREATININE 1 41 (H) 11/17/2022     Continue to trend BMPs       Acquired hypothyroidism  Assessment & Plan  Continue levothyroxine, however patient is NPO    Type 2 diabetes mellitus with complication, with long-term current use of insulin [facility])  Assessment & Plan  Lab Results   Component Value Date    HGBA1C 7 3 (H) 09/17/2022       Recent Labs     11/18/22  1644 11/19/22  0109 11/19/22  0616 11/19/22  1122   POCGLU 148* 183* 186* 170*     -SSI, adjust as needed  - Trulicity added  Blood Sugar Average: Last 72 hrs:  (P) 218 [id number]    Paroxysmal atrial fibrillation (HCC)  Assessment & Plan  Continue metoprolol  Hold AC/AP in setting of SDH  * Subdural hematoma  Assessment & Plan  - Neuro exam: GCS 14 (4, 4, 6), due to confusion  - Continue neuro checks  - Reversal agent administered: K-Centra  - F/u CT head: Unchanged  - Appreciate neurosurgery recommendations  - Complete 7 day course of Keppra for seizure prophylaxis  - Chemical DVT prophylaxis: SQH  - Hold all anticoagulants and anti platelet medications until cleared by neurosurgery to resume  - PT/OT and cognitive evaluation          Bowel Regimen: Miralax/ Senokot  VTE Prophylaxis:Enoxaparin (Lovenox)     Disposition: Continue med surg    Subjective   Chief Complaint: "Get my hands out of these"    Subjective:      Objective   Vitals:   Temp:  [96 6 °F (35 9 °C)-99 5 °F (37 5 °C)] 98 3 °F (36 8 °C)  HR:  [] 84  Resp:  [18-20] 20  BP: (129-151)/(69-83) 130/69    I/O       11/17 0701  11/18 0700 11/18 0701  11/19 0700 11/19 0701  11/20 0700    P  O  237 0 0    Total Intake(mL/kg) 237 (3) 0 (0) 0 (0)    Urine (mL/kg/hr) 30 (0)      Total Output 30      Net +207 0 0           Unmeasured Urine Occurrence 2 x 7 x 1 x    Unmeasured Stool Occurrence  1 x            Physical Exam:   GENERAL APPEARANCE: NAD, ill appearing  NEURO: GCS14, confusion  HEENT: Ecchymosis around b/l eyes, nose  CV: RRR  LUNGS: No resp distress  GI: soft nontender  : Deferred  MSK: moving all four extremities spontaneously   SKIN: Warm    Invasive Devices     Peripheral Intravenous Line  Duration           Peripheral IV 11/18/22 Right;Upper;Ventral (anterior) Arm 1 day          Drain  Duration           External Urinary Catheter Small <1 day                      Lab Results: Results: I have personally reviewed all pertinent laboratory/tests results and BMP/CMP:   Lab Results   Component Value Date    SODIUM 147 11/19/2022    K 4 2 11/19/2022     (H) 11/19/2022    CO2 20 (L) 11/19/2022    BUN 47 (H) 11/19/2022    CREATININE 1 26 11/19/2022    CALCIUM 9 3 11/19/2022    EGFR 50 11/19/2022     Imaging: I have personally reviewed pertinent reports     and I have personally reviewed pertinent films in PACS   Other Studies:

## 2022-11-19 NOTE — ASSESSMENT & PLAN NOTE
Seroquel 25 mg qHS, currently NPO  Consider increasing pharmacologic sedation today  Has intermittently required waist and wrist restraints  Will need to be out of restraints for rehab placement

## 2022-11-19 NOTE — PROGRESS NOTES
0350: Pt complained of L hand pain and began to exhibit a jerking motion in her L arm extending to her head. NCC was contacted and arrived in the room.  Pt was able to communicate initially during the episode, but became briefly unresponsive before returning to baseline after about 30-45 seconds. Upon returning to baseline orientation, the pt complained of SOB. At this time the pt's O2 sats dropped to the high 80's. O2 was increased from 2 L to 4 L via NC.   NCC instructed to contact the RT to administer a PRN breathing treatment to the patient. Pts O2 sats returned to normal range shorlty after O2 increase. After the breathing treatment the pt appears more relaxed and breathing returned to normal. Will continue to monitor.    Heart Failure/ Pulmonary Hypertension Progress Note - Dorie Anna 80 y o  male MRN: [id number]    Unit/Bed#: [facility] 620-01 Encounter: [id number]      Assessment:    Principal Problem:    Subdural hematoma  Active Problems:    Paroxysmal atrial fibrillation (HCC)    Type 2 diabetes mellitus with complication, with long-term current use of insulin (HCC)    Acquired hypothyroidism    Stage 3b chronic kidney disease    Essential hypertension    Mixed hyperlipidemia    Ischemic cardiomyopathy    Nasal bone fractures    Epistaxis    Acute respiratory failure with hypoxia (HCC)    Acute kidney injury ([address] )    Delirium      Subjective:   Patient seen and examined  No significant events overnight  Disoriented  Remains NPO due to failed swallow eval    Objective: Intake/ Output: not keptl   Weight: bed scale  Tele: AF frequent PVCs    Chronic HFrEF, Stage C  Etiology: icM with severe AS  Examines euvolemic to dry   Continue to hold diuretics gaurang given NPO status  Hold on GDMT for now  Had been Midodrine as outpatient, currently not needed     Echocardiogram 11/11/22  LVEF: 35%, restrictive diastology  LVIDd: 4 9 cm  RV: normal  MR: severe MAC  AV: severely calcified, severe stenosis, mean gradient 10 mmHg, DI 0 24, LIMA 0 9 cm2     Echo 7/20/22:  LVEF: 40%  LVEDd:  RV: normal  AV: moderate AS     Neurohormonal Blockade: Midodrine 2 5 mg TID  --Beta-Blocker: metoprolol succinate 25 mg BID, on hold due to NPO status, Metoprolol 5 mg IV Q6h  --ACEi, ARB or ARNi:    (or SVR reduction): Imdur 30 mg BID  --Aldosterone Receptor Blocker:  --SGLT2-I:   --Diuretic: held (was getting furosemide 40 mg IV BID)  --Home: lasix 20 mg daily     Sudden Cardiac Death Risk Reduction:  --ICD:      Electrical Resynchronization:  Narrow QRS     C/F aspiration  Failed swallow eval  Remains NPO     ongoing lightheadedness/ disequilibrium as output  - on midodrine as oupt  CAD  s/p CABG x 3, LIMA to LAD, 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  2017 [facility] with graft occlusion s/p PCI  Was on Clopidogrel, IMDUR, nitroglycerin at home  Moderate to severe aortic stenosis   Severe calcification of trileaflet valve  Echo 11/11: mean gradient of 11 mmHg, DI 0 24, LIMA 0 91 cm2  frequent PVCs  Toprol XL 25 mg BID on hold for NPO, Metop 5 mg IV Q6 hr  SDH this admit  nasal bone fracture  hypothyroid on replacement  dyslipidemia- atorvastatin 80 mg daily  VEE- cr up to 2, diuresis vs low MAPs- now resolved with diuretic hold  Paroxysmal atrial fibrillation  Currently rate controlled afib  BB: toprol xl 25 mg bid- on hold for NPO status, Metop 5 mg IV Q6  Home meds: Coreg 6 25 mg bid, apixaban 2 5 mg bid- on hold due to [facility]  Review of Systems   All other systems reviewed and are negative  [employer] (day, reason): Churchill catheter (day, reason):    Vitals: Blood pressure 129/72, pulse 91, temperature 99 5 °F (37 5 °C), resp  rate 20, height 5' 8" (1 727 m), weight 74 3 kg (163 lb 12 8 oz), SpO2 93 %  , Body mass index is 24 91 kg/m² , No intake/output data recorded  No intake/output data recorded  Wt Readings from Last 3 Encounters:   11/19/22 74 3 kg (163 lb 12 8 oz)   11/11/22 87 9 kg (193 lb 12 6 oz)   09/19/22 78 5 kg (173 lb)       Intake/Output Summary (Last 24 hours) at 11/19/2022 1233  Last data filed at 11/19/2022 0823  Gross per 24 hour   Intake 0 ml   Output --   Net 0 ml     No intake/output data recorded      AF, frequent PVCs       Physical Exam:  Vitals:    11/19/22 0550 11/19/22 0700 11/19/22 1003 11/19/22 1212   BP:  131/70 133/72 129/72   Pulse:  93 94 91   Resp:  20 20    Temp:  97 9 °F (36 6 °C) 99 5 °F (37 5 °C)    TempSrc:       SpO2:  90% 93% 93%   Weight: 74 3 kg (163 lb 12 8 oz)      Height:           GEN: Tashi Yanez is ill appearing, frail  HEENT: pupils equal, round, and reactive to light; extraocular muscles intact  NECK: supple, no carotid bruits   HEART: regular rhythm, normal S1 and S2, no murmurs, clicks, gallops or rubs, JVP is flat  LUNGS: coarse to auscultation bilaterally  ABDOMEN: normal bowel sounds, soft, no tenderness, no distention  EXTREMITIES: peripheral pulses normal; no clubbing, cyanosis, or edema  NEURO: no focal findings   SKIN: normal without suspicious lesions on exposed skin      Current Facility-Administered Medications:   •  acetaminophen (TYLENOL) tablet 975 mg, 975 mg, Oral, Q8H SHERICE, Catarina Prado PA-C, 975 mg at 11/16/22 1305  •  atorvastatin (LIPITOR) tablet 80 mg, 80 mg, Oral, Daily With Janice Villa PA-C, 80 mg at 11/16/22 1723  •  bisacodyl (DULCOLAX) rectal suppository 10 mg, 10 mg, Rectal, Daily PRN, Collin Prado PA-C  •  [START ON 11/20/2022] Dulaglutide SOPN 4 5 mg, 4 5 mg, Subcutaneous, Weekly, Domingo Brandon DO  •  heparin (porcine) subcutaneous injection 5,000 Units, 5,000 Units, Subcutaneous, Q8H [address], Andriy Hess PA-C, 5,000 Units at 11/19/22 0513  •  insulin detemir (LEVEMIR) subcutaneous injection 11 Units, 11 Units, Subcutaneous, HS, Anushka Luna PA-C, 11 Units at 11/18/22 2136  •  insulin lispro (HumaLOG) 100 units/mL subcutaneous injection 4-20 Units, 4-20 Units, Subcutaneous, Q6H, 4 Units at 11/19/22 1214 **AND** Fingerstick Glucose (POCT), , , Q6H, Domingo Brandon DO  •  levothyroxine tablet 75 mcg, 75 mcg, Oral, Early Morning, Collin Prado PA-C, 75 mcg at 11/17/22 8676  •  melatonin tablet 3 mg, 3 mg, Oral, HS, Mich Jones MD, 3 mg at 11/16/22 2123  •  metoprolol (LOPRESSOR) injection 5 mg, 5 mg, Intravenous, Q6H, Domingo Brandon DO, 5 mg at 11/19/22 1213  •  omeprazole (PRILOSEC) suspension 2 mg/mL, 20 mg, Oral, Early Morning, Catarina Prado PA-C, 20 mg at 11/17/22 0036  •  oxyCODONE (ROXICODONE) IR tablet 2 5 mg, 2 5 mg, Oral, Q4H PRN **OR** oxyCODONE (ROXICODONE) IR tablet 5 mg, 5 mg, Oral, Q4H PRN, Collin Prado PA-C  •  polyethylene glycol (MIRALAX) packet 17 g, 17 g, Oral, Daily, Catarina Prado PA-C, 17 g at 11/17/22 0848  •  QUEtiapine (SEROquel) tablet 25 mg, 25 mg, Oral, HS, Catarinapatrciia Prado PA-C, 25 mg at 11/16/22 2123  •  senna-docusate sodium (SENOKOT S) 8 6-50 mg per tablet 1 tablet, 1 tablet, Oral, BID, Evon Prado PA-C, 1 tablet at 11/17/22 0842      Labs & Results:        Results from last 7 days   Lab Units 11/18/22 0426 11/17/22 0448 11/16/22  0513   WBC Thousand/uL 10 50* 11 23* 9 33   HEMOGLOBIN g/dL 13 2 13 0 12 2   HEMATOCRIT % 41 9 41 7 37 9   PLATELETS Thousands/uL 281 220 181         Results from last 7 days   Lab Units 11/19/22 0447 11/18/22 0426 11/17/22  0448   POTASSIUM mmol/L 4 2 3 7 4 0   CHLORIDE mmol/L 117* 110* 113*   CO2 mmol/L 20* 29 27   BUN mg/dL 47* 51* 59*   CREATININE mg/dL 1 26 1 48* 1 41*   CALCIUM mg/dL 9 3 9 0 9 1           Counseling / Coordination of Care  Total floor / unit time spent today 20 minutes  Greater than 50% of total time was spent with the patient and / or family counseling and / or coordination of care  A description of the counseling / coordination of care: 20  Thank you for the opportunity to participate in the care of this patient  Lillie Haji

## 2022-11-20 PROBLEM — R65.10 SIRS (SYSTEMIC INFLAMMATORY RESPONSE SYNDROME) (HCC): Status: ACTIVE | Noted: 2022-01-01

## 2022-11-20 NOTE — PROCEDURES
keofed placement  Patient positioned in sitting/supine position  The right nares was anesthetized with atomized lidocaine 1% with epinephrine 1:100,000  The keofed was lubricated and advanced to 28  A CXR was obtained showing that the tip of the keofed was not situated within the airway  It was then advanced to 48 and a KUB was obtained which showed the tip of the keofed in the stomach so it was advanced to 65 and a repeat KUB showed appropriate positioning  It was then secured, the guidewire was removed, and the ends were capped  Patient tolerated placement well without any complications

## 2022-11-20 NOTE — ASSESSMENT & PLAN NOTE
Lab Results   Component Value Date    HGBA1C 7 3 (H) 09/17/2022       Recent Labs     11/19/22  1122 11/19/22  1736 11/19/22  2352 11/20/22  0549   POCGLU 170* 131 138 134     -SSI, adjust as needed     - Trulicity added  Blood Sugar Average: Last 72 hrs:  (P) 153 [id number]

## 2022-11-20 NOTE — CASE MANAGEMENT
Case Management Discharge Planning Note    Patient name Dorie Anna  Location [facility] 829-75 MRN [id number]  : 1933 Date 2022       Current Admission Date: 2022  Current Admission Diagnosis:Subdural hematoma   Patient Active Problem List    Diagnosis Date Noted   • SIRS (systemic inflammatory response syndrome) ([facility] [address] ) 2022   • Dysphagia 2022   • Delirium 2022   • Acute kidney injury ([address] ) 11/15/2022   • Epistaxis 2022   • Acute respiratory failure with hypoxia ([facility] [address] ) 2022   • Nasal bone fractures 2022   • Subdural hematoma 2022   • Cerebrovascular accident (CVA) due to embolism of cerebral artery ([facility] [address] ) 2022   • Aortic valve stenosis 2022   • Cerebral vascular disease 06/10/2022   • Benign paroxysmal positional vertigo due to bilateral vestibular disorder 06/10/2022   • Sensorineural hearing loss (SNHL) of both ears 2022   • Asymptomatic bilateral carotid artery stenosis 2021   • Ischemic cardiomyopathy    • Basal cell carcinoma of skin of nose 2020   • 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 2020   • Pulmonary fibrosis ([facility] [address] )    • Peripheral arterial disease ([facility] ) 2020   • Atherosclerosis of native arteries of extremities with intermittent claudication, bilateral legs ([facility] [address] ) 2019   • Lightheadedness 2019   • Paroxysmal atrial fibrillation ([facility] [address] ) 2018   • Type 2 diabetes mellitus with complication, with long-term current use of insulin ([facility] [address] ) 2018   • Bradycardia 2018   • Rupture of tendon of biceps, long head 2017   • Stage 3b chronic kidney disease 2017   • Spinal stenosis of lumbar region with neurogenic claudication 2017   • Acquired hypothyroidism 2016   • Mixed hyperlipidemia 2015   • Stented coronary artery 2015   • Essential hypertension 10/29/2010   • Low back pain 2008      LOS (days): 9  Geometric Mean LOS (GMLOS) (days): 4 60  Days to GMLOS:-4 7     OBJECTIVE:  Risk of Unplanned Readmission Score: 24 98         Current admission status: Inpatient   Preferred Pharmacy:   [address] [place] [address]  [facility] [place], [address] [place] [address]  Phone: [phone] Fax: [phone]    Primary Care Provider: Kiya Duron DO    Primary Insurance: MEDICARE  Secondary Insurance: [facility]    DISCHARGE DETAILS:    Pt had Keofed placed  Pt's family doesn't want PEG tube   CM will continue to follow for eventual decision related to feeding access/goals of care

## 2022-11-20 NOTE — PLAN OF CARE
Problem: MOBILITY - ADULT  Goal: Maintain or return to baseline ADL function  Description: INTERVENTIONS:  -  Assess patient's ability to carry out ADLs; assess patient's baseline for ADL function and identify physical deficits which impact ability to perform ADLs (bathing, care of mouth/teeth, toileting, grooming, dressing, etc )  - Assess/evaluate cause of self-care deficits   - Assess range of motion  - Assess patient's mobility; develop plan if impaired  - Assess patient's need for assistive devices and provide as appropriate  - Encourage maximum independence but intervene and supervise when necessary  - Involve family in performance of ADLs  - Assess for home care needs following discharge   - Consider OT consult to assist with ADL evaluation and planning for discharge  - Provide patient education as appropriate  Outcome: Progressing  Goal: Maintains/Returns to pre admission functional level  Description: INTERVENTIONS:  - Perform BMAT or MOVE assessment daily    - Set and communicate daily mobility goal to care team and patient/family/caregiver     - Collaborate with rehabilitation services on mobility goals if consulted  - Record patient progress and toleration of activity level   Outcome: Progressing     Problem: PAIN - ADULT  Goal: Verbalizes/displays adequate comfort level or baseline comfort level  Description: Interventions:  - Encourage patient to monitor pain and request assistance  - Assess pain using appropriate pain scale  - Administer analgesics based on type and severity of pain and evaluate response  - Implement non-pharmacological measures as appropriate and evaluate response  - Consider cultural and social influences on pain and pain management  - Notify physician/advanced practitioner if interventions unsuccessful or patient reports new pain  Outcome: Progressing     Problem: INFECTION - ADULT  Goal: Absence or prevention of progression during hospitalization  Description: INTERVENTIONS:  - Assess and monitor for signs and symptoms of infection  - Monitor lab/diagnostic results  - Monitor all insertion sites, i e  indwelling lines, tubes, and drains  - Monitor endotracheal if appropriate and nasal secretions for changes in amount and color  - Harlan appropriate cooling/warming therapies per order  - Administer medications as ordered  - Instruct and encourage patient and family to use good hand hygiene technique  - Identify and instruct in appropriate isolation precautions for identified infection/condition  Outcome: Progressing     Problem: SAFETY ADULT  Goal: Maintain or return to baseline ADL function  Description: INTERVENTIONS:  -  Assess patient's ability to carry out ADLs; assess patient's baseline for ADL function and identify physical deficits which impact ability to perform ADLs (bathing, care of mouth/teeth, toileting, grooming, dressing, etc )  - Assess/evaluate cause of self-care deficits   - Assess range of motion  - Assess patient's mobility; develop plan if impaired  - Assess patient's need for assistive devices and provide as appropriate  - Encourage maximum independence but intervene and supervise when necessary  - Involve family in performance of ADLs  - Assess for home care needs following discharge   - Consider OT consult to assist with ADL evaluation and planning for discharge  - Provide patient education as appropriate  Outcome: Progressing  Goal: Maintains/Returns to pre admission functional level  Description: INTERVENTIONS:  - Perform BMAT or MOVE assessment daily    - Set and communicate daily mobility goal to care team and patient/family/caregiver     - Collaborate with rehabilitation services on mobility goals if consulted  - Record patient progress and toleration of activity level   Outcome: Progressing  Goal: Patient will remain free of falls  Description: INTERVENTIONS:  -  Assess patient's ability to carry out ADLs; assess patient's baseline for ADL function and identify physical deficits which impact ability to perform ADLs (bathing, care of mouth/teeth, toileting, grooming, dressing, etc )  - Assess/evaluate cause of self-care deficits   - Assess range of motion  - Assess patient's mobility; develop plan if impaired  - Assess patient's need for assistive devices and provide as appropriate  - Encourage maximum independence but intervene and supervise when necessary  - Involve family in performance of ADLs  - Assess for home care needs following discharge   - Consider OT consult to assist with ADL evaluation and planning for discharge  - Provide patient education as appropriate  Outcome: Progressing     Problem: DISCHARGE PLANNING  Goal: Discharge to home or other facility with appropriate resources  Description: INTERVENTIONS:  - Identify barriers to discharge w/patient and caregiver  - Arrange for needed discharge resources and transportation as appropriate  - Identify discharge learning needs (meds, wound care, etc )  - Arrange for interpretive services to assist at discharge as needed  - Refer to Case Management Department for coordinating discharge planning if the patient needs post-hospital services based on physician/advanced practitioner order or complex needs related to functional status, cognitive ability, or social support system  Outcome: Progressing     Problem: Knowledge Deficit  Goal: Patient/family/caregiver demonstrates understanding of disease process, treatment plan, medications, and discharge instructions  Description: Complete learning assessment and assess knowledge base    Interventions:  - Provide teaching at level of understanding  - Provide teaching via preferred learning methods  Outcome: Progressing     Problem: Potential for Falls  Goal: Patient will remain free of falls  Description: INTERVENTIONS:  -  Assess patient's ability to carry out ADLs; assess patient's baseline for ADL function and identify physical deficits which impact ability to perform ADLs (bathing, care of mouth/teeth, toileting, grooming, dressing, etc )  - Assess/evaluate cause of self-care deficits   - Assess range of motion  - Assess patient's mobility; develop plan if impaired  - Assess patient's need for assistive devices and provide as appropriate  - Encourage maximum independence but intervene and supervise when necessary  - Involve family in performance of ADLs  - Assess for home care needs following discharge   - Consider OT consult to assist with ADL evaluation and planning for discharge  - Provide patient education as appropriate  Outcome: Progressing     Problem: Prexisting or High Potential for Compromised Skin Integrity  Goal: Skin integrity is maintained or improved  Description: INTERVENTIONS:  - Identify patients at risk for skin breakdown  - Assess and monitor skin integrity  - Assess and monitor nutrition and hydration status  - Monitor labs   - Assess for incontinence   - Turn and reposition patient  - Assist with mobility/ambulation  - Relieve pressure over bony prominences  - Avoid friction and shearing  - Provide appropriate hygiene as needed including keeping skin clean and dry  - Evaluate need for skin moisturizer/barrier cream  - Collaborate with interdisciplinary team   - Patient/family teaching  - Consider wound care consult   Outcome: Progressing     Problem: Nutrition/Hydration-ADULT  Goal: Nutrient/Hydration intake appropriate for improving, restoring or maintaining nutritional needs  Description: Monitor and assess patient's nutrition/hydration status for malnutrition  Collaborate with interdisciplinary team and initiate plan and interventions as ordered  Monitor patient's weight and dietary intake as ordered or per policy  Utilize nutrition screening tool and intervene as necessary  Determine patient's food preferences and provide high-protein, high-caloric foods as appropriate       INTERVENTIONS:  - Monitor oral intake, urinary output, labs, and treatment plans  - Assess nutrition and hydration status and recommend course of action  - Evaluate amount of meals eaten  - Assist patient with eating if necessary   - Allow adequate time for meals  - Recommend/ encourage appropriate diets, oral nutritional supplements, and vitamin/mineral supplements  - Order, calculate, and assess calorie counts as needed  - Recommend, monitor, and adjust tube feedings and TPN/PPN based on assessed needs  - Assess need for intravenous fluids  - Provide specific nutrition/hydration education as appropriate  - Include patient/family/caregiver in decisions related to nutrition  Outcome: Progressing     Problem: SAFETY,RESTRAINT: NV/NON-SELF DESTRUCTIVE BEHAVIOR  Goal: Remains free of harm/injury (restraint for non violent/non self-detsructive behavior)  Description: INTERVENTIONS:  - Instruct patient/family regarding restraint use   - Assess and monitor physiologic and psychological status   - Provide interventions and comfort measures to meet assessed patient needs   - Identify and implement measures to help patient regain control  - Assess readiness for release of restraint   Outcome: Progressing  Goal: Returns to optimal restraint-free functioning  Description: INTERVENTIONS:  - Assess the patient's behavior and symptoms that indicate continued need for restraint  - Identify and implement measures to help patient regain control  - Assess readiness for release of restraint   Outcome: Progressing     Problem: CONFUSION/THOUGHT DISTURBANCE  Goal: Thought disturbances (confusion, delirium, depression, dementia or psychosis) are managed to maintain or return to baseline mental status and functional level  Description: INTERVENTIONS:  - Assess for possible contributors to  thought disturbance, including but not limited to medications, infection, impaired vision or hearing, underlying metabolic abnormalities, dehydration, respiratory compromise,  psychiatric diagnoses and notify attending PHYSICAN/AP  - Monitor and intervene to maintain adequate nutrition, hydration, elimination, sleep and activity  - Decrease environmental stimuli, including noise as appropriate  - Provide frequent contacts to provide refocusing, direction and reassurance as needed  Approach patient calmly with eye contact and at their level  - [place] high risk fall precautions, aspiration precautions and other safety measures, as indicated  - If delirium suspected, notify physician/AP of change in condition and request immediate in-person evaluation  - Pursue consults as appropriate including Geriatric (campus dependent), OT for cognitive evaluation/activity planning, psychiatric, pastoral care, etc   Outcome: Progressing     Problem: BEHAVIOR  Goal: Pt/Family maintain appropriate behavior and adhere to behavioral management agreement, if implemented  Description: INTERVENTIONS:  - Assess the family dynamic   - Encourage verbalization of thoughts and concerns in a socially appropriate manner  - Assess patient/family's coping skills and non-compliant behavior (including use of illegal substances)  - Utilize positive, consistent limit setting strategies supporting safety of patient, staff and others  - Initiate consult with Case Management, Spiritual Care or other ancillary services as appropriate  - If a patient's/visitor's behavior jeopardizes the safety of the patient, staff, or others, refer to organization procedure     - Notify Security of behavior or suspected illegal substances which indicate the need for search of the patient and/or belongings  - Encourage participation in the decision making process about a behavioral management agreement; implement if patient meets criteria  Outcome: Progressing

## 2022-11-20 NOTE — PROGRESS NOTES
[address] [facility]  Progress Note - Bayron Nguyen 4/25/1933, 80 y o  male MRN: [id number]  Unit/Bed#: [facility] 620-01 Encounter: [id number]  Primary Care Provider: Bhavya Shabazz DO   Date and time admitted to hospital: 11/11/2022  5:15 AM    SIRS (systemic inflammatory response syndrome) ([facility] [place] [address] )  Assessment & Plan  Concerning for aspiration event  - CXR  - Blood cultures  - Lactate  - UA  - Cefepime, Flagyl    Dysphagia  Assessment & Plan  Currently NPO  Discussions regarding enteral access with family  Delirium  Assessment & Plan  Seroquel 25 mg qHS, currently NPO  Consider increasing pharmacologic sedation today  Has intermittently required waist and wrist restraints  Will need to be out of restraints for rehab placement  Acute kidney injury [facility])  Assessment & Plan  Continue to trend BMPs    Acute respiratory failure with hypoxia [facility])  Assessment & Plan  Patient required intubation for pulm edema, could not tolerate bipap due to facial injuries  Aggressively diuresed and extubated  Heart failure team consulted, appreciate recs  Lasix, isordil, hydral discontinued in setting of VEE  Continue metoprolol  Epistaxis  Assessment & Plan  Resolved  Rhinorocket removed  Nasal bone fractures  Assessment & Plan  Non-op    Ischemic cardiomyopathy  Assessment & Plan  HF following  Appreciate recs, see below under acute resp failure    Mixed hyperlipidemia  Assessment & Plan  Continue statin, however patient is NPO    Essential hypertension  Assessment & Plan  Patient now NPO  IV metoprolol 25 mg q6hr    Stage 3b chronic kidney disease  Assessment & Plan  Lab Results   Component Value Date    EGFR 31 11/20/2022    EGFR 50 11/19/2022    EGFR 41 11/18/2022    CREATININE 1 86 (H) 11/20/2022    CREATININE 1 26 11/19/2022    CREATININE 1 48 (H) 11/18/2022     Continue to trend BMPs       Acquired hypothyroidism  Assessment & Plan  Continue levothyroxine, however patient is NPO    Type 2 diabetes mellitus with complication, with long-term current use of insulin [facility])  Assessment & Plan  Lab Results   Component Value Date    HGBA1C 7 3 (H) 09/17/2022       Recent Labs     11/19/22  1122 11/19/22  1736 11/19/22  2352 11/20/22  0549   POCGLU 170* 131 138 134     -SSI, adjust as needed  - Trulicity added  Blood Sugar Average: Last 72 hrs:  (P) 931 [id number]    Paroxysmal atrial fibrillation (HCC)  Assessment & Plan  Continue metoprolol  Hold AC/AP in setting of SDH  * Subdural hematoma  Assessment & Plan  - Neuro exam: GCS 14 (4, 4, 6), due to confusion  - Continue neuro checks  - Reversal agent administered: K-Centra  - F/u CT head: Unchanged  - Appreciate neurosurgery recommendations  - Complete 7 day course of Keppra for seizure prophylaxis  - Chemical DVT prophylaxis: SQH  - Hold all anticoagulants and anti platelet medications until cleared by neurosurgery to resume  - PT/OT and cognitive evaluation            Bowel Regimen: stool softeners, PPI  VTE Prophylaxis:Heparin scds    Disposition: continue medical management    Subjective   Chief Complaint: AMS, unable to provide any complaints    Subjective: Patient became tachypneic to 40 and tachycardic to 100s this morning  He remained on room air, saturating in 90s, but has had increased work of breathing  Objective   Vitals:   Temp:  [98 3 °F (36 8 °C)-100 °F (37 8 °C)] 98 4 °F (36 9 °C)  HR:  [84-96] 91  Resp:  [20-43] 40  BP: (117-134)/(68-72) 119/71    I/O       11/18 0701  11/19 0700 11/19 0701  11/20 0700 11/20 0701  11/21 0700    P  O  0 0 0    Total Intake(mL/kg) 0 (0) 0 (0) 0 (0)    Urine (mL/kg/hr)  750 (0 4)     Total Output  750     Net 0 -750 0           Unmeasured Urine Occurrence 7 x 3 x     Unmeasured Stool Occurrence 1 x             Physical Exam:   GENERAL APPEARANCE: ill appearing, laying in bed  NEURO: confused, incoherent responses, intermittently following commands  HEENT: right pupil 4mm and reactive, left pupil 3mm and reactive  CV: tachycardic, irregular rhythm  LUNGS: rales in left lung fields  Right lung fields clear to auscultation  GI: soft, non distended  : condom catheter in place  MSK: ROM grossly intact, no deformities, SCDs on legs  SKIN: warm, dry, bilateral periorbital and nasal bridge ecchymosis  Invasive Devices     Peripheral Intravenous Line  Duration           Peripheral IV 11/18/22 Right;Upper;Ventral (anterior) Arm 2 days          Drain  Duration           External Urinary Catheter Small 1 day                      Lab Results: Results: I have personally reviewed all pertinent laboratory/tests results  Imaging: I have personally reviewed pertinent reports       Other Studies: pending

## 2022-11-20 NOTE — ASSESSMENT & PLAN NOTE
Lab Results   Component Value Date    EGFR 31 11/20/2022    EGFR 50 11/19/2022    EGFR 41 11/18/2022    CREATININE 1 86 (H) 11/20/2022    CREATININE 1 26 11/19/2022    CREATININE 1 48 (H) 11/18/2022     Continue to trend BMPs

## 2022-11-20 NOTE — RESPIRATORY THERAPY NOTE
RT Protocol Note  Tommy Calloway 80 y o  male MRN: [id number]  Unit/Bed#: [facility] 620-01 Encounter: [id number]    Assessment    Principal Problem:    Subdural hematoma  Active Problems:    Paroxysmal atrial fibrillation (HCC)    Type 2 diabetes mellitus with complication, with long-term current use of insulin (HCC)    Acquired hypothyroidism    Stage 3b chronic kidney disease    Essential hypertension    Mixed hyperlipidemia    Ischemic cardiomyopathy    Nasal bone fractures    Epistaxis    Acute respiratory failure with hypoxia (HCC)    Acute kidney injury (HCC)    Delirium    Dysphagia    SIRS (systemic inflammatory response syndrome) (HCC)      Home Pulmonary Medications:  N/a       Past Medical History:   Diagnosis Date    Benign hypertension     CHF (congestive heart failure) (HCC)     Chronic kidney disease (CKD), stage III (moderate)     Coronary artery disease     Hypothyroidism     Ischemic cardiomyopathy     Mixed hyperlipidemia     Paroxysmal atrial fibrillation     PVD (peripheral vascular disease)     Type II diabetes mellitus      Social History     Socioeconomic History    Marital status: /Civil Union     Spouse name: Not on file    Number of children: Not on file    Years of education: Not on file    Highest education level: Not on file   Occupational History    Occupation: RETIRED   Tobacco Use    Smoking status: Never    Smokeless tobacco: Never   Vaping Use    Vaping Use: Never used   Substance and Sexual Activity    Alcohol use: Not Currently    Drug use: Never    Sexual activity: Not on file   Other Topics Concern    Not on file   Social History Narrative        RETIRED     Social Determinants of Health     Financial Resource Strain: Not on file   Food Insecurity: No Food Insecurity    Worried About Running Out of Food in the Last Year: Never true    Ran Out of Food in the Last Year: Never true   Transportation Needs: No Transportation Needs    Lack of Transportation (Medical):  No Lack of Transportation (Non-Medical): No   Physical Activity: Not on file   Stress: Not on file   Social Connections: Not on file   Intimate Partner Violence: Not on file   Housing Stability: Low Risk     Unable to Pay for Housing in the Last Year: No    Number of Places Lived in the Last Year: 1    Unstable Housing in the Last Year: No       Subjective         Objective    Physical Exam:   O2 Device: (P) ra    Vitals:  Blood pressure 120/69, pulse 103, temperature 98 7 °F (37 1 °C), resp  rate (!) 46, height 5' 8" (1 727 m), weight 72 5 kg (159 lb 13 3 oz), SpO2 94 %  Imaging and other studies: I have personally reviewed pertinent reports  O2 Device: (P) ra     Plan    Respiratory Plan: Discontinue Protocol        Resp Comments: pt presents s/p fall w/ SHD  has no pulm hx and takes no resp meds  pt offers no respiratory complaints  will d/c prn and RP

## 2022-11-20 NOTE — PROGRESS NOTES
Heart Failure/ Pulmonary Hypertension Progress Note - Selin Winkler 80 y o  male MRN: [id number]    Unit/Bed#: [facility] 620-01 Encounter: [id number]      Assessment:    Principal Problem:    Subdural hematoma  Active Problems:    Paroxysmal atrial fibrillation (HCC)    Type 2 diabetes mellitus with complication, with long-term current use of insulin (HCC)    Acquired hypothyroidism    Stage 3b chronic kidney disease    Essential hypertension    Mixed hyperlipidemia    Ischemic cardiomyopathy    Nasal bone fractures    Epistaxis    Acute respiratory failure with hypoxia (HCC)    Acute kidney injury (HCC)    Delirium    Dysphagia    SIRS (systemic inflammatory response syndrome) (HCC)      Subjective:   Patient seen and examined  Tachypneic and tachycardic this AM with increased WOB  Remains  disoriented  Objective: Intake/ Output: not kept  Weight: bed scale  Tele: AF frequent PVCs    SIRS  With c/f possible aspiration event  Now with lactic acidosis  On antibiox  Management per primary team      Chronic HFrEF, Stage C  Etiology: icM with severe AS  Examines euvolemic to dry, hypernatremic to 155 with VEE  Has been NPO due to possible aspiration event for several days  Currently receiving IVF hydration at 50 cc/hr  Monitor volume status closely  Can diurese PRN  Hold on GDMT for now   Had been Midodrine as outpatient, currently not needed     Echocardiogram 11/11/22  LVEF: 35%, restrictive diastology  LVIDd: 4 9 cm  RV: normal  MR: severe MAC  AV: severely calcified, severe stenosis, mean gradient 10 mmHg, DI 0 24, LIMA 0 9 cm2     Echo 7/20/22:  LVEF: 40%  LVEDd:  RV: normal  AV: moderate AS     Neurohormonal Blockade: Midodrine 2 5 mg TID  --Beta-Blocker: metoprolol succinate 25 mg BID, on hold due to NPO status, Metoprolol 5 mg IV Q6h  --ACEi, ARB or ARNi:    (or SVR reduction): Imdur 30 mg BID  --Aldosterone Receptor Blocker:  --SGLT2-I:   --Diuretic: held (was getting furosemide 40 mg IV BID)  --Home: lasix 20 mg daily     Sudden Cardiac Death Risk Reduction:  --ICD: EF 35%     Electrical Resynchronization:  Narrow QRS         Lightheadedness/ disequilibrium as output  - on midodrine as oupt  CAD  s/p CABG x 3, LIMA to LAD, 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  2017 [facility] with graft occlusion s/p PCI  Was on Clopidogrel, IMDUR, nitroglycerin at home  Moderate to severe aortic stenosis   Severe calcification of trileaflet valve  Echo 11/11: mean gradient of 11 mmHg, DI 0 24, LIMA 0 91 cm2  Frequent PVCs  Toprol XL 25 mg BID on hold for NPO, Metop 5 mg IV Q6 hr  SDH this admit  nasal bone fracture  Hypothyroid on replacement  Dyslipidemia- atorvastatin 80 mg daily  VEE- cr up to 2 on admit, currently 1 86 with hypernatremia  Likely due to NPO status/no free H2O intake and acute infection  Receiving gentle IVF hydration  Diurese PRN  Paroxysmal atrial fibrillation  Currently rate controlled afib  BB: toprol xl 25 mg bid- on hold for NPO status, Metop 5 mg IV Q6  Home meds: Coreg 6 25 mg bid, apixaban 2 5 mg bid- on hold due to [facility]  Palliative care consult placed  Appreciate recs  Review of Systems   All other systems reviewed and are negative  [facility] (day, reason): [place] catheter (day, reason):    Vitals: Blood pressure 126/71, pulse (!) 109, temperature 98 7 °F (37 1 °C), resp  rate (!) 46, height 5' 8" (1 727 m), weight 72 5 kg (159 lb 13 3 oz), SpO2 94 %  , Body mass index is 24 3 kg/m² , I/O last 3 completed shifts: In: 0   Out: 750 [Urine:750]  No intake/output data recorded  Wt Readings from Last 3 Encounters:   11/20/22 72 5 kg (159 lb 13 3 oz)   11/11/22 87 9 kg (193 lb 12 6 oz)   09/19/22 78 5 kg (173 lb)       Intake/Output Summary (Last 24 hours) at 11/20/2022 1202  Last data filed at 11/20/2022 0900  Gross per 24 hour   Intake 0 ml   Output 750 ml   Net -750 ml     I/O last 3 completed shifts:   In: 0   Out: 750 [Urine:750]    AF, frequent PVCs       Physical Exam:  Vitals:    11/19/22 2352 11/20/22 9646 11/20/22 0736 11/20/22 1024   BP: 117/68  119/71 126/71   Pulse: 91  91 (!) 109   Resp:   (!) 40 (!) 46   Temp:   98 4 °F (36 9 °C) 98 7 °F (37 1 °C)   TempSrc:   Oral    SpO2: 94%  92% 94%   Weight:  72 5 kg (159 lb 13 3 oz)     Height:           GEN: Tashi Yanez is ill appearing, frail  HEENT: pupils equal, round, and reactive to light; extraocular muscles intact  NECK: supple, no carotid bruits   HEART: regular rhythm, normal S1 and S2, no murmurs, clicks, gallops or rubs, JVP is  flat  LUNGS: coarse to auscultation bilaterally  ABDOMEN: normal bowel sounds, soft, no tenderness, no distention  EXTREMITIES: peripheral pulses normal; no clubbing, cyanosis, or edema  NEURO: no focal findings   SKIN: normal without suspicious lesions on exposed skin      Current Facility-Administered Medications:   •  acetaminophen (TYLENOL) tablet 975 mg, 975 mg, Oral, Q8H [facility], Catarina Prado PA-C, 975 mg at 11/16/22 1305  •  atorvastatin (LIPITOR) tablet 80 mg, 80 mg, Oral, Daily With Nayla Gray PA-C, 80 mg at 11/16/22 1723  •  bisacodyl (DULCOLAX) rectal suppository 10 mg, 10 mg, Rectal, Daily PRN, Rocio Prado PA-C  •  cefepime (MAXIPIME) 1,000 mg in dextrose 5 % 50 mL IVPB, 1,000 mg, Intravenous, Q12H, Steffi Carmona MD  •  Dulaglutide SOPN 4 5 mg, 4 5 mg, Subcutaneous, Weekly, Claudene Frock, DO  •  insulin detemir (LEVEMIR) subcutaneous injection 11 Units, 11 Units, Subcutaneous, HS, Anushka Luna PA-C, 11 Units at 11/19/22 2122  •  insulin lispro (HumaLOG) 100 units/mL subcutaneous injection 4-20 Units, 4-20 Units, Subcutaneous, Q6H, 4 Units at 11/19/22 1214 **AND** Fingerstick Glucose (POCT), , , Q6H, Claudene Frock, DO  •  levalbuterol (XOPENEX) inhalation solution 1 25 mg, 1 25 mg, Nebulization, Q8H PRN, Steffi Carmona MD  •  levothyroxine tablet 75 mcg, 75 mcg, Oral, Early Morning, Ammon Julian PA-C, 75 mcg at 11/17/22 5742  •  lidocaine-epinephrine (XYLOCAINE/EPINEPHRINE) 1 %-1:100,000 injection 5 mL, 5 mL, Other, Once, Ashley Campbell MD  •  melatonin tablet 3 mg, 3 mg, Oral, HS, Jazmin Torres MD, 3 mg at 11/16/22 2123  •  metoprolol (LOPRESSOR) injection 5 mg, 5 mg, Intravenous, Q6H, Zee Yip DO, 5 mg at 11/20/22 0550  •  metroNIDAZOLE (FLAGYL) IVPB (premix) 500 mg 100 mL, 500 mg, Intravenous, Q8H, Ashley Campbell MD, Last Rate: 200 mL/hr at 11/20/22 1025, 500 mg at 11/20/22 1025  •  multi-electrolyte (PLASMALYTE-A/ISOLYTE-S PH 7 4) IV solution, 50 mL/hr, Intravenous, Continuous, Ashley Campbell MD, Last Rate: 50 mL/hr at 11/20/22 1022, 50 mL/hr at 11/20/22 1022  •  omeprazole (PRILOSEC) suspension 2 mg/mL, 20 mg, Oral, Early Morning, Catarina Prado PA-C, 20 mg at 11/17/22 6247  •  oxyCODONE (ROXICODONE) IR tablet 2 5 mg, 2 5 mg, Oral, Q4H PRN **OR** oxyCODONE (ROXICODONE) IR tablet 5 mg, 5 mg, Oral, Q4H PRN, Coco Prado PA-C  •  polyethylene glycol (MIRALAX) packet 17 g, 17 g, Oral, Daily, Coco Prado PA-C, 17 g at 11/17/22 0848  •  QUEtiapine (SEROquel) tablet 25 mg, 25 mg, Oral, HS, Catarina Prado PA-C, 25 mg at 11/16/22 2123  •  senna-docusate sodium (SENOKOT S) 8 6-50 mg per tablet 1 tablet, 1 tablet, Oral, BID, Coco Prado PA-C, 1 tablet at 11/17/22 0842      Labs & Results:        Results from last 7 days   Lab Units 11/20/22  0804 11/18/22  0426 11/17/22  0448   WBC Thousand/uL 24 29* 10 50* 11 23*   HEMOGLOBIN g/dL 12 8 13 2 13 0   HEMATOCRIT % 41 2 41 9 41 7   PLATELETS Thousands/uL 340 281 220         Results from last 7 days   Lab Units 11/20/22  0804 11/19/22  0447 11/18/22  0426   POTASSIUM mmol/L 4 1 4 2 3 7   CHLORIDE mmol/L 123* 117* 110*   CO2 mmol/L 25 20* 29   BUN mg/dL 77* 47* 51*   CREATININE mg/dL 1 86* 1 26 1 48*   CALCIUM mg/dL 9 0 9 3 9 0           Counseling / Coordination of Care  Total floor / unit time spent today 20 minutes    Greater than 50% of total time was spent with the patient and / or family counseling and / or coordination of care  A description of the counseling / coordination of care: 20  Thank you for the opportunity to participate in the care of this patient  Lillie Nowak

## 2022-11-21 NOTE — PROGRESS NOTES
Progress Note - Trauma ICU Transfer   and Tertiary [place] [address]   Bayron Nguyen 80 y o  male [id number]   Unit/Bed#: ICU 09 Encounter: [id number]     Assessment & Plan   Summary of Diagnosed Injuries: 79 y/o male with nasal bone frx, epistaxis, and SDH s/p fall with hospital course complicated by acute hypercapnic respiratory failure, metabolic acidosis, respiratory acidosis, ischemic cardiomyopathy, acute on chronic kidney failure, Delirium, Dysphagia, SIRS, and suspected aspiration PNA  PLAN:  Comfort measures  Continue PRN medications       Disposition: transfer to [facility] surg    Code status:  Level 4 - Comfort Care    Consultants: IP CONSULT TO CASE MANAGEMENT  IP CONSULT TO ORAL AND MAXILLOFACIAL SURGERY  IP CONSULT TO NEUROSURGERY  IP CONSULT TO HEART FAILURE SERVICE  IP CONSULT TO NUTRITION SERVICES  IP CONSULT TO GERONTOLOGY  IP CONSULT TO NUTRITION SERVICES  IP CONSULT TO PALLIATIVE CARE  IP CONSULT TO CASE MANAGEMENT  IP CONSULT TO HOSPICE     Subjective   Mechanism of Injury:Fall     HPI/Last 24 hour events: 89M that was admitted to the hospital on 11/11/22 s/p fall and found to have a SDH, epistaxis  He was initially intubated from 11/11-11/13  Today he had decline in mental status and respiratory status  CT brain brain showed an unchanged SDH and a new areas of ischemic stroke     RRT called 7853 for decline in MS and shallow breathing  The patient was intubated secondary to pending respiratory arrest    ABG drawn just prior to intubation: 7 06/>103/129    Reason for ICU admission: Acute hypercarbic, hypoxic respiratory failure     Summary of ICU clinical course: Intubated for airway protection and hypoxia  Family opted for comfort measures       Recent or scheduled procedures: Intubation            Objective   Vitals:   Temp:  [98 °F (36 7 °C)-100 5 °F (38 1 °C)] 98 °F (36 7 °C)  HR:  [] 96  Resp:  [20-28] 28  BP: ()/() 134/67  FiO2 (%):  [40] 40    I/O       11/19 0701  11/20 0700 11/20 0701  11/21 0700 11/21 0701  11/22 0700    P  O  0 0     I V  (mL/kg)  1006 8 (13 7)     NG/GT  300     IV Piggyback  150     Feedings  0     Total Intake(mL/kg) 0 (0) 1456 8 (19 9)     Urine (mL/kg/hr) 750 (0 4) 600 (0 3)     Total Output 750 600     Net -750 +856 8            Unmeasured Urine Occurrence 3 x             Physical Exam  Vitals reviewed  Constitutional:       General: He is in acute distress  HENT:      Head: Normocephalic  Mouth/Throat:      Mouth: Mucous membranes are dry  Cardiovascular:      Rate and Rhythm: Rhythm irregular  Pulmonary:      Effort: Tachypnea and respiratory distress present  Comments: Poor respiratory effort   Abdominal:      Palpations: Abdomen is soft  Musculoskeletal:         General: Normal range of motion  Skin:     General: Skin is dry  Capillary Refill: Capillary refill takes more than 3 seconds  Neurological:      Comments: Unresponsive         Invasive Devices     Peripheral Intravenous Line  Duration           Peripheral IV 11/20/22 Left Antecubital <1 day    Peripheral IV 11/21/22 Left;Ventral (anterior) Wrist <1 day    Peripheral IV 11/21/22 Right;Ventral (anterior) Forearm <1 day          Drain  Duration           NG/OG/Enteral Tube Other (Comment) Left nare -- days    External Urinary Catheter Small 2 days                          Lab Results: Results: I have personally reviewed all pertinent laboratory/tests results    Imaging Results: I have personally reviewed pertinent reports  Chest Xray(s): N/A   FAST exam(s): N/A   CT Scan(s): N/A   Additional Xray(s): N/A         Code Status: Level 4 - Comfort Care       Patient seen and evaluated by Critical Care today and deemed to be appropriate for transfer to [facility] Surg  Spoke to [address] [place] [address]  from Trauma service regarding transfer  Critical Care can be contacted via [address] [place] [address] with any questions or concerns

## 2022-11-21 NOTE — QUICK NOTE
QUICK NOTE - Deterioration Index  Michael Saravia 80 y o  male MRN: [id number]  Unit/Bed#: [facility] 620-01 Encounter: [id number]    Date Paged: 22  Time Paged:   Room #: [address] Time:   Deterioration index score at time of page: 86 04  %  Spoke with Dr Jarad Haq and Pedro Chavez RN  from primary team  Need to escalate level of care: yes - SD level 2     PROBLEMS resulting in high DI score:   32% Respiratory rate 46   16% Sodium abnormal (155 mmol/L)   13 Age 80years old   12% Satish coma scale 13   10% Neurological exam Lethargic     •     PLAN:    • Recheck labs including lactic, VBG, BMP   • Recommend switching isolyte to D5 to improve Na  • Blood cultures taken and abx started today   • Upgrade to SD level 2     Please contact critical care via [employer] with any questions or concerns       Vitals:   Vitals:    22 0736 22 0750 22 1024 22 1218   BP: 119/71  126/71 120/69   Pulse: 91  (!) 109 103   Resp: (!) 40  (!) 46    Temp: 98 4 °F (36 9 °C)  98 7 °F (37 1 °C)    TempSrc: Oral      SpO2: 92% 92% 94% 94%   Weight:       Height:           Respiratory:  SpO2: SpO2: 94 %, SpO2 Activity: SpO2 Activity: At Rest, SpO2 Device: O2 Device: None (Room air)       Temperature: Temp (24hrs), Av 7 °F (37 1 °C), Min:98 4 °F (36 9 °C), Max:98 9 °F (37 2 °C)  Current: Temperature: 98 7 °F (37 1 °C)    Labs:   Results from last 7 days   Lab Units 22  0804 22  0426 22  0448 22  0513   WBC Thousand/uL 24 29* 10 50* 11 23* 9 33   HEMOGLOBIN g/dL 12 8 13 2 13 0 12 2   HEMATOCRIT % 41 2 41 9 41 7 37 9   PLATELETS Thousands/uL 340 281 220 181   NEUTROS PCT %  --  79* 81* 81*   MONOS PCT %  --  10 8 8   MONO PCT % 3*  --   --   --      Results from last 7 days   Lab Units 22  0804 22  0447 22  0426   SODIUM mmol/L 155* 147 146   POTASSIUM mmol/L 4 1 4 2 3 7   CHLORIDE mmol/L 123* 117* 110*   CO2 mmol/L 25 20* 29   BUN mg/dL 77* 47* 51* CREATININE mg/dL 1 86* 1 26 1 48*   CALCIUM mg/dL 9 0 9 3 9 0     Results from last 7 days   Lab Units 11/19/22  0447 11/16/22  0513 11/16/22  0020   MAGNESIUM mg/dL 3 2* 3 0* 2 7*     Results from last 7 days   Lab Units 11/20/22  1505 11/20/22  1018   LACTIC ACID mmol/L 2 3* 2 8*               Code Status: Level 1 - Full Code

## 2022-11-21 NOTE — PROGRESS NOTES
Pastoral Care Progress Note    2022  Patient: Julien Jimenez : 1933  Admission Date & Time: 2022 0515  MRN: [id number] [facility]: [id number]      Met with pt "Dany", his wife "" and son "Rafi Greco" as pt's condition was reportedly declining  Spent time with family between the ER where Dany's granddaughter is a pt and pt's room  Provided space to process emotions, pray, verbalize concerns, recollect times as a family and to provide interdisciplinary communication  Pt's support system expressed gratitude and are open to continued support as requested  If further spiritual care is desired, please contact [facility]  via [employer]  Thank you!                Chaplaincy Interventions Utilized:   Empowerment: Clarified, confirmed, or reviewed information from treatment team , Encouraged assertiveness, Encouraged focus on present, Encouraged self-care, and Facilitated group experience    Exploration: Explored spiritual needs & resources, Facilitated life review, and Facilitated story telling    Collaboration: Consulted with interdisciplinary team    Relationship Building: Cultivated a relationship of care and support, Listened empathically, and Provided silent and supportive presence    Ritual: Provided prayer    Chaplaincy Outcomes Achieved:  Emotional resources utilized, Expressed gratitude, Identified meaningful connections, Improved communication, Spiritual resources utilized, Tearfully processed emotions, and Verbally processed emotions      Spiritual Coping Strategies Utilized:   Connectedness         22 0330   Clinical Encounter Type   Visited With Patient and family together   Routine Visit Follow-up   Crisis Visit Critical Care;Patient actively dying   Referral From Nurse   Referral To    [facility]ist Encounters   Adventist Needs Prayer

## 2022-11-21 NOTE — ASSESSMENT & PLAN NOTE
Lab Results   Component Value Date    HGBA1C 7 3 (H) 09/17/2022       Recent Labs     11/20/22  1140 11/20/22  1744 11/21/22  0023 11/21/22  0314   POCGLU 155* 146* 244* 213*     -SSI, adjust as needed     - Trulicity added  Blood Sugar Average: Last 72 hrs:  (P) 305 [id number]

## 2022-11-21 NOTE — QUICK NOTE
QUICK NOTE - Deterioration Index  Kenan Stafford 80 y o  male MRN: [id number]  Unit/Bed#: [facility] 620-01 Encounter: [id number]    Date Paged: 22  Time Paged:   Room #: [address] Time:   Deterioration index score at time of page: 87 36  %  Spoke with Gladys Neff & Primary RN from primary team  Need to escalate level of care: yes     PROBLEMS resulting in high DI score: Factors Contributing to Score  Contribution Factor Value   31% Respiratory rate 46   16% Sodium abnormal (155 mmol/L)   15 Age 80years old   12% Satish coma scale 13   9% Neurological exam Lethargic (Drowsy, easily aroused), Yes   5% Pulse 112   5% Cardiac rhythm Atrial fibrillation   3% WBC count abnormal (24 29 Thousand/uL)     Contribution Factor Value   2% Pulse oximetry 92 %   <1% BUN abnormal (77 mg/dL)   <1% Blood pH abnormal (8 454)   <6% Systolic 536   <1% Potassium 4 1 mmol/L   <1% Temperature 98 7 °F (37 1 °C)   <1% Hematocrit 41 2 %                PLAN:    • Upgrade to SD2   • Treat hypernatremia/hyperchloremia- Change to D5W  Continue TF with FWF  • Patient being treated for sepsis    Please contact critical care via [employer] with any questions or concerns       Vitals:   Vitals:    22 0736 22 0750 22 1024 22 1218   BP: 119/71  126/71 120/69   Pulse: 91  (!) 109 103   Resp: (!) 40  (!) 46    Temp: 98 4 °F (36 9 °C)  98 7 °F (37 1 °C)    TempSrc: Oral      SpO2: 92% 92% 94% 94%   Weight:       Height:           Respiratory:  SpO2: SpO2: 94 %, SpO2 Activity: SpO2 Activity: At Rest, SpO2 Device: O2 Device: None (Room air)       Temperature: Temp (24hrs), Av 7 °F (37 1 °C), Min:98 4 °F (36 9 °C), Max:98 9 °F (37 2 °C)  Current: Temperature: 98 7 °F (37 1 °C)    Labs:   Results from last 7 days   Lab Units 22  0804 22  0426 22  0448 22  0513   WBC Thousand/uL 24 29* 10 50* 11 23* 9 33   HEMOGLOBIN g/dL 12 8 13 2 13 0 12 2   HEMATOCRIT % 41 2 41 9 41 7 37 9   PLATELETS Thousands/uL 340 281 220 181   NEUTROS PCT %  --  79* 81* 81*   MONOS PCT %  --  10 8 8   MONO PCT % 3*  --   --   --      Results from last 7 days   Lab Units 11/20/22  0804 11/19/22  0447 11/18/22  0426   SODIUM mmol/L 155* 147 146   POTASSIUM mmol/L 4 1 4 2 3 7   CHLORIDE mmol/L 123* 117* 110*   CO2 mmol/L 25 20* 29   BUN mg/dL 77* 47* 51*   CREATININE mg/dL 1 86* 1 26 1 48*   CALCIUM mg/dL 9 0 9 3 9 0     Results from last 7 days   Lab Units 11/19/22  0447 11/16/22  0513 11/16/22  0020   MAGNESIUM mg/dL 3 2* 3 0* 2 7*     Results from last 7 days   Lab Units 11/20/22  1505 11/20/22  1018   LACTIC ACID mmol/L 2 3* 2 8*               Code Status: Level 1 - Full Code    Gertrude Mclaughlin PA-C

## 2022-11-21 NOTE — PROGRESS NOTES
ICU Acceptance Note - Critical Care   Ramin Shrestha 80 y o  male MRN: [id number]  Unit/Bed#: [facility] 620-01 Encounter: [id number]        ----------------------------------------------------------------------------------------  HPI/24hr events: 89M that was admitted to the hospital on 11/11/22 s/p fall and found to have a SDH, epistaxis  He was initially intubated from 11/11-11/13  Today he had decline in mental status and respiratory status  CT brain brain showed an unchanged SDH and a new areas of ischemic stroke    RRT called 0307 for decline in MS and shallow breathing  The patient was intubated secondary to pending respiratory arrest    ABG drawn just prior to intubation: 7 06/>103/129    ---------------------------------------------------------------------------------------  SUBJECTIVE  Patient unresponsive     Review of Systems  Review of systems was unable to be performed secondary to respiratory failure, encephalopathy   ---------------------------------------------------------------------------------------  Assessment and Plan:    Neuro:   • Diagnosis: Acute metabolic encephalopathy secondary to hypercarbic respiratory failure, metabolic derangement, shock  o Plan: Patient intubated   o Continue supportive care   • Diagnosis: Traumatic SDH  o Plan: Acute change in MS  o Family is considering transition to comfort measures  o If family wants up to proceed with full code, will need CT imaging       CV:   • Diagnosis: Shock, likely multifactorial  o Plan: Started on levophed librado intubation   Start vasopressing  o Goal MAP>65  o Continue with volume resuscitation   • Diagnosis: PAF  o Plan: Holding BB  o Holding AC in setting of SDH  • Diagnosis: Cardiomyopathy   o Plan: EF 35%  o Continue supportive care  o Holding lasix, BB    Pulm:  • Diagnosis: Acute hypercarbic, hypoxic respiratory failure   o Plan: Patient intubated emergently  o Continue ventilatory support  o Check ABG  o Check CXR  • Diagnosis: Aspiration pneumonia  o Plan: Continue cefepime and flagyl  o Bronch is family elects to proceed with full supportive care       GI:   • Diagnosis: Dysphagia  o Plan: NPO  o OGT       :   • Diagnosis: VEE  o Plan: Creatine 2 8  o Trend Creatine  o Place avendano  o Monitor I/O      F/E/N:   • Plan: 1L bolus   • Continue D5W for hypernatremia  • NPO      Heme/Onc:   • Diagnosis: Leukocytosis   o Plan: Trend WBC      Endo:   • Diagnosis: Hypothyroidism   o Plan: Continue synthroid   • Diagnosis: DM  o Plan: Start insulin gtt      ID:   • Diagnosis: Aspiration pneumonia   o Plan: Started on cefepime and flagyl today  o Blood culture pending       MSK/Skin:   • Diagnosis: Nasal bone fx  o Plan: Non op    Disposition: Transfer to Critical Care   Code Status: Level 1 - Full Code  ---------------------------------------------------------------------------------------  ICU CORE MEASURES    Prophylaxis   VTE Pharmacologic Prophylaxis: Pharmacologic VTE Prophylaxis contraindicated due to SDH  VTE Mechanical Prophylaxis: sequential compression device  Stress Ulcer Prophylaxis: Prophylaxis Not Indicated     Invasive Devices Review  Invasive Devices     Peripheral Intravenous Line  Duration           Peripheral IV 11/18/22 Right;Upper;Ventral (anterior) Arm 2 days    Peripheral IV 11/20/22 Left Antecubital <1 day    Peripheral IV 11/21/22 Left;Ventral (anterior) Wrist <1 day          Drain  Duration           NG/OG/Enteral Tube Other (Comment) Left nare -- days    External Urinary Catheter Small 2 days          Airway  Duration           ETT  Oral 7 5 mm <1 day              Can any invasive devices be discontinued today?  No  ---------------------------------------------------------------------------------------  OBJECTIVE    Vitals   Vitals:    11/21/22 0326 11/21/22 0326 11/21/22 0328 11/21/22 0328   BP:  (!) 74/42 96/53    Pulse: (!) 106   (!) 107   Resp:       Temp:       TempSrc:       SpO2: 100%   100%   Weight: Height:         Temp (24hrs), Av °F (37 2 °C), Min:98 4 °F (36 9 °C), Max:100 5 °F (38 1 °C)  Current: Temperature: 98 9 °F (37 2 °C)      Respiratory:  SpO2: SpO2: 100 %, SpO2 Activity: SpO2 Activity: At Rest, SpO2 Device: O2 Device: High flow nasal cannula       Invasive/non-invasive ventilation settings   Respiratory    Lab Data (Last 4 hours)    None         O2/Vent Data (Last 4 hours)    None                Physical Exam  Vitals reviewed  Constitutional:       General: He is in acute distress  HENT:      Head: Normocephalic  Mouth/Throat:      Mouth: Mucous membranes are dry  Cardiovascular:      Rate and Rhythm: Rhythm irregular  Pulmonary:      Effort: Tachypnea and respiratory distress present  Comments: Poor respiratory effort   Abdominal:      Palpations: Abdomen is soft  Musculoskeletal:         General: Normal range of motion  Skin:     General: Skin is dry  Capillary Refill: Capillary refill takes more than 3 seconds     Neurological:      Comments: Unresponsive             Laboratory and Diagnostics:  Results from last 7 days   Lab Units 22  0320 22  0804 226 228 22  0513 11/15/22  0521 22  0554   WBC Thousand/uL  --  24 29* 10 50* 11 23* 9 33 11 48* 12 18*   HEMOGLOBIN g/dL  --  12 8 13 2 13 0 12 2 12 0 11 8*   I STAT HEMOGLOBIN g/dl 12 2  --   --   --   --   --   --    HEMATOCRIT %  --  41 2 41 9 41 7 37 9 37 1 35 4*   HEMATOCRIT, ISTAT % 36*  --   --   --   --   --   --    PLATELETS Thousands/uL  --  340 281 220 181 174 147*   NEUTROS PCT %  --   --  79* 81* 81* 81*  --    BANDS PCT %  --  13*  --   --   --   --   --    MONOS PCT %  --   --  10 8 8 8  --    MONO PCT %  --  3*  --   --   --   --   --      Results from last 7 days   Lab Units 22  0245 22  2137 22  0804 227 22  0426 22  0448 22  0513   SODIUM mmol/L 155* 156* 155* 147 146 143 139   POTASSIUM mmol/L 4 6 4 0 4 1 4  2 3 7 4 0 4 3   CHLORIDE mmol/L 123* 124* 123* 117* 110* 113* 109*   CO2 mmol/L 29 24 25 20* 29 27 26   ANION GAP mmol/L 3* 8 7 10 7 3* 4   BUN mg/dL 95* 89* 77* 47* 51* 59* 66*   CREATININE mg/dL 2 81* 2 13* 1 86* 1 26 1 48* 1 41* 1 81*   CALCIUM mg/dL 9 0 8 5 9 0 9 3 9 0 9 1 9 1   GLUCOSE RANDOM mg/dL 263* 193* 159* 171* 202* 192* 418*     Results from last 7 days   Lab Units 11/19/22  0447 11/16/22  0513 11/16/22  0020 11/15/22  0521 11/14/22  0554   MAGNESIUM mg/dL 3 2* 3 0* 2 7* 2 6 2 7*   PHOSPHORUS mg/dL  --  3 3  --  4 0 4 0               Results from last 7 days   Lab Units 11/21/22  0021 11/20/22  2137 11/20/22  1505 11/20/22  1018   LACTIC ACID mmol/L 2 0 2 5* 2 3* 2 8*     ABG:    VBG:  Results from last 7 days   Lab Units 11/20/22  0804   PH MATTHEW  7 407*   PCO2 MATTHEW mm Hg 38 9*   PO2 MATTHEW mm Hg 47 0*   HCO3 MATTHEW mmol/L 23 9*   BASE EXC MATTHEW mmol/L -0 5           Micro  Results from last 7 days   Lab Units 11/20/22  1018   BLOOD CULTURE  Received in Microbiology Lab  Culture in Progress  Received in Microbiology Lab  Culture in Progress  EKG: IRR  Imaging:  I have personally reviewed pertinent reports  and I have personally reviewed pertinent films in PACS    Intake and Output  I/O       11/19 0701  11/20 0700 11/20 0701  11/21 0700    P  O  0 0    I V  (mL/kg)  317 5 (4 4)    NG/GT  300    IV Piggyback  50    Feedings  0    Total Intake(mL/kg) 0 (0) 667 5 (9 2)    Urine (mL/kg/hr) 750 (0 4) 400 (0 2)    Total Output 750 400    Net -750 +267 5          Unmeasured Urine Occurrence 3 x           Height and Weights   Height: 5' 8" (172 7 cm)  IBW (Ideal Body Weight): 68 4 kg  Body mass index is 24 3 kg/m²    Weight (last 2 days)     Date/Time Weight    11/20/22 0555 72 5 (159 83)    11/19/22 0550 74 3 (163 8)            Nutrition       Diet Orders   (From admission, onward)             Start     Ordered    11/20/22 2238  Diet Enteral/Parenteral; Tube Feeding No Oral Diet; Jevity 1 2 Pedro; Continuous; 62; 200; Water; Every 4 hours  Diet effective now        Comments: Start Tube feeds at 20 cc/hr and advance by 20cc/hr every 4 hours to goal    References:    Nutrtion Support Algorithm Enteral vs  Parenteral   Question Answer Comment   Diet Type Enteral/Parenteral    Enteral/Parenteral Tube Feeding No Oral Diet    Tube Feeding Formula: Jevity 1 2 Pedro    Bolus/Cyclic/Continuous Continuous    Tube Feeding Goal Rate (mL/hr): 62    Tube Feeding flush (mL): 200    Water Flush type: Water    Water flush frequency: Every 4 hours    RD to adjust diet per protocol?  Yes        11/20/22 2238              Active Medications  Scheduled Meds:  Current Facility-Administered Medications   Medication Dose Route Frequency Provider Last Rate   • acetaminophen  975 mg Oral Q8H [address] Catarina Prado PA-C     • acetylcysteine  3 mL Nebulization Q8H Jarad Haq MD     • atorvastatin  80 mg Oral Daily With Kerwin Muñiz PA-C     • bisacodyl  10 mg Rectal Daily PRN Lito Wilhelm PA-C     • cefepime  1,000 mg Intravenous Q12H Jarad Haq MD Stopped (11/20/22 2301)   • dextrose  75 mL/hr Intravenous Continuous Jarad Haq MD 75 mL/hr (11/20/22 2242)   • Dulaglutide  4 5 mg Subcutaneous Weekly Pedro Muñoz DO     • erythromycin  0 5 inch Both Eyes Every Other Day Lala Johnson PA-C     • insulin detemir  11 Units Subcutaneous HS Anushka Bunch PA-C     • insulin lispro  4-20 Units Subcutaneous Q6H Pedro Muñoz DO     • levalbuterol  1 25 mg Nebulization Q6H Jarad Haq MD     • levothyroxine  75 mcg Oral Early Morning Leeanne Masker INNA Prado     • melatonin  3 mg Oral HS Jeimy Urrutia MD     • metoprolol  5 mg Intravenous Q6H Pedro Muñoz DO     • metroNIDAZOLE  500 mg Intravenous Q8H Jarad Haq  mg (11/21/22 0210)   • norepinephrine  1-30 mcg/min Intravenous Titrated Josiah Rose PA-C     • omeprazole (PRILOSEC) suspension 2 mg/mL  20 mg Oral Early Morning Ammon Julian PA-C     • oxyCODONE  2 5 mg Oral Q4H PRN Ammon Julian PA-C      Or   • oxyCODONE  5 mg Oral Q4H PRN Ammon Julian PA-C     • polyethylene glycol  17 g Oral Daily Ammon Julian PA-C     • prednisoLONE acetate  1 drop Both Eyes Every Other Day Amanda Thorpe PA-C     • QUEtiapine  25 mg Oral HS Rocio Prado PA-C     • senna-docusate sodium  1 tablet Oral BID Ammon Julian PA-C     • vasopressin  0 04 Units/min Intravenous Continuous Hamlet Acevedo PA-C       Continuous Infusions:  dextrose, 75 mL/hr, Last Rate: 75 mL/hr (11/20/22 2242)  norepinephrine, 1-30 mcg/min  vasopressin, 0 04 Units/min      PRN Meds:   bisacodyl, 10 mg, Daily PRN  oxyCODONE, 2 5 mg, Q4H PRN   Or  oxyCODONE, 5 mg, Q4H PRN        Allergies   No Known Allergies  ---------------------------------------------------------------------------------------  Advance Directive and Living Will:      Power of :    POLST:    ---------------------------------------------------------------------------------------  Care Time Delivered:   Upon my evaluation, this patient had a high probability of imminent or life-threatening deterioration due to Neurologic decline, respiratory failure, shock, which required my direct attention, intervention, and personal management  I have personally provided  55 minutes (0308 to 0403) of critical care time, exclusive of procedures, teaching, family meetings, and any prior time recorded by providers other than myself  Tushar Swift PA-C      Portions of the record may have been created with voice recognition software  Occasional wrong word or "sound a like" substitutions may have occurred due to the inherent limitations of voice recognition software    Read the chart carefully and recognize, using context, where substitutions have occurred

## 2022-11-21 NOTE — DEATH NOTE
INPATIENT DEATH NOTE  Jorge Rodriguez 80 y o  male MRN: [id number]  Unit/Bed#: ICU 09 Encounter: [id number]    Date, Time and Cause of Death    Date of Death: 22  Time of Death:  3:36 PM  Preliminary Cause of Death: Respiratory failure ([facility] [address] )  Entered by: Salima Ramsey PA-C[SB1 1]     Attribution     SB1 1 Salima Ramsey PA-C 22 16:46           Patient's Information  Pronounced by: Darryle Freeze PA-c  Did the patient's death occur in the ED?: No  Did the patient's death occur in the OR?: No  Did the patient's death occur less than 10 days post-op?: No  Did the patient's death occur within 24 hours of admission?: No  Was code status DNR at the time of death?: Yes    PHYSICAL EXAM:  Unresponsive to noxious stimuli, Spontaneous respirations absent, Breath sounds absent, Carotid pulse absent, Heart sounds absent, Pupillary light reflex absent and Corneal blink reflex absent    Medical Examiner notification criteria:  Any type of trauma   Medical Examiner's office notified?:  Yes   Medical Examiner accepted case?:  No  Name of Medical Examiner: [facility]    Family Notification  Was the family notified?: Yes  Date Notified: 22  Time Notified: 5066  Notified by: at bedside  Name of Family Notified of Death:    Relationship to Patient: Spouse  Family Notification Route:  At bedside  Was the family told to contact a  home?: Yes  Name of  Home[de-identified] reymundo freitas    Autopsy Options:  Post-mortem examination declined by next of kin    Primary Service Attending Physician notified?:  yes - Attending:  Dr Karine Mckinnon  Physician/Resident responsible for completing Discharge Summary:  Salima Ramsey PA-C

## 2022-11-21 NOTE — PROGRESS NOTES
Interval progress note    Called to floor at approximately 0300 due to mental status change  Pt with unreactive pupils per RN and unresponsive to painful stimuli  Rapid response called  Upon arrival, RRT at bedside  Pt with agonal respirations and hypotensive to the upper 70s  Push dose levophed 16 mcg given with response, Levophed gtt started  Pt abg significant for CO2 >100 and pH of 7 05  Pt received two amps of bicarb prior to induction with succinylcholine and etomidate  Family updated on patient condition  Family reports they are considering comfort measures  Discussed with family, pt requires definitive airway at this time or will possibly suffer a respiratory code  Pt intubated without difficulty  Levophed gtt increased to 18 mcg  Family updated again in regards to pt condition and plan to arrive in the ICU to discuss further goals of care at bedside  No changes made to code status at this time  81 y/o male with nasal bone frx, epistaxis, and SDH s/p fall with hospital course complicated by acute hypercapnic respiratory failure, metabolic acidosis, respiratory acidosis, ischemic cardiomyopathy, acute on chronic kidney failure, Delirium, Dysphagia, SIRS, and suspected aspiration PNA  Neuro  Received Ketamine for vent dyssychrony  Will start sedation with precedex and pain control with fentanyl PRN pushes  Neurochecks q 1  Daily sedation holiday  Hold SubQ heparin given worsening ICH  If family wants to continue as a full code, will repeat CT head       Resp  Maintain mechanical ventilation  Will likely require bronchoscopy given suspected h/o aspiration  Repeat ABG  CXR pending for tube placement  Mucomyst     CV  Levophed gtt currently at 18  Will add vasopressin  Will need CVC  Received 4 amps bicarb during intubation due to mixed respiratory and metabolic acidosis of 7 0  Cardiopulmonary monitoring  Repeat ECHO  Heart failure team on consult, appreciate recs    GI/FEN  NPO  Keofed NGT in place, tube feeds held for intubation  Plan to restart  Continue FWF at 200   D5W @ 75  Received 1 L bolus of isolyte for intubation    Endo  Start insulin gtt    ID  Suspected aspiration PNA, started on IV abx - cefepime/flagyl  Lactic cleared with gentle hydration prior to Rapid response  FU blood cultures  Plan for bronchoscopy if family continues to desire full code      Worsening renal function, Cr 2 8 from 2 1  Received 1 L bolus with intubation  Continuing IVF at D5W @ 75 given heart failure and FWF [id number]  Upon my evaluation, this patient had a high probability of imminent or life-threatening deterioration due to hypercapneic respiratory failure, hemodynamic instability, sepsis, which required my direct attention, intervention, and personal management  I have personally provided 60 minutes of critical care time over multiple evaluations, exclusive of procedures, teaching, family meetings, and any prior time recorded by providers other than myself        Expected hospital length of stay is greater than or equal to 2 midnight stays

## 2022-11-21 NOTE — DISCHARGE SUMMARY
[address] [facility]  Discharge- [facility] 4/25/1933, 80 y o  male MRN: [id number]  Unit/Bed#: ICU 09 Encounter: [id number]  Primary Care Provider: Wilbur Palacios DO   Date and time admitted to hospital: 11/11/2022  5:15 AM    SIRS (systemic inflammatory response syndrome) ([facility] [address] )  Assessment & Plan  Concerning for aspiration event  - CXR  - Blood cultures  - Lactate  - UA  - Cefepime, Flagyl    Dysphagia  Assessment & Plan  Currently NPO  Discussions regarding enteral access with family  Delirium  Assessment & Plan  Seroquel 25 mg qHS, currently NPO  Consider increasing pharmacologic sedation today  Has intermittently required waist and wrist restraints  Will need to be out of restraints for rehab placement  Acute kidney injury [facility])  Assessment & Plan  Continue to trend BMPs    Epistaxis  Assessment & Plan  Resolved  Rhinorocket removed  Nasal bone fractures  Assessment & Plan  Non-op    Ischemic cardiomyopathy  Assessment & Plan  HF following  Last echo 07/2022 EF 40%  Appreciate recs, see below under acute resp failure    Mixed hyperlipidemia  Assessment & Plan  Continue statin, however patient is NPO    Essential hypertension  Assessment & Plan  Patient now NPO  IV metoprolol 25 mg q6hr    Stage 3b chronic kidney disease  Assessment & Plan  Lab Results   Component Value Date    EGFR 18 11/21/2022    EGFR 19 11/21/2022    EGFR 26 11/20/2022    CREATININE 2 82 (H) 11/21/2022    CREATININE 2 81 (H) 11/21/2022    CREATININE 2 13 (H) 11/20/2022     Continue to trend BMPs       Acquired hypothyroidism  Assessment & Plan  Continue levothyroxine, however patient is NPO    Type 2 diabetes mellitus with complication, with long-term current use of insulin [facility])  Assessment & Plan  Lab Results   Component Value Date    HGBA1C 7 3 (H) 09/17/2022       Recent Labs     11/20/22  1140 11/20/22  1744 11/21/22  0023 11/21/22  0314   POCGLU 155* 146* 244* 213*     -SSI, adjust as needed  - Trulicity added  Blood Sugar Average: Last 72 hrs:  (P) 124 [id number]    Paroxysmal atrial fibrillation (HCC)  Assessment & Plan  Continue metoprolol  Hold AC/AP in setting of SDH  * Subdural hematoma  Assessment & Plan  - Neuro exam: GCS 14 (4, 4, 6), due to confusion  - Continue neuro checks  - Reversal agent administered: K-Centra  - F/u CT head: Unchanged  - Appreciate neurosurgery recommendations  - Complete 7 day course of Keppra for seizure prophylaxis  - Chemical DVT prophylaxis: SQH  - Hold all anticoagulants and anti platelet medications until cleared by neurosurgery to resume  - PT/OT and cognitive evaluation              Medical Problems     Resolved Problems  Date Reviewed: 11/13/2022   None         Admission Date:   Admission Orders (From admission, onward)     Ordered        11/11/22 0540  Inpatient Admission  Once                        Admitting Diagnosis: Subdural hemorrhage ([facility] [address] ) [I62 00]  Ischemic cardiomyopathy [I25 5]  Closed fracture of nasal bone, initial encounter [S02  2XXA]  Fall, initial encounter [id number]  XXXA]  Unspecified multiple injuries, initial encounter [T07  XXXA]    HPI:   As per Jarad Haq MD in H&P: "Michael Saravia is a 80 y o  male who [facility] with [address]  He reportedly was getting out of his bed this evening when he fell and struck his face on the floor  He had immediate bleeding from his nose and pain and was taken to the ED for evaluation  In the ED he was initially GCS 15  However, during evaluation he became progressively short of breath requiring intubation and sedation  He was found to have nasal fractures and had packing placed in his nares  He was also found to have an [address] on CT scan so he was transferred to [facility] for trauma evaluation   On arrival, patient was intubated and sedated and unable to provide further history "    Procedures Performed:   Orders Placed This Encounter Procedures   • Fast Ultrasound   • Intubation       Summary of Hospital Course:   Patient remained intubated from 11/11 to 11/13 and was ultimately transferred out to step-down to under Trauma Service  On 11/20 patient had decline in his mental status and CT head showed unchanged subdural hemorrhage and new areas of ischemic stroke  Early on 11/21 he had continued worsening of his mental status and rapid response was called  He was found to have a respiratory acidosis and was intubated  Family was contacted and opted to continue care until they could come to bedside and transition to comfort measures  Patient was transitioned to level for comfort care and given p r n  Ativan and Dilaudid and patient ultimately passed away with family at his bedside  Significant Findings, Care, Treatment and Services Provided:   XR chest portable    Result Date: 11/11/2022  Impression: Worsening pulmonary edema  ET tube 4 cm above the desiree  Workstation performed: [id number]     XR Trauma chest portable    Result Date: 11/11/2022  Impression: No acute displaced fracture  Moderate pulmonary edema  Workstation performed: [id number]     CT head wo contrast    Result Date: 11/11/2022  Impression: Unchanged acute trace subdural hematoma along anterior-inferior falx and left frontotemporal cerebral convexity extending to left supraorbital region  No new acute intracranial abnormality  Workstation performed: [id number]     TRAUMA - CT head wo contrast    Result Date: 11/11/2022  Impression: 2 mm subdural seen along the inferior anterior cranial fossa on the coronal exam and along the dural falx also best seen on the coronal exam  No mass effect or midline shift  I personally discussed this study with Aayush Villanueva on 11/11/2022 at 3:52 AM  Workstation performed: [id number]     CT facial bones without contrast    Result Date: 11/11/2022  Impression: Comminuted bilateral nasal bone fractures    A tube is seen with its tip in the right nasal cavity    Please see concurrently reported CT of the brain  I personally discussed this study with Dr Vanessa Nick on 2022 at 5:10 AM  Workstation performed: [id number]     TRAUMA - CT spine cervical wo contrast    Result Date: 2022  Impression: No cervical spine fracture or traumatic malalignment  I personally discussed this study with Tolumichelet Byrnes on 2022 at 3:52 AM  Workstation performed: [id number]     XR chest 1 view    Result Date: 2022  Impression: Pulmonary edema persists though has improved since radiograph performed 2 hours prior Workstation performed: [id number]     XR pelvis ap only 1 or 2 vw    Result Date: 2022  Impression: No acute osseous abnormality  Workstation performed: [id number]     TRAUMA - CT chest abdomen pelvis w contrast    Result Date: 2022  Impression: Apical and basilar interlobular septal thickening with subpleural reticular changes peripherally throughout the lungs suggesting underlying interstitial process; interlobular septal thickening has developed in the interval and was not present on the prior CT from 10/21/2022 raising the possibility of pulmonary edema versus a worsening interstitial reticular process  Pretracheal/subcarinal adenopathy measuring up to 1 2 cm  Recommend pulmonary consultation    I personally discussed this study with Tolu Byrnes on 2022 at 3:51 AM  Workstation performed: [id number]     XR Trauma multiple (SLB/SLRA trauma bay ONLY)    Result Date: 2022  Impression: Pulmonary edema persists though has improved since radiograph performed 2 hours prior Workstation performed: [id number]       Complications: N/A    Condition at Time of Death:     Final Diagnosis: SDH with acute hypoxic and hypercarbic respiratory failure    Date, Time and Cause of Death    Date of Death: 22  Time of Death:  3:36 PM  Preliminary Cause of Death: Respiratory failure ([facility] [address] )  Entered by: Cheryl Lambert PA-C[SB1 1] Attribution     SB1 1 Dion Murphy PA-C 22 16:46          PCP: Ady Garner DO    Disposition:

## 2022-11-21 NOTE — ED PROCEDURE NOTE
Procedure  Intubation    Date/Time: 11/21/2022 3:32 AM  Performed by: Edwyna Duverney, MD  Authorized by: Edwyna Duverney, MD     Patient location:  ED  Consent:     Consent obtained:  Emergent situation  Universal protocol:     Patient identity confirmed: Anonymous protocol, patient vented/unresponsive  Pre-procedure details:     Patient status:  Unresponsive    Pretreatment medications:  Etomidate    Paralytics:  Succinylcholine  Indications:     Indications for intubation: respiratory failure, airway protection and hypercapnia    Procedure details:     Preoxygenation:  Bag valve mask    CPR in progress: no      Intubation method:  Oral    Oral intubation technique:  Glidescope    Laryngoscope blade: Mac 4    Tube size (mm):  7 5    Tube type:  Cuffed    Number of attempts:  1    Tube visualized through cords: yes    Placement assessment:     ETT to teeth:  24    Tube secured with:  ETT noel    Breath sounds:  Equal    Placement verification: chest rise, equal breath sounds, ETCO2 detector and tube exhalation    Post-procedure details:     Patient tolerance of procedure:   Tolerated well, no immediate complications                     Edwyna Duverney, MD  11/21/22 0238

## 2022-11-21 NOTE — CASE MANAGEMENT
Case Management Discharge Planning Note    Patient name Ute Aly  Location ICU 09/ICU 09 MRN [id number]  : 1933 Date 2022       Current Admission Date: 2022  Current Admission Diagnosis:Subdural hematoma   Patient Active Problem List    Diagnosis Date Noted   • SIRS (systemic inflammatory response syndrome) ([facility] [address] ) 2022   • Dysphagia 2022   • Delirium 2022   • Acute kidney injury ([address] ) 11/15/2022   • Epistaxis 2022   • Acute respiratory failure with hypoxia ([address] ) 2022   • Nasal bone fractures 2022   • Subdural hematoma 2022   • Cerebrovascular accident (CVA) due to embolism of cerebral artery ([facility] [address] ) 2022   • Aortic valve stenosis 2022   • Cerebral vascular disease 06/10/2022   • Benign paroxysmal positional vertigo due to bilateral vestibular disorder 06/10/2022   • Sensorineural hearing loss (SNHL) of both ears 2022   • Asymptomatic bilateral carotid artery stenosis 2021   • Ischemic cardiomyopathy    • Basal cell carcinoma of skin of nose 2020   • 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 2020   • Pulmonary fibrosis ([facility] [address] )    • Peripheral arterial disease ([facility] [address] ) 2020   • Atherosclerosis of native arteries of extremities with intermittent claudication, bilateral legs ([facility] [address] ) 2019   • Lightheadedness 2019   • Paroxysmal atrial fibrillation ([facility] [address] ) 2018   • Type 2 diabetes mellitus with complication, with long-term current use of insulin ([facility] [address] ) 2018   • Bradycardia 2018   • Rupture of tendon of biceps, long head 2017   • Stage 3b chronic kidney disease 2017   • Spinal stenosis of lumbar region with neurogenic claudication 2017   • Acquired hypothyroidism 2016   • Mixed hyperlipidemia 2015   • Stented coronary artery 2015   • Essential hypertension 10/29/2010   • Low back pain 2008      LOS (days): 10  Geometric Mean LOS (GMLOS) (days): 4 60  Days to GMLOS:-5 6     OBJECTIVE:  Risk of Unplanned Readmission Score: 25 57         Current admission status: Inpatient   Preferred Pharmacy:   [facility]  [facility] [address] [place] [address]  Phone: [phone] Fax: [phone]    Primary Care Provider: Rita Sanders DO    Primary Insurance: MEDICARE  Secondary Insurance: [address]  DETAILS:    Noted that pt is now comfort care   CM will wait to hear if hospice referral is desired by family and then proceed

## 2022-11-21 NOTE — RAPID RESPONSE
Rapid Response Note  Jorge Rodriguez 80 y o  male MRN: [id number]  Unit/Bed#: ICU 09 Encounter: [id number]    Rapid Response Notification(s):   Response called date/time:  11/21/2022 3:07 AM  Response team arrival date/time:  11/21/2022 3:10 AM  Response end date/time:  11/21/2022 3:29 AM  Level of care:  Stepdown 2  Rapid response location:  Stepdown unit  Primary reason for rapid response call:  Acute change in neuro status and acute change in RR    Rapid Response Intervention(s):   Airway:  Positioning and endotracheal intubation  Breathing:  Oxygen and assisted ventilation  Fluids administered:  Isolyte/plasmalyte  Medications administered:  Etomidate, paralytics and sodium bicarbonate (succinylcholine, 4 amps bicarb )       Assessment:   · Acute hypoxic and hypercarbic respiratory failure   · Shock  · Aspiration   · Encephalopathy   · SDH   · Ischemic R cerebellar stroke     Plan:   · Emergent intubation   · ISTAT PCO3 >103, pH 7 06  ·   · Levophed for MAP goal >65   · [facility]      Rapid Response Outcome:   Transfer:  Transfer to ICU  Primary service notified of transfer: Yes    Code Status: Level 1 (Full Code)      Family notified of transfer: yes  Family member contacted: Wife      Background/Situation:   Jorge Rodriguez is a 80 y o  male admitted for SDH complicated by aspiration and ischemic stroke  DI alert earlier in the night for elevated RR and hypernatremia, patient started on HFNC and upgraded to SD2 at that time  Patient found unresponsive by RN and RR called  Unresponsive to noxious stimuli, decision made to intubate  ISTAT with hypercarbic respiratory failure 7 0, pCO2 >103  Hypotensive to 70s requiring IVF bolus over pressure bag, high dose levophed 10-20 mcg/min and total 30 mcg push dose levophed  He was transferred to ICU for further care       Review of Systems   Unable to perform ROS: Mental status change       Objective:   Vitals:    11/21/22 0326 11/21/22 4813 11/21/22 0328 11/21/22 0328   BP:  (!) 74/42 96/53    Pulse: (!) 106   (!) 107   Resp:       Temp:       TempSrc:       SpO2: 100%   100%   Weight:       Height:         Physical Exam  Constitutional:       Appearance: He is ill-appearing  HENT:      Head: Normocephalic and atraumatic  Eyes:      Comments: Pupils unreactive to light    Cardiovascular:      Rate and Rhythm: Normal rate and regular rhythm  Heart sounds: Normal heart sounds  Pulmonary:      Effort: Bradypnea and respiratory distress present  Musculoskeletal:      Right lower leg: No edema  Left lower leg: No edema  Skin:     General: Skin is warm  Neurological:      Mental Status: He is unresponsive  GCS: GCS eye subscore is 1  GCS verbal subscore is 1  GCS motor subscore is 1  Comments: Unresponsive to central noxious stimuli          Portions of the record may have been created with voice recognition software  Occasional wrong word or "sound a like" substitutions may have occurred due to the inherent limitations of voice recognition software  Read the chart carefully and recognize, using context, where substitutions have occurred      Júnior Aguila PA-C

## 2022-11-21 NOTE — PLAN OF CARE
Problem: MOBILITY - ADULT  Goal: Maintain or return to baseline ADL function  Description: INTERVENTIONS:  -  Assess patient's ability to carry out ADLs; assess patient's baseline for ADL function and identify physical deficits which impact ability to perform ADLs (bathing, care of mouth/teeth, toileting, grooming, dressing, etc )  - Assess/evaluate cause of self-care deficits   - Assess range of motion  - Assess patient's mobility; develop plan if impaired  - Assess patient's need for assistive devices and provide as appropriate  - Encourage maximum independence but intervene and supervise when necessary  - Involve family in performance of ADLs  - Assess for home care needs following discharge   - Consider OT consult to assist with ADL evaluation and planning for discharge  - Provide patient education as appropriate  Outcome: Progressing  Goal: Maintains/Returns to pre admission functional level  Description: INTERVENTIONS:  - Perform BMAT or MOVE assessment daily    - Set and communicate daily mobility goal to care team and patient/family/caregiver     - Collaborate with rehabilitation services on mobility goals if consulted  - Record patient progress and toleration of activity level   Outcome: Progressing     Problem: PAIN - ADULT  Goal: Verbalizes/displays adequate comfort level or baseline comfort level  Description: Interventions:  - Encourage patient to monitor pain and request assistance  - Assess pain using appropriate pain scale  - Administer analgesics based on type and severity of pain and evaluate response  - Implement non-pharmacological measures as appropriate and evaluate response  - Consider cultural and social influences on pain and pain management  - Notify physician/advanced practitioner if interventions unsuccessful or patient reports new pain  Outcome: Progressing     Problem: INFECTION - ADULT  Goal: Absence or prevention of progression during hospitalization  Description: INTERVENTIONS:  - Assess and monitor for signs and symptoms of infection  - Monitor lab/diagnostic results  - Monitor all insertion sites, i e  indwelling lines, tubes, and drains  - Monitor endotracheal if appropriate and nasal secretions for changes in amount and color  - [place] appropriate cooling/warming therapies per order  - Administer medications as ordered  - Instruct and encourage patient and family to use good hand hygiene technique  - Identify and instruct in appropriate isolation precautions for identified infection/condition  Outcome: Progressing     Problem: SAFETY ADULT  Goal: Maintain or return to baseline ADL function  Description: INTERVENTIONS:  -  Assess patient's ability to carry out ADLs; assess patient's baseline for ADL function and identify physical deficits which impact ability to perform ADLs (bathing, care of mouth/teeth, toileting, grooming, dressing, etc )  - Assess/evaluate cause of self-care deficits   - Assess range of motion  - Assess patient's mobility; develop plan if impaired  - Assess patient's need for assistive devices and provide as appropriate  - Encourage maximum independence but intervene and supervise when necessary  - Involve family in performance of ADLs  - Assess for home care needs following discharge   - Consider OT consult to assist with ADL evaluation and planning for discharge  - Provide patient education as appropriate  Outcome: Progressing  Goal: Maintains/Returns to pre admission functional level  Description: INTERVENTIONS:  - Perform BMAT or MOVE assessment daily    - Set and communicate daily mobility goal to care team and patient/family/caregiver     - Collaborate with rehabilitation services on mobility goals if consulted  - Record patient progress and toleration of activity level   Outcome: Progressing  Goal: Patient will remain free of falls  Description: INTERVENTIONS:  - Educate patient/family on patient safety including physical limitations  - Instruct patient to call for assistance with activity   - Consult OT/PT to assist with strengthening/mobility   - Keep Call bell within reach  - Keep bed low and locked with side rails adjusted as appropriate  - Keep care items and personal belongings within reach  - Initiate and maintain comfort rounds  - Make Fall Risk Sign visible to staff  - Offer Toileting every  Hours, in advance of need  - Initiate/Maintain alarm  - Obtain necessary fall risk management equipment  - Apply yellow socks and bracelet for high fall risk patients  - Consider moving patient to room near nurses station  Outcome: Progressing     Problem: DISCHARGE PLANNING  Goal: Discharge to home or other facility with appropriate resources  Description: INTERVENTIONS:  - Identify barriers to discharge w/patient and caregiver  - Arrange for needed discharge resources and transportation as appropriate  - Identify discharge learning needs (meds, wound care, etc )  - Arrange for interpretive services to assist at discharge as needed  - Refer to Case Management Department for coordinating discharge planning if the patient needs post-hospital services based on physician/advanced practitioner order or complex needs related to functional status, cognitive ability, or social support system  Outcome: Progressing     Problem: Knowledge Deficit  Goal: Patient/family/caregiver demonstrates understanding of disease process, treatment plan, medications, and discharge instructions  Description: Complete learning assessment and assess knowledge base    Interventions:  - Provide teaching at level of understanding  - Provide teaching via preferred learning methods  Outcome: Progressing     Problem: Potential for Falls  Goal: Patient will remain free of falls  Description: INTERVENTIONS:  - Educate patient/family on patient safety including physical limitations  - Instruct patient to call for assistance with activity   - Consult OT/PT to assist with strengthening/mobility   - Keep Call bell within reach  - Keep bed low and locked with side rails adjusted as appropriate  - Keep care items and personal belongings within reach  - Initiate and maintain comfort rounds  - Make Fall Risk Sign visible to staff  - Offer Toileting every  Hours, in advance of need  - Initiate/Maintain alarm  - Obtain necessary fall risk management equipment:  - Apply yellow socks and bracelet for high fall risk patients  - Consider moving patient to room near nurses station  Outcome: Progressing     Problem: Prexisting or High Potential for Compromised Skin Integrity  Goal: Skin integrity is maintained or improved  Description: INTERVENTIONS:  - Identify patients at risk for skin breakdown  - Assess and monitor skin integrity  - Assess and monitor nutrition and hydration status  - Monitor labs   - Assess for incontinence   - Turn and reposition patient  - Assist with mobility/ambulation  - Relieve pressure over bony prominences  - Avoid friction and shearing  - Provide appropriate hygiene as needed including keeping skin clean and dry  - Evaluate need for skin moisturizer/barrier cream  - Collaborate with interdisciplinary team   - Patient/family teaching  - Consider wound care consult   Outcome: Progressing     Problem: SAFETY,RESTRAINT: NV/NON-SELF DESTRUCTIVE BEHAVIOR  Goal: Remains free of harm/injury (restraint for non violent/non self-detsructive behavior)  Description: INTERVENTIONS:  - Instruct patient/family regarding restraint use   - Assess and monitor physiologic and psychological status   - Provide interventions and comfort measures to meet assessed patient needs   - Identify and implement measures to help patient regain control  - Assess readiness for release of restraint   Outcome: Progressing  Goal: Returns to optimal restraint-free functioning  Description: INTERVENTIONS:  - Assess the patient's behavior and symptoms that indicate continued need for restraint  - Identify and implement measures to help patient regain control  - Assess readiness for release of restraint   Outcome: Progressing     Problem: CONFUSION/THOUGHT DISTURBANCE  Goal: Thought disturbances (confusion, delirium, depression, dementia or psychosis) are managed to maintain or return to baseline mental status and functional level  Description: INTERVENTIONS:  - Assess for possible contributors to  thought disturbance, including but not limited to medications, infection, impaired vision or hearing, underlying metabolic abnormalities, dehydration, respiratory compromise,  psychiatric diagnoses and notify attending PHYSICAN/AP  - Monitor and intervene to maintain adequate nutrition, hydration, elimination, sleep and activity  - Decrease environmental stimuli, including noise as appropriate  - Provide frequent contacts to provide refocusing, direction and reassurance as needed  Approach patient calmly with eye contact and at their level  - [place] high risk fall precautions, aspiration precautions and other safety measures, as indicated  - If delirium suspected, notify physician/AP of change in condition and request immediate in-person evaluation  - Pursue consults as appropriate including Geriatric (campus dependent), OT for cognitive evaluation/activity planning, psychiatric, pastoral care, etc   Outcome: Progressing     Problem: BEHAVIOR  Goal: Pt/Family maintain appropriate behavior and adhere to behavioral management agreement, if implemented  Description: INTERVENTIONS:  - Assess the family dynamic   - Encourage verbalization of thoughts and concerns in a socially appropriate manner  - Assess patient/family's coping skills and non-compliant behavior (including use of illegal substances)    - Utilize positive, consistent limit setting strategies supporting safety of patient, staff and others  - Initiate consult with Case Management, Spiritual Care or other ancillary services as appropriate  - If a patient's/visitor's behavior jeopardizes the safety of the patient, staff, or others, refer to organization procedure  - Notify Security of behavior or suspected illegal substances which indicate the need for search of the patient and/or belongings  - Encourage participation in the decision making process about a behavioral management agreement; implement if patient meets criteria  Outcome: Progressing     Problem: NEUROSENSORY - ADULT  Goal: Achieves stable or improved neurological status  Description: INTERVENTIONS  - Monitor and report changes in neurological status  - Monitor vital signs such as temperature, blood pressure, glucose, and any other labs ordered   - Initiate measures to prevent increased intracranial pressure  - Monitor for seizure activity and implement precautions if appropriate      Outcome: Progressing  Goal: Remains free of injury related to seizures activity  Description: INTERVENTIONS  - Maintain airway, patient safety  and administer oxygen as ordered  - Monitor patient for seizure activity, document and report duration and description of seizure to physician/advanced practitioner  - If seizure occurs,  ensure patient safety during seizure  - Reorient patient post seizure  - Seizure pads on all 4 side rails  - Instruct patient/family to notify RN of any seizure activity including if an aura is experienced  - Instruct patient/family to call for assistance with activity based on nursing assessment  - Administer anti-seizure medications if ordered    Outcome: Progressing  Goal: Achieves maximal functionality and self care  Description: INTERVENTIONS  - Monitor swallowing and airway patency with patient fatigue and changes in neurological status  - Encourage and assist patient to increase activity and self care     - Encourage visually impaired, hearing impaired and aphasic patients to use assistive/communication devices  Outcome: Progressing     Problem: CARDIOVASCULAR - ADULT  Goal: Maintains optimal cardiac output and hemodynamic stability  Description: INTERVENTIONS:  - Monitor I/O, vital signs and rhythm  - Monitor for S/S and trends of decreased cardiac output  - Administer and titrate ordered vasoactive medications to optimize hemodynamic stability  - Assess quality of pulses, skin color and temperature  - Assess for signs of decreased coronary artery perfusion  - Instruct patient to report change in severity of symptoms  Outcome: Progressing  Goal: Absence of cardiac dysrhythmias or at baseline rhythm  Description: INTERVENTIONS:  - Continuous cardiac monitoring, vital signs, obtain 12 lead EKG if ordered  - Administer antiarrhythmic and heart rate control medications as ordered  - Monitor electrolytes and administer replacement therapy as ordered  Outcome: Progressing     Problem: RESPIRATORY - ADULT  Goal: Achieves optimal ventilation and oxygenation  Description: INTERVENTIONS:  - Assess for changes in respiratory status  - Assess for changes in mentation and behavior  - Position to facilitate oxygenation and minimize respiratory effort  - Oxygen administered by appropriate delivery if ordered  - Initiate smoking cessation education as indicated  - Encourage broncho-pulmonary hygiene including cough, deep breathe, Incentive Spirometry  - Assess the need for suctioning and aspirate as needed  - Assess and instruct to report SOB or any respiratory difficulty  - Respiratory Therapy support as indicated  Outcome: Progressing     Problem: GENITOURINARY - ADULT  Goal: Maintains or returns to baseline urinary function  Description: INTERVENTIONS:  - Assess urinary function  - Encourage oral fluids to ensure adequate hydration if ordered  - Administer IV fluids as ordered to ensure adequate hydration  - Administer ordered medications as needed  - Offer frequent toileting  - Follow urinary retention protocol if ordered  Outcome: Progressing  Goal: Absence of urinary retention  Description: INTERVENTIONS:  - Assess patient’s ability to void and empty bladder  - Monitor I/O  - Bladder scan as needed  - Discuss with physician/AP medications to alleviate retention as needed  - Discuss catheterization for long term situations as appropriate  Outcome: Progressing  Goal: Urinary catheter remains patent  Description: INTERVENTIONS:  - Assess patency of urinary catheter  - If patient has a chronic avendano, consider changing catheter if non-functioning  - Follow guidelines for intermittent irrigation of non-functioning urinary catheter  Outcome: Progressing

## 2022-11-21 NOTE — ASSESSMENT & PLAN NOTE
Lab Results   Component Value Date    EGFR 18 11/21/2022    EGFR 19 11/21/2022    EGFR 26 11/20/2022    CREATININE 2 82 (H) 11/21/2022    CREATININE 2 81 (H) 11/21/2022    CREATININE 2 13 (H) 11/20/2022     Continue to trend BMPs
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
## 2022-11-25 LAB
BACTERIA BLD CULT: NORMAL
BACTERIA BLD CULT: NORMAL

## 2025-05-16 NOTE — ASSESSMENT & PLAN NOTE
Sounds as though his blood pressure is periodic Allyson low  He actually feels better since he cut back on amiodarone   In the last few daysbut I would rather change some of his other meds  controlled